# Patient Record
Sex: FEMALE | Race: WHITE | NOT HISPANIC OR LATINO | Employment: PART TIME | URBAN - NONMETROPOLITAN AREA
[De-identification: names, ages, dates, MRNs, and addresses within clinical notes are randomized per-mention and may not be internally consistent; named-entity substitution may affect disease eponyms.]

---

## 2016-08-25 LAB
EXTERNAL ABO GROUPING: NORMAL
EXTERNAL ANTIBODY SCREEN: NEGATIVE
EXTERNAL GONORRHEA SCREEN: NEGATIVE
EXTERNAL HEPATITIS B SURFACE ANTIGEN: NEGATIVE
EXTERNAL RH FACTOR: POSITIVE
EXTERNAL RUBELLA QUALITATIVE: NORMAL
HIV1 AB SPEC QL IA.RAPID: NEGATIVE

## 2017-02-07 ENCOUNTER — INITIAL PRENATAL (OUTPATIENT)
Dept: OBSTETRICS AND GYNECOLOGY | Facility: CLINIC | Age: 27
End: 2017-02-07

## 2017-02-07 ENCOUNTER — PROCEDURE VISIT (OUTPATIENT)
Dept: OBSTETRICS AND GYNECOLOGY | Facility: CLINIC | Age: 27
End: 2017-02-07

## 2017-02-07 VITALS
HEIGHT: 64 IN | BODY MASS INDEX: 25.27 KG/M2 | SYSTOLIC BLOOD PRESSURE: 110 MMHG | DIASTOLIC BLOOD PRESSURE: 65 MMHG | WEIGHT: 148 LBS

## 2017-02-07 DIAGNOSIS — Z34.83 ENCOUNTER FOR SUPERVISION OF OTHER NORMAL PREGNANCY IN THIRD TRIMESTER: Primary | ICD-10-CM

## 2017-02-07 DIAGNOSIS — O36.5930 SMALL FOR DATES AFFECTING MANAGEMENT OF MOTHER, THIRD TRIMESTER, NOT APPLICABLE OR UNSPECIFIED FETUS: Primary | ICD-10-CM

## 2017-02-07 PROBLEM — Z34.93 ENCOUNTER FOR SUPERVISION OF NORMAL PREGNANCY IN THIRD TRIMESTER: Status: ACTIVE | Noted: 2017-02-07

## 2017-02-07 PROCEDURE — 0501F PRENATAL FLOW SHEET: CPT | Performed by: OBSTETRICS & GYNECOLOGY

## 2017-02-07 PROCEDURE — 90715 TDAP VACCINE 7 YRS/> IM: CPT | Performed by: OBSTETRICS & GYNECOLOGY

## 2017-02-07 PROCEDURE — 90471 IMMUNIZATION ADMIN: CPT | Performed by: OBSTETRICS & GYNECOLOGY

## 2017-02-07 PROCEDURE — 76816 OB US FOLLOW-UP PER FETUS: CPT | Performed by: OBSTETRICS & GYNECOLOGY

## 2017-02-07 RX ORDER — PROMETHAZINE HYDROCHLORIDE 25 MG/1
25 TABLET ORAL EVERY 6 HOURS PRN
COMMUNITY
End: 2017-02-07 | Stop reason: SDUPTHER

## 2017-02-07 RX ORDER — PRENATAL VIT NO.126/IRON/FOLIC 28MG-0.8MG
1 TABLET ORAL DAILY
COMMUNITY
End: 2017-11-29

## 2017-02-07 RX ORDER — RANITIDINE 150 MG/1
150 TABLET ORAL 2 TIMES DAILY
COMMUNITY
End: 2017-04-20 | Stop reason: HOSPADM

## 2017-02-07 RX ORDER — FERROUS SULFATE 325(65) MG
325 TABLET ORAL
COMMUNITY
End: 2017-06-14

## 2017-02-07 RX ORDER — PROMETHAZINE HYDROCHLORIDE 25 MG/1
25 TABLET ORAL EVERY 6 HOURS PRN
Qty: 30 TABLET | Refills: 3 | Status: SHIPPED | OUTPATIENT
Start: 2017-02-07 | End: 2017-04-20 | Stop reason: HOSPADM

## 2017-02-07 NOTE — PROGRESS NOTES
Transfer OB, first pregnancy uncomplicated but delivery complicated by postpartum hemorrhage and transfusion. Second pregnancy complicated by severe hyperemesis but normal delivery.  Having a GIRL this time, feeling well. Has had flu vaccine.  Taking prenatal vitamin and iron supplement.  Labs and records reviewed, normal labs with blood type O positive, Glucola 127. Normal anatomy US and BHARGAVI by 7 week US.  US today shows 39% growth, AIDA 12.9  Tdap today in office.  Discussed contraception, patient considering surgical sterilization.

## 2017-02-21 ENCOUNTER — ROUTINE PRENATAL (OUTPATIENT)
Dept: OBSTETRICS AND GYNECOLOGY | Facility: CLINIC | Age: 27
End: 2017-02-21

## 2017-02-21 VITALS — SYSTOLIC BLOOD PRESSURE: 100 MMHG | DIASTOLIC BLOOD PRESSURE: 70 MMHG | BODY MASS INDEX: 26.26 KG/M2 | WEIGHT: 153 LBS

## 2017-02-21 DIAGNOSIS — Z34.93 PRENATAL CARE, THIRD TRIMESTER: ICD-10-CM

## 2017-02-21 DIAGNOSIS — Z78.9 NON-SMOKER: Primary | ICD-10-CM

## 2017-02-21 PROCEDURE — 0502F SUBSEQUENT PRENATAL CARE: CPT | Performed by: OBSTETRICS & GYNECOLOGY

## 2017-02-21 NOTE — PROGRESS NOTES
Kick count instructions were reviewed and encouraged.  Labor signs and symptoms were reviewed.  Patient was encouraged to come to hospital or call for bleeding, leakage of fluid or any other concerns.

## 2017-03-07 ENCOUNTER — ROUTINE PRENATAL (OUTPATIENT)
Dept: OBSTETRICS AND GYNECOLOGY | Facility: CLINIC | Age: 27
End: 2017-03-07

## 2017-03-07 VITALS — BODY MASS INDEX: 27.29 KG/M2 | WEIGHT: 159 LBS | DIASTOLIC BLOOD PRESSURE: 72 MMHG | SYSTOLIC BLOOD PRESSURE: 105 MMHG

## 2017-03-07 DIAGNOSIS — Z34.83 ENCOUNTER FOR SUPERVISION OF OTHER NORMAL PREGNANCY IN THIRD TRIMESTER: ICD-10-CM

## 2017-03-07 DIAGNOSIS — Z78.9 NON-SMOKER: Primary | ICD-10-CM

## 2017-03-07 PROCEDURE — 0502F SUBSEQUENT PRENATAL CARE: CPT | Performed by: OBSTETRICS & GYNECOLOGY

## 2017-03-07 NOTE — PROGRESS NOTES
Pt c/o frequent BH contractions and has passed a little bit of mucus.  Zantac not really helping with GERD, recommended Pepcid AC.  Good FM.  .  Patient reports that she has already had a TDaP.  No concerns.

## 2017-03-21 ENCOUNTER — ROUTINE PRENATAL (OUTPATIENT)
Dept: OBSTETRICS AND GYNECOLOGY | Facility: CLINIC | Age: 27
End: 2017-03-21

## 2017-03-21 VITALS — WEIGHT: 158 LBS | SYSTOLIC BLOOD PRESSURE: 118 MMHG | DIASTOLIC BLOOD PRESSURE: 72 MMHG | BODY MASS INDEX: 27.12 KG/M2

## 2017-03-21 DIAGNOSIS — Z34.83 PRENATAL CARE, SUBSEQUENT PREGNANCY, THIRD TRIMESTER: Primary | ICD-10-CM

## 2017-03-21 DIAGNOSIS — Z34.93 PRENATAL CARE, THIRD TRIMESTER: ICD-10-CM

## 2017-03-21 DIAGNOSIS — Z78.9 NON-SMOKER: ICD-10-CM

## 2017-03-21 LAB — EXTERNAL GROUP B STREP ANTIGEN: NEGATIVE

## 2017-03-21 PROCEDURE — 0502F SUBSEQUENT PRENATAL CARE: CPT | Performed by: OBSTETRICS & GYNECOLOGY

## 2017-03-24 LAB
C TRACH RRNA SPEC QL NAA+PROBE: NEGATIVE
GP B STREP DNA SPEC QL NAA+PROBE: NEGATIVE
N GONORRHOEA RRNA SPEC QL NAA+PROBE: NEGATIVE

## 2017-03-28 ENCOUNTER — ROUTINE PRENATAL (OUTPATIENT)
Dept: OBSTETRICS AND GYNECOLOGY | Facility: CLINIC | Age: 27
End: 2017-03-28

## 2017-03-28 ENCOUNTER — PROCEDURE VISIT (OUTPATIENT)
Dept: OBSTETRICS AND GYNECOLOGY | Facility: CLINIC | Age: 27
End: 2017-03-28

## 2017-03-28 VITALS — WEIGHT: 155 LBS | BODY MASS INDEX: 26.61 KG/M2 | DIASTOLIC BLOOD PRESSURE: 80 MMHG | SYSTOLIC BLOOD PRESSURE: 110 MMHG

## 2017-03-28 DIAGNOSIS — O36.5130: Primary | ICD-10-CM

## 2017-03-28 DIAGNOSIS — Z34.83 PRENATAL CARE, SUBSEQUENT PREGNANCY, THIRD TRIMESTER: ICD-10-CM

## 2017-03-28 DIAGNOSIS — Z78.9 NONSMOKER: Primary | ICD-10-CM

## 2017-03-28 PROCEDURE — 76816 OB US FOLLOW-UP PER FETUS: CPT | Performed by: OBSTETRICS & GYNECOLOGY

## 2017-03-28 PROCEDURE — 76819 FETAL BIOPHYS PROFIL W/O NST: CPT | Performed by: OBSTETRICS & GYNECOLOGY

## 2017-03-28 PROCEDURE — 59425 ANTEPARTUM CARE ONLY: CPT | Performed by: OBSTETRICS & GYNECOLOGY

## 2017-04-11 ENCOUNTER — ROUTINE PRENATAL (OUTPATIENT)
Dept: OBSTETRICS AND GYNECOLOGY | Facility: CLINIC | Age: 27
End: 2017-04-11

## 2017-04-11 VITALS — WEIGHT: 160 LBS | BODY MASS INDEX: 27.46 KG/M2 | SYSTOLIC BLOOD PRESSURE: 112 MMHG | DIASTOLIC BLOOD PRESSURE: 78 MMHG

## 2017-04-11 DIAGNOSIS — Z34.93 PRENATAL CARE IN THIRD TRIMESTER: Primary | ICD-10-CM

## 2017-04-11 PROCEDURE — 0502F SUBSEQUENT PRENATAL CARE: CPT | Performed by: NURSE PRACTITIONER

## 2017-04-11 NOTE — PROGRESS NOTES
+FHTs, +FM.  Doing well.  Occasional contractions.  Cvx 2/30/hi.  RTO Monday for postdate US and appt with MD.

## 2017-04-17 ENCOUNTER — PROCEDURE VISIT (OUTPATIENT)
Dept: OBSTETRICS AND GYNECOLOGY | Facility: CLINIC | Age: 27
End: 2017-04-17

## 2017-04-17 ENCOUNTER — ROUTINE PRENATAL (OUTPATIENT)
Dept: OBSTETRICS AND GYNECOLOGY | Facility: CLINIC | Age: 27
End: 2017-04-17

## 2017-04-17 VITALS — WEIGHT: 160 LBS | SYSTOLIC BLOOD PRESSURE: 128 MMHG | BODY MASS INDEX: 27.46 KG/M2 | DIASTOLIC BLOOD PRESSURE: 90 MMHG

## 2017-04-17 DIAGNOSIS — O48.0 POSTMATURITY PREGNANCY, 40-42 WEEKS GESTATION: Primary | ICD-10-CM

## 2017-04-17 DIAGNOSIS — Z34.83 ENCOUNTER FOR SUPERVISION OF OTHER NORMAL PREGNANCY IN THIRD TRIMESTER: Primary | ICD-10-CM

## 2017-04-17 DIAGNOSIS — O48.0 POST-TERM PREGNANCY, 40-42 WEEKS OF GESTATION: Primary | ICD-10-CM

## 2017-04-17 PROCEDURE — 76816 OB US FOLLOW-UP PER FETUS: CPT | Performed by: OBSTETRICS & GYNECOLOGY

## 2017-04-17 PROCEDURE — 0502F SUBSEQUENT PRENATAL CARE: CPT | Performed by: OBSTETRICS & GYNECOLOGY

## 2017-04-17 PROCEDURE — 76819 FETAL BIOPHYS PROFIL W/O NST: CPT | Performed by: OBSTETRICS & GYNECOLOGY

## 2017-04-17 RX ORDER — CALCIUM CARBONATE 200(500)MG
2 TABLET,CHEWABLE ORAL 3 TIMES DAILY PRN
Status: CANCELLED | OUTPATIENT
Start: 2017-04-17

## 2017-04-17 RX ORDER — PROMETHAZINE HYDROCHLORIDE 25 MG/1
12.5 SUPPOSITORY RECTAL EVERY 6 HOURS PRN
Status: CANCELLED | OUTPATIENT
Start: 2017-04-17

## 2017-04-17 RX ORDER — PROMETHAZINE HYDROCHLORIDE 25 MG/ML
12.5 INJECTION, SOLUTION INTRAMUSCULAR; INTRAVENOUS EVERY 6 HOURS PRN
Status: CANCELLED | OUTPATIENT
Start: 2017-04-17

## 2017-04-17 RX ORDER — FAMOTIDINE 10 MG
20 TABLET ORAL ONCE AS NEEDED
Status: CANCELLED | OUTPATIENT
Start: 2017-04-17

## 2017-04-17 RX ORDER — SODIUM CHLORIDE 0.9 % (FLUSH) 0.9 %
1-10 SYRINGE (ML) INJECTION AS NEEDED
Status: CANCELLED | OUTPATIENT
Start: 2017-04-17

## 2017-04-17 RX ORDER — LIDOCAINE HYDROCHLORIDE 10 MG/ML
5 INJECTION, SOLUTION INFILTRATION; PERINEURAL AS NEEDED
Status: CANCELLED | OUTPATIENT
Start: 2017-04-17

## 2017-04-17 RX ORDER — METHYLERGONOVINE MALEATE 0.2 MG/ML
200 INJECTION INTRAVENOUS AS NEEDED
Status: CANCELLED | OUTPATIENT
Start: 2017-04-17

## 2017-04-17 RX ORDER — BUTORPHANOL TARTRATE 1 MG/ML
1 INJECTION, SOLUTION INTRAMUSCULAR; INTRAVENOUS
Status: CANCELLED | OUTPATIENT
Start: 2017-04-17

## 2017-04-17 RX ORDER — PROMETHAZINE HYDROCHLORIDE 25 MG/1
12.5 TABLET ORAL EVERY 6 HOURS PRN
Status: CANCELLED | OUTPATIENT
Start: 2017-04-17

## 2017-04-17 RX ORDER — ACETAMINOPHEN 325 MG/1
650 TABLET ORAL EVERY 4 HOURS PRN
Status: CANCELLED | OUTPATIENT
Start: 2017-04-17

## 2017-04-17 RX ORDER — FAMOTIDINE 10 MG/ML
20 INJECTION, SOLUTION INTRAVENOUS ONCE AS NEEDED
Status: CANCELLED | OUTPATIENT
Start: 2017-04-17

## 2017-04-17 RX ORDER — CARBOPROST TROMETHAMINE 250 UG/ML
250 INJECTION, SOLUTION INTRAMUSCULAR AS NEEDED
Status: CANCELLED | OUTPATIENT
Start: 2017-04-17

## 2017-04-17 RX ORDER — MISOPROSTOL 100 UG/1
800 TABLET ORAL AS NEEDED
Status: CANCELLED | OUTPATIENT
Start: 2017-04-17

## 2017-04-17 RX ORDER — TERBUTALINE SULFATE 1 MG/ML
0.25 INJECTION, SOLUTION SUBCUTANEOUS AS NEEDED
Status: CANCELLED | OUTPATIENT
Start: 2017-04-17

## 2017-04-17 RX ORDER — OXYCODONE HYDROCHLORIDE AND ACETAMINOPHEN 5; 325 MG/1; MG/1
2 TABLET ORAL EVERY 4 HOURS PRN
Status: CANCELLED | OUTPATIENT
Start: 2017-04-17 | End: 2017-04-27

## 2017-04-17 RX ORDER — BUTORPHANOL TARTRATE 1 MG/ML
2 INJECTION, SOLUTION INTRAMUSCULAR; INTRAVENOUS
Status: CANCELLED | OUTPATIENT
Start: 2017-04-17

## 2017-04-17 NOTE — H&P
[facility]  Mirela Flores  : 1990  MRN: [id number]  CSN: [id number]    History and Physical    Subjective   Mirela Flores is a 27 y.o. year old  with an Estimated Date of Delivery: 17 scheduled for induction of labor on 17 due to post dates pregnancy with favorable cervix.  Prenatal care has been with Dr. Roosevelt Stevens.  It has been benign.    Obstetric History       T2      TAB0   SAB1   E0   M0   L2       # Outcome Date GA Lbr Parish/2nd Weight Sex Delivery Anes PTL Lv   4 Current            3 SAB 16 7w0d    SAB      2 Term 14 39w0d  7 lb 6 oz (3.345 kg) M Vag-Spont EPI N Y   1 Term 13 39w0d  7 lb 6 oz (3.345 kg) M Vag-Spont EPI Y Y      Complications: Postpartum hemorrhage        No past medical history on file.  Past Surgical History:   Procedure Laterality Date   • CHOLECYSTECTOMY     • D&C WITH SUCTION     • OVARIAN CYST SURGERY Right    • TYMPANOSTOMY TUBE PLACEMENT         Current Outpatient Prescriptions:   •  ferrous sulfate 325 (65 FE) MG tablet, Take 325 mg by mouth Daily With Breakfast., Disp: , Rfl:   •  Prenatal Vit-Fe Fumarate-FA (PRENATAL, CLASSIC, VITAMIN) 28-0.8 MG tablet tablet, Take  by mouth Daily., Disp: , Rfl:   •  promethazine (PHENERGAN) 25 MG tablet, Take 1 tablet by mouth Every 6 (Six) Hours As Needed for nausea or vomiting., Disp: 30 tablet, Rfl: 3  •  raNITIdine (ZANTAC) 150 MG tablet, Take 150 mg by mouth 2 (Two) Times a Day., Disp: , Rfl:     Allergies   Allergen Reactions   • Cephalosporins    • Penicillins      Smoking status: Never Smoker                                                              Smokeless status: Never Used                        Review of Systems      Objective   LMP 2016  General: well developed; well nourished  no acute distress   Heart: regular rate and rhythm   Lungs: breathing is unlabored   Abdomen:  Cervix: soft, non-tender; no masses  was checked: 4 cm / 50 % / -3  Estimated weight 8#        Prenatal Labs  Lab Results   Component Value Date    HGB 12.2 2016    ABORH O Rh Positive 2016       Recent Labs  Lab Results   Component Value Date    HGB 12.2 2016    HCT 34.9 (L) 2016    WBC 7.60 2016     2016           Assessment   1. IUP with an Estimated Date of Delivery: 17  2. Induction of labor scheduled on 17 for post dates pregnancy.         Plan   1. Risks and benefits of induction discussed.  Patient understands that IOL increases the risk of  delivery over spontaneous labor, especially if the patient does not have a favorable cervix.    Margarito Stevens MD  2017

## 2017-04-18 ENCOUNTER — HOSPITAL ENCOUNTER (INPATIENT)
Facility: HOSPITAL | Age: 27
LOS: 2 days | Discharge: HOME | End: 2017-04-20
Attending: OBSTETRICS & GYNECOLOGY | Admitting: OBSTETRICS & GYNECOLOGY

## 2017-04-18 DIAGNOSIS — O48.0 POST TERM PREGNANCY OVER 40 WEEKS: Primary | ICD-10-CM

## 2017-04-18 DIAGNOSIS — O48.0 POSTMATURITY PREGNANCY, 40-42 WEEKS GESTATION: ICD-10-CM

## 2017-04-18 LAB
ABO GROUP BLD: NORMAL
BLD GP AB SCN SERPL QL: NEGATIVE
DEPRECATED RDW RBC AUTO: 49.2 FL (ref 40–54)
ERYTHROCYTE [DISTWIDTH] IN BLOOD BY AUTOMATED COUNT: 14.6 % (ref 12–15)
HCT VFR BLD AUTO: 32.1 % (ref 37–47)
HGB BLD-MCNC: 11.1 G/DL (ref 12–16)
MCH RBC QN AUTO: 32.1 PG (ref 28–32)
MCHC RBC AUTO-ENTMCNC: 34.6 G/DL (ref 33–36)
MCV RBC AUTO: 92.8 FL (ref 82–98)
PLATELET # BLD AUTO: 123 10*3/MM3 (ref 130–400)
PMV BLD AUTO: 12.1 FL (ref 6–12)
RBC # BLD AUTO: 3.46 10*6/MM3 (ref 4.2–5.4)
RH BLD: POSITIVE
WBC NRBC COR # BLD: 9.77 10*3/MM3 (ref 4.8–10.8)

## 2017-04-18 PROCEDURE — 25010000002 BUTORPHANOL PER 1 MG: Performed by: OBSTETRICS & GYNECOLOGY

## 2017-04-18 PROCEDURE — 86900 BLOOD TYPING SEROLOGIC ABO: CPT | Performed by: OBSTETRICS & GYNECOLOGY

## 2017-04-18 PROCEDURE — 85027 COMPLETE CBC AUTOMATED: CPT | Performed by: OBSTETRICS & GYNECOLOGY

## 2017-04-18 PROCEDURE — 59409 OBSTETRICAL CARE: CPT | Performed by: OBSTETRICS & GYNECOLOGY

## 2017-04-18 PROCEDURE — 86901 BLOOD TYPING SEROLOGIC RH(D): CPT | Performed by: OBSTETRICS & GYNECOLOGY

## 2017-04-18 PROCEDURE — 4A1HX4Z MONITORING OF PRODUCTS OF CONCEPTION, CARDIAC ELECTRICAL ACTIVITY, EXTERNAL APPROACH: ICD-10-PCS | Performed by: OBSTETRICS & GYNECOLOGY

## 2017-04-18 PROCEDURE — 86850 RBC ANTIBODY SCREEN: CPT | Performed by: OBSTETRICS & GYNECOLOGY

## 2017-04-18 PROCEDURE — 0HQ9XZZ REPAIR PERINEUM SKIN, EXTERNAL APPROACH: ICD-10-PCS | Performed by: OBSTETRICS & GYNECOLOGY

## 2017-04-18 RX ORDER — MISOPROSTOL 200 UG/1
600 TABLET ORAL ONCE AS NEEDED
Status: DISCONTINUED | OUTPATIENT
Start: 2017-04-18 | End: 2017-04-20 | Stop reason: HOSPADM

## 2017-04-18 RX ORDER — SODIUM CHLORIDE, SODIUM LACTATE, POTASSIUM CHLORIDE, CALCIUM CHLORIDE 600; 310; 30; 20 MG/100ML; MG/100ML; MG/100ML; MG/100ML
125 INJECTION, SOLUTION INTRAVENOUS CONTINUOUS
Status: DISCONTINUED | OUTPATIENT
Start: 2017-04-18 | End: 2017-04-18

## 2017-04-18 RX ORDER — FAMOTIDINE 10 MG/ML
20 INJECTION, SOLUTION INTRAVENOUS ONCE AS NEEDED
Status: DISCONTINUED | OUTPATIENT
Start: 2017-04-18 | End: 2017-04-18 | Stop reason: HOSPADM

## 2017-04-18 RX ORDER — CALCIUM CARBONATE 200(500)MG
2 TABLET,CHEWABLE ORAL 3 TIMES DAILY PRN
Status: DISCONTINUED | OUTPATIENT
Start: 2017-04-18 | End: 2017-04-18 | Stop reason: HOSPADM

## 2017-04-18 RX ORDER — PROMETHAZINE HYDROCHLORIDE 25 MG/ML
12.5 INJECTION, SOLUTION INTRAMUSCULAR; INTRAVENOUS EVERY 6 HOURS PRN
Status: DISCONTINUED | OUTPATIENT
Start: 2017-04-18 | End: 2017-04-18 | Stop reason: HOSPADM

## 2017-04-18 RX ORDER — ACETAMINOPHEN 325 MG/1
650 TABLET ORAL EVERY 4 HOURS PRN
Status: DISCONTINUED | OUTPATIENT
Start: 2017-04-18 | End: 2017-04-18 | Stop reason: HOSPADM

## 2017-04-18 RX ORDER — BUTORPHANOL TARTRATE 1 MG/ML
1 INJECTION, SOLUTION INTRAMUSCULAR; INTRAVENOUS
Status: DISCONTINUED | OUTPATIENT
Start: 2017-04-18 | End: 2017-04-18 | Stop reason: HOSPADM

## 2017-04-18 RX ORDER — OXYTOCIN/RINGER'S LACTATE 20/1000 ML
999 PLASTIC BAG, INJECTION (ML) INTRAVENOUS CONTINUOUS PRN
Status: DISCONTINUED | OUTPATIENT
Start: 2017-04-18 | End: 2017-04-20 | Stop reason: HOSPADM

## 2017-04-18 RX ORDER — ONDANSETRON 2 MG/ML
4 INJECTION INTRAMUSCULAR; INTRAVENOUS EVERY 6 HOURS PRN
Status: DISCONTINUED | OUTPATIENT
Start: 2017-04-18 | End: 2017-04-20 | Stop reason: HOSPADM

## 2017-04-18 RX ORDER — METHYLERGONOVINE MALEATE 0.2 MG/ML
200 INJECTION INTRAVENOUS ONCE AS NEEDED
Status: DISCONTINUED | OUTPATIENT
Start: 2017-04-18 | End: 2017-04-20 | Stop reason: HOSPADM

## 2017-04-18 RX ORDER — POLYETHYLENE GLYCOL 3350 17 G/17G
17 POWDER, FOR SOLUTION ORAL DAILY
Status: DISCONTINUED | OUTPATIENT
Start: 2017-04-18 | End: 2017-04-20 | Stop reason: HOSPADM

## 2017-04-18 RX ORDER — PROMETHAZINE HYDROCHLORIDE 25 MG/ML
12.5 INJECTION, SOLUTION INTRAMUSCULAR; INTRAVENOUS EVERY 6 HOURS PRN
Status: DISCONTINUED | OUTPATIENT
Start: 2017-04-18 | End: 2017-04-20 | Stop reason: HOSPADM

## 2017-04-18 RX ORDER — PROMETHAZINE HYDROCHLORIDE 25 MG/1
25 TABLET ORAL EVERY 6 HOURS PRN
Status: DISCONTINUED | OUTPATIENT
Start: 2017-04-18 | End: 2017-04-20 | Stop reason: HOSPADM

## 2017-04-18 RX ORDER — FAMOTIDINE 20 MG/1
20 TABLET, FILM COATED ORAL ONCE AS NEEDED
Status: DISCONTINUED | OUTPATIENT
Start: 2017-04-18 | End: 2017-04-18 | Stop reason: HOSPADM

## 2017-04-18 RX ORDER — PROMETHAZINE HYDROCHLORIDE 12.5 MG/1
12.5 SUPPOSITORY RECTAL EVERY 6 HOURS PRN
Status: DISCONTINUED | OUTPATIENT
Start: 2017-04-18 | End: 2017-04-18 | Stop reason: HOSPADM

## 2017-04-18 RX ORDER — TERBUTALINE SULFATE 1 MG/ML
0.25 INJECTION, SOLUTION SUBCUTANEOUS AS NEEDED
Status: DISCONTINUED | OUTPATIENT
Start: 2017-04-18 | End: 2017-04-18 | Stop reason: HOSPADM

## 2017-04-18 RX ORDER — METHYLERGONOVINE MALEATE 0.2 MG/ML
200 INJECTION INTRAVENOUS AS NEEDED
Status: DISCONTINUED | OUTPATIENT
Start: 2017-04-18 | End: 2017-04-18 | Stop reason: HOSPADM

## 2017-04-18 RX ORDER — LIDOCAINE HYDROCHLORIDE 10 MG/ML
5 INJECTION, SOLUTION INFILTRATION; PERINEURAL AS NEEDED
Status: DISCONTINUED | OUTPATIENT
Start: 2017-04-18 | End: 2017-04-18 | Stop reason: HOSPADM

## 2017-04-18 RX ORDER — OXYTOCIN/0.9 % SODIUM CHLORIDE 30/500 ML
2-30 PLASTIC BAG, INJECTION (ML) INTRAVENOUS
Status: DISCONTINUED | OUTPATIENT
Start: 2017-04-18 | End: 2017-04-18

## 2017-04-18 RX ORDER — SODIUM CHLORIDE 0.9 % (FLUSH) 0.9 %
1-10 SYRINGE (ML) INJECTION AS NEEDED
Status: DISCONTINUED | OUTPATIENT
Start: 2017-04-18 | End: 2017-04-18 | Stop reason: HOSPADM

## 2017-04-18 RX ORDER — CALCIUM CARBONATE 200(500)MG
2 TABLET,CHEWABLE ORAL 3 TIMES DAILY PRN
Status: DISCONTINUED | OUTPATIENT
Start: 2017-04-18 | End: 2017-04-20 | Stop reason: HOSPADM

## 2017-04-18 RX ORDER — OXYTOCIN/RINGER'S LACTATE 20/1000 ML
125 PLASTIC BAG, INJECTION (ML) INTRAVENOUS CONTINUOUS PRN
Status: DISCONTINUED | OUTPATIENT
Start: 2017-04-18 | End: 2017-04-20 | Stop reason: HOSPADM

## 2017-04-18 RX ORDER — PROMETHAZINE HYDROCHLORIDE 25 MG/1
12.5 TABLET ORAL EVERY 6 HOURS PRN
Status: DISCONTINUED | OUTPATIENT
Start: 2017-04-18 | End: 2017-04-18 | Stop reason: HOSPADM

## 2017-04-18 RX ORDER — SODIUM CHLORIDE 0.9 % (FLUSH) 0.9 %
1-10 SYRINGE (ML) INJECTION AS NEEDED
Status: DISCONTINUED | OUTPATIENT
Start: 2017-04-18 | End: 2017-04-20 | Stop reason: HOSPADM

## 2017-04-18 RX ORDER — ONDANSETRON 4 MG/1
4 TABLET, FILM COATED ORAL EVERY 8 HOURS PRN
Status: DISCONTINUED | OUTPATIENT
Start: 2017-04-18 | End: 2017-04-20 | Stop reason: HOSPADM

## 2017-04-18 RX ORDER — PROMETHAZINE HYDROCHLORIDE 12.5 MG/1
12.5 SUPPOSITORY RECTAL EVERY 6 HOURS PRN
Status: DISCONTINUED | OUTPATIENT
Start: 2017-04-18 | End: 2017-04-20 | Stop reason: HOSPADM

## 2017-04-18 RX ORDER — OXYCODONE HYDROCHLORIDE AND ACETAMINOPHEN 5; 325 MG/1; MG/1
2 TABLET ORAL EVERY 4 HOURS PRN
Status: DISCONTINUED | OUTPATIENT
Start: 2017-04-18 | End: 2017-04-18 | Stop reason: HOSPADM

## 2017-04-18 RX ORDER — HYDROCODONE BITARTRATE AND ACETAMINOPHEN 5; 325 MG/1; MG/1
1 TABLET ORAL EVERY 4 HOURS PRN
Status: DISCONTINUED | OUTPATIENT
Start: 2017-04-18 | End: 2017-04-20 | Stop reason: HOSPADM

## 2017-04-18 RX ORDER — ACETAMINOPHEN 325 MG/1
650 TABLET ORAL EVERY 4 HOURS PRN
Status: DISCONTINUED | OUTPATIENT
Start: 2017-04-18 | End: 2017-04-20 | Stop reason: HOSPADM

## 2017-04-18 RX ORDER — CARBOPROST TROMETHAMINE 250 UG/ML
250 INJECTION, SOLUTION INTRAMUSCULAR AS NEEDED
Status: DISCONTINUED | OUTPATIENT
Start: 2017-04-18 | End: 2017-04-18 | Stop reason: HOSPADM

## 2017-04-18 RX ORDER — IBUPROFEN 200 MG
600 TABLET ORAL EVERY 8 HOURS PRN
Status: DISCONTINUED | OUTPATIENT
Start: 2017-04-18 | End: 2017-04-20 | Stop reason: HOSPADM

## 2017-04-18 RX ORDER — BISACODYL 10 MG
10 SUPPOSITORY, RECTAL RECTAL DAILY PRN
Status: DISCONTINUED | OUTPATIENT
Start: 2017-04-19 | End: 2017-04-20 | Stop reason: HOSPADM

## 2017-04-18 RX ORDER — MISOPROSTOL 200 UG/1
800 TABLET ORAL AS NEEDED
Status: DISCONTINUED | OUTPATIENT
Start: 2017-04-18 | End: 2017-04-18 | Stop reason: HOSPADM

## 2017-04-18 RX ORDER — DOCUSATE SODIUM 100 MG/1
100 CAPSULE, LIQUID FILLED ORAL 2 TIMES DAILY
Status: DISCONTINUED | OUTPATIENT
Start: 2017-04-18 | End: 2017-04-20 | Stop reason: HOSPADM

## 2017-04-18 RX ADMIN — HYDROCODONE BITARTRATE AND ACETAMINOPHEN 1 TABLET: 5; 325 TABLET ORAL at 17:13

## 2017-04-18 RX ADMIN — IBUPROFEN 600 MG: 600 TABLET ORAL at 14:12

## 2017-04-18 RX ADMIN — LIDOCAINE HYDROCHLORIDE 5 ML: 10 INJECTION, SOLUTION INFILTRATION; PERINEURAL at 12:47

## 2017-04-18 RX ADMIN — SODIUM CHLORIDE, POTASSIUM CHLORIDE, SODIUM LACTATE AND CALCIUM CHLORIDE 1000 ML: 600; 310; 30; 20 INJECTION, SOLUTION INTRAVENOUS at 06:32

## 2017-04-18 RX ADMIN — SODIUM CHLORIDE, POTASSIUM CHLORIDE, SODIUM LACTATE AND CALCIUM CHLORIDE 125 ML/HR: 600; 310; 30; 20 INJECTION, SOLUTION INTRAVENOUS at 09:46

## 2017-04-18 RX ADMIN — Medication 125 ML/HR: at 13:51

## 2017-04-18 RX ADMIN — IBUPROFEN 600 MG: 600 TABLET ORAL at 21:38

## 2017-04-18 RX ADMIN — Medication 999 ML/HR: at 12:40

## 2017-04-18 RX ADMIN — OXYTOCIN-SODIUM CHLORIDE 0.9% IV SOLN 30 UNIT/500ML 2 MILLI-UNITS/MIN: 30-0.9/5 SOLUTION at 07:32

## 2017-04-18 RX ADMIN — BUTORPHANOL TARTRATE 1 MG: 1 INJECTION, SOLUTION INTRAMUSCULAR; INTRAVENOUS at 11:05

## 2017-04-18 RX ADMIN — HYDROCODONE BITARTRATE AND ACETAMINOPHEN 1 TABLET: 5; 325 TABLET ORAL at 21:38

## 2017-04-18 NOTE — L&D DELIVERY NOTE
[facility]  Vaginal Delivery Note    Delivery     Delivery: Vaginal, Spontaneous Delivery     YOB: 2016    Time of Birth: 12:36 PM      Anesthesia: None     Delivering clinician: Margarito Stevens    Forceps?   No   Vacuum? No    Shoulder dystocia present: No        Delivery narrative:  Progressed to C/C/+2 and pushed well to deliver a LVIF. LAWRENCE to ROT vigorous to mom's abdomen. Placenta spontaneous and intact with 3VC after IV pitocin. Right labial laceration repaired with 3-0 Vicryl Rapide. Epidural anes. EBL 250cc. No complications. Sponge and needle count correct.       Infant    Findings: female  infant     Infant observations: Weight: No birth weight on file.   Length:    in  Observations/Comments:         Apgars: 8   @ 1 minute /    9   @ 5 minutes   Infant Name:      Placenta, Cord, and Fluid    Placenta delivered  Spontaneous  at   4/18 12:40 PM     Cord: 3 vessels  present.   Nuchal Cord?  no   Cord blood obtained: Yes    Cord gases obtained:  No    Cord gas results: Venous:  No results found for: PHCVEN    Arterial:  No results found for: PHCART     Repair    Episiotomy: None    Lacerations: Yes  Laceration Information  Laceration Repaired?   Perineal:         Periurethral:         Labial:         Sulcus:         Vaginal:         Cervical:            Estimated Blood Loss:       Suture used for repair: 3-0 Vicryl Rapide     Complications  none    Disposition  Mother to Mother Baby/Postpartum  in stable condition currently.  Baby to remains with mom  in stable condition currently.      Margairto Stevens MD  04/18/17  1:01 PM

## 2017-04-18 NOTE — PLAN OF CARE
Problem: Breastfeeding (Adult,NICU,[place],Obstetrics,Pediatric)  Goal: Signs and Symptoms of Listed Potential Problems Will be Absent or Manageable (Breastfeeding)  Outcome: Ongoing (interventions implemented as appropriate)

## 2017-04-18 NOTE — LACTATION NOTE
This note was copied from a baby's chart.  Assisted with latching and infant nursed well after 2 attempts. Skin to skin with mom. Deep jaw dropping sucks and consistent sucking noted. Mother states she feels tug but no pinching. Mother appears comfortable with positioning and infant positioned well.

## 2017-04-18 NOTE — PLAN OF CARE
Problem: Patient Care Overview (Adult)  Goal: Plan of Care Review  Outcome: Ongoing (interventions implemented as appropriate)    04/18/17 0708   Outcome Evaluation   Outcome Summary/Follow up Plan no problems with labor and delivery        Goal: Adult Individualization and Mutuality  Outcome: Ongoing (interventions implemented as appropriate)  Goal: Discharge Needs Assessment  Outcome: Ongoing (interventions implemented as appropriate)    Problem: Labor (Cervical Ripen, Induct, Augment) (Adult,Obstetrics,Pediatric)  Goal: Signs and Symptoms of Listed Potential Problems Will be Absent or Manageable (Labor)  Outcome: Ongoing (interventions implemented as appropriate)

## 2017-04-18 NOTE — PLAN OF CARE
Problem: Patient Care Overview (Adult)  Goal: Plan of Care Review  Outcome: Ongoing (interventions implemented as appropriate)    04/18/17 4028   Outcome Evaluation   Outcome Summary/Follow up Plan FF/ML/U1 with light lochia; voiding; ambulating; breastfeeding; norco x 1 dose for pain et rach well   Coping/Psychosocial Response Interventions   Plan Of Care Reviewed With patient   Patient Care Overview   Progress improving       Goal: Adult Individualization and Mutuality  Outcome: Ongoing (interventions implemented as appropriate)  Goal: Discharge Needs Assessment  Outcome: Ongoing (interventions implemented as appropriate)    Problem: Postpartum, Vaginal Delivery (Adult)  Goal: Signs and Symptoms of Listed Potential Problems Will be Absent or Manageable (Postpartum, Vaginal Delivery)  Outcome: Ongoing (interventions implemented as appropriate)

## 2017-04-18 NOTE — LACTATION NOTE
40/4 week  in labor with Pitocin planning to breastfeed. Patient states she  2 previous children but required supplementation with formula due to children not gaining weight. 2nd child  approximately 4 months while supplementing. New Medela Double Electric breast pump at home. Initial Breastfeeding Teaching Packet reviewed with patient. See Lactation Assessment.     Maternal Hx: D&C, Right ovarian cyst removed, cholecystectomy, inadequate milk supply  Maternal Meds: Zantac, Ferrous Sulfate, PNV, Phenergan  Pump: New Double Electric Medela Breast Pump

## 2017-04-18 NOTE — PLAN OF CARE
Problem: Labor (Cervical Ripen, Induct, Augment) (Adult,Obstetrics,Pediatric)  Goal: Signs and Symptoms of Listed Potential Problems Will be Absent or Manageable (Labor)  Outcome: Outcome(s) achieved Date Met:  04/18/17

## 2017-04-19 LAB
HCT VFR BLD AUTO: 29.2 % (ref 37–47)
HGB BLD-MCNC: 10 G/DL (ref 12–16)

## 2017-04-19 PROCEDURE — 85014 HEMATOCRIT: CPT | Performed by: OBSTETRICS & GYNECOLOGY

## 2017-04-19 PROCEDURE — 85018 HEMOGLOBIN: CPT | Performed by: OBSTETRICS & GYNECOLOGY

## 2017-04-19 RX ADMIN — HYDROCODONE BITARTRATE AND ACETAMINOPHEN 1 TABLET: 5; 325 TABLET ORAL at 02:49

## 2017-04-19 RX ADMIN — IBUPROFEN 600 MG: 600 TABLET ORAL at 08:40

## 2017-04-19 RX ADMIN — IBUPROFEN 600 MG: 600 TABLET ORAL at 21:46

## 2017-04-19 NOTE — LACTATION NOTE
This note was copied from a baby's chart.  Bilateral compression stripes noted per breast exam. No feeding at this time. Encouraged lanolin, milk expression, air drying nipples. Cool gels given. Described how to attain deep latch and emphasized need for same at each feed to avoid further nipple trauma. Pt states abrasions happened last night when she and baby both very sleepy. Pt also had been pulling breast from infant's mouth before breaking suction to disengage. Teaching completed on same.    Hx low supply discussed. Says does not have dx of PCOS, but had cysts removed in past. She also has thyroid problems, is being monitored, but is not on med for it. She had no problems with infertility. Denies:  HTN, breast surgeries, DM. Cautioned use of anything that could negatively impact milk supply:  OTC cold/allergy meds, peppermint, sandro, hormonal birth control, missing feeding sessions, lack of stimulation, lack of milk removal.       Many topics covered per pt's questions:  Galactogogues, lactogenesis II, infant abdomen size, (Dad voiced understanding at small amount of milk required on day 1 , 5 mls,), self-regulating feeds at breast by infant, hunger cues. Pt tracking feeds well.   Plan is to do everything to attain maximum milk supply- to formula feed only if necessary for adequate weight gain, but not until it is confirmed it is needed. She desires exclusive BFing.     Pump:  Has double electric at home    Education given:    BF cards    Breastfeeding A Great Start Book by Erika Stanton RN, LCCE, ICD and GKim Burrell MD, FACOG    [facility] by [facility]    Freshly Expressed Breastmilk Storage Guidelines for Healthy Term Babies References: [email or web address]

## 2017-04-19 NOTE — PLAN OF CARE
Problem: Patient Care Overview (Adult)  Goal: Plan of Care Review  Outcome: Ongoing (interventions implemented as appropriate)    17 [id number]   Outcome Evaluation   Outcome Summary/Follow up Plan fundus firm, midline, u2,lochia scant, voiding, nipples cracked with lanoling cream to nipples prn, po pain medication given prn, saline lock removed, breastfeeding is going good   Coping/Psychosocial Response Interventions   Plan Of Care Reviewed With patient;spouse   Patient Care Overview   Progress improving       Goal: Adult Individualization and Mutuality  Outcome: Ongoing (interventions implemented as appropriate)  Goal: Discharge Needs Assessment  Outcome: Ongoing (interventions implemented as appropriate)    Problem: Breastfeeding (Adult,NICU,,Obstetrics,Pediatric)  Goal: Signs and Symptoms of Listed Potential Problems Will be Absent or Manageable (Breastfeeding)  Outcome: Ongoing (interventions implemented as appropriate)    Problem: Postpartum, Vaginal Delivery (Adult)  Goal: Signs and Symptoms of Listed Potential Problems Will be Absent or Manageable (Postpartum, Vaginal Delivery)  Outcome: Ongoing (interventions implemented as appropriate)

## 2017-04-19 NOTE — PROGRESS NOTES
"[facility]  Vaginal Delivery Progress Note    Subjective   Subjective:   Diet: Adequate intake.    Activity level: Normal.    Pain control: Well controlled.      Postpartum Day 1: Vaginal Delivery    The patient feels well.  Her pain is well controlled with prescribed pain medications.   She is ambulating well.  Patient describes her bleeding as thin lochia.    Breastfeeding: infant latching.    Objective     Objective   Vital Signs Range for the last 24 hours  Temperature: Temp:  [97.2 °F (36.2 °C)-98.1 °F (36.7 °C)] 97.8 °F (36.6 °C)   Temp Source: Temp src: Temporal Artery    BP: BP: (115-136)/(69-89) 115/76   Pulse: Heart Rate:  [] 65   Respirations: Resp:  [16-20] 18   SPO2: SpO2:  [98 %-100 %] 98 %   O2 Amount (l/min): Flow (L/min):  [10] 10   O2 Devices O2 Device: room air   Weight:       Admit Height:  Height: 64\" (162.6 cm)    Physical Exam:  General:  no acute distresss.  Abdomen: abdomen is soft without significant tenderness, masses, organomegaly or guarding. Fundus: appropriate, firm, non tender  Extremities: normal, atraumatic, no cyanosis, and trace edema.       Lab results reviewed:  Yes   Rubella:  No results found for: RUBELLAIGGIN Nurse Transcribed from prenatal record --  No components found for: EXTRUBELQUAL  Rh Status:    RH type   Date Value Ref Range Status   04/18/2017 Positive  Final     Immunizations:   Immunization History   Administered Date(s) Administered   • Tdap 02/07/2017       Assessment/Plan   Assessment & Plan  Active Problems:    Post term pregnancy over 40 weeks      Mirela Flores is Day 1  post-partum  Vaginal, Spontaneous Delivery  local adm per MD for repair .      Plan:  Continue current care.      Roseanne Michele MD  4/19/2017  8:31 AM    "

## 2017-04-19 NOTE — PLAN OF CARE
Problem: Patient Care Overview (Adult)  Goal: Plan of Care Review  Outcome: Ongoing (interventions implemented as appropriate)    17 0404   Outcome Evaluation   Outcome Summary/Follow up Plan FF ML U1 Scant lochia. voiding. Breastfeeding well. Norco and Motrin given for pain this shift per pt request.   Coping/Psychosocial Response Interventions   Plan Of Care Reviewed With patient   Patient Care Overview   Progress progress toward functional goals as expected       Goal: Adult Individualization and Mutuality  Outcome: Ongoing (interventions implemented as appropriate)  Goal: Discharge Needs Assessment  Outcome: Ongoing (interventions implemented as appropriate)    Problem: Breastfeeding (Adult,NICU,,Obstetrics,Pediatric)  Goal: Signs and Symptoms of Listed Potential Problems Will be Absent or Manageable (Breastfeeding)  Outcome: Ongoing (interventions implemented as appropriate)    Problem: Postpartum, Vaginal Delivery (Adult)  Goal: Signs and Symptoms of Listed Potential Problems Will be Absent or Manageable (Postpartum, Vaginal Delivery)  Outcome: Ongoing (interventions implemented as appropriate)

## 2017-04-20 ENCOUNTER — TELEPHONE (OUTPATIENT)
Dept: OBSTETRICS AND GYNECOLOGY | Facility: CLINIC | Age: 27
End: 2017-04-20

## 2017-04-20 VITALS
WEIGHT: 163.5 LBS | HEART RATE: 64 BPM | SYSTOLIC BLOOD PRESSURE: 129 MMHG | OXYGEN SATURATION: 98 % | RESPIRATION RATE: 18 BRPM | TEMPERATURE: 98 F | HEIGHT: 64 IN | BODY MASS INDEX: 27.91 KG/M2 | DIASTOLIC BLOOD PRESSURE: 89 MMHG

## 2017-04-20 RX ORDER — HYDROCODONE BITARTRATE AND ACETAMINOPHEN 5; 325 MG/1; MG/1
1 TABLET ORAL EVERY 6 HOURS PRN
Qty: 10 TABLET | Refills: 0 | Status: SHIPPED | OUTPATIENT
Start: 2017-04-20 | End: 2017-04-28

## 2017-04-20 RX ADMIN — IBUPROFEN 600 MG: 600 TABLET ORAL at 08:51

## 2017-04-20 NOTE — TELEPHONE ENCOUNTER
Patient is being discharged from  per Cathie.  She has changed her mind and would like to go home with some pain medication.  Can you please review. Patient is still in house

## 2017-04-20 NOTE — PLAN OF CARE
Problem: Patient Care Overview (Adult)  Goal: Plan of Care Review  Outcome: Ongoing (interventions implemented as appropriate)    17 0541   Outcome Evaluation   Outcome Summary/Follow up Plan FF ML U2. Scant Lochia. VSS. Motrin given per pt request.    Coping/Psychosocial Response Interventions   Plan Of Care Reviewed With patient   Patient Care Overview   Progress progress toward functional goals as expected       Goal: Adult Individualization and Mutuality  Outcome: Ongoing (interventions implemented as appropriate)  Goal: Discharge Needs Assessment  Outcome: Ongoing (interventions implemented as appropriate)    Problem: Breastfeeding (Adult,NICU,[place],Obstetrics,Pediatric)  Goal: Signs and Symptoms of Listed Potential Problems Will be Absent or Manageable (Breastfeeding)  Outcome: Ongoing (interventions implemented as appropriate)    Problem: Postpartum, Vaginal Delivery (Adult)  Goal: Signs and Symptoms of Listed Potential Problems Will be Absent or Manageable (Postpartum, Vaginal Delivery)  Outcome: Ongoing (interventions implemented as appropriate)

## 2017-04-20 NOTE — LACTATION NOTE
This note was copied from a baby's chart.  40/4 week female infant born via vaginal delivery 17 @1236.  Birth weight 7 lb 15.7 oz (3620 g). Current weight 7 lb 9.4 oz (3442 g). Weight loss -4.93%.  12 breastfeeds, 2 voids and 2 stools in last 24 hours.  Mother states she feels like feedings are going very well.  States her breasts feel more full today than yesterday.  Encouraged importance of nursing at least every three hours and more often if infant desires related to mother's history of low milk supply.  Discussed management of engorgement and signs and symptoms of mastitis.  Encouraged mother to call lactation staff with any further questions following discharge.       Maternal hx: , hx low milk supply  Maternal meds: iron, PNV, phenergan, zantac  Pump: has pump at home

## 2017-04-20 NOTE — PROGRESS NOTES
"[facility]  Vaginal Delivery Progress Note    Subjective   Postpartum Day 2: Vaginal Delivery    The patient feels well.  Her pain is well controlled with nonsteroidal anti-inflammatory drugs.   She is ambulating well.  Patient describes her bleeding as thin lochia.    Breastfeeding: infant latching without difficulty.    Objective     Vital Signs Range for the last 24 hours  Temperature: Temp:  [97.2 °F (36.2 °C)-98 °F (36.7 °C)] 97.2 °F (36.2 °C)   Temp Source: Temp src: Temporal Artery    BP: BP: (118-130)/(67-78) 118/78   Pulse: Heart Rate:  [61-71] 67   Respirations: Resp:  [16-18] 18   SPO2: SpO2:  [98 %-100 %] 100 %   O2 Amount (l/min):     O2 Devices O2 Device: room air   Weight:       Admit Height:  Height: 64\" (162.6 cm)      Physical Exam:  General:  no acute distresss.  Abdomen: Fundus: appropriate, firm, non tender  Extremities: normal, atraumatic, no cyanosis, and trace edema.       Lab results reviewed:  Yes   Rubella:  No results found for: RUBELLAIGGIN Nurse Transcribed from prenatal record --  No components found for: EXTRUBELQUAL  Rh Status:    RH type   Date Value Ref Range Status   04/18/2017 Positive  Final     Immunizations:   Immunization History   Administered Date(s) Administered   • Tdap 02/07/2017     Lab Results (last 24 hours)     ** No results found for the last 24 hours. **          Assessment/Plan     Active Problems:    Post term pregnancy over 40 weeks      Mirela Flores is Day 2  post-partum  Vaginal, Spontaneous Delivery  local adm per MD for repair .      Plan:  Discharge home with standard precautions and return to clinic in 4-6 weeks.      Margarito Stevens MD  4/20/2017  6:40 AM      "

## 2017-04-20 NOTE — DISCHARGE SUMMARY
Discharge Summary    [facility]  Mirela Flores  : 1990  MRN: [id number]  CSN: [id number]    Date of Admission: 2017   Date of Discharge:  2017   Delivering Physician: Margarito Stevens        Admission Diagnosis: 1. Post term pregnancy over 40 weeks [O48.0]   Discharge Diagnosis: 1. Pregnancy at 40w4d - delivered       Procedures: 2017  - Vaginal, Spontaneous Delivery       Hospital Course  Patient is a 27 y.o.  who at 40w4d had a vaginal birth.  Her postpartum course was without complications.  On PPD #2 she was ready for discharge.  She had normal lochia and pain well controlled with oral medications.    Infant  female  fetus weighing 7 lb 15.7 oz (3.62 kg)   Apgars -  8  @ 1 minute /  9  @ 5 minutes.    Discharge labs  Lab Results   Component Value Date    WBC 9.77 2017    HGB 10.0 (L) 2017    HCT 29.2 (L) 2017     (L) 2017       Discharge Medications   Mirela Flores   Home Medication Instructions RASTA:[id number]    Printed on:17 0641   Medication Information                      ferrous sulfate 325 (65 FE) MG tablet  Take 325 mg by mouth Daily With Breakfast.             Prenatal Vit-Fe Fumarate-FA (PRENATAL, CLASSIC, VITAMIN) 28-0.8 MG tablet tablet  Take  by mouth Daily.                 Discharge Disposition Home or Self Care   Condition on Discharge: good   Follow-up: 6 weeks with Cesar Stevens MD  2017

## 2017-04-26 ENCOUNTER — TELEPHONE (OUTPATIENT)
Dept: LACTATION | Facility: HOSPITAL | Age: 27
End: 2017-04-26

## 2017-04-26 NOTE — TELEPHONE ENCOUNTER
Infant, Estrella, 1 week    Mirela called as Estrella has begun feeding for maximum of 10 mins each breast or sometimes 10-15 mins one side only. Mirela says she is full after this. She cannot elicit more feeding cues or get her to nurse longer. She hears gulping at breast. Infant has adequate wet/dirty diapers. Mirela reports weight was 8-0 in hospital and she weighed 7-15 at last MD visit. Assured her  these are indications that Estrella is feeding well and that shorter duration is likely due to abundant milk supply. Much praise / encouragement given. Invited to Andres Beckman.

## 2017-05-30 ENCOUNTER — POSTPARTUM VISIT (OUTPATIENT)
Dept: OBSTETRICS AND GYNECOLOGY | Facility: CLINIC | Age: 27
End: 2017-05-30

## 2017-05-30 VITALS
DIASTOLIC BLOOD PRESSURE: 70 MMHG | HEIGHT: 64 IN | WEIGHT: 127 LBS | BODY MASS INDEX: 21.68 KG/M2 | SYSTOLIC BLOOD PRESSURE: 115 MMHG

## 2017-05-30 DIAGNOSIS — R87.610 ASCUS WITH POSITIVE HIGH RISK HPV CERVICAL: Primary | ICD-10-CM

## 2017-05-30 DIAGNOSIS — R87.810 ASCUS WITH POSITIVE HIGH RISK HPV CERVICAL: Primary | ICD-10-CM

## 2017-05-30 DIAGNOSIS — Z78.9 NONSMOKER: ICD-10-CM

## 2017-05-30 PROCEDURE — 0503F POSTPARTUM CARE VISIT: CPT | Performed by: OBSTETRICS & GYNECOLOGY

## 2017-05-30 PROCEDURE — G0123 SCREEN CERV/VAG THIN LAYER: HCPCS | Performed by: OBSTETRICS & GYNECOLOGY

## 2017-06-01 LAB
GEN CATEG CVX/VAG CYTO-IMP: ABNORMAL
LAB AP CASE REPORT: ABNORMAL
LAB AP GYN ADDITIONAL INFORMATION: ABNORMAL
Lab: ABNORMAL
PATH INTERP SPEC-IMP: ABNORMAL
STAT OF ADQ CVX/VAG CYTO-IMP: ABNORMAL

## 2017-06-05 ENCOUNTER — TELEPHONE (OUTPATIENT)
Dept: OBSTETRICS AND GYNECOLOGY | Facility: CLINIC | Age: 27
End: 2017-06-05

## 2017-06-06 ENCOUNTER — TELEPHONE (OUTPATIENT)
Dept: OBSTETRICS AND GYNECOLOGY | Facility: CLINIC | Age: 27
End: 2017-06-06

## 2017-06-14 ENCOUNTER — OFFICE VISIT (OUTPATIENT)
Dept: OBSTETRICS AND GYNECOLOGY | Facility: CLINIC | Age: 27
End: 2017-06-14

## 2017-06-14 VITALS
BODY MASS INDEX: 21.34 KG/M2 | WEIGHT: 125 LBS | SYSTOLIC BLOOD PRESSURE: 120 MMHG | HEIGHT: 64 IN | DIASTOLIC BLOOD PRESSURE: 72 MMHG

## 2017-06-14 DIAGNOSIS — Z78.9 NONSMOKER: ICD-10-CM

## 2017-06-14 DIAGNOSIS — R87.613 HIGH GRADE SQUAMOUS INTRAEPITHELIAL LESION ON CYTOLOGIC SMEAR OF CERVIX (HGSIL): Primary | ICD-10-CM

## 2017-06-14 LAB
B-HCG UR QL: NEGATIVE
INTERNAL NEGATIVE CONTROL: NEGATIVE
INTERNAL POSITIVE CONTROL: NEGATIVE
Lab: NORMAL

## 2017-06-14 PROCEDURE — 81025 URINE PREGNANCY TEST: CPT | Performed by: OBSTETRICS & GYNECOLOGY

## 2017-06-14 PROCEDURE — 88305 TISSUE EXAM BY PATHOLOGIST: CPT | Performed by: OBSTETRICS & GYNECOLOGY

## 2017-06-14 PROCEDURE — 57454 BX/CURETT OF CERVIX W/SCOPE: CPT | Performed by: OBSTETRICS & GYNECOLOGY

## 2017-06-14 NOTE — PROGRESS NOTES
Colposcopy    Date of procedure:  6/14/2017    Risks and benefits discussed? yes           All questions answered? 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  The findings were as follows:  The transformation zone was not able to be seen adequately.    An ECC was performed.    Acetowhite noted surrounding the external os, extending farther our onto the cx from 3-9 o'clock    Ectocervical biopsies were taken from 6 o'clock.  Silver nitrate was applied to acheive hemostasis..    Colposcopic Impression: Adequate colposcopy  BRETT II  Colposcopic findings are consistent with PAP      Plan: Will base further treatment on pathology results.    This note was electronically signed.    Karli Guerrero MD  June 14, 2017  11:52 AM

## 2017-06-16 LAB
CYTO UR: NORMAL
LAB AP CASE REPORT: NORMAL
LAB AP CLINICAL INFORMATION: NORMAL
Lab: NORMAL
PATH REPORT.FINAL DX SPEC: NORMAL
PATH REPORT.GROSS SPEC: NORMAL

## 2017-06-19 ENCOUNTER — TELEPHONE (OUTPATIENT)
Dept: OBSTETRICS AND GYNECOLOGY | Facility: CLINIC | Age: 27
End: 2017-06-19

## 2017-06-20 ENCOUNTER — OFFICE VISIT (OUTPATIENT)
Dept: OBSTETRICS AND GYNECOLOGY | Facility: CLINIC | Age: 27
End: 2017-06-20

## 2017-06-20 VITALS
HEIGHT: 64 IN | BODY MASS INDEX: 21.17 KG/M2 | DIASTOLIC BLOOD PRESSURE: 82 MMHG | WEIGHT: 124 LBS | SYSTOLIC BLOOD PRESSURE: 120 MMHG

## 2017-06-20 DIAGNOSIS — Z78.9 NONSMOKER: ICD-10-CM

## 2017-06-20 DIAGNOSIS — D06.9 CIN III (CERVICAL INTRAEPITHELIAL NEOPLASIA GRADE III) WITH SEVERE DYSPLASIA: Primary | ICD-10-CM

## 2017-06-20 PROCEDURE — 99213 OFFICE O/P EST LOW 20 MIN: CPT | Performed by: OBSTETRICS & GYNECOLOGY

## 2017-06-20 RX ORDER — SODIUM CHLORIDE 0.9 % (FLUSH) 0.9 %
1-10 SYRINGE (ML) INJECTION AS NEEDED
Status: CANCELLED | OUTPATIENT
Start: 2017-06-20

## 2017-06-20 NOTE — H&P
"[facility]  Mirela Flores  : 1990  MRN: [id number]  CSN: [id number]    History and Physical    Subjective   Mirela Flores is a 27 y.o. year old  who presents for consultation about surgery due to BRETT III/carcinoma in-situ on recent colpo biopsies.  The patient recalls that the last PAP before she got pregnant was LGSIL (she is currently postpartum), and then her PP PAP showed HGSIL.  We have discussed the possibility of a LEEP at previous office visits.    No past medical history on file.  Past Surgical History:   Procedure Laterality Date   • CHOLECYSTECTOMY     • D&C WITH SUCTION     • OVARIAN CYST SURGERY Right    • TYMPANOSTOMY TUBE PLACEMENT       Smoking status: Never Smoker                                                              Smokeless status: Never Used                          Current Outpatient Prescriptions:   •  Prenatal Vit-Fe Fumarate-FA (PRENATAL, CLASSIC, VITAMIN) 28-0.8 MG tablet tablet, Take  by mouth Daily., Disp: , Rfl:     Allergies   Allergen Reactions   • Cephalosporins      Pt states was told can never take them again.    • Penicillins      Reaction as a child.  Patient was told can never take again.        Family History   Problem Relation Age of Onset   • Other Father    • No Known Problems Mother    • No Known Problems Paternal Grandfather    • No Known Problems Paternal Grandmother    • Thyroid disease Maternal Grandmother    • Hypertension Maternal Grandmother    • No Known Problems Maternal Grandfather    • Breast cancer Other 60   • Ovarian cancer Neg Hx      Review of Systems   Respiratory: Negative for shortness of breath.    Cardiovascular: Negative for chest pain.   Gastrointestinal: Positive for constipation (for past several weeks). Negative for abdominal pain and diarrhea.   Genitourinary: Positive for pelvic pain (\" a little \".  Patient feels like that started a couple of weeks ago.  She has not had a period yet since delivery.) and vaginal bleeding " "(scent spotting). Negative for difficulty urinating.   Musculoskeletal: Negative for arthralgias, back pain, neck pain and neck stiffness.   Skin: Negative for rash.   Neurological: Positive for headaches (every day recently, she took migraine medication before pregnancy, will resume when not breastfeeding). Negative for dizziness.   Psychiatric/Behavioral: Positive for sleep disturbance (new baby). The patient is not nervous/anxious.          Objective   /82  Ht 64\" (162.6 cm)  Wt 124 lb (56.2 kg)  BMI 21.28 kg/m2    Physical Exam   Physical Exam   Constitutional: She is oriented to person, place, and time. She appears well-developed and well-nourished. No distress.   HENT:   Head: Normocephalic and atraumatic.   Eyes: EOM are normal.   Neck: Normal range of motion. No thyromegaly present.   Cardiovascular: Normal rate and regular rhythm.    No murmur heard.  Pulmonary/Chest: Effort normal and breath sounds normal. She has no wheezes.   Abdominal: Soft. She exhibits no distension. There is no tenderness.   Musculoskeletal: Normal range of motion.   Neurological: She is alert and oriented to person, place, and time.   Skin: Skin is warm and dry.   Psychiatric: She has a normal mood and affect. Her behavior is normal. Judgment normal.   Nursing note and vitals reviewed.      Labs  Lab Results   Component Value Date     (L) 04/18/2017    HGB 10.0 (L) 04/19/2017    HCT 29.2 (L) 04/19/2017    WBC 9.77 04/18/2017     08/18/2016    K 3.7 08/18/2016     08/18/2016    CO2 23 (L) 08/18/2016    BUN 11 08/18/2016    CREATININE 0.51 08/18/2016    GLUCOSE 90 08/18/2016    ALBUMIN 4.4 08/18/2016    CALCIUM 9.0 08/18/2016    AST 25 08/18/2016    ALT 26 08/18/2016    BILITOT 0.4 08/18/2016        Assessment & Plan  Mirela was seen today for pre-op exam.    Diagnoses and all orders for this visit:    BRETT III (cervical intraepithelial neoplasia grade III) with severe dysplasia: patient being scheduled for a " MARCO ANTONIO.  Questions answered.  Procedure diagrammed for patient.  Post-op expectations and restrictions reviewed.    Nonsmoker      There are no diagnoses linked to this encounter.    Karli Guerrero MD  6/20/2017  2:02 PM

## 2017-06-20 NOTE — PROGRESS NOTES
"[facility]  Mirela Flores  : 1990  MRN: [id number]  CSN: [id number]    History and Physical    Subjective   Mirela Flores is a 27 y.o. year old  who presents for consultation about surgery due to BRETT III/carcinoma in-situ on recent colpo biopsies.  The patient recalls that the last PAP before she got pregnant was LGSIL (she is currently postpartum), and then her PP PAP showed HGSIL.  We have discussed the possibility of a LEEP at previous office visits.    No past medical history on file.  Past Surgical History:   Procedure Laterality Date   • CHOLECYSTECTOMY     • D&C WITH SUCTION     • OVARIAN CYST SURGERY Right    • TYMPANOSTOMY TUBE PLACEMENT       Smoking status: Never Smoker                                                              Smokeless status: Never Used                          Current Outpatient Prescriptions:   •  Prenatal Vit-Fe Fumarate-FA (PRENATAL, CLASSIC, VITAMIN) 28-0.8 MG tablet tablet, Take  by mouth Daily., Disp: , Rfl:     Allergies   Allergen Reactions   • Cephalosporins      Pt states was told can never take them again.    • Penicillins      Reaction as a child.  Patient was told can never take again.        Family History   Problem Relation Age of Onset   • Other Father    • No Known Problems Mother    • No Known Problems Paternal Grandfather    • No Known Problems Paternal Grandmother    • Thyroid disease Maternal Grandmother    • Hypertension Maternal Grandmother    • No Known Problems Maternal Grandfather    • Breast cancer Other 60   • Ovarian cancer Neg Hx      Review of Systems   Respiratory: Negative for shortness of breath.    Cardiovascular: Negative for chest pain.   Gastrointestinal: Positive for constipation (for past several weeks). Negative for abdominal pain and diarrhea.   Genitourinary: Positive for pelvic pain (\" a little \".  Patient feels like that started a couple of weeks ago.  She has not had a period yet since delivery.) and vaginal bleeding " "(scent spotting). Negative for difficulty urinating.   Musculoskeletal: Negative for arthralgias, back pain, neck pain and neck stiffness.   Skin: Negative for rash.   Neurological: Positive for headaches (every day recently, she took migraine medication before pregnancy, will resume when not breastfeeding). Negative for dizziness.   Psychiatric/Behavioral: Positive for sleep disturbance (new baby). The patient is not nervous/anxious.          Objective   /82  Ht 64\" (162.6 cm)  Wt 124 lb (56.2 kg)  BMI 21.28 kg/m2    Physical Exam   Physical Exam   Constitutional: She is oriented to person, place, and time. She appears well-developed and well-nourished. No distress.   HENT:   Head: Normocephalic and atraumatic.   Eyes: EOM are normal.   Neck: Normal range of motion. No thyromegaly present.   Cardiovascular: Normal rate and regular rhythm.    No murmur heard.  Pulmonary/Chest: Effort normal and breath sounds normal. She has no wheezes.   Abdominal: Soft. She exhibits no distension. There is no tenderness.   Musculoskeletal: Normal range of motion.   Neurological: She is alert and oriented to person, place, and time.   Skin: Skin is warm and dry.   Psychiatric: She has a normal mood and affect. Her behavior is normal. Judgment normal.   Nursing note and vitals reviewed.      Labs  Lab Results   Component Value Date     (L) 04/18/2017    HGB 10.0 (L) 04/19/2017    HCT 29.2 (L) 04/19/2017    WBC 9.77 04/18/2017     08/18/2016    K 3.7 08/18/2016     08/18/2016    CO2 23 (L) 08/18/2016    BUN 11 08/18/2016    CREATININE 0.51 08/18/2016    GLUCOSE 90 08/18/2016    ALBUMIN 4.4 08/18/2016    CALCIUM 9.0 08/18/2016    AST 25 08/18/2016    ALT 26 08/18/2016    BILITOT 0.4 08/18/2016        Assessment & Plan  Mirela was seen today for pre-op exam.    Diagnoses and all orders for this visit:    BRETT III (cervical intraepithelial neoplasia grade III) with severe dysplasia: patient being scheduled for a " MARCO ANTONIO.  Questions answered.  Procedure diagrammed for patient.  Post-op expectations and restrictions reviewed.    Nonsmoker      There are no diagnoses linked to this encounter.    Karli Guerrero MD  6/20/2017  2:02 PM

## 2017-06-25 ENCOUNTER — RESULTS ENCOUNTER (OUTPATIENT)
Dept: OBSTETRICS AND GYNECOLOGY | Facility: CLINIC | Age: 27
End: 2017-06-25

## 2017-06-25 DIAGNOSIS — D06.9 CIN III (CERVICAL INTRAEPITHELIAL NEOPLASIA GRADE III) WITH SEVERE DYSPLASIA: ICD-10-CM

## 2017-06-26 ENCOUNTER — APPOINTMENT (OUTPATIENT)
Dept: PREADMISSION TESTING | Facility: HOSPITAL | Age: 27
End: 2017-06-26

## 2017-06-30 ENCOUNTER — APPOINTMENT (OUTPATIENT)
Dept: PREADMISSION TESTING | Facility: HOSPITAL | Age: 27
End: 2017-06-30

## 2017-06-30 VITALS
BODY MASS INDEX: 21.41 KG/M2 | HEIGHT: 64 IN | HEART RATE: 88 BPM | OXYGEN SATURATION: 97 % | RESPIRATION RATE: 16 BRPM | TEMPERATURE: 97.6 F | SYSTOLIC BLOOD PRESSURE: 123 MMHG | DIASTOLIC BLOOD PRESSURE: 69 MMHG | WEIGHT: 125.4 LBS

## 2017-06-30 DIAGNOSIS — D06.9 CIN III (CERVICAL INTRAEPITHELIAL NEOPLASIA GRADE III) WITH SEVERE DYSPLASIA: ICD-10-CM

## 2017-06-30 LAB
ABO GROUP BLD: NORMAL
BASOPHILS # BLD AUTO: 0.05 10*3/MM3 (ref 0–0.2)
BASOPHILS NFR BLD AUTO: 0.8 % (ref 0–2)
BLD GP AB SCN SERPL QL: NEGATIVE
DEPRECATED RDW RBC AUTO: 47.6 FL (ref 40–54)
EOSINOPHIL # BLD AUTO: 0.05 10*3/MM3 (ref 0–0.7)
EOSINOPHIL NFR BLD AUTO: 0.8 % (ref 0–4)
ERYTHROCYTE [DISTWIDTH] IN BLOOD BY AUTOMATED COUNT: 13.9 % (ref 12–15)
HCT VFR BLD AUTO: 34.7 % (ref 37–47)
HGB BLD-MCNC: 11.6 G/DL (ref 12–16)
IMM GRANULOCYTES # BLD: 0.01 10*3/MM3 (ref 0–0.03)
IMM GRANULOCYTES NFR BLD: 0.2 % (ref 0–5)
LYMPHOCYTES # BLD AUTO: 2.94 10*3/MM3 (ref 0.72–4.86)
LYMPHOCYTES NFR BLD AUTO: 47.4 % (ref 15–45)
MCH RBC QN AUTO: 31 PG (ref 28–32)
MCHC RBC AUTO-ENTMCNC: 33.4 G/DL (ref 33–36)
MCV RBC AUTO: 92.8 FL (ref 82–98)
MONOCYTES # BLD AUTO: 0.44 10*3/MM3 (ref 0.19–1.3)
MONOCYTES NFR BLD AUTO: 7.1 % (ref 4–12)
NEUTROPHILS # BLD AUTO: 2.71 10*3/MM3 (ref 1.87–8.4)
NEUTROPHILS NFR BLD AUTO: 43.7 % (ref 39–78)
PLATELET # BLD AUTO: 204 10*3/MM3 (ref 130–400)
PMV BLD AUTO: 11.6 FL (ref 6–12)
RBC # BLD AUTO: 3.74 10*6/MM3 (ref 4.2–5.4)
RH BLD: POSITIVE
WBC NRBC COR # BLD: 6.2 10*3/MM3 (ref 4.8–10.8)

## 2017-06-30 PROCEDURE — 93010 ELECTROCARDIOGRAM REPORT: CPT | Performed by: INTERNAL MEDICINE

## 2017-07-03 ENCOUNTER — ANESTHESIA (OUTPATIENT)
Dept: PERIOP | Facility: HOSPITAL | Age: 27
End: 2017-07-03

## 2017-07-03 ENCOUNTER — HOSPITAL ENCOUNTER (OUTPATIENT)
Facility: HOSPITAL | Age: 27
Setting detail: HOSPITAL OUTPATIENT SURGERY
Discharge: HOME | End: 2017-07-03
Attending: OBSTETRICS & GYNECOLOGY | Admitting: OBSTETRICS & GYNECOLOGY

## 2017-07-03 ENCOUNTER — ANESTHESIA EVENT (OUTPATIENT)
Dept: PERIOP | Facility: HOSPITAL | Age: 27
End: 2017-07-03

## 2017-07-03 ENCOUNTER — TELEPHONE (OUTPATIENT)
Dept: OBSTETRICS AND GYNECOLOGY | Facility: CLINIC | Age: 27
End: 2017-07-03

## 2017-07-03 VITALS
SYSTOLIC BLOOD PRESSURE: 105 MMHG | RESPIRATION RATE: 16 BRPM | HEART RATE: 60 BPM | TEMPERATURE: 98 F | DIASTOLIC BLOOD PRESSURE: 66 MMHG | OXYGEN SATURATION: 100 %

## 2017-07-03 DIAGNOSIS — D06.9 CIN III (CERVICAL INTRAEPITHELIAL NEOPLASIA GRADE III) WITH SEVERE DYSPLASIA: ICD-10-CM

## 2017-07-03 DIAGNOSIS — R94.31 ABNORMAL EKG: Primary | ICD-10-CM

## 2017-07-03 PROBLEM — O48.0 POST TERM PREGNANCY OVER 40 WEEKS: Status: RESOLVED | Noted: 2017-04-18 | Resolved: 2017-07-03

## 2017-07-03 PROBLEM — Z34.93 ENCOUNTER FOR SUPERVISION OF NORMAL PREGNANCY IN THIRD TRIMESTER: Status: RESOLVED | Noted: 2017-02-07 | Resolved: 2017-07-03

## 2017-07-03 LAB — B-HCG UR QL: NEGATIVE

## 2017-07-03 PROCEDURE — 25010000002 FENTANYL CITRATE (PF) 100 MCG/2ML SOLUTION: Performed by: NURSE ANESTHETIST, CERTIFIED REGISTERED

## 2017-07-03 PROCEDURE — 57522 CONIZATION OF CERVIX: CPT | Performed by: OBSTETRICS & GYNECOLOGY

## 2017-07-03 PROCEDURE — 25010000002 KETOROLAC TROMETHAMINE PER 15 MG: Performed by: NURSE ANESTHETIST, CERTIFIED REGISTERED

## 2017-07-03 PROCEDURE — 25010000002 MIDAZOLAM PER 1 MG: Performed by: ANESTHESIOLOGY

## 2017-07-03 PROCEDURE — 25010000002 DEXAMETHASONE PER 1 MG: Performed by: ANESTHESIOLOGY

## 2017-07-03 PROCEDURE — 25010000002 PROPOFOL 10 MG/ML EMULSION: Performed by: NURSE ANESTHETIST, CERTIFIED REGISTERED

## 2017-07-03 PROCEDURE — 88307 TISSUE EXAM BY PATHOLOGIST: CPT | Performed by: OBSTETRICS & GYNECOLOGY

## 2017-07-03 PROCEDURE — 81025 URINE PREGNANCY TEST: CPT | Performed by: OBSTETRICS & GYNECOLOGY

## 2017-07-03 RX ORDER — MIDAZOLAM HYDROCHLORIDE 1 MG/ML
2 INJECTION INTRAMUSCULAR; INTRAVENOUS
Status: DISCONTINUED | OUTPATIENT
Start: 2017-07-03 | End: 2017-07-03 | Stop reason: HOSPADM

## 2017-07-03 RX ORDER — FENTANYL CITRATE 50 UG/ML
INJECTION, SOLUTION INTRAMUSCULAR; INTRAVENOUS AS NEEDED
Status: DISCONTINUED | OUTPATIENT
Start: 2017-07-03 | End: 2017-07-03 | Stop reason: SURG

## 2017-07-03 RX ORDER — ONDANSETRON 2 MG/ML
4 INJECTION INTRAMUSCULAR; INTRAVENOUS ONCE AS NEEDED
Status: DISCONTINUED | OUTPATIENT
Start: 2017-07-03 | End: 2017-07-03 | Stop reason: HOSPADM

## 2017-07-03 RX ORDER — MAGNESIUM HYDROXIDE 1200 MG/15ML
LIQUID ORAL AS NEEDED
Status: DISCONTINUED | OUTPATIENT
Start: 2017-07-03 | End: 2017-07-03 | Stop reason: HOSPADM

## 2017-07-03 RX ORDER — SODIUM CHLORIDE, SODIUM LACTATE, POTASSIUM CHLORIDE, CALCIUM CHLORIDE 600; 310; 30; 20 MG/100ML; MG/100ML; MG/100ML; MG/100ML
9 INJECTION, SOLUTION INTRAVENOUS CONTINUOUS
Status: DISCONTINUED | OUTPATIENT
Start: 2017-07-03 | End: 2017-07-03 | Stop reason: HOSPADM

## 2017-07-03 RX ORDER — OXYCODONE HYDROCHLORIDE AND ACETAMINOPHEN 5; 325 MG/1; MG/1
1 TABLET ORAL EVERY 4 HOURS PRN
Status: DISCONTINUED | OUTPATIENT
Start: 2017-07-03 | End: 2017-07-03 | Stop reason: HOSPADM

## 2017-07-03 RX ORDER — LABETALOL HYDROCHLORIDE 5 MG/ML
5 INJECTION, SOLUTION INTRAVENOUS
Status: DISCONTINUED | OUTPATIENT
Start: 2017-07-03 | End: 2017-07-03 | Stop reason: HOSPADM

## 2017-07-03 RX ORDER — LIDOCAINE HYDROCHLORIDE 20 MG/ML
INJECTION, SOLUTION INFILTRATION; PERINEURAL AS NEEDED
Status: DISCONTINUED | OUTPATIENT
Start: 2017-07-03 | End: 2017-07-03 | Stop reason: SURG

## 2017-07-03 RX ORDER — VASOPRESSIN 20 U/ML
INJECTION PARENTERAL AS NEEDED
Status: DISCONTINUED | OUTPATIENT
Start: 2017-07-03 | End: 2017-07-03 | Stop reason: HOSPADM

## 2017-07-03 RX ORDER — MORPHINE SULFATE 2 MG/ML
2 INJECTION, SOLUTION INTRAMUSCULAR; INTRAVENOUS
Status: DISCONTINUED | OUTPATIENT
Start: 2017-07-03 | End: 2017-07-03 | Stop reason: HOSPADM

## 2017-07-03 RX ORDER — IODINE SOLUTION STRONG 5% (LUGOL'S) 5 %
SOLUTION ORAL AS NEEDED
Status: DISCONTINUED | OUTPATIENT
Start: 2017-07-03 | End: 2017-07-03 | Stop reason: HOSPADM

## 2017-07-03 RX ORDER — PROPOFOL 10 MG/ML
VIAL (ML) INTRAVENOUS AS NEEDED
Status: DISCONTINUED | OUTPATIENT
Start: 2017-07-03 | End: 2017-07-03 | Stop reason: SURG

## 2017-07-03 RX ORDER — FENTANYL CITRATE 50 UG/ML
25 INJECTION, SOLUTION INTRAMUSCULAR; INTRAVENOUS
Status: DISCONTINUED | OUTPATIENT
Start: 2017-07-03 | End: 2017-07-03 | Stop reason: HOSPADM

## 2017-07-03 RX ORDER — NALOXONE HCL 0.4 MG/ML
0.4 VIAL (ML) INJECTION AS NEEDED
Status: DISCONTINUED | OUTPATIENT
Start: 2017-07-03 | End: 2017-07-03 | Stop reason: HOSPADM

## 2017-07-03 RX ORDER — DEXAMETHASONE SODIUM PHOSPHATE 4 MG/ML
4 INJECTION, SOLUTION INTRA-ARTICULAR; INTRALESIONAL; INTRAMUSCULAR; INTRAVENOUS; SOFT TISSUE ONCE AS NEEDED
Status: COMPLETED | OUTPATIENT
Start: 2017-07-03 | End: 2017-07-03

## 2017-07-03 RX ORDER — SODIUM CHLORIDE 0.9 % (FLUSH) 0.9 %
1-10 SYRINGE (ML) INJECTION AS NEEDED
Status: DISCONTINUED | OUTPATIENT
Start: 2017-07-03 | End: 2017-07-03 | Stop reason: HOSPADM

## 2017-07-03 RX ORDER — IPRATROPIUM BROMIDE AND ALBUTEROL SULFATE 2.5; .5 MG/3ML; MG/3ML
3 SOLUTION RESPIRATORY (INHALATION) ONCE AS NEEDED
Status: DISCONTINUED | OUTPATIENT
Start: 2017-07-03 | End: 2017-07-03 | Stop reason: HOSPADM

## 2017-07-03 RX ORDER — DIPHENHYDRAMINE HYDROCHLORIDE 50 MG/ML
12.5 INJECTION INTRAMUSCULAR; INTRAVENOUS
Status: DISCONTINUED | OUTPATIENT
Start: 2017-07-03 | End: 2017-07-03 | Stop reason: HOSPADM

## 2017-07-03 RX ORDER — KETOROLAC TROMETHAMINE 30 MG/ML
INJECTION, SOLUTION INTRAMUSCULAR; INTRAVENOUS AS NEEDED
Status: DISCONTINUED | OUTPATIENT
Start: 2017-07-03 | End: 2017-07-03 | Stop reason: SURG

## 2017-07-03 RX ORDER — HYDRALAZINE HYDROCHLORIDE 20 MG/ML
5 INJECTION INTRAMUSCULAR; INTRAVENOUS
Status: DISCONTINUED | OUTPATIENT
Start: 2017-07-03 | End: 2017-07-03 | Stop reason: HOSPADM

## 2017-07-03 RX ORDER — OXYCODONE HYDROCHLORIDE AND ACETAMINOPHEN 5; 325 MG/1; MG/1
1-2 TABLET ORAL EVERY 4 HOURS PRN
Qty: 20 TABLET | Refills: 0 | Status: SHIPPED | OUTPATIENT
Start: 2017-07-03 | End: 2017-07-14

## 2017-07-03 RX ORDER — MEPERIDINE HYDROCHLORIDE 25 MG/ML
12.5 INJECTION INTRAMUSCULAR; INTRAVENOUS; SUBCUTANEOUS
Status: DISCONTINUED | OUTPATIENT
Start: 2017-07-03 | End: 2017-07-03 | Stop reason: HOSPADM

## 2017-07-03 RX ORDER — MAGNESIUM HYDROXIDE 1200 MG/15ML
LIQUID ORAL
Status: DISCONTINUED | OUTPATIENT
Start: 1840-12-31 | End: 2017-07-03 | Stop reason: HOSPADM

## 2017-07-03 RX ORDER — DEXTROSE MONOHYDRATE 25 G/50ML
12.5 INJECTION, SOLUTION INTRAVENOUS AS NEEDED
Status: DISCONTINUED | OUTPATIENT
Start: 2017-07-03 | End: 2017-07-03 | Stop reason: HOSPADM

## 2017-07-03 RX ORDER — MIDAZOLAM HYDROCHLORIDE 1 MG/ML
1 INJECTION INTRAMUSCULAR; INTRAVENOUS
Status: DISCONTINUED | OUTPATIENT
Start: 2017-07-03 | End: 2017-07-03 | Stop reason: HOSPADM

## 2017-07-03 RX ADMIN — KETOROLAC TROMETHAMINE 30 MG: 30 INJECTION, SOLUTION INTRAMUSCULAR at 14:19

## 2017-07-03 RX ADMIN — LIDOCAINE HYDROCHLORIDE 0.5 ML: 10 INJECTION, SOLUTION EPIDURAL; INFILTRATION; INTRACAUDAL; PERINEURAL at 12:59

## 2017-07-03 RX ADMIN — FENTANYL CITRATE 100 MCG: 50 INJECTION, SOLUTION INTRAMUSCULAR; INTRAVENOUS at 14:18

## 2017-07-03 RX ADMIN — MIDAZOLAM HYDROCHLORIDE 2 MG: 1 INJECTION, SOLUTION INTRAMUSCULAR; INTRAVENOUS at 13:04

## 2017-07-03 RX ADMIN — SODIUM CHLORIDE, POTASSIUM CHLORIDE, SODIUM LACTATE AND CALCIUM CHLORIDE 9 ML/HR: 600; 310; 30; 20 INJECTION, SOLUTION INTRAVENOUS at 12:59

## 2017-07-03 RX ADMIN — PROPOFOL 200 MG: 10 INJECTION, EMULSION INTRAVENOUS at 14:18

## 2017-07-03 RX ADMIN — DEXAMETHASONE SODIUM PHOSPHATE 4 MG: 4 INJECTION, SOLUTION INTRAMUSCULAR; INTRAVENOUS at 13:04

## 2017-07-03 RX ADMIN — LIDOCAINE HYDROCHLORIDE 100 MG: 20 INJECTION, SOLUTION INFILTRATION; PERINEURAL at 14:18

## 2017-07-03 RX ADMIN — KETOROLAC TROMETHAMINE 30 MG: 30 INJECTION, SOLUTION INTRAMUSCULAR at 14:18

## 2017-07-03 NOTE — DISCHARGE INSTRUCTIONS

## 2017-07-03 NOTE — PLAN OF CARE
Problem: Patient Care Overview (Adult)  Goal: Plan of Care Review  Outcome: Ongoing (interventions implemented as appropriate)    07/03/17 1250   Coping/Psychosocial Response Interventions   Plan Of Care Reviewed With patient   Patient Care Overview   Progress no change         Problem: Perioperative Period (Adult)  Goal: Signs and Symptoms of Listed Potential Problems Will be Absent or Manageable (Perioperative Period)    07/03/17 1250   Perioperative Period   Problems Assessed (Perioperative Period) hypothermia;physiologic stress response;situational response   Problems Present (Perioperative Period) none

## 2017-07-03 NOTE — PLAN OF CARE
Problem: Patient Care Overview (Adult)  Goal: Plan of Care Review  Outcome: Outcome(s) achieved Date Met:  07/03/17 07/03/17 3375   Coping/Psychosocial Response Interventions   Plan Of Care Reviewed With patient;spouse   Patient Care Overview   Progress improving   Outcome Evaluation   Outcome Summary/Follow up Plan pt states that pain level is tolerable; spoke with dr. ang regarding continuation of pnv (okay to continue) and percocet while breastfeeding (okay to take while breastfeeding with no restrictions); pt meets discharge critiera, pt okay for discharge          Problem: Perioperative Period (Adult)  Goal: Signs and Symptoms of Listed Potential Problems Will be Absent or Manageable (Perioperative Period)  Outcome: Outcome(s) achieved Date Met:  07/03/17

## 2017-07-03 NOTE — ANESTHESIA POSTPROCEDURE EVALUATION
Patient: Mirela Flores    Procedure Summary     Date Anesthesia Start Anesthesia Stop Room / Location    07/03/17 1412 1451 [facility] OR 14 / [facility] OR       Procedure Diagnosis Surgeon Provider    CERVICAL CONIZATION, LOOP ELECTROCAUTERY EXCISION PROCEDURE (Bilateral Cervix) BRETT III (cervical intraepithelial neoplasia grade III) with severe dysplasia  (BRETT III (cervical intraepithelial neoplasia grade III) with severe dysplasia [D06.9]) MD Laurie James CRNA          Anesthesia Type: general  Last vitals  /70 (07/03/17 1533)    Temp 98 °F (36.7 °C) (07/03/17 1500)    Pulse 69 (07/03/17 1533)   Resp 18 (07/03/17 1533)    SpO2 100 % (07/03/17 1533)      Post Anesthesia Care and Evaluation    Patient location during evaluation: PACU  Patient participation: complete - patient participated  Level of consciousness: awake  Pain score: 0  Pain management: adequate  Airway patency: patent  Anesthetic complications: No anesthetic complications  PONV Status: none  Cardiovascular status: acceptable  Respiratory status: acceptable  Hydration status: acceptable  Post Neuraxial Block status: Motor and sensory function returned to baseline

## 2017-07-03 NOTE — PLAN OF CARE
Problem: Patient Care Overview (Adult)  Goal: Plan of Care Review  Outcome: Ongoing (interventions implemented as appropriate)    07/03/17 1534   Coping/Psychosocial Response Interventions   Plan Of Care Reviewed With patient   Patient Care Overview   Progress improving   Outcome Evaluation   Outcome Summary/Follow up Plan no pain or nausea meets pacu discharge criteria         Problem: Perioperative Period (Adult)  Goal: Signs and Symptoms of Listed Potential Problems Will be Absent or Manageable (Perioperative Period)  Outcome: Ongoing (interventions implemented as appropriate)

## 2017-07-03 NOTE — H&P (VIEW-ONLY)
"[facility]  Mirela Flores  : 1990  MRN: [id number]  CSN: [id number]    History and Physical    Subjective   Mirela Flores is a 27 y.o. year old  who presents for consultation about surgery due to BRETT III/carcinoma in-situ on recent colpo biopsies.  The patient recalls that the last PAP before she got pregnant was LGSIL (she is currently postpartum), and then her PP PAP showed HGSIL.  We have discussed the possibility of a LEEP at previous office visits.    No past medical history on file.  Past Surgical History:   Procedure Laterality Date   • CHOLECYSTECTOMY     • D&C WITH SUCTION     • OVARIAN CYST SURGERY Right    • TYMPANOSTOMY TUBE PLACEMENT       Smoking status: Never Smoker                                                              Smokeless status: Never Used                          Current Outpatient Prescriptions:   •  Prenatal Vit-Fe Fumarate-FA (PRENATAL, CLASSIC, VITAMIN) 28-0.8 MG tablet tablet, Take  by mouth Daily., Disp: , Rfl:     Allergies   Allergen Reactions   • Cephalosporins      Pt states was told can never take them again.    • Penicillins      Reaction as a child.  Patient was told can never take again.        Family History   Problem Relation Age of Onset   • Other Father    • No Known Problems Mother    • No Known Problems Paternal Grandfather    • No Known Problems Paternal Grandmother    • Thyroid disease Maternal Grandmother    • Hypertension Maternal Grandmother    • No Known Problems Maternal Grandfather    • Breast cancer Other 60   • Ovarian cancer Neg Hx      Review of Systems   Respiratory: Negative for shortness of breath.    Cardiovascular: Negative for chest pain.   Gastrointestinal: Positive for constipation (for past several weeks). Negative for abdominal pain and diarrhea.   Genitourinary: Positive for pelvic pain (\" a little \".  Patient feels like that started a couple of weeks ago.  She has not had a period yet since delivery.) and vaginal bleeding " "(scent spotting). Negative for difficulty urinating.   Musculoskeletal: Negative for arthralgias, back pain, neck pain and neck stiffness.   Skin: Negative for rash.   Neurological: Positive for headaches (every day recently, she took migraine medication before pregnancy, will resume when not breastfeeding). Negative for dizziness.   Psychiatric/Behavioral: Positive for sleep disturbance (new baby). The patient is not nervous/anxious.          Objective   /82  Ht 64\" (162.6 cm)  Wt 124 lb (56.2 kg)  BMI 21.28 kg/m2    Physical Exam   Physical Exam   Constitutional: She is oriented to person, place, and time. She appears well-developed and well-nourished. No distress.   HENT:   Head: Normocephalic and atraumatic.   Eyes: EOM are normal.   Neck: Normal range of motion. No thyromegaly present.   Cardiovascular: Normal rate and regular rhythm.    No murmur heard.  Pulmonary/Chest: Effort normal and breath sounds normal. She has no wheezes.   Abdominal: Soft. She exhibits no distension. There is no tenderness.   Musculoskeletal: Normal range of motion.   Neurological: She is alert and oriented to person, place, and time.   Skin: Skin is warm and dry.   Psychiatric: She has a normal mood and affect. Her behavior is normal. Judgment normal.   Nursing note and vitals reviewed.      Labs  Lab Results   Component Value Date     (L) 04/18/2017    HGB 10.0 (L) 04/19/2017    HCT 29.2 (L) 04/19/2017    WBC 9.77 04/18/2017     08/18/2016    K 3.7 08/18/2016     08/18/2016    CO2 23 (L) 08/18/2016    BUN 11 08/18/2016    CREATININE 0.51 08/18/2016    GLUCOSE 90 08/18/2016    ALBUMIN 4.4 08/18/2016    CALCIUM 9.0 08/18/2016    AST 25 08/18/2016    ALT 26 08/18/2016    BILITOT 0.4 08/18/2016        Assessment & Plan  Mirela was seen today for pre-op exam.    Diagnoses and all orders for this visit:    BRETT III (cervical intraepithelial neoplasia grade III) with severe dysplasia: patient being scheduled for a " MARCO ANTONIO.  Questions answered.  Procedure diagrammed for patient.  Post-op expectations and restrictions reviewed.    Nonsmoker      There are no diagnoses linked to this encounter.    Karli Guerrero MD  6/20/2017  2:02 PM

## 2017-07-03 NOTE — ANESTHESIA PREPROCEDURE EVALUATION
Anesthesia Evaluation     Patient summary reviewed   no history of anesthetic complications:  NPO Solid Status: > 8 hours       Airway   Mallampati: I  TM distance: >3 FB  Neck ROM: full  Dental      Pulmonary - negative pulmonary ROS   Cardiovascular - negative cardio ROS        Neuro/Psych- negative ROS  (-) seizures  GI/Hepatic/Renal/Endo - negative ROS     Musculoskeletal     Abdominal    Substance History      OB/GYN          Other                                      Anesthesia Plan    ASA 1     general     intravenous induction   Anesthetic plan and risks discussed with patient.

## 2017-07-03 NOTE — OP NOTE
[facility] Din[facility] Flores  : 1990  MRN: [id number]  CSN: [id number]  Date: 7/3/2017    Operative Note    CERVICAL CONIZATION      Pre-op Diagnosis:  BRETT III (cervical intraepithelial neoplasia grade III) with severe dysplasia [D06.9]   Post-op Diagnosis:  Post-Op Diagnosis Codes:     * BRETT III (cervical intraepithelial neoplasia grade III) with severe dysplasia [D06.9]   Procedure: Procedure(s):  CERVICAL CONIZATION, LOOP ELECTROCAUTERY EXCISION PROCEDURE   Surgeon: Surgeon(s):  Karli Guerrero MD       Anesthesia: General   Estimated Blood Loss: 50   mls   Fluids: See anesthesia record   UOP: 50   mls   ABx: None   Specimens:  LEEP cone   Findings: Small margin of Lugol's Iodine uptake defect surrounding the os    Complications: None     Description of Procedure:       The patient was taken to the operating room where she was prepped and draped in the usual sterile fashion in the dorsal lithotomy position using Juan J stirrups.  A powder coated speculum was placed in the patient's vagina.  The cervix was grasped with a tenaculum for traction, and pulled into a neutral position for best visualization.  The cervix was washed with Lugol's iodine solution.  The cervix was unremarkable with no visible lesionsThere were an area of Lugol's iodine defect noted to surround the external os in a narrow margin all the way around.  .  A standard size medium Caceres loop electrode was chosen.  With the Bovie set to 40/40, a 12 o'clock entry point was used and the Caceres loop electrode rotated 360°.  The bed of the LEEP site was then cauterized with the Bovie rollerball.  The area was completely hemostatic at the conclusion of the case.  The tenaculum was removed from the cervix and the tenaculum site noted to be hemostatic.  All instrumentation was removed from the patient's vagina.  Sponge, lap and needle counts were correct ×2.  The patient tolerated the procedure well.  The patient was taken to the recovery room in  stable condition with instructions for discharge home the same day and follow up in the office in 2 weeks.           Karli Guerrero MD   7/3/2017  2:44 PM

## 2017-07-03 NOTE — ANESTHESIA PROCEDURE NOTES
Airway  Urgency: elective    End Time:7/3/2017 2:20 PM  Airway not difficult    General Information and Staff    Patient location during procedure: OR  CRNA: KATHERINE PACHECO    Indications and Patient Condition  Indications for airway management: airway protection    Preoxygenated: yes  MILS maintained throughout  Mask difficulty assessment: 1 - vent by mask    Final Airway Details  Final airway type: supraglottic airway      Successful airway: I-gel  Size 4    Number of attempts at approach: 1

## 2017-07-05 ENCOUNTER — TELEPHONE (OUTPATIENT)
Dept: OBSTETRICS AND GYNECOLOGY | Facility: CLINIC | Age: 27
End: 2017-07-05

## 2017-07-06 LAB
CYTO UR: NORMAL
LAB AP CASE REPORT: NORMAL
Lab: NORMAL
PATH REPORT.FINAL DX SPEC: NORMAL
PATH REPORT.GROSS SPEC: NORMAL

## 2017-07-14 ENCOUNTER — OFFICE VISIT (OUTPATIENT)
Dept: OBSTETRICS AND GYNECOLOGY | Facility: CLINIC | Age: 27
End: 2017-07-14

## 2017-07-14 VITALS
BODY MASS INDEX: 20.49 KG/M2 | WEIGHT: 120 LBS | DIASTOLIC BLOOD PRESSURE: 64 MMHG | HEIGHT: 64 IN | SYSTOLIC BLOOD PRESSURE: 112 MMHG

## 2017-07-14 DIAGNOSIS — Z78.9 NONSMOKER: ICD-10-CM

## 2017-07-14 DIAGNOSIS — Z09 S/P GYNECOLOGICAL SURGERY, FOLLOW-UP EXAM: Primary | ICD-10-CM

## 2017-07-14 PROCEDURE — 99024 POSTOP FOLLOW-UP VISIT: CPT | Performed by: OBSTETRICS & GYNECOLOGY

## 2017-07-14 RX ORDER — CIPROFLOXACIN 250 MG/1
250 TABLET, FILM COATED ORAL 2 TIMES DAILY
Qty: 14 TABLET | Refills: 0 | Status: SHIPPED | OUTPATIENT
Start: 2017-07-14 | End: 2017-07-24

## 2017-07-14 RX ORDER — METRONIDAZOLE 500 MG/1
500 TABLET ORAL 2 TIMES DAILY
Qty: 14 TABLET | Refills: 0 | Status: SHIPPED | OUTPATIENT
Start: 2017-07-14 | End: 2017-07-21

## 2017-07-14 NOTE — PROGRESS NOTES
"Subjective   Chief Complaint   Patient presents with   • Post-op     pt here today for 2 week post op LEEP procedure. pt says that she is still bleeding and says that it about like a period. pt says that she still have that black flecks as well. pt voices no other concerns.      Mirela Flores is a 27 y.o. year old  presenting to be seen for her post-operative visit.  She had a LEEP 2 weeks ago.  Currently she reports mild cramping and bleeding like a period.  She is not sure whether this is a period or not since she has irregular cycles.    No Additional Complaints Reported    The following portions of the patient's history were reviewed and updated as appropriate:current medications and allergies    Review of Systems   Constitutional: Negative for fatigue and fever.   Respiratory: Negative for shortness of breath.    Cardiovascular: Negative for chest pain.   Gastrointestinal: Negative for constipation and diarrhea.   Genitourinary: Positive for vaginal bleeding. Negative for pelvic pain.         Objective   /64  Ht 64.25\" (163.2 cm)  Wt 120 lb (54.4 kg)  BMI 20.44 kg/m2    General:  well developed; well nourished  no acute distress   Abdomen: soft, non-tender; no masses   Pelvis: Clinical staff was present for exam  Cervix:  LEEP bed \"oozing\" with a discharge that is mostly watery, but contains some scant blood.  Eschar over leep bed with a yellow-liza appearance that could represent cellulitis.          Assessment   1. Pt is 2 weeks s/p LEEP  2. Possible cellulitis of LEEP bed  3. BRETT III on pathology, with negative margins     Plan   1. Allergy to cephalosporins, so will do cipro and flagyl.  2. RTO in 2 weeks  3. Will surveillance with PAP's q 6 months for 2 years.    No orders of the defined types were placed in this encounter.         This note was electronically signed.    Karli Guerrero MD  2017  "

## 2017-07-19 ENCOUNTER — HOSPITAL ENCOUNTER (EMERGENCY)
Facility: HOSPITAL | Age: 27
Discharge: HOME | End: 2017-07-20
Attending: EMERGENCY MEDICINE | Admitting: EMERGENCY MEDICINE

## 2017-07-19 DIAGNOSIS — N99.89 OTHER POSTOPERATIVE COMPLICATION INVOLVING GENITOURINARY SYSTEM: ICD-10-CM

## 2017-07-19 DIAGNOSIS — N93.9 VAGINAL BLEEDING: Primary | ICD-10-CM

## 2017-07-19 LAB
B-HCG UR QL: NEGATIVE
BACTERIA UR QL AUTO: ABNORMAL /HPF
BILIRUB UR QL STRIP: NEGATIVE
CLARITY UR: ABNORMAL
COLOR UR: ABNORMAL
GLUCOSE UR STRIP-MCNC: NEGATIVE MG/DL
HCT VFR BLD AUTO: 34.1 % (ref 37–47)
HGB BLD-MCNC: 11.4 G/DL (ref 12–16)
HGB UR QL STRIP.AUTO: ABNORMAL
HYALINE CASTS UR QL AUTO: ABNORMAL /LPF
KETONES UR QL STRIP: ABNORMAL
LEUKOCYTE ESTERASE UR QL STRIP.AUTO: ABNORMAL
NITRITE UR QL STRIP: POSITIVE
PH UR STRIP.AUTO: 6.5 [PH] (ref 5–8)
PROT UR QL STRIP: ABNORMAL
RBC # UR: ABNORMAL /HPF
REF LAB TEST METHOD: ABNORMAL
SP GR UR STRIP: 1.02 (ref 1–1.03)
SQUAMOUS #/AREA URNS HPF: ABNORMAL /HPF
UROBILINOGEN UR QL STRIP: ABNORMAL
WBC UR QL AUTO: ABNORMAL /HPF

## 2017-07-19 PROCEDURE — 99284 EMERGENCY DEPT VISIT MOD MDM: CPT

## 2017-07-19 PROCEDURE — 85018 HEMOGLOBIN: CPT | Performed by: EMERGENCY MEDICINE

## 2017-07-19 PROCEDURE — 85014 HEMATOCRIT: CPT | Performed by: EMERGENCY MEDICINE

## 2017-07-19 PROCEDURE — 87086 URINE CULTURE/COLONY COUNT: CPT | Performed by: EMERGENCY MEDICINE

## 2017-07-19 PROCEDURE — 81001 URINALYSIS AUTO W/SCOPE: CPT | Performed by: EMERGENCY MEDICINE

## 2017-07-19 PROCEDURE — 81025 URINE PREGNANCY TEST: CPT | Performed by: EMERGENCY MEDICINE

## 2017-07-20 ENCOUNTER — OFFICE VISIT (OUTPATIENT)
Dept: OBSTETRICS AND GYNECOLOGY | Facility: CLINIC | Age: 27
End: 2017-07-20

## 2017-07-20 ENCOUNTER — LAB REQUISITION (OUTPATIENT)
Dept: LAB | Facility: HOSPITAL | Age: 27
End: 2017-07-20

## 2017-07-20 ENCOUNTER — APPOINTMENT (OUTPATIENT)
Dept: LAB | Facility: HOSPITAL | Age: 27
End: 2017-07-20
Attending: OBSTETRICS & GYNECOLOGY

## 2017-07-20 VITALS
DIASTOLIC BLOOD PRESSURE: 72 MMHG | SYSTOLIC BLOOD PRESSURE: 102 MMHG | HEIGHT: 64 IN | BODY MASS INDEX: 19.97 KG/M2 | WEIGHT: 117 LBS

## 2017-07-20 VITALS
HEIGHT: 64 IN | DIASTOLIC BLOOD PRESSURE: 64 MMHG | BODY MASS INDEX: 20.49 KG/M2 | TEMPERATURE: 98.1 F | OXYGEN SATURATION: 100 % | SYSTOLIC BLOOD PRESSURE: 117 MMHG | RESPIRATION RATE: 16 BRPM | WEIGHT: 120 LBS | HEART RATE: 88 BPM

## 2017-07-20 DIAGNOSIS — N93.9 VAGINAL BLEEDING, ABNORMAL: ICD-10-CM

## 2017-07-20 DIAGNOSIS — R55 SYNCOPE AND COLLAPSE: ICD-10-CM

## 2017-07-20 DIAGNOSIS — Z00.00 ROUTINE GENERAL MEDICAL EXAMINATION AT A HEALTH CARE FACILITY: ICD-10-CM

## 2017-07-20 DIAGNOSIS — Z78.9 NONSMOKER: ICD-10-CM

## 2017-07-20 DIAGNOSIS — Z09 S/P GYNECOLOGICAL SURGERY, FOLLOW-UP EXAM: Primary | ICD-10-CM

## 2017-07-20 LAB
BILIRUB UR QL STRIP: NEGATIVE
CLARITY UR: CLEAR
COLOR UR: YELLOW
GLUCOSE UR STRIP-MCNC: NEGATIVE MG/DL
HGB BLD-MCNC: 11.7 G/DL (ref 12–16)
HGB UR QL STRIP.AUTO: NEGATIVE
KETONES UR QL STRIP: NEGATIVE
LEUKOCYTE ESTERASE UR QL STRIP.AUTO: NEGATIVE
NITRITE UR QL STRIP: NEGATIVE
PH UR STRIP.AUTO: 6.5 [PH] (ref 5–8)
PROT UR QL STRIP: NEGATIVE
SP GR UR STRIP: 1.02 (ref 1–1.03)
UROBILINOGEN UR QL STRIP: NORMAL

## 2017-07-20 PROCEDURE — 81003 URINALYSIS AUTO W/O SCOPE: CPT | Performed by: EMERGENCY MEDICINE

## 2017-07-20 PROCEDURE — 36415 COLL VENOUS BLD VENIPUNCTURE: CPT | Performed by: OBSTETRICS & GYNECOLOGY

## 2017-07-20 PROCEDURE — 99024 POSTOP FOLLOW-UP VISIT: CPT | Performed by: OBSTETRICS & GYNECOLOGY

## 2017-07-20 PROCEDURE — 85018 HEMOGLOBIN: CPT | Performed by: OBSTETRICS & GYNECOLOGY

## 2017-07-20 NOTE — PROGRESS NOTES
"Subjective   Chief Complaint   Patient presents with   • Vaginal Bleeding     pt here today with c/o vaginal bleeding. pt had LEEP a little over 2 weeks ago. pt had clotting yesterday that sent her to the er yesterday. pt says that this morning she has passed several clots this morning that have been between the size of a golf ball and tennis ball. pt says that the bleeding is getting worse. pt voices that her cramping is getting worse as the clots are getting bigger. pt says that she has been dizzy this morning. pt voices no other concerns.      Mirela Flores is a 27 y.o. year old .  No LMP recorded.  She presents to be seen because of heavy bleeding 2+ weeks s/p LEEP.  The patient went to the ER last night passing large clots and was instructed to present to the office this morning.     The following portions of the patient's history were reviewed and updated as appropriate:current medications and allergies    Smoking status: Never Smoker                                                              Smokeless status: Never Used                        Review of Systems   Respiratory: Negative for shortness of breath.    Cardiovascular: Negative for chest pain.   Gastrointestinal: Negative for abdominal pain.   Genitourinary: Positive for vaginal bleeding (heavy with clots since last evening, had previously been light).   Neurological: Positive for dizziness.         Objective   /72  Ht 64\" (162.6 cm)  Wt 117 lb (53.1 kg)  BMI 20.08 kg/m2    Physical Exam   Constitutional: She is oriented to person, place, and time. She appears well-developed and well-nourished. No distress.   HENT:   Head: Normocephalic and atraumatic.   Neck: Normal range of motion.   Pulmonary/Chest: Effort normal.   Abdominal: Soft. There is no tenderness.   Genitourinary: Vagina normal. Pelvic exam was performed with patient supine. There is no tenderness or lesion on the right labia. There is no tenderness or lesion on the " left labia. Uterus is not enlarged and not tender. Right adnexum displays no tenderness and no fullness. Left adnexum displays no tenderness and no fullness. No tenderness or bleeding in the vagina. No signs of injury around the vagina.   Genitourinary Comments: LEEP bed healing well with one distinct area of bleeding at the 9 o'clock position right at the edge of the LEEP site.  Silver nitrate and Monsel's used to control bleeding.  Patient's bleeding resolved by the end of exam.  Patient tolerated well.   Musculoskeletal: Normal range of motion.   Neurological: She is alert and oriented to person, place, and time.   Skin: Skin is warm and dry.   Psychiatric: She has a normal mood and affect. Her behavior is normal. Judgment and thought content normal.   Nursing note and vitals reviewed.      Lab Review   Hgb from ER was 11.2    Imaging   No data reviewed     Assessment & Plan    Mirela was seen today for vaginal bleeding.    Diagnoses and all orders for this visit:    S/P gynecological surgery: Pt had a LEEP just over 2 weeks ago    Vaginal bleeding, abnormal: Bleeding controlled with silver nitrate and Monsel's.  Patient will call office if bleeding returns and be taken to the OR for cautery or suture    Nonsmoker      This note was electronically signed.    Karli Guerrero MD  July 20, 2017  9:05 AM    Total time spent today with Mirela  was 20 minutes (level 3).  Greater than 50% of the time was spent coordinating care, answering her questions and counseling regarding pathophysiology of her presenting problem along with plans for any diagnositc work-up and treatment.    Addendum:  The patient passed out and fell to floor at check-out desk.  She was placed in a room and experienced N/V.  Patient had a STAT Hgb sent to hospital lab and was given cool rags.  Approx 20 minutes later, after having something to drink, the patient was feeling better and wanted to leave.  She was escorted out by .  Patient will be  called with results of Hgb when it returns.  She was encouraged to push fluids all day.

## 2017-07-20 NOTE — ED PROVIDER NOTES
Subjective   HPI Comments: Pt reports that she has been having vaginal bleeding for the past 2-2.5 wks.  Pt had a LEEP and cone performed.  She reports that she began passing large clots this afternoon and using more than a pad per hour.  She developed some dizziness and felt faint this evening to the point that she fell onto the couch.  She has not had any dyspnea with the bleeding.        History provided by:  Patient      Review of Systems   Constitutional: Negative for activity change, fatigue and fever.   HENT: Negative for congestion, ear pain, facial swelling, postnasal drip, rhinorrhea, sinus pressure, sore throat and trouble swallowing.    Eyes: Negative for photophobia and redness.   Respiratory: Negative for chest tightness, shortness of breath and wheezing.    Cardiovascular: Negative for chest pain and palpitations.   Gastrointestinal: Negative for abdominal distention, abdominal pain, diarrhea, nausea and vomiting.   Genitourinary: Positive for vaginal bleeding. Negative for difficulty urinating, dysuria and flank pain.   Musculoskeletal: Negative for back pain and neck pain.   Skin: Negative for color change and wound.   Neurological: Positive for dizziness, light-headedness and headaches. Negative for tremors, seizures, syncope, speech difficulty, weakness and numbness.   Psychiatric/Behavioral: Negative for agitation, confusion, self-injury and suicidal ideas.       Past Medical History:   Diagnosis Date   • Anemia affecting pregnancy    • Asthma     as a child   • History of transfusion    • Migraines    • Mono exposure    • S/P thyroid biopsy        Allergies   Allergen Reactions   • Cephalosporins      Pt states was told can never take them again.    • Penicillins      Reaction as a child.  Patient was told can never take again.    • Tape Rash     TEGADERM       Past Surgical History:   Procedure Laterality Date   • CERVICAL CONIZATION Bilateral 7/3/2017    Procedure: CERVICAL CONIZATION, LOOP  ELECTROCAUTERY EXCISION PROCEDURE;  Surgeon: Karli Guerrero MD;  Location: [facility];  Service:    • CHOLECYSTECTOMY     • D&C WITH SUCTION     • OVARIAN CYST SURGERY Right    • TYMPANOSTOMY TUBE PLACEMENT         Family History   Problem Relation Age of Onset   • Other Father    • Heart murmur Mother    • No Known Problems Paternal Grandfather    • No Known Problems Paternal Grandmother    • Thyroid disease Maternal Grandmother    • Hypertension Maternal Grandmother    • No Known Problems Maternal Grandfather    • Breast cancer Other 60   • Ovarian cancer Neg Hx        Social History     Social History   • Marital status:      Spouse name: N/A   • Number of children: N/A   • Years of education: N/A     Social History Main Topics   • Smoking status: Never Smoker   • Smokeless tobacco: Never Used   • Alcohol use No   • Drug use: No   • Sexual activity: Yes     Partners: Male     Birth control/ protection: None     Other Topics Concern   • None     Social History Narrative           Objective   Physical Exam   Constitutional: She is oriented to person, place, and time. She appears well-developed and well-nourished. No distress.   HENT:   Head: Normocephalic and atraumatic.   Mouth/Throat: Oropharynx is clear and moist. No oropharyngeal exudate.   Eyes: EOM are normal. Pupils are equal, round, and reactive to light.   Neck: Normal range of motion. Neck supple. No JVD present.   Cardiovascular: Normal rate, regular rhythm and normal heart sounds.  Exam reveals no friction rub.    No murmur heard.  Pulmonary/Chest: Effort normal and breath sounds normal. No respiratory distress. She has no wheezes. She has no rales.   Abdominal: Soft. Bowel sounds are normal. She exhibits no distension. There is no tenderness. There is no rebound and no guarding.   Genitourinary: Pelvic exam was performed with patient supine. There is no tenderness on the right labia. There is no tenderness on the left labia. Cervix exhibits  friability. Cervix exhibits no motion tenderness and no discharge. There is bleeding in the vagina.   Genitourinary Comments: There is postsurgical change from the LEEP and cone procedure.  The cervix does not appear to be healing.  It is friable and continues to be bleeding although not significantly heavy   Neurological: She is alert and oriented to person, place, and time. No cranial nerve deficit.   Skin: Skin is warm and dry. No rash noted.   Psychiatric: She has a normal mood and affect. Her behavior is normal. Judgment and thought content normal.   Nursing note and vitals reviewed.      Procedures         ED Course  ED Course                  MDM  Number of Diagnoses or Management Options  Other postoperative complication involving genitourinary system: new and requires workup  Vaginal bleeding: new and requires workup  Diagnosis management comments: Discussed with patient about the findings of the pelvic exam.  Her H&H is stable when compared to lab work from 2 weeks ago.  Instructed her to follow-up with her OB/GYN in the next few days to see about the complications of this LEEP and cone.  She was told to return to the ED if she has any further issues or new complaints.       Amount and/or Complexity of Data Reviewed  Clinical lab tests: ordered and reviewed  Decide to obtain previous medical records or to obtain history from someone other than the patient: yes  Independent visualization of images, tracings, or specimens: yes    Risk of Complications, Morbidity, and/or Mortality  Presenting problems: moderate  Diagnostic procedures: moderate  Management options: moderate    Patient Progress  Patient progress: stable      Final diagnoses:   Vaginal bleeding   Other postoperative complication involving genitourinary system            David Pryor MD  07/20/17 0147

## 2017-07-21 LAB — BACTERIA SPEC AEROBE CULT: ABNORMAL

## 2017-07-31 ENCOUNTER — OFFICE VISIT (OUTPATIENT)
Dept: OBSTETRICS AND GYNECOLOGY | Facility: CLINIC | Age: 27
End: 2017-07-31

## 2017-07-31 VITALS
WEIGHT: 119 LBS | BODY MASS INDEX: 20.32 KG/M2 | HEIGHT: 64 IN | SYSTOLIC BLOOD PRESSURE: 118 MMHG | DIASTOLIC BLOOD PRESSURE: 76 MMHG

## 2017-07-31 DIAGNOSIS — N93.9 VAGINAL BLEEDING: Primary | ICD-10-CM

## 2017-07-31 PROCEDURE — 99213 OFFICE O/P EST LOW 20 MIN: CPT | Performed by: OBSTETRICS & GYNECOLOGY

## 2017-08-01 NOTE — PROGRESS NOTES
"Mirela Flores is a 27 y.o. female here today for evaluation of vaginal bleeding.  About 1 month ago she underwent a LEEP, which has been complicated by couple episodes of bleeding from the conization bed.  She was seen about a week ago and had Monsel solution applied in the office.  Over the last 2 days she has had light to moderate bright red vaginal bleeding.  This is been associated with small clots but no cramping.  She denies fevers or vaginal discharge.  She denies dizziness or weakness.    Visit Vitals   • /76   • Ht 64\" (162.6 cm)   • Wt 119 lb (54 kg)   • BMI 20.43 kg/m2     Gen. pleasant female no acute distress  Breathing unlabored  On pelvic examination there is a small amount of blood around the vulva.  With the speculum there is a small amount of dark red blood in the vagina.  Upon visualizing the cervix, a pinpoint area of active bleeding at the anterior surface of the conization bed was noted.  This was made hemostatic with a silver nitrate stick.  Is difficult to tell if this was related to cervical trauma from inserting the speculum, or was pre-existing.  Nonetheless, the conization bed is almost completely healed and there is no active bleeding.    Assessment: Vaginal bleeding    The bleeding appears to be control this time, and she will follow up as scheduled with Dr. Guerrero in 1-2 weeks.  In the meantime if she has any recurrence of heavy vaginal bleeding she will contact the office.    "

## 2017-08-11 ENCOUNTER — OFFICE VISIT (OUTPATIENT)
Dept: OBSTETRICS AND GYNECOLOGY | Facility: CLINIC | Age: 27
End: 2017-08-11

## 2017-08-11 VITALS
SYSTOLIC BLOOD PRESSURE: 102 MMHG | DIASTOLIC BLOOD PRESSURE: 62 MMHG | BODY MASS INDEX: 20.32 KG/M2 | HEIGHT: 64 IN | WEIGHT: 119 LBS

## 2017-08-11 DIAGNOSIS — Z78.9 NONSMOKER: ICD-10-CM

## 2017-08-11 DIAGNOSIS — Z30.2 STERILIZATION: Primary | ICD-10-CM

## 2017-08-11 DIAGNOSIS — D06.9 CIN III (CERVICAL INTRAEPITHELIAL NEOPLASIA GRADE III) WITH SEVERE DYSPLASIA: ICD-10-CM

## 2017-08-11 PROCEDURE — 99024 POSTOP FOLLOW-UP VISIT: CPT | Performed by: OBSTETRICS & GYNECOLOGY

## 2017-08-11 RX ORDER — SODIUM CHLORIDE 0.9 % (FLUSH) 0.9 %
1-10 SYRINGE (ML) INJECTION AS NEEDED
Status: CANCELLED | OUTPATIENT
Start: 2017-08-11

## 2017-08-11 NOTE — PROGRESS NOTES
"Subjective   Chief Complaint   Patient presents with   • Postpartum Care     pt here today for 6 weeks postpartum visit. pt says that she did finally stop bleeding from her LEEP procedure. pt voices no other concerns.      Mirela Flores is a 27 y.o. year old  presenting to be seen for her postpartum visit.  She had a vaginal delivery.   Prenatal course was been benign.    Since delivery she has been sexually active, but it was not painful.  She does not have concerns about post-partum blues/depression.   She is breastfeeing.  Going well.    The following portions of the patient's history were reviewed and updated as appropriate:current medications and allergies    Smoking status: Never Smoker                                                              Smokeless status: Never Used                        Review of Systems   Respiratory: Negative for shortness of breath.    Cardiovascular: Negative for chest pain.   Gastrointestinal: Negative for abdominal pain.   Genitourinary: Negative for difficulty urinating, enuresis, pelvic pain, vaginal bleeding and vaginal discharge.         Objective   /62  Ht 64\" (162.6 cm)  Wt 119 lb (54 kg)  LMP 2017  BMI 20.43 kg/m2    General:  well developed; well nourished  no acute distress   Abdomen: soft, non-tender; no masses   Pelvis: Not performed.          Assessment   1. Normal 6 week postpartum exam  2. No depression or blues  3. BRETT III, s/p LEEP 5 weeks ago     Plan   1. BC options reviewed and compared today.  She did not do well with pills in the past and she previously had a Mirena and did not like it.  Desires a tubal ligation.  Discussed salpingectomy vs Filsche clips        Risks of laparoscopic surgery were reviewed to include, but are not limited to, the possibility of bowel perforation requiring possible bowel resection and/or colostomy, possible bladder injury or possible and possible vascular injury which could require the need for a blood " transfusion.  Possible need for conversion to a laparotomy was also discussed.  The patient's questions were answered to her satisfaction.  Post-op restrictions and typical post-op course were also reviewed.     2. Repeat PAP in 5 months.    No orders of the defined types were placed in this encounter.         This note was electronically signed.    Karli Guerrero MD  August 11, 2017

## 2017-08-14 ENCOUNTER — APPOINTMENT (OUTPATIENT)
Dept: PREADMISSION TESTING | Facility: HOSPITAL | Age: 27
End: 2017-08-14

## 2017-08-14 VITALS
OXYGEN SATURATION: 98 % | HEIGHT: 64 IN | HEART RATE: 72 BPM | WEIGHT: 121 LBS | BODY MASS INDEX: 20.66 KG/M2 | RESPIRATION RATE: 18 BRPM | DIASTOLIC BLOOD PRESSURE: 53 MMHG | SYSTOLIC BLOOD PRESSURE: 114 MMHG

## 2017-08-14 LAB
BASOPHILS # BLD AUTO: 0.04 10*3/MM3 (ref 0–0.2)
BASOPHILS NFR BLD AUTO: 0.7 % (ref 0–2)
DEPRECATED RDW RBC AUTO: 46.4 FL (ref 40–54)
EOSINOPHIL # BLD AUTO: 0.08 10*3/MM3 (ref 0–0.7)
EOSINOPHIL NFR BLD AUTO: 1.4 % (ref 0–4)
ERYTHROCYTE [DISTWIDTH] IN BLOOD BY AUTOMATED COUNT: 13.3 % (ref 12–15)
HCT VFR BLD AUTO: 33.9 % (ref 37–47)
HGB BLD-MCNC: 11.2 G/DL (ref 12–16)
IMM GRANULOCYTES # BLD: 0.01 10*3/MM3 (ref 0–0.03)
IMM GRANULOCYTES NFR BLD: 0.2 % (ref 0–5)
LYMPHOCYTES # BLD AUTO: 2.65 10*3/MM3 (ref 0.72–4.86)
LYMPHOCYTES NFR BLD AUTO: 45.6 % (ref 15–45)
MCH RBC QN AUTO: 32.1 PG (ref 28–32)
MCHC RBC AUTO-ENTMCNC: 33 G/DL (ref 33–36)
MCV RBC AUTO: 97.1 FL (ref 82–98)
MONOCYTES # BLD AUTO: 0.4 10*3/MM3 (ref 0.19–1.3)
MONOCYTES NFR BLD AUTO: 6.9 % (ref 4–12)
NEUTROPHILS # BLD AUTO: 2.63 10*3/MM3 (ref 1.87–8.4)
NEUTROPHILS NFR BLD AUTO: 45.2 % (ref 39–78)
PLATELET # BLD AUTO: 169 10*3/MM3 (ref 130–400)
PMV BLD AUTO: 11.7 FL (ref 6–12)
RBC # BLD AUTO: 3.49 10*6/MM3 (ref 4.2–5.4)
WBC NRBC COR # BLD: 5.81 10*3/MM3 (ref 4.8–10.8)

## 2017-08-14 PROCEDURE — 36415 COLL VENOUS BLD VENIPUNCTURE: CPT | Performed by: OBSTETRICS & GYNECOLOGY

## 2017-08-14 PROCEDURE — 85025 COMPLETE CBC W/AUTO DIFF WBC: CPT | Performed by: OBSTETRICS & GYNECOLOGY

## 2017-08-14 NOTE — DISCHARGE INSTRUCTIONS
DAY OF SURGERY INSTRUCTIONS        YOUR SURGEON: dr ang    PROCEDURE: ***laparoscopic salpingectomy    DATE OF SURGERY: ***8/28/2017    ARRIVAL TIME: AS DIRECTED BY OFFICE    DAY OF SURGERY TAKE ONLY THESE MEDICATIONS: ***none      BEFORE YOU COME TO THE HOSPITAL  (Pre-op instructions)  • Do not eat, drink, smoke or chew gum after midnight the night before surgery.  This also includes no mints.  • Morning of surgery take only the medicines you have been instructed with a sip of water unless otherwise instructed  by your physician.  • Do not shave, wear makeup or dark nail polish.  • Remove all jewelry including rings.  • Leave anything you consider valuable at home.  • Leave your suitcase in the car until after your surgery.  • Bring the following with you if applicable:  o Picture ID and insurance, Medicare or Medicaid cards  o Co-pay/deductible required by insurance (cash, check, credit card)  o Copy of advance directive, living will or power-of- documents if not brought to PAT  o CPAP or BIPAP mask and tubing  o Relaxation aids (MP3 player, book, magazine)  • On the day of surgery check in at registration located at the main entrance of the hospital.       Outpatient Surgery Guidelines, Adult  Outpatient procedures are those for which the person having the procedure is allowed to go home the same day as the procedure. Various procedures are done on an outpatient basis. You should follow some general guidelines if you will be having an outpatient procedure.  LET YOUR HEALTH CARE PROVIDER KNOW ABOUT:  · Any allergies you have.  · All medicines you are taking, including vitamins, herbs, eye drops, creams, and over-the-counter medicines.  · Previous problems you or members of your family have had with the use of anesthetics.  · Any blood disorders you have.  · Previous surgeries you have had.  · Medical conditions you have.  RISKS AND COMPLICATIONS  Your health care provider will discuss possible risks  and complications with you before surgery. Common risks and complications include:    · Problems due to the use of anesthetics.  · Blood loss and replacement (does not apply to minor surgical procedures).  · Temporary increase in pain due to surgery.  · Uncorrected pain or problems that the surgery was meant to correct.  · Infection.  · New damage.  BEFORE THE PROCEDURE  · Ask your health care provider about changing or stopping your regular medicines. You may need to stop taking certain medicines in the days or weeks before the procedure.  · Stop smoking at least 2 weeks before surgery. This lowers your risk for complications during and after surgery. Ask your health care provider for help with this if needed.  · Eat your usual meals and a light supper the day before surgery. Continue fluid intake. Do not drink alcohol.  · Do not eat or drink after midnight the night before your surgery.   · Arrange for someone to take you home and to stay with you for 24 hours after the procedure. Medicine given for your procedure may affect your ability to drive or to care for yourself.  · Call your health care provider's office if you develop an illness or problem that may prevent you from safely having your procedure.  AFTER THE PROCEDURE  After surgery, you will be taken to a recovery area, where your progress will be monitored. If there are no complications, you will be allowed to go home when you are awake, stable, and taking fluids well. You may have numbness around the surgical site. Healing will take some time. You will have tenderness at the surgical site and may have some swelling and bruising. You may also have some nausea.  HOME CARE INSTRUCTIONS  · Do not drive for 24 hours, or as directed by your health care provider. Do not drive while taking prescription pain medicines.  · Do not drink alcohol for 24 hours.  · Do not make important decisions or sign legal documents for 24 hours.  · You may resume a normal diet and  activities as directed.  · Do not lift anything heavier than 10 pounds (4.5 kg) or play contact sports until your health care provider says it is okay.  · Change your bandages (dressings) as directed.  · Only take over-the-counter or prescription medicines as directed by your health care provider.  · Follow up with your health care provider as directed.  SEEK MEDICAL CARE IF:  · You have increased bleeding (more than a small spot) from the surgical site.  · You have redness, swelling, or increasing pain in the wound.  · You see pus coming from the wound.  · You have a fever.  · You notice a bad smell coming from the wound or dressing.  · You feel lightheaded or faint.  · You develop a rash.  · You have trouble breathing.  · You develop allergies.  MAKE SURE YOU:  · Understand these instructions.  · Will watch your condition.  · Will get help right away if you are not doing well or get worse.     This information is not intended to replace advice given to you by your health care provider. Make sure you discuss any questions you have with your health care provider.     Document Released: 09/12/2002 Document Revised: 05/03/2016 Document Reviewed: 05/22/2014  [employer] Interactive Patient Education ©2016 [employer]       Fall Prevention in Hospitals, Adult  As a hospital patient, your condition and the treatments you receive can increase your risk for falls. Some additional risk factors for falls in a hospital include:  · Being in an unfamiliar environment.  · Being on bed rest.  · Your surgery.  · Taking certain medicines.  · Your tubing requirements, such as intravenous (IV) therapy or catheters.  It is important that you learn how to decrease fall risks while at the hospital. Below are important tips that can help prevent falls.  SAFETY TIPS FOR PREVENTING FALLS  Talk about your risk of falling.  · Ask your health care provider why you are at risk for falling. Is it your medicine, illness, tubing placement, or  something else?  · Make a plan with your health care provider to keep you safe from falls.  · Ask your health care provider or pharmacist about side effects of your medicines. Some medicines can make you dizzy or affect your coordination.  Ask for help.  · Ask for help before getting out of bed. You may need to press your call button.  · Ask for assistance in getting safely to the toilet.  · Ask for a walker or cane to be put at your bedside. Ask that most of the side rails on your bed be placed up before your health care provider leaves the room.  · Ask family or friends to sit with you.  · Ask for things that are out of your reach, such as your glasses, hearing aids, telephone, bedside table, or call button.  Follow these tips to avoid falling:  · Stay lying or seated, rather than standing, while waiting for help.  · Wear rubber-soled slippers or shoes whenever you walk in the hospital.  · Avoid quick, sudden movements.  ¨ Change positions slowly.  ¨ Sit on the side of your bed before standing.  ¨ Stand up slowly and wait before you start to walk.  · Let your health care provider know if there is a spill on the floor.  · Pay careful attention to the medical equipment, electrical cords, and tubes around you.  · When you need help, use your call button by your bed or in the bathroom. Wait for one of your health care providers to help you.  · If you feel dizzy or unsure of your footing, return to bed and wait for assistance.  · Avoid being distracted by the TV, telephone, or another person in your room.  · Do not lean or support yourself on rolling objects, such as IV poles or bedside tables.     This information is not intended to replace advice given to you by your health care provider. Make sure you discuss any questions you have with your health care provider.     Document Released: 12/15/2001 Document Revised: 01/08/2016 Document Reviewed: 08/25/2013  Elsevier Interactive Patient Education ©2016 [employer]       Surgical Site Infections FAQs  What is a Surgical Site Infection (SSI)?  A surgical site infection is an infection that occurs after surgery in the part of the body where the surgery took place. Most patients who have surgery do not develop an infection. However, infections develop in about 1 to 3 out of every 100 patients who have surgery.  Some of the common symptoms of a surgical site infection are:  · Redness and pain around the area where you had surgery  · Drainage of cloudy fluid from your surgical wound  · Fever  Can SSIs be treated?  Yes. Most surgical site infections can be treated with antibiotics. The antibiotic given to you depends on the bacteria (germs) causing the infection. Sometimes patients with SSIs also need another surgery to treat the infection.  What are some of the things that hospitals are doing to prevent SSIs?  To prevent SSIs, doctors, nurses, and other healthcare providers:  · Clean their hands and arms up to their elbows with an antiseptic agent just before the surgery.  · Clean their hands with soap and water or an alcohol-based hand rub before and after caring for each patient.  · May remove some of your hair immediately before your surgery using electric clippers if the hair is in the same area where the procedure will occur. They should not shave you with a razor.  · Wear special hair covers, masks, gowns, and gloves during surgery to keep the surgery area clean.  · Give you antibiotics before your surgery starts. In most cases, you should get antibiotics within 60 minutes before the surgery starts and the antibiotics should be stopped within 24 hours after surgery.  · Clean the skin at the site of your surgery with a special soap that kills germs.  What can I do to help prevent SSIs?  Before your surgery:  · Tell your doctor about other medical problems you may have. Health problems such as allergies, diabetes, and obesity could affect your surgery and your  treatment.  · Quit smoking. Patients who smoke get more infections. Talk to your doctor about how you can quit before your surgery.  · Do not shave near where you will have surgery. Shaving with a razor can irritate your skin and make it easier to develop an infection.  At the time of your surgery:  · Speak up if someone tries to shave you with a razor before surgery. Ask why you need to be shaved and talk with your surgeon if you have any concerns.  · Ask if you will get antibiotics before surgery.  After your surgery:  · Make sure that your healthcare providers clean their hands before examining you, either with soap and water or an alcohol-based hand rub.  · If you do not see your providers clean their hands, please ask them to do so.  · Family and friends who visit you should not touch the surgical wound or dressings.  · Family and friends should clean their hands with soap and water or an alcohol-based hand rub before and after visiting you. If you do not see them clean their hands, ask them to clean their hands.  What do I need to do when I go home from the hospital?  · Before you go home, your doctor or nurse should explain everything you need to know about taking care of your wound. Make sure you understand how to care for your wound before you leave the hospital.  · Always clean your hands before and after caring for your wound.  · Before you go home, make sure you know who to contact if you have questions or problems after you get home.  · If you have any symptoms of an infection, such as redness and pain at the surgery site, drainage, or fever, call your doctor immediately.  If you have additional questions, please ask your doctor or nurse.  Developed and co-sponsored by The Society for Healthcare Epidemiology of [place] (SHEA); Infectious Diseases Society of [place] (IDSA); American Hospital Association; Association for Professionals in Infection Control and Epidemiology (APIC); Centers for Disease  Control and Prevention (CDC); and The Joint Commission.     This information is not intended to replace advice given to you by your health care provider. Make sure you discuss any questions you have with your health care provider.     Document Released: 12/23/2014 Document Revised: 01/08/2016 Document Reviewed: 03/02/2016  [employer] Interactive Patient Education ©2016 [employer].       [facility]  CHG 4% Patient Instruction Sheet    Preparing the Skin Before Surgery  Preparing or “prepping” skin before surgery can reduce the risk of infection at the surgical site. To make the process easier,[facility] has chosen 4% Chlorhexidine Gluconate (CHG) antiseptic solution.   The steps below outline the prepping process and should be carefully followed.                                                                                                                                                      Prep the skin at the following time(s):                                                      We recommend you shower the night before surgery, and again the morning of surgery with the 4% CHG antiseptic solution using half of the bottle and a cloth each time.  Dress in clean clothes/sleepwear after showering.  See instructions below for application.          Do not apply any lotions or moisturizers.       Do not shave the area to be prepped for at least 2 days prior to surgery.    Clipping the hair may be done immediately prior to your surgery at the hospital    if needed.    Directions:  Thoroughly rinse your body with water.  Apply 4% CHG to a cloth and wash skin gently, paying special attention to the operative site.  Rinse again thoroughly.  Once you have begun using this product do not apply anything else to your skin. If itching or redness persists, rinse affected areas and discontinue use.    When using this product:  • Keep out of eyes, ears, and mouth.  • If solution should contact these areas, rinse out promptly  and thoroughly with water.  • For external use only.  • Do not use in genital area, on your face or head.      PATIENT/FAMILY/RESPONSIBLE PARTY VERBALIZES UNDERSTANDING OF ABOVE EDUCATION.  COPY OF PAIN SCALE GIVEN AND REVIEWED WITH VERBALIZED UNDERSTANDING.

## 2017-08-16 ENCOUNTER — RESULTS ENCOUNTER (OUTPATIENT)
Dept: OBSTETRICS AND GYNECOLOGY | Facility: CLINIC | Age: 27
End: 2017-08-16

## 2017-08-16 DIAGNOSIS — Z30.2 STERILIZATION: ICD-10-CM

## 2017-08-28 ENCOUNTER — ANESTHESIA EVENT (OUTPATIENT)
Dept: PERIOP | Facility: HOSPITAL | Age: 27
End: 2017-08-28

## 2017-08-28 ENCOUNTER — ANESTHESIA (OUTPATIENT)
Dept: PERIOP | Facility: HOSPITAL | Age: 27
End: 2017-08-28

## 2017-08-28 ENCOUNTER — HOSPITAL ENCOUNTER (OUTPATIENT)
Facility: HOSPITAL | Age: 27
Setting detail: HOSPITAL OUTPATIENT SURGERY
Discharge: HOME | End: 2017-08-28
Attending: OBSTETRICS & GYNECOLOGY | Admitting: OBSTETRICS & GYNECOLOGY

## 2017-08-28 VITALS
SYSTOLIC BLOOD PRESSURE: 113 MMHG | OXYGEN SATURATION: 98 % | DIASTOLIC BLOOD PRESSURE: 68 MMHG | RESPIRATION RATE: 16 BRPM | TEMPERATURE: 97.5 F | HEART RATE: 73 BPM

## 2017-08-28 DIAGNOSIS — Z30.2 STERILIZATION: ICD-10-CM

## 2017-08-28 LAB
ABO GROUP BLD: NORMAL
B-HCG UR QL: NEGATIVE
BLD GP AB SCN SERPL QL: NEGATIVE
RH BLD: POSITIVE

## 2017-08-28 PROCEDURE — 58661 LAPAROSCOPY REMOVE ADNEXA: CPT | Performed by: OBSTETRICS & GYNECOLOGY

## 2017-08-28 PROCEDURE — 88302 TISSUE EXAM BY PATHOLOGIST: CPT | Performed by: OBSTETRICS & GYNECOLOGY

## 2017-08-28 PROCEDURE — 86900 BLOOD TYPING SEROLOGIC ABO: CPT | Performed by: OBSTETRICS & GYNECOLOGY

## 2017-08-28 PROCEDURE — 25010000002 ONDANSETRON PER 1 MG: Performed by: ANESTHESIOLOGY

## 2017-08-28 PROCEDURE — 25010000002 FENTANYL CITRATE (PF) 100 MCG/2ML SOLUTION: Performed by: NURSE ANESTHETIST, CERTIFIED REGISTERED

## 2017-08-28 PROCEDURE — 81025 URINE PREGNANCY TEST: CPT | Performed by: OBSTETRICS & GYNECOLOGY

## 2017-08-28 PROCEDURE — 25010000002 GENTAMICIN PER 80 MG: Performed by: OBSTETRICS & GYNECOLOGY

## 2017-08-28 PROCEDURE — 86901 BLOOD TYPING SEROLOGIC RH(D): CPT | Performed by: OBSTETRICS & GYNECOLOGY

## 2017-08-28 PROCEDURE — 86850 RBC ANTIBODY SCREEN: CPT | Performed by: OBSTETRICS & GYNECOLOGY

## 2017-08-28 PROCEDURE — 25010000002 KETOROLAC TROMETHAMINE PER 15 MG: Performed by: NURSE ANESTHETIST, CERTIFIED REGISTERED

## 2017-08-28 PROCEDURE — 25010000002 PROPOFOL 10 MG/ML EMULSION: Performed by: NURSE ANESTHETIST, CERTIFIED REGISTERED

## 2017-08-28 PROCEDURE — 25010000002 HYDROMORPHONE PER 4 MG: Performed by: ANESTHESIOLOGY

## 2017-08-28 PROCEDURE — 25010000002 NEOSTIGMINE PER 0.5 MG: Performed by: NURSE ANESTHETIST, CERTIFIED REGISTERED

## 2017-08-28 PROCEDURE — 25010000002 MIDAZOLAM PER 1 MG: Performed by: ANESTHESIOLOGY

## 2017-08-28 RX ORDER — GLYCOPYRROLATE 0.2 MG/ML
INJECTION INTRAMUSCULAR; INTRAVENOUS AS NEEDED
Status: DISCONTINUED | OUTPATIENT
Start: 2017-08-28 | End: 2017-08-28 | Stop reason: SURG

## 2017-08-28 RX ORDER — CLINDAMYCIN PHOSPHATE 900 MG/50ML
900 INJECTION INTRAVENOUS
Status: DISCONTINUED | OUTPATIENT
Start: 2017-08-28 | End: 2017-08-28 | Stop reason: HOSPADM

## 2017-08-28 RX ORDER — NALOXONE HCL 0.4 MG/ML
0.04 VIAL (ML) INJECTION AS NEEDED
Status: DISCONTINUED | OUTPATIENT
Start: 2017-08-28 | End: 2017-08-28 | Stop reason: HOSPADM

## 2017-08-28 RX ORDER — PROPOFOL 10 MG/ML
VIAL (ML) INTRAVENOUS AS NEEDED
Status: DISCONTINUED | OUTPATIENT
Start: 2017-08-28 | End: 2017-08-28 | Stop reason: SURG

## 2017-08-28 RX ORDER — MIDAZOLAM HYDROCHLORIDE 1 MG/ML
2 INJECTION INTRAMUSCULAR; INTRAVENOUS
Status: DISCONTINUED | OUTPATIENT
Start: 2017-08-28 | End: 2017-08-28 | Stop reason: HOSPADM

## 2017-08-28 RX ORDER — OXYCODONE HYDROCHLORIDE AND ACETAMINOPHEN 5; 325 MG/1; MG/1
1 TABLET ORAL EVERY 4 HOURS PRN
Status: DISCONTINUED | OUTPATIENT
Start: 2017-08-28 | End: 2017-08-28 | Stop reason: HOSPADM

## 2017-08-28 RX ORDER — OXYCODONE HYDROCHLORIDE AND ACETAMINOPHEN 5; 325 MG/1; MG/1
1-2 TABLET ORAL EVERY 4 HOURS PRN
Qty: 40 TABLET | Refills: 0 | Status: SHIPPED | OUTPATIENT
Start: 2017-08-28 | End: 2017-09-12

## 2017-08-28 RX ORDER — ONDANSETRON 2 MG/ML
4 INJECTION INTRAMUSCULAR; INTRAVENOUS AS NEEDED
Status: DISCONTINUED | OUTPATIENT
Start: 2017-08-28 | End: 2017-08-28 | Stop reason: HOSPADM

## 2017-08-28 RX ORDER — SODIUM CHLORIDE 0.9 % (FLUSH) 0.9 %
3 SYRINGE (ML) INJECTION AS NEEDED
Status: DISCONTINUED | OUTPATIENT
Start: 2017-08-28 | End: 2017-08-28 | Stop reason: HOSPADM

## 2017-08-28 RX ORDER — MIDAZOLAM HYDROCHLORIDE 1 MG/ML
1 INJECTION INTRAMUSCULAR; INTRAVENOUS
Status: DISCONTINUED | OUTPATIENT
Start: 2017-08-28 | End: 2017-08-28 | Stop reason: HOSPADM

## 2017-08-28 RX ORDER — MORPHINE SULFATE 2 MG/ML
2 INJECTION, SOLUTION INTRAMUSCULAR; INTRAVENOUS AS NEEDED
Status: DISCONTINUED | OUTPATIENT
Start: 2017-08-28 | End: 2017-08-28 | Stop reason: HOSPADM

## 2017-08-28 RX ORDER — KETOROLAC TROMETHAMINE 30 MG/ML
INJECTION, SOLUTION INTRAMUSCULAR; INTRAVENOUS AS NEEDED
Status: DISCONTINUED | OUTPATIENT
Start: 2017-08-28 | End: 2017-08-28 | Stop reason: SURG

## 2017-08-28 RX ORDER — LIDOCAINE HYDROCHLORIDE 10 MG/ML
0.5 INJECTION, SOLUTION INFILTRATION; PERINEURAL ONCE AS NEEDED
Status: DISCONTINUED | OUTPATIENT
Start: 2017-08-28 | End: 2017-08-28 | Stop reason: HOSPADM

## 2017-08-28 RX ORDER — SODIUM CHLORIDE 0.9 % (FLUSH) 0.9 %
1-10 SYRINGE (ML) INJECTION AS NEEDED
Status: DISCONTINUED | OUTPATIENT
Start: 2017-08-28 | End: 2017-08-28 | Stop reason: HOSPADM

## 2017-08-28 RX ORDER — HYDRALAZINE HYDROCHLORIDE 20 MG/ML
5 INJECTION INTRAMUSCULAR; INTRAVENOUS
Status: DISCONTINUED | OUTPATIENT
Start: 2017-08-28 | End: 2017-08-28 | Stop reason: HOSPADM

## 2017-08-28 RX ORDER — METOCLOPRAMIDE HYDROCHLORIDE 5 MG/ML
5 INJECTION INTRAMUSCULAR; INTRAVENOUS
Status: DISCONTINUED | OUTPATIENT
Start: 2017-08-28 | End: 2017-08-28 | Stop reason: HOSPADM

## 2017-08-28 RX ORDER — LABETALOL HYDROCHLORIDE 5 MG/ML
5 INJECTION, SOLUTION INTRAVENOUS
Status: DISCONTINUED | OUTPATIENT
Start: 2017-08-28 | End: 2017-08-28 | Stop reason: HOSPADM

## 2017-08-28 RX ORDER — IPRATROPIUM BROMIDE AND ALBUTEROL SULFATE 2.5; .5 MG/3ML; MG/3ML
3 SOLUTION RESPIRATORY (INHALATION) ONCE AS NEEDED
Status: DISCONTINUED | OUTPATIENT
Start: 2017-08-28 | End: 2017-08-28 | Stop reason: HOSPADM

## 2017-08-28 RX ORDER — FENTANYL CITRATE 50 UG/ML
INJECTION, SOLUTION INTRAMUSCULAR; INTRAVENOUS AS NEEDED
Status: DISCONTINUED | OUTPATIENT
Start: 2017-08-28 | End: 2017-08-28 | Stop reason: SURG

## 2017-08-28 RX ORDER — MAGNESIUM HYDROXIDE 1200 MG/15ML
LIQUID ORAL AS NEEDED
Status: DISCONTINUED | OUTPATIENT
Start: 2017-08-28 | End: 2017-08-28 | Stop reason: HOSPADM

## 2017-08-28 RX ORDER — SODIUM CHLORIDE, SODIUM LACTATE, POTASSIUM CHLORIDE, CALCIUM CHLORIDE 600; 310; 30; 20 MG/100ML; MG/100ML; MG/100ML; MG/100ML
1000 INJECTION, SOLUTION INTRAVENOUS CONTINUOUS PRN
Status: DISCONTINUED | OUTPATIENT
Start: 2017-08-28 | End: 2017-08-28 | Stop reason: HOSPADM

## 2017-08-28 RX ORDER — SCOLOPAMINE TRANSDERMAL SYSTEM 1 MG/1
1 PATCH, EXTENDED RELEASE TRANSDERMAL ONCE
Status: DISCONTINUED | OUTPATIENT
Start: 2017-08-28 | End: 2017-08-28 | Stop reason: HOSPADM

## 2017-08-28 RX ORDER — MEPERIDINE HYDROCHLORIDE 25 MG/ML
12.5 INJECTION INTRAMUSCULAR; INTRAVENOUS; SUBCUTANEOUS
Status: DISCONTINUED | OUTPATIENT
Start: 2017-08-28 | End: 2017-08-28 | Stop reason: HOSPADM

## 2017-08-28 RX ORDER — FLUMAZENIL 0.1 MG/ML
0.2 INJECTION INTRAVENOUS AS NEEDED
Status: DISCONTINUED | OUTPATIENT
Start: 2017-08-28 | End: 2017-08-28 | Stop reason: HOSPADM

## 2017-08-28 RX ORDER — LIDOCAINE HYDROCHLORIDE 20 MG/ML
INJECTION, SOLUTION INFILTRATION; PERINEURAL AS NEEDED
Status: DISCONTINUED | OUTPATIENT
Start: 2017-08-28 | End: 2017-08-28 | Stop reason: SURG

## 2017-08-28 RX ORDER — SODIUM CHLORIDE, SODIUM LACTATE, POTASSIUM CHLORIDE, CALCIUM CHLORIDE 600; 310; 30; 20 MG/100ML; MG/100ML; MG/100ML; MG/100ML
100 INJECTION, SOLUTION INTRAVENOUS CONTINUOUS
Status: DISCONTINUED | OUTPATIENT
Start: 2017-08-28 | End: 2017-08-28 | Stop reason: HOSPADM

## 2017-08-28 RX ADMIN — HYDROMORPHONE HYDROCHLORIDE 1 MG: 1 INJECTION, SOLUTION INTRAMUSCULAR; INTRAVENOUS; SUBCUTANEOUS at 13:25

## 2017-08-28 RX ADMIN — SODIUM CHLORIDE, POTASSIUM CHLORIDE, SODIUM LACTATE AND CALCIUM CHLORIDE: 600; 310; 30; 20 INJECTION, SOLUTION INTRAVENOUS at 12:45

## 2017-08-28 RX ADMIN — MIDAZOLAM HYDROCHLORIDE 2 MG: 1 INJECTION, SOLUTION INTRAMUSCULAR; INTRAVENOUS at 12:01

## 2017-08-28 RX ADMIN — Medication 3 MG: at 12:50

## 2017-08-28 RX ADMIN — FENTANYL CITRATE 100 MCG: 50 INJECTION, SOLUTION INTRAMUSCULAR; INTRAVENOUS at 12:18

## 2017-08-28 RX ADMIN — PROPOFOL 150 MG: 10 INJECTION, EMULSION INTRAVENOUS at 12:18

## 2017-08-28 RX ADMIN — CLINDAMYCIN PHOSPHATE 900 MG: 18 INJECTION, SOLUTION INTRAVENOUS at 12:18

## 2017-08-28 RX ADMIN — ONDANSETRON 4 MG: 2 INJECTION, SOLUTION INTRAMUSCULAR; INTRAVENOUS at 14:28

## 2017-08-28 RX ADMIN — LIDOCAINE HYDROCHLORIDE 50 MG: 20 INJECTION, SOLUTION INFILTRATION; PERINEURAL at 12:18

## 2017-08-28 RX ADMIN — GLYCOPYRROLATE 0.4 MG: 0.2 INJECTION, SOLUTION INTRAMUSCULAR; INTRAVENOUS at 12:50

## 2017-08-28 RX ADMIN — OXYCODONE HYDROCHLORIDE AND ACETAMINOPHEN 1 TABLET: 5; 325 TABLET ORAL at 15:01

## 2017-08-28 RX ADMIN — SODIUM CHLORIDE, POTASSIUM CHLORIDE, SODIUM LACTATE AND CALCIUM CHLORIDE 1000 ML: 600; 310; 30; 20 INJECTION, SOLUTION INTRAVENOUS at 09:41

## 2017-08-28 RX ADMIN — SCOLOPAMINE TRANSDERMAL SYSTEM 1 PATCH: 1 PATCH, EXTENDED RELEASE TRANSDERMAL at 12:00

## 2017-08-28 RX ADMIN — KETOROLAC TROMETHAMINE 30 MG: 30 INJECTION, SOLUTION INTRAMUSCULAR at 12:50

## 2017-08-28 RX ADMIN — HYDROMORPHONE HYDROCHLORIDE 1 MG: 1 INJECTION, SOLUTION INTRAMUSCULAR; INTRAVENOUS; SUBCUTANEOUS at 13:40

## 2017-08-28 RX ADMIN — GENTAMICIN SULFATE 270 MG: 40 INJECTION, SOLUTION INTRAMUSCULAR; INTRAVENOUS at 12:05

## 2017-08-28 NOTE — PLAN OF CARE
Problem: Patient Care Overview (Adult)  Goal: Plan of Care Review  Outcome: Ongoing (interventions implemented as appropriate)    08/28/17 4848   Coping/Psychosocial Response Interventions   Plan Of Care Reviewed With patient   Patient Care Overview   Progress improving   Outcome Evaluation   Outcome Summary/Follow up Plan SD-4, Drowsy, arouses. PACU dc criteria met.         Problem: Perioperative Period (Adult)  Goal: Signs and Symptoms of Listed Potential Problems Will be Absent or Manageable (Perioperative Period)  Outcome: Ongoing (interventions implemented as appropriate)

## 2017-08-28 NOTE — ANESTHESIA PROCEDURE NOTES
Airway  Urgency: elective    Airway not difficult    General Information and Staff    Patient location during procedure: OR    Indications and Patient Condition  Indications for airway management: airway protection    Preoxygenated: yes  MILS maintained throughout  Mask difficulty assessment: 1 - vent by mask    Final Airway Details  Final airway type: endotracheal airway      Successful airway: ETT    Successful intubation technique: direct laryngoscopy  Endotracheal tube insertion site: oral  Blade: Delcid  Blade size: #2  ETT size: 7.0 mm  Cormack-Lehane Classification: grade I - full view of glottis  Placement verified by: chest auscultation   Measured from: lips  ETT to lips (cm): 19  Number of attempts at approach: 1

## 2017-08-28 NOTE — PLAN OF CARE
Problem: Perioperative Period (Adult)  Goal: Signs and Symptoms of Listed Potential Problems Will be Absent or Manageable (Perioperative Period)  Outcome: Ongoing (interventions implemented as appropriate)    08/28/17 1128   Perioperative Period   Problems Assessed (Perioperative Period) pain;hypothermia;hypoxia/hypoxemia;perioperative injury;situational response   Problems Present (Perioperative Period) situational response

## 2017-08-28 NOTE — PLAN OF CARE
Problem: Patient Care Overview (Adult)  Goal: Plan of Care Review  Outcome: Outcome(s) achieved Date Met:  08/28/17 08/28/17 8126   Coping/Psychosocial Response Interventions   Plan Of Care Reviewed With patient;spouse   Patient Care Overview   Progress improving   Outcome Evaluation   Outcome Summary/Follow up Plan PATIENT MEETS DISCHARGE CRITERIA         Problem: Perioperative Period (Adult)  Goal: Signs and Symptoms of Listed Potential Problems Will be Absent or Manageable (Perioperative Period)  Outcome: Outcome(s) achieved Date Met:  08/28/17

## 2017-08-28 NOTE — PLAN OF CARE
Problem: Patient Care Overview (Adult)  Goal: Plan of Care Review  Outcome: Ongoing (interventions implemented as appropriate)    08/28/17 1211   Coping/Psychosocial Response Interventions   Plan Of Care Reviewed With patient   Patient Care Overview   Progress no change   Outcome Evaluation   Outcome Summary/Follow up Plan no changes in the preop holding phase

## 2017-08-28 NOTE — ANESTHESIA POSTPROCEDURE EVALUATION
Patient: Mirela LEAHY Flores    Procedure Summary     Date Anesthesia Start Anesthesia Stop Room / Location    08/28/17 1217 1316 [facility] OR 09 / [facility] OR       Procedure Diagnosis Surgeon Provider    laparoscopic salpingectomy (Bilateral Abdomen) Sterilization  (Sterilization [Z30.2]) MD Meli James CRNA          Anesthesia Type: general  Last vitals  BP   113/68 (08/28/17 1540)    Temp        Pulse   73 (08/28/17 1540)   Resp   16 (08/28/17 1540)    SpO2   98 % (08/28/17 1540)      Post Anesthesia Care and Evaluation      Comments: Patient d/c from PACU prior to anes eval based on Tony score.  Please see RN notes for details of d/c criteria.

## 2017-08-28 NOTE — OP NOTE
Dinah Flores  : 1990  MRN: [id number]  [facility]: [id number]  Date: 2017    Operative Note    SALPINGECTOMY LAPAROSCOPIC      Pre-op Diagnosis:  Sterilization [Z30.2]   Post-op Diagnosis:  Post-Op Diagnosis Codes:     * Sterilization [Z30.2]   Procedure: Procedure(s):  laparoscopic salpingectomy   Surgeon: Surgeon(s):  Karli Guerrero MD       Anesthesia: General   Estimated Blood Loss: scant   mls   Fluids: 1000   mls   UOP: 15   mls   ABx: ancef   Specimens:  Bilateral fallopian tubes   Findings: Normal pelvis.  U/T/O unremarkable   Complications: none     Description of Procedure:         After informed consent was obtained, the patient was taken to the operating room, where general anesthesia was administered. She was placed in the lithotomy position in [place] stirrups, and her abdomen, perineum and vagina were prepped and draped in normal sterile fashion. A speculum was placed in the vagina and a NONO uterine manipulator placed through the cervical os and clamped to the anterior lip of the cervix.  Her bladder drained with a latex free catheter.   The patient's abdomen was insufflated using a varies needle at the umbilicus.  Following appropriate insufflation, a 5 mm Optiview trocar was used at the same location.  The abdomen was surveyed and found to be free of any adhesive disease or scar tissue to the anterior abdominal wall.  An additional 12 mm trocar was placed in the suprapubic region, in the midline, above the pubic symphysis, and another 5 mm port placed in the LLQ. The patient was then placed in Trendelenburg position and the bowel swept out of the pelvis with a blunt probe and/or Pacheco Geck grasper.  The left fallopian tube was grasp with the Pacheco Geck grasper and placed under general tension as [place] Bipolar device was used to dissect it away from the adjacent ovary and transect across the diameter of the tube near the cornua of the uterus.  A before-and-after  picture was taken on each side.  All surgical pedicles were checked for hemostasis.  The identical procedure was then performed on the right fallopian tube.  The patient's uterus, both ovaries, and both fallopian tubes all appeared completely normal.  There was no free fluid or blood in the patient's pelvis.  The laparoscope was removed and the gas allowed to escape the abdomen through the umbilical port.  Trochars were removed.   The fascia at the 12 mm incision was reapproximated with 0-vicryl on a UR6 needle and subcutaneous tissue at all incisions were reapproximated with 3-0 Vicryl in a continuous fashion.  The instruments were then removed from the vagina and the [place] site noted to be hemostatic.  Skin Affix was placed over the incisions as a bandage.   The patient was then awakened and taken to the recovery room in stable condition.  She tolerated the procedure well and without complications.  All sponge needle and instrument counts were correct times 2 per the OR staff.      Karli Guerrero MD   8/28/2017  1:00 PM

## 2017-08-28 NOTE — ANESTHESIA PREPROCEDURE EVALUATION
Anesthesia Evaluation     Patient summary reviewed and Nursing notes reviewed   no history of anesthetic complications:  NPO Solid Status: > 8 hours  NPO Liquid Status: > 8 hours     Airway   Mallampati: I  TM distance: >3 FB  Neck ROM: full  no difficulty expected  Dental - normal exam     Pulmonary - normal exam    breath sounds clear to auscultation  (-) asthma, recent URI, sleep apnea, not a smoker  Cardiovascular   Exercise tolerance: excellent (>7 METS)    Rhythm: regular  Rate: normal    (-) pacemaker, hypertension, past MI, angina, murmur, cardiac stents      Neuro/Psych  (-) seizures, TIA, CVA, weakness, numbness  GI/Hepatic/Renal/Endo    (-) GERD, liver disease, no renal disease, diabetes, hypothyroidism, hyperthyroidism    ROS Comment: Thyroid nodule, being followed    Musculoskeletal     (-) neck pain, neck stiffness  Abdominal    Substance History      OB/GYN    (-)  Pregnant        Other                                        Anesthesia Plan    ASA 1     general     intravenous induction   Anesthetic plan and risks discussed with patient.  Use of blood products discussed with patient  Consented to blood products.

## 2017-08-28 NOTE — DISCHARGE INSTRUCTIONS

## 2017-08-31 ENCOUNTER — OFFICE VISIT (OUTPATIENT)
Dept: OBSTETRICS AND GYNECOLOGY | Facility: CLINIC | Age: 27
End: 2017-08-31

## 2017-08-31 VITALS
DIASTOLIC BLOOD PRESSURE: 80 MMHG | WEIGHT: 121 LBS | BODY MASS INDEX: 20.66 KG/M2 | HEIGHT: 64 IN | SYSTOLIC BLOOD PRESSURE: 102 MMHG

## 2017-08-31 DIAGNOSIS — Z09 S/P GYNECOLOGICAL SURGERY, FOLLOW-UP EXAM: Primary | ICD-10-CM

## 2017-08-31 DIAGNOSIS — L23.9 ALLERGIC DERMATITIS: ICD-10-CM

## 2017-08-31 PROCEDURE — 99024 POSTOP FOLLOW-UP VISIT: CPT | Performed by: OBSTETRICS & GYNECOLOGY

## 2017-08-31 RX ORDER — METHYLPREDNISOLONE 4 MG/1
TABLET ORAL
Qty: 21 TABLET | Refills: 0 | Status: SHIPPED | OUTPATIENT
Start: 2017-08-31 | End: 2017-09-12

## 2017-09-12 ENCOUNTER — OFFICE VISIT (OUTPATIENT)
Dept: OBSTETRICS AND GYNECOLOGY | Facility: CLINIC | Age: 27
End: 2017-09-12

## 2017-09-12 VITALS
WEIGHT: 119 LBS | BODY MASS INDEX: 20.32 KG/M2 | HEIGHT: 64 IN | DIASTOLIC BLOOD PRESSURE: 70 MMHG | SYSTOLIC BLOOD PRESSURE: 104 MMHG

## 2017-09-12 DIAGNOSIS — Z78.9 NONSMOKER: ICD-10-CM

## 2017-09-12 DIAGNOSIS — Z09 S/P GYNECOLOGICAL SURGERY, FOLLOW-UP EXAM: Primary | ICD-10-CM

## 2017-09-12 DIAGNOSIS — F41.9 ANXIETY: ICD-10-CM

## 2017-09-12 PROCEDURE — 99024 POSTOP FOLLOW-UP VISIT: CPT | Performed by: OBSTETRICS & GYNECOLOGY

## 2017-09-12 RX ORDER — BUSPIRONE HYDROCHLORIDE 15 MG/1
7.5 TABLET ORAL 2 TIMES DAILY PRN
Qty: 60 TABLET | Refills: 2 | Status: SHIPPED | OUTPATIENT
Start: 2017-09-12 | End: 2017-11-20

## 2017-09-12 NOTE — PROGRESS NOTES
"Subjective   Chief Complaint   Patient presents with   • Post-op     pt here today for 2 week post op laparoscopic bilateral salpingectomy. pt voices that she still has a little bit of the rash and says that it will flare up if she gets stressed out. pt voices no other concerns.      Mirela Flores is a 27 y.o. year old  presenting to be seen for her post-operative visit.  She had a bilateral salpingectomy 2 weeks ago.  Currently she reports No pain or discomfort.  Incisions healed.  She still has a \"flare-up\" of rash if she she really stressed, but rash from Chloroprep has basically resolved.    No Additional Complaints Reported    The following portions of the patient's history were reviewed and updated as appropriate:current medications and allergies    Review of Systems      Objective   /70  Ht 64\" (162.6 cm)  Wt 119 lb (54 kg)  BMI 20.43 kg/m2    General:  well developed; well nourished  no acute distress   Abdomen: soft, non-tender; no masses  incision is healed.  No rash visible on exam.   Pelvis: Not performed.          Assessment   1. Pt is 2 weeks s/p bilateral salpingectomy  2. Pt with a long h/o anxiety and has been on medication in the past.  She stopped taking it when she became pregnant, but feels it would be beneficial to resume.     Plan   1. Zoloft with Buspar prn started today.  2. Patient has no restrictions now from sterilization.  3. Annual exam to be scheduled as she is leaving  4. RTO prn    New Medications Ordered This Visit   Medications   • sertraline (ZOLOFT) 50 MG tablet     Sig: Take 1 tablet by mouth Daily.     Dispense:  30 tablet     Refill:  2   • busPIRone (BUSPAR) 15 MG tablet     Sig: Take 0.5 tablets by mouth 2 (Two) Times a Day As Needed (anxiety).     Dispense:  60 tablet     Refill:  2          This note was electronically signed.    Karli Guerrero MD  2017  "

## 2017-10-03 ENCOUNTER — OFFICE VISIT (OUTPATIENT)
Dept: FAMILY MEDICINE CLINIC | Age: 27
End: 2017-10-03
Payer: COMMERCIAL

## 2017-10-03 VITALS
RESPIRATION RATE: 16 BRPM | HEART RATE: 98 BPM | BODY MASS INDEX: 19.05 KG/M2 | TEMPERATURE: 99.1 F | OXYGEN SATURATION: 98 % | DIASTOLIC BLOOD PRESSURE: 62 MMHG | HEIGHT: 64 IN | WEIGHT: 111.6 LBS | SYSTOLIC BLOOD PRESSURE: 112 MMHG

## 2017-10-03 DIAGNOSIS — F90.0 ATTENTION DEFICIT HYPERACTIVITY DISORDER (ADHD), PREDOMINANTLY INATTENTIVE TYPE: ICD-10-CM

## 2017-10-03 DIAGNOSIS — G43.009 MIGRAINE WITHOUT AURA AND WITHOUT STATUS MIGRAINOSUS, NOT INTRACTABLE: ICD-10-CM

## 2017-10-03 PROCEDURE — 99213 OFFICE O/P EST LOW 20 MIN: CPT | Performed by: NURSE PRACTITIONER

## 2017-10-03 RX ORDER — BUSPIRONE HYDROCHLORIDE 15 MG/1
7.5 TABLET ORAL
COMMUNITY
Start: 2017-09-12 | End: 2017-11-30

## 2017-10-03 RX ORDER — RIZATRIPTAN BENZOATE 10 MG/1
TABLET ORAL
Qty: 10 TABLET | Refills: 2 | Status: SHIPPED | OUTPATIENT
Start: 2017-10-03

## 2017-10-03 RX ORDER — BUTALBITAL, ACETAMINOPHEN AND CAFFEINE 50; 325; 40 MG/1; MG/1; MG/1
1 TABLET ORAL EVERY 6 HOURS PRN
Qty: 40 TABLET | Refills: 2 | Status: SHIPPED | OUTPATIENT
Start: 2017-10-03 | End: 2017-11-09 | Stop reason: SDUPTHER

## 2017-10-03 ASSESSMENT — PATIENT HEALTH QUESTIONNAIRE - PHQ9
SUM OF ALL RESPONSES TO PHQ9 QUESTIONS 1 & 2: 0
1. LITTLE INTEREST OR PLEASURE IN DOING THINGS: 0
2. FEELING DOWN, DEPRESSED OR HOPELESS: 0
SUM OF ALL RESPONSES TO PHQ QUESTIONS 1-9: 0

## 2017-10-03 ASSESSMENT — ENCOUNTER SYMPTOMS
CHEST TIGHTNESS: 0
COUGH: 0
SHORTNESS OF BREATH: 0
SORE THROAT: 0
SINUS PRESSURE: 0
ABDOMINAL PAIN: 0
NAUSEA: 1
WHEEZING: 0
DIARRHEA: 0

## 2017-10-03 NOTE — MR AVS SNAPSHOT
After Visit Summary             Candace Crabtree   10/3/2017 10:45 AM   Office Visit    Description:  Female : 1990   Provider:  AHSAN Dennis   Department:  [facility]              Your Follow-Up and Future Appointments         Below is a list of your follow-up and future appointments. This may not be a complete list as you may have made appointments directly with providers that we are not aware of or your providers may have made some for you. Please call your providers to confirm appointments. It is important to keep your appointments. Please bring your current insurance card, photo ID, co-pay, and all medication bottles to your appointment. If self-pay, payment is expected at the time of service. Your To-Do List     Future Appointments Provider Department Dept Phone    2018 11:15 AM Sandy Villalta MD [address] [phone]    Please arrive 15 minutes prior to appointment time, bring insurance card and photo ID. Follow-Up    Return in about 3 months (around 1/3/2018). Information from Your Visit        Department     Name Address Phone Fax    [address] [address] [address] [phone]      You Were Seen for:         Comments    Migraine without aura and without status migrainosus, not intractable   [[id number]]         Vital Signs     Blood Pressure Pulse Temperature Respirations Height Weight    112/62 98 99.1 °F (37.3 °C) (Temporal) 16 5' 4\" (1.626 m) 111 lb 9.6 oz (50.6 kg)    Oxygen Saturation Body Mass Index Smoking Status             98% 19.16 kg/m2 Never Smoker            Today's Medication Changes          These changes are accurate as of: 10/3/17 12:25 PM.  If you have any questions, ask your nurse or doctor.                START taking these medications           butalbital-acetaminophen-caffeine -40 MG per tablet   Commonly known as:  Wei Rivera Instructions: Take 1 tablet by mouth every 6 hours as needed for Headaches   Quantity:  40 tablet   Refills:  2   Started by:  AHSAN Miller       lisdexamfetamine 30 MG capsule   Commonly known as:  VYVANSE   Instructions: Take 1 capsule by mouth every morning . Earliest Fill Date: 10/3/17   Quantity:  30 capsule   Refills:  0   Started by:  Sim Promise, APRN       rizatriptan 10 MG tablet   Commonly known as:  MAXALT   Instructions: May repeat in 2 hours if needed. Do not exceed 2 tablets in 24 hours. Quantity:  10 tablet   Refills:  2   Started by:  AHSAN Miller            Where to Get Your Medications      These medications were sent to [address] [place] [address]., [address] [facility] [address]     Phone:  [phone]     butalbital-acetaminophen-caffeine -40 MG per tablet    rizatriptan 10 MG tablet         You can get these medications from any pharmacy     Bring a paper prescription for each of these medications     lisdexamfetamine 30 MG capsule               Your Current Medications Are              busPIRone (BUSPAR) 15 MG tablet Take 7.5 mg by mouth    sertraline (ZOLOFT) 50 MG tablet Take 50 mg by mouth    rizatriptan (MAXALT) 10 MG tablet May repeat in 2 hours if needed. Do not exceed 2 tablets in 24 hours. butalbital-acetaminophen-caffeine (FIORICET, ESGIC) -40 MG per tablet Take 1 tablet by mouth every 6 hours as needed for Headaches    lisdexamfetamine (VYVANSE) 30 MG capsule Take 1 capsule by mouth every morning . Earliest Fill Date: 10/3/17      Allergies              Cephalosporins     Pt states was told can never take them again. Penicillins     Reaction as a child. Patient was told can never take again.      Adhesive Tape Rash    TEGADERM AND STERI STRIPS         Additional Information        Basic Information Date Of Birth Sex Race Ethnicity Preferred Language    1990 Female White Non-/Non  English      Problem List as of 10/3/2017                 Migraine without aura and without status migrainosus, not intractable    Attention deficit hyperactivity disorder (ADHD), predominantly inattentive type      Preventive Care        Date Due    HIV screening is recommended for all people regardless of risk factors  aged 15-65 years at least once (lifetime) who have never been HIV tested. 4/9/2006    Tetanus Combination Vaccine (1 - Tdap) 4/9/2010    Pap Smear 4/9/2012    Yearly Flu Vaccine (1) 9/1/2017            MyChart Signup           [employer] allows you to send messages to your doctor, view your test results, renew your prescriptions, schedule appointments, view visit notes, and more. How Do I Sign Up? 1. In your Internet browser, go to [email or web address]  2. Click on the Sign Up Now link in the Sign In box. You will see the New Member Sign Up page. 3. Enter your [employer] Access Code exactly as it appears below. You will not need to use this code after youve completed the sign-up process. If you do not sign up before the expiration date, you must request a new code. [employer] Access Code: [email or web address]  Expires: 12/2/2017 12:25 PM    4. Enter your Social Security Number ([id number]) and Date of Birth (mm/dd/yyyy) as indicated and click Submit. You will be taken to the next sign-up page. 5. Create a [employer] ID. This will be your [employer] login ID and cannot be changed, so think of one that is secure and easy to remember. 6. Create a [employer] password. You can change your password at any time. 7. Enter your Password Reset Question and Answer. This can be used at a later time if you forget your password. 8. Enter your e-mail address. You will receive e-mail notification when new information is available in [address] 9. Click Sign Up. You can now view your medical record.

## 2017-10-03 NOTE — PROGRESS NOTES
Leandro Tavarez is a 33 y.o. female who presents today for   Chief Complaint   Patient presents with    Discuss Medications     headache med and has not taken ADD med in a long time. What pt had before it did not work and is requesting starting something new        HPI:  She has a h/o migraine headaches which have been worse recently. Symptoms include photophobia and nausea. She sometimes has trouble focusing when headache is bad. No diplopia or visual disturbance otherwise. She is having a migraine once a week now. She has tried sumitriptan in the past with some relief. She could not tolerate topamax due to facial swelling. She has a h/o ADHD, inattentive predominant. Symptoms include inability to concentrate and focus well on the task at hand. She was controlling symptoms fairly well without medication, but now has 2 children and has had more trouble focusing and keeping schedule straight. More forgetful. She has tried ritalin and concerta in the past but could not tolerate, made her feel poorly and nauseated. Her  takes vyvanse and tolerates it well. Review of Systems   HENT: Negative for congestion, ear pain, sinus pressure and sore throat. Respiratory: Negative for cough, chest tightness, shortness of breath and wheezing. Cardiovascular: Negative for chest pain. Gastrointestinal: Positive for nausea (with migraines). Negative for abdominal pain and diarrhea. Musculoskeletal: Negative for arthralgias and myalgias. Skin: Negative for rash. Neurological: Positive for headaches. Psychiatric/Behavioral: Positive for decreased concentration.        Past Medical History:   Diagnosis Date    Attention deficit hyperactivity disorder (ADHD), predominantly inattentive type 10/3/2017    Migraine without aura and without status migrainosus, not intractable 10/3/2017       Current Outpatient Prescriptions   Medication Sig Dispense Refill    busPIRone (BUSPAR) 15 MG tablet Take 7.5 mg by mouth      sertraline (ZOLOFT) 50 MG tablet Take 50 mg by mouth      lisdexamfetamine (VYVANSE) 30 MG capsule Take 1 capsule by mouth every morning . 30 capsule 0    rizatriptan (MAXALT) 10 MG tablet May repeat in 2 hours if needed. Do not exceed 2 tablets in 24 hours. 10 tablet 2    butalbital-acetaminophen-caffeine (FIORICET, ESGIC) -40 MG per tablet Take 1 tablet by mouth every 6 hours as needed for Headaches 40 tablet 2     No current facility-administered medications for this visit. Allergies   Allergen Reactions    Cephalosporins      Pt states was told can never take them again.  Penicillins      Reaction as a child. Patient was told can never take again.  Adhesive Tape Rash     TEGADERM AND STERI STRIPS       No past surgical history on file. Social History   Substance Use Topics    Smoking status: Never Smoker    Smokeless tobacco: Never Used    Alcohol use No       No family history on file. /62  Pulse 98  Temp 99.1 °F (37.3 °C) (Temporal)   Resp 16  Ht 5' 4\" (1.626 m)  Wt 111 lb 9.6 oz (50.6 kg)  SpO2 98%  BMI 19.16 kg/m2    Physical Exam   Constitutional: She is oriented to person, place, and time. She appears well-developed and well-nourished. Eyes: Conjunctivae and EOM are normal. Pupils are equal, round, and reactive to light. Neck: Normal range of motion. Neck supple. No JVD present. No tracheal deviation present. No thyromegaly present. Cardiovascular: Normal rate, regular rhythm, normal heart sounds and intact distal pulses. No murmur heard. Pulmonary/Chest: Effort normal and breath sounds normal. No respiratory distress. Musculoskeletal: Normal range of motion. She exhibits no edema. Lymphadenopathy:     She has no cervical adenopathy. Neurological: She is alert and oriented to person, place, and time. No cranial nerve deficit. She exhibits normal muscle tone. Coordination normal.   Skin: Skin is warm and dry.  No rash noted.   Psychiatric: She has a normal mood and affect. Her behavior is normal. Thought content normal.   Vitals reviewed. Assessment:    ICD-10-CM ICD-9-CM    1. Migraine without aura and without status migrainosus, not intractable G43.009 346.10    2. Attention deficit hyperactivity disorder (ADHD), predominantly inattentive type F90.0 314.00        Plan:  -Advised aleve 1-2 tabs at onset of HA. May take twice daily prn.  -If pain is not controlled with aleve, she will try fioricet q6h prn but was advised to avoid daily use due to potential of rebound HA.  -If not improved with fiorecet, she may try maxalt.  -Unfortunately, she could not tolerate topamax in the past for prevention. Could consider propranolol in the future. -Vyvanse rx given for adhd, inattentive predominant. -F/U with Dr. Gideon Gale in 3 months. Sanjuana Lopez was seen today for discuss medications. Diagnoses and all orders for this visit:    Migraine without aura and without status migrainosus, not intractable    Attention deficit hyperactivity disorder (ADHD), predominantly inattentive type    Other orders  -     lisdexamfetamine (VYVANSE) 30 MG capsule; Take 1 capsule by mouth every morning .  -     rizatriptan (MAXALT) 10 MG tablet; May repeat in 2 hours if needed. Do not exceed 2 tablets in 24 hours. -     butalbital-acetaminophen-caffeine (FIORICET, ESGIC) -40 MG per tablet; Take 1 tablet by mouth every 6 hours as needed for Headaches      There are no discontinued medications. There are no Patient Instructions on file for this visit. Patient voices understanding and agrees to plans along with risks and benefits of plan. Counseling:  Sweta Jimenez's case, medications and options were discussed in detail. Patient was instructed to call the office if she questions regarding her treatment. Should her conditions worsen, she should return to office to be reassessed by AHSAN Valera.  she Should to go the closest Emergency Department for any emergency. They verbalized understanding the above instructions. Return in about 3 months (around 1/3/2018).

## 2017-10-17 ENCOUNTER — TELEPHONE (OUTPATIENT)
Dept: FAMILY MEDICINE CLINIC | Age: 27
End: 2017-10-17

## 2017-10-17 NOTE — TELEPHONE ENCOUNTER
Pt stated that she likes the vyvanse and how it works, however, she states it runs out fairly quickly and she feels like it needs to be a stronger dose. Whatever is decided on this medication, she will need that sent in to her pharmacy.  She says she is about out of it

## 2017-11-02 ENCOUNTER — TELEPHONE (OUTPATIENT)
Dept: FAMILY MEDICINE CLINIC | Age: 27
End: 2017-11-02

## 2017-11-07 ENCOUNTER — HOSPITAL ENCOUNTER (EMERGENCY)
Facility: HOSPITAL | Age: 27
Discharge: HOME | End: 2017-11-07
Attending: EMERGENCY MEDICINE | Admitting: EMERGENCY MEDICINE

## 2017-11-07 VITALS
DIASTOLIC BLOOD PRESSURE: 93 MMHG | RESPIRATION RATE: 16 BRPM | OXYGEN SATURATION: 100 % | HEART RATE: 76 BPM | SYSTOLIC BLOOD PRESSURE: 129 MMHG | WEIGHT: 110 LBS | TEMPERATURE: 98.5 F | BODY MASS INDEX: 18.33 KG/M2 | HEIGHT: 65 IN

## 2017-11-07 DIAGNOSIS — G43.001 MIGRAINE WITHOUT AURA AND WITH STATUS MIGRAINOSUS, NOT INTRACTABLE: Primary | ICD-10-CM

## 2017-11-07 PROCEDURE — 96372 THER/PROPH/DIAG INJ SC/IM: CPT

## 2017-11-07 PROCEDURE — 25010000002 DIPHENHYDRAMINE PER 50 MG: Performed by: EMERGENCY MEDICINE

## 2017-11-07 PROCEDURE — 99283 EMERGENCY DEPT VISIT LOW MDM: CPT

## 2017-11-07 RX ORDER — MEPERIDINE HYDROCHLORIDE 50 MG/ML
50 INJECTION INTRAMUSCULAR; INTRAVENOUS; SUBCUTANEOUS ONCE
Status: COMPLETED | OUTPATIENT
Start: 2017-11-07 | End: 2017-11-07

## 2017-11-07 RX ORDER — DIPHENHYDRAMINE HYDROCHLORIDE 50 MG/ML
25 INJECTION INTRAMUSCULAR; INTRAVENOUS ONCE
Status: COMPLETED | OUTPATIENT
Start: 2017-11-07 | End: 2017-11-07

## 2017-11-07 RX ADMIN — MEPERIDINE HYDROCHLORIDE 50 MG: 50 INJECTION, SOLUTION INTRAMUSCULAR; INTRAVENOUS; SUBCUTANEOUS at 07:39

## 2017-11-07 RX ADMIN — DIPHENHYDRAMINE HYDROCHLORIDE 25 MG: 50 INJECTION, SOLUTION INTRAMUSCULAR; INTRAVENOUS at 07:38

## 2017-11-07 NOTE — ED PROVIDER NOTES
Subjective   HPI Comments: Patient complains of a migraine headache is been going on for about 2 weeks now but has waxed and waned in severity.  It is much more severe this morning.  She has migraine medicine normally takes care of this but cannot take the right now because she is breast-feeding.  She says she has been managing to control the headache with Excedrin migraine for the past couple weeks but this one is much worse this morning she cannot get any relief from it.  It is similar to her migraines in the past.    Patient is a 27 y.o. female presenting with migraines.   History provided by:  Patient   used: No    Migraine   Pain location:  Frontal  Quality:  Dull  Radiates to:  Does not radiate  Severity currently:  9/10  Severity at highest:  9/10  Onset quality:  Gradual  Duration:  2 weeks  Timing:  Constant  Progression:  Worsening  Chronicity:  Recurrent  Similar to prior headaches: yes    Context: bright light and loud noise    Context: not activity, not caffeine, not coughing, not defecating, not eating, not stress, not exposure to cold air, not intercourse and not straining    Relieved by:  Nothing  Worsened by:  Nothing  Ineffective treatments:  None tried  Associated symptoms: eye pain, nausea and photophobia    Associated symptoms: no abdominal pain, no back pain, no blurred vision, no congestion, no cough, no diarrhea, no dizziness, no drainage, no ear pain, no facial pain, no fatigue, no fever, no focal weakness, no hearing loss, no loss of balance, no myalgias, no near-syncope, no neck pain, no neck stiffness, no numbness, no paresthesias, no seizures, no sinus pressure, no sore throat, no swollen glands, no syncope, no tingling, no URI, no visual change and no weakness    Risk factors: no anger, no family hx of SAH, does not have insomnia and lifestyle not sedentary        Review of Systems   Constitutional: Negative.  Negative for fatigue and fever.   HENT: Negative.   Negative for congestion, ear pain, hearing loss, postnasal drip, sinus pressure and sore throat.    Eyes: Positive for photophobia and pain. Negative for blurred vision.   Respiratory: Negative.  Negative for cough.    Cardiovascular: Negative for syncope and near-syncope.   Gastrointestinal: Positive for nausea. Negative for abdominal pain and diarrhea.   Genitourinary: Negative.    Musculoskeletal: Negative.  Negative for back pain, myalgias, neck pain and neck stiffness.   Neurological: Negative.  Negative for dizziness, focal weakness, seizures, weakness, numbness, paresthesias and loss of balance.   Psychiatric/Behavioral: Negative.    All other systems reviewed and are negative.      Past Medical History:   Diagnosis Date   • Anemia affecting pregnancy    • Asthma     as a child   • History of transfusion    • Migraines    • Mono exposure    • S/P thyroid biopsy        Allergies   Allergen Reactions   • Cephalosporins      Pt states was told can never take them again.    • Penicillins      Reaction as a child.  Patient was told can never take again.    • Tape Rash     TEGADERM AND STERI STRIPS       Past Surgical History:   Procedure Laterality Date   • CERVICAL CONIZATION Bilateral 7/3/2017    Procedure: CERVICAL CONIZATION, LOOP ELECTROCAUTERY EXCISION PROCEDURE;  Surgeon: Karli Guerrero MD;  Location: [facility] OR;  Service:    • CHOLECYSTECTOMY     • D&C WITH SUCTION     • OVARIAN CYST SURGERY Right    • SALPINGECTOMY Bilateral 8/28/2017    Procedure: laparoscopic salpingectomy;  Surgeon: Karli Guerrero MD;  Location: [facility] OR;  Service:    • TYMPANOSTOMY TUBE PLACEMENT         Family History   Problem Relation Age of Onset   • Other Father    • Heart murmur Mother    • No Known Problems Paternal Grandfather    • No Known Problems Paternal Grandmother    • Thyroid disease Maternal Grandmother    • Hypertension Maternal Grandmother    • No Known Problems Maternal Grandfather    • Breast cancer Other 60   •  Ovarian cancer Neg Hx        Social History     Social History   • Marital status:      Spouse name: N/A   • Number of children: N/A   • Years of education: N/A     Social History Main Topics   • Smoking status: Never Smoker   • Smokeless tobacco: Never Used   • Alcohol use No   • Drug use: No   • Sexual activity: Yes     Partners: Male     Birth control/ protection: None     Other Topics Concern   • None     Social History Narrative       Prior to Admission medications    Medication Sig Start Date End Date Taking? Authorizing Provider   busPIRone (BUSPAR) 15 MG tablet Take 0.5 tablets by mouth 2 (Two) Times a Day As Needed (anxiety). 9/12/17 9/12/18 Yes Karli Guerrero MD   lisdexamfetamine (VYVANSE) 40 MG capsule Take 40 mg by mouth Every Morning.   Yes Historical Provider, MD   Prenatal Vit-Fe Fumarate-FA (PRENATAL, CLASSIC, VITAMIN) 28-0.8 MG tablet tablet Take  by mouth Daily.   Yes Historical Provider, MD   sertraline (ZOLOFT) 50 MG tablet Take 1 tablet by mouth Daily. 9/12/17 9/12/18 Yes Karli Guerrero MD       Medications   meperidine (DEMEROL) injection 50 mg (50 mg Intramuscular Given 11/7/17 0739)   diphenhydrAMINE (BENADRYL) injection 25 mg (25 mg Intramuscular Given 11/7/17 0738)       Vitals:    11/07/17 0754   BP: 129/93   Pulse: 76   Resp: 16   Temp: 98.5 °F (36.9 °C)   SpO2: 100%         Objective   Physical Exam   Constitutional: She is oriented to person, place, and time. She appears well-developed and well-nourished.   HENT:   Head: Normocephalic and atraumatic.   Eyes: EOM are normal. Pupils are equal, round, and reactive to light.   Neck: Normal range of motion. Neck supple.   Neurological: She is alert and oriented to person, place, and time. She displays normal reflexes. No cranial nerve deficit. She exhibits normal muscle tone. Coordination normal.   Psychiatric: She has a normal mood and affect. Her behavior is normal.   Nursing note and vitals reviewed.      Procedures         Lab  Results (last 24 hours)     ** No results found for the last 24 hours. **          No orders to display       ED Course  ED Course   Comment By Time   I told the patient that we would get her shot today to help with her headache as that is what has helped her in the past.  I did tell her however she did go home and get an a clot dark place to rest with this past and that she cannot be try to take care of things at home while he was medicine [place].  I also turnover told her not to breast-feed with this medicine for the next several hours.  She says she will female before the injection and then supplemented later with other stuff.  She is discharged in stable condition. Ad Sparks Jr., MD 11/07 0821          MDM  Number of Diagnoses or Management Options  Migraine without aura and with status migrainosus, not intractable: established and worsening  Risk of Complications, Morbidity, and/or Mortality  Presenting problems: moderate  Diagnostic procedures: low  Management options: low    Patient Progress  Patient progress: stable      Final diagnoses:   Migraine without aura and with status migrainosus, not intractable          Ad Sparks Jr., MD  11/07/17 0878

## 2017-11-08 ENCOUNTER — HOSPITAL ENCOUNTER (EMERGENCY)
Facility: HOSPITAL | Age: 27
Discharge: HOME | End: 2017-11-08
Admitting: EMERGENCY MEDICINE

## 2017-11-08 VITALS
HEART RATE: 73 BPM | RESPIRATION RATE: 16 BRPM | BODY MASS INDEX: 19.12 KG/M2 | WEIGHT: 112 LBS | HEIGHT: 64 IN | DIASTOLIC BLOOD PRESSURE: 83 MMHG | TEMPERATURE: 98 F | SYSTOLIC BLOOD PRESSURE: 134 MMHG | OXYGEN SATURATION: 96 %

## 2017-11-08 DIAGNOSIS — G43.809 OTHER MIGRAINE WITHOUT STATUS MIGRAINOSUS, NOT INTRACTABLE: Primary | ICD-10-CM

## 2017-11-08 PROCEDURE — 96372 THER/PROPH/DIAG INJ SC/IM: CPT

## 2017-11-08 PROCEDURE — 99283 EMERGENCY DEPT VISIT LOW MDM: CPT

## 2017-11-08 PROCEDURE — 25010000002 HYDROMORPHONE PER 4 MG: Performed by: NURSE PRACTITIONER

## 2017-11-08 RX ORDER — ONDANSETRON 4 MG/1
4 TABLET, ORALLY DISINTEGRATING ORAL ONCE
Status: COMPLETED | OUTPATIENT
Start: 2017-11-08 | End: 2017-11-08

## 2017-11-08 RX ADMIN — HYDROMORPHONE HYDROCHLORIDE 1 MG: 1 INJECTION, SOLUTION INTRAMUSCULAR; INTRAVENOUS; SUBCUTANEOUS at 10:12

## 2017-11-08 RX ADMIN — ONDANSETRON 4 MG: 4 TABLET, ORALLY DISINTEGRATING ORAL at 10:09

## 2017-11-08 NOTE — ED PROVIDER NOTES
Subjective   HPI Comments: Patient is a 27-year-old  female presents ER today with complaint of migraine headache.  The patient reports that she has a history of migraine headaches.  She states that she normally takes daily medications to treat this however has not had this medication in over 14 months.  This was due to the patient being pregnant and that she is now breast-feeding.  The patient reports that she had a migraine yesterday and was seen here at this ER.  Patient received a shot of Benadryl as well as Demerol.  She states that this helped for a while however when she got home last night she began to vomit and have more pain.  The patient reports that she decided to come in this morning to see about getting another pain shot.  Patient denies any fever she denies any neck pain.  She denies any visual changes or any thunderclap headache.  The patient reports that her symptoms are consistent with her normal migraines.  She presents ER today for further evaluation.    Patient is a 27 y.o. female presenting with migraines.   History provided by:  Patient   used: No    Migraine   Pain location:  Generalized  Quality:  Dull  Radiates to:  Does not radiate  Severity currently:  8/10  Onset quality:  Sudden  Duration:  1 day  Timing:  Constant  Progression:  Unchanged  Chronicity:  New  Similar to prior headaches: yes    Context: bright light and loud noise    Context: not activity, not caffeine, not coughing, not defecating, not eating, not stress, not exposure to cold air, not intercourse and not straining    Relieved by: Demerol, Benadryl.  Worsened by:  Light  Associated symptoms: nausea and vomiting    Associated symptoms: no abdominal pain, no back pain, no blurred vision, no congestion, no cough, no diarrhea, no dizziness, no drainage, no ear pain, no eye pain, no facial pain, no fatigue, no fever, no focal weakness, no hearing loss, no loss of balance, no myalgias, no  near-syncope, no neck pain, no neck stiffness, no numbness, no paresthesias, no photophobia, no seizures, no sinus pressure, no sore throat, no swollen glands, no syncope, no tingling, no URI, no visual change and no weakness    Risk factors: no anger, no family hx of SAH, does not have insomnia and lifestyle not sedentary        Review of Systems   Constitutional: Negative for fatigue and fever.   HENT: Negative for congestion, ear pain, hearing loss, postnasal drip, sinus pressure and sore throat.    Eyes: Negative for blurred vision, photophobia and pain.   Respiratory: Negative for cough.    Cardiovascular: Negative for syncope and near-syncope.   Gastrointestinal: Positive for nausea and vomiting. Negative for abdominal pain and diarrhea.   Musculoskeletal: Negative for back pain, myalgias, neck pain and neck stiffness.   Neurological: Negative for dizziness, focal weakness, seizures, weakness, numbness, paresthesias and loss of balance.   All other systems reviewed and are negative.      Past Medical History:   Diagnosis Date   • Anemia affecting pregnancy    • Asthma     as a child   • History of transfusion    • Migraines    • Mono exposure    • S/P thyroid biopsy        Allergies   Allergen Reactions   • Cephalosporins      Pt states was told can never take them again.    • Penicillins      Reaction as a child.  Patient was told can never take again.    • Tape Rash     TEGADERM AND STERI STRIPS       Past Surgical History:   Procedure Laterality Date   • CERVICAL CONIZATION Bilateral 7/3/2017    Procedure: CERVICAL CONIZATION, LOOP ELECTROCAUTERY EXCISION PROCEDURE;  Surgeon: Karli Guerrero MD;  Location: [facility] OR;  Service:    • CHOLECYSTECTOMY     • D&C WITH SUCTION     • OVARIAN CYST SURGERY Right    • SALPINGECTOMY Bilateral 8/28/2017    Procedure: laparoscopic salpingectomy;  Surgeon: Karli Guerrero MD;  Location: [facility] OR;  Service:    • TYMPANOSTOMY TUBE PLACEMENT         Family History   Problem  Relation Age of Onset   • Other Father    • Heart murmur Mother    • No Known Problems Paternal Grandfather    • No Known Problems Paternal Grandmother    • Thyroid disease Maternal Grandmother    • Hypertension Maternal Grandmother    • No Known Problems Maternal Grandfather    • Breast cancer Other 60   • Ovarian cancer Neg Hx        Social History     Social History   • Marital status:      Spouse name: N/A   • Number of children: N/A   • Years of education: N/A     Social History Main Topics   • Smoking status: Never Smoker   • Smokeless tobacco: Never Used   • Alcohol use No   • Drug use: No   • Sexual activity: Yes     Partners: Male     Birth control/ protection: None     Other Topics Concern   • None     Social History Narrative           Objective   Physical Exam   Constitutional: She is oriented to person, place, and time. She appears well-developed and well-nourished.   HENT:   Head: Normocephalic and atraumatic.   Eyes: Conjunctivae are normal. Pupils are equal, round, and reactive to light.   Neck: Normal range of motion. Neck supple.   Cardiovascular: Normal rate, regular rhythm and normal heart sounds.    Pulmonary/Chest: Effort normal and breath sounds normal.   Musculoskeletal: Normal range of motion.   Neurological: She is alert and oriented to person, place, and time.   Skin: Skin is warm and dry.   Psychiatric: She has a normal mood and affect.   Nursing note and vitals reviewed.      Procedures         ED Course  ED Course   Comment By Time   Pt is currently breastfeeding; I reviewed the literature regarding recommendations for medications to use while lactating; promethazine can decrease milk production as well as benadryl. The pt received Demerol yesterday but it is recommended as a one time dose only during lactation. I discussed this with pt; zoffran can be used with caution while breastfeeding. Dilaudid can be used as well with caution advised and has a low risk of CNS depression in  infants due to limited drug excretion in milk. Mary Rodríguez, APRN 11/08 1014   I did discuss this with the pt and she understands the risks of taking these medications while breastfeeding. She would like to proceed with Zofran and Dilaudid at this time. I have adivised the pt to avoid breastfeeding for at least 4 hours and to dispose of any milk that is produced during that time. She is cautioned to look for any s/s of resp or CNS depression in her child for the next day. She is advised to r/t immediately to the ER if she notices this. Mary M Marcos, APRN 11/08 1015   Pt feeling better; will be DC home at this time in stable cond; advised to f/u with her PCP in 1-2 days for a recheck, she may return to the ER as needed. She will be DC home in stable cond with her spouse at this time. Mary Rodríguez, APRN 11/08 1046        No orders to display       Lab Results (last 24 hours)     ** No results found for the last 24 hours. **                [facility]    Final diagnoses:   Other migraine without status migrainosus, not intractable            Mary Rodríguez, APRN  11/08/17 1106

## 2017-11-09 ENCOUNTER — OFFICE VISIT (OUTPATIENT)
Dept: FAMILY MEDICINE CLINIC | Age: 27
End: 2017-11-09
Payer: COMMERCIAL

## 2017-11-09 VITALS
WEIGHT: 114.6 LBS | OXYGEN SATURATION: 100 % | SYSTOLIC BLOOD PRESSURE: 112 MMHG | DIASTOLIC BLOOD PRESSURE: 74 MMHG | HEART RATE: 74 BPM | BODY MASS INDEX: 19.67 KG/M2

## 2017-11-09 DIAGNOSIS — G43.119 INTRACTABLE MIGRAINE WITH AURA WITHOUT STATUS MIGRAINOSUS: Primary | ICD-10-CM

## 2017-11-09 DIAGNOSIS — F90.0 ATTENTION DEFICIT HYPERACTIVITY DISORDER (ADHD), PREDOMINANTLY INATTENTIVE TYPE: ICD-10-CM

## 2017-11-09 PROCEDURE — 96372 THER/PROPH/DIAG INJ SC/IM: CPT | Performed by: CLINICAL NURSE SPECIALIST

## 2017-11-09 PROCEDURE — 99213 OFFICE O/P EST LOW 20 MIN: CPT | Performed by: CLINICAL NURSE SPECIALIST

## 2017-11-09 RX ORDER — BUTALBITAL, ACETAMINOPHEN AND CAFFEINE 50; 325; 40 MG/1; MG/1; MG/1
1 TABLET ORAL EVERY 6 HOURS PRN
Qty: 40 TABLET | Refills: 0 | Status: SHIPPED | OUTPATIENT
Start: 2017-11-09 | End: 2017-11-30 | Stop reason: ALTCHOICE

## 2017-11-09 RX ORDER — PROPRANOLOL HYDROCHLORIDE 40 MG/1
40 TABLET ORAL 2 TIMES DAILY
Qty: 60 TABLET | Refills: 3 | Status: SHIPPED | OUTPATIENT
Start: 2017-11-09 | End: 2017-11-30 | Stop reason: ALTCHOICE

## 2017-11-09 RX ORDER — RIZATRIPTAN BENZOATE 10 MG/1
TABLET ORAL
Qty: 10 TABLET | Refills: 2 | Status: CANCELLED | OUTPATIENT
Start: 2017-11-09

## 2017-11-09 RX ORDER — KETOROLAC TROMETHAMINE 30 MG/ML
60 INJECTION, SOLUTION INTRAMUSCULAR; INTRAVENOUS ONCE
Status: COMPLETED | OUTPATIENT
Start: 2017-11-09 | End: 2017-11-09

## 2017-11-09 RX ADMIN — KETOROLAC TROMETHAMINE 60 MG: 30 INJECTION, SOLUTION INTRAMUSCULAR; INTRAVENOUS at 14:17

## 2017-11-09 ASSESSMENT — ENCOUNTER SYMPTOMS
SHORTNESS OF BREATH: 0
TROUBLE SWALLOWING: 0
COLOR CHANGE: 0
WHEEZING: 0
NAUSEA: 1
FACIAL SWELLING: 0
SINUS PRESSURE: 0
SORE THROAT: 0
CHEST TIGHTNESS: 0
BACK PAIN: 0
COUGH: 0
VOMITING: 1

## 2017-11-09 NOTE — PROGRESS NOTES
SUBJECTIVE:  Sergio Scott is a 33 y.o. who presents today for Follow-Up from Hospital (Patient is here for ER follow up for migraines. Patient states she is breastfeeding and has not been able to take her migraine medications.)      JOSELITO Mcgregor presents today with c/o migraine headaches. She has chronic migraines. She has been on topamax in the past for prevention, but had adverse effects. She was then off medications during pregnancy and she continues to occasionally breastfeed her smallest child. She was in the office recently, was given rx for fioricet and maxalt to use prn. She has not used either due to worry about breastfeeding. She developed a migraine Monday that was worse than usual.  She did go to ER and was given dilaudid and zofran. She felt like it masked the pain, but did not help anything. She has not had any relief. She has had slight vision changes, tingling to right side of her face with this. She remains in pain today. She has not had vision exam in some time. ADHD, inattentive type, worse with 2 small children. She is back on vyvanse at 40mg once daily. She is doing better, tolerating this dose without any adverse effects. She will see Dr Monie Humphries for routine follow up on this in January. She is due for next rx on 11/16, but needs to get 11/15 as will be going out of town for one week. Past Medical History:   Diagnosis Date    Attention deficit hyperactivity disorder (ADHD), predominantly inattentive type 10/3/2017    Migraine without aura and without status migrainosus, not intractable 10/3/2017     No past surgical history on file. No family history on file.   Social History   Substance Use Topics    Smoking status: Never Smoker    Smokeless tobacco: Never Used    Alcohol use No     Current Outpatient Prescriptions   Medication Sig Dispense Refill    butalbital-acetaminophen-caffeine (FIORICET, ESGIC) -40 MG per tablet Take 1 tablet by mouth every 6 hours as needed for Headaches 40 tablet 0    propranolol (INDERAL) 40 MG tablet Take 1 tablet by mouth 2 times daily 60 tablet 3    Lisdexamfetamine Dimesylate (VYVANSE) 40 MG CAPS Take 40 mg by mouth daily . 30 capsule 0    busPIRone (BUSPAR) 15 MG tablet Take 7.5 mg by mouth      sertraline (ZOLOFT) 50 MG tablet Take 50 mg by mouth      rizatriptan (MAXALT) 10 MG tablet May repeat in 2 hours if needed. Do not exceed 2 tablets in 24 hours. 10 tablet 2     No current facility-administered medications for this visit. Allergies   Allergen Reactions    Cephalosporins      Pt states was told can never take them again.  Penicillins      Reaction as a child. Patient was told can never take again.  Adhesive Tape Rash     TEGADERM AND STERI STRIPS       Review of Systems   Constitutional: Negative for appetite change, chills and fever. HENT: Negative for congestion, ear pain, facial swelling, hearing loss, postnasal drip, sinus pressure, sore throat and trouble swallowing. Eyes: Positive for visual disturbance. Respiratory: Negative for cough, chest tightness, shortness of breath and wheezing. Cardiovascular: Negative for chest pain and palpitations. Gastrointestinal: Positive for nausea and vomiting. Musculoskeletal: Negative for arthralgias, back pain and neck pain. Skin: Negative for color change and rash. Neurological: Positive for numbness and headaches. Negative for dizziness, syncope, weakness and light-headedness. Hematological: Negative for adenopathy. Does not bruise/bleed easily. Psychiatric/Behavioral: Positive for decreased concentration. Negative for confusion and suicidal ideas. The patient is not nervous/anxious. OBJECTIVE:  /74   Pulse 74   Wt 114 lb 9.6 oz (52 kg)   SpO2 100%   BMI 19.67 kg/m²    Physical Exam   Constitutional: She is oriented to person, place, and time. She appears well-developed and well-nourished.    HENT:   Head: Normocephalic and atraumatic. Mouth/Throat: Oropharynx is clear and moist.   Eyes: Conjunctivae are normal. Pupils are equal, round, and reactive to light. Right eye exhibits no discharge. Left eye exhibits no discharge. Cardiovascular: Normal rate and regular rhythm. No murmur heard. Pulmonary/Chest: Effort normal and breath sounds normal. No respiratory distress. She has no wheezes. She has no rales. Musculoskeletal: Normal range of motion. She exhibits no deformity. Neurological: She is alert and oriented to person, place, and time. No cranial nerve deficit. Skin: Skin is warm and dry. No rash noted. No erythema. Psychiatric: She has a normal mood and affect. Thought content normal.   Vitals reviewed. ASSESSMENT/PLAN:  1. Intractable migraine with aura without status migrainosus  To start propanolol 40mg twice daily for prophylaxis   Warned about possibly causing hypotension, symptoms to watch for  May use fioricet prn, would not use maxalt while breastfeeding  toradol IM today  Recommend vision exam with eye MD  - butalbital-acetaminophen-caffeine (FIORICET, ESGIC) -40 MG per tablet; Take 1 tablet by mouth every 6 hours as needed for Headaches  Dispense: 40 tablet; Refill: 0  - propranolol (INDERAL) 40 MG tablet; Take 1 tablet by mouth 2 times daily  Dispense: 60 tablet; Refill: 3  - ketorolac (TORADOL) injection 60 mg; Inject 2 mLs into the muscle once    2. Attention deficit hyperactivity disorder (ADHD), predominantly inattentive type  Same dose  Refill per Dr Bone Seats  Return every 3 months as scheduled          Return for already scheduled.

## 2017-11-12 ENCOUNTER — APPOINTMENT (OUTPATIENT)
Dept: CT IMAGING | Facility: HOSPITAL | Age: 27
End: 2017-11-12

## 2017-11-12 ENCOUNTER — HOSPITAL ENCOUNTER (EMERGENCY)
Facility: HOSPITAL | Age: 27
Discharge: HOME | End: 2017-11-13
Attending: FAMILY MEDICINE | Admitting: FAMILY MEDICINE

## 2017-11-12 DIAGNOSIS — R51.9 INTRACTABLE HEADACHE, UNSPECIFIED CHRONICITY PATTERN, UNSPECIFIED HEADACHE TYPE: Primary | ICD-10-CM

## 2017-11-12 LAB
APPEARANCE CSF: CLEAR
BASOPHILS # BLD AUTO: 0.02 10*3/MM3 (ref 0–0.2)
BASOPHILS NFR BLD AUTO: 0.2 % (ref 0–2)
COLOR CSF: COLORLESS
COLOR SPUN CSF: COLORLESS
CRP SERPL-MCNC: 32.96 MG/DL (ref 0–0.99)
DEPRECATED RDW RBC AUTO: 46.3 FL (ref 40–54)
EOSINOPHIL # BLD AUTO: 0.01 10*3/MM3 (ref 0–0.7)
EOSINOPHIL NFR BLD AUTO: 0.1 % (ref 0–4)
ERYTHROCYTE [DISTWIDTH] IN BLOOD BY AUTOMATED COUNT: 13.5 % (ref 12–15)
FLUAV AG NPH QL: NEGATIVE
FLUBV AG NPH QL IA: NEGATIVE
GLUCOSE CSF-MCNC: 67 MG/DL (ref 40–70)
HCT VFR BLD AUTO: 36.4 % (ref 37–47)
HGB BLD-MCNC: 12 G/DL (ref 12–16)
IMM GRANULOCYTES # BLD: 0.05 10*3/MM3 (ref 0–0.03)
IMM GRANULOCYTES NFR BLD: 0.4 % (ref 0–5)
INDIA INK PREP CSF: NORMAL
LYMPHOCYTES # BLD AUTO: 1.27 10*3/MM3 (ref 0.72–4.86)
LYMPHOCYTES NFR BLD AUTO: 10.1 % (ref 15–45)
MCH RBC QN AUTO: 31.1 PG (ref 28–32)
MCHC RBC AUTO-ENTMCNC: 33 G/DL (ref 33–36)
MCV RBC AUTO: 94.3 FL (ref 82–98)
METHOD: ABNORMAL
MONOCYTES # BLD AUTO: 1.38 10*3/MM3 (ref 0.19–1.3)
MONOCYTES NFR BLD AUTO: 11 % (ref 4–12)
NEUTROPHILS # BLD AUTO: 9.81 10*3/MM3 (ref 1.87–8.4)
NEUTROPHILS NFR BLD AUTO: 78.2 % (ref 39–78)
NUC CELL # CSF MANUAL: 0 /MM3 (ref 0–8)
PLATELET # BLD AUTO: 190 10*3/MM3 (ref 130–400)
PMV BLD AUTO: 11.2 FL (ref 6–12)
PROT CSF-MCNC: 22 MG/DL (ref 12–60)
RBC # BLD AUTO: 3.86 10*6/MM3 (ref 4.2–5.4)
RBC # CSF MANUAL: 1 /MM3 (ref 0–0)
SPECIMEN VOL CSF: 6 ML
TUBE # CSF: 1
WBC NRBC COR # BLD: 12.54 10*3/MM3 (ref 4.8–10.8)

## 2017-11-12 PROCEDURE — 86618 LYME DISEASE ANTIBODY: CPT | Performed by: FAMILY MEDICINE

## 2017-11-12 PROCEDURE — 96361 HYDRATE IV INFUSION ADD-ON: CPT

## 2017-11-12 PROCEDURE — 96374 THER/PROPH/DIAG INJ IV PUSH: CPT

## 2017-11-12 PROCEDURE — 82945 GLUCOSE OTHER FLUID: CPT | Performed by: PHYSICIAN ASSISTANT

## 2017-11-12 PROCEDURE — 87205 SMEAR GRAM STAIN: CPT | Performed by: PHYSICIAN ASSISTANT

## 2017-11-12 PROCEDURE — 87070 CULTURE OTHR SPECIMN AEROBIC: CPT | Performed by: PHYSICIAN ASSISTANT

## 2017-11-12 PROCEDURE — 87804 INFLUENZA ASSAY W/OPTIC: CPT | Performed by: PHYSICIAN ASSISTANT

## 2017-11-12 PROCEDURE — 87206 SMEAR FLUORESCENT/ACID STAI: CPT | Performed by: FAMILY MEDICINE

## 2017-11-12 PROCEDURE — 84157 ASSAY OF PROTEIN OTHER: CPT | Performed by: PHYSICIAN ASSISTANT

## 2017-11-12 PROCEDURE — 86666 EHRLICHIA ANTIBODY: CPT | Performed by: FAMILY MEDICINE

## 2017-11-12 PROCEDURE — 25010000002 HYDROMORPHONE PER 4 MG: Performed by: FAMILY MEDICINE

## 2017-11-12 PROCEDURE — 87116 MYCOBACTERIA CULTURE: CPT | Performed by: FAMILY MEDICINE

## 2017-11-12 PROCEDURE — 86140 C-REACTIVE PROTEIN: CPT | Performed by: PHYSICIAN ASSISTANT

## 2017-11-12 PROCEDURE — 86617 LYME DISEASE ANTIBODY: CPT | Performed by: FAMILY MEDICINE

## 2017-11-12 PROCEDURE — 87529 HSV DNA AMP PROBE: CPT | Performed by: FAMILY MEDICINE

## 2017-11-12 PROCEDURE — 96375 TX/PRO/DX INJ NEW DRUG ADDON: CPT

## 2017-11-12 PROCEDURE — 25010000002 DEXAMETHASONE PER 1 MG: Performed by: PHYSICIAN ASSISTANT

## 2017-11-12 PROCEDURE — 87015 SPECIMEN INFECT AGNT CONCNTJ: CPT | Performed by: PHYSICIAN ASSISTANT

## 2017-11-12 PROCEDURE — 87798 DETECT AGENT NOS DNA AMP: CPT | Performed by: FAMILY MEDICINE

## 2017-11-12 PROCEDURE — 70450 CT HEAD/BRAIN W/O DYE: CPT

## 2017-11-12 PROCEDURE — 25010000002 ONDANSETRON PER 1 MG: Performed by: PHYSICIAN ASSISTANT

## 2017-11-12 PROCEDURE — 85025 COMPLETE CBC W/AUTO DIFF WBC: CPT | Performed by: PHYSICIAN ASSISTANT

## 2017-11-12 PROCEDURE — 99284 EMERGENCY DEPT VISIT MOD MDM: CPT

## 2017-11-12 PROCEDURE — 87498 ENTEROVIRUS PROBE&REVRS TRNS: CPT | Performed by: FAMILY MEDICINE

## 2017-11-12 PROCEDURE — 89050 BODY FLUID CELL COUNT: CPT | Performed by: PHYSICIAN ASSISTANT

## 2017-11-12 RX ORDER — DIPHENHYDRAMINE HYDROCHLORIDE 50 MG/ML
25 INJECTION INTRAMUSCULAR; INTRAVENOUS ONCE
Status: COMPLETED | OUTPATIENT
Start: 2017-11-12 | End: 2017-11-13

## 2017-11-12 RX ORDER — SODIUM CHLORIDE 0.9 % (FLUSH) 0.9 %
10 SYRINGE (ML) INJECTION AS NEEDED
Status: DISCONTINUED | OUTPATIENT
Start: 2017-11-12 | End: 2017-11-13 | Stop reason: HOSPADM

## 2017-11-12 RX ORDER — BUTALBITAL, ACETAMINOPHEN AND CAFFEINE 50; 325; 40 MG/1; MG/1; MG/1
1 TABLET ORAL EVERY 4 HOURS PRN
COMMUNITY
End: 2017-11-23 | Stop reason: HOSPADM

## 2017-11-12 RX ORDER — DOXYCYCLINE 100 MG/1
100 CAPSULE ORAL 2 TIMES DAILY
Qty: 20 CAPSULE | Refills: 0 | Status: SHIPPED | OUTPATIENT
Start: 2017-11-12 | End: 2017-11-20

## 2017-11-12 RX ORDER — ONDANSETRON 2 MG/ML
4 INJECTION INTRAMUSCULAR; INTRAVENOUS ONCE
Status: COMPLETED | OUTPATIENT
Start: 2017-11-12 | End: 2017-11-12

## 2017-11-12 RX ORDER — DEXAMETHASONE SODIUM PHOSPHATE 10 MG/ML
6 INJECTION INTRAMUSCULAR; INTRAVENOUS ONCE
Status: COMPLETED | OUTPATIENT
Start: 2017-11-12 | End: 2017-11-12

## 2017-11-12 RX ADMIN — DEXAMETHASONE SODIUM PHOSPHATE 6 MG: 10 INJECTION, SOLUTION INTRAMUSCULAR; INTRAVENOUS at 20:50

## 2017-11-12 RX ADMIN — SODIUM CHLORIDE 1000 ML: 9 INJECTION, SOLUTION INTRAVENOUS at 19:40

## 2017-11-12 RX ADMIN — HYDROMORPHONE HYDROCHLORIDE 0.5 MG: 1 INJECTION, SOLUTION INTRAMUSCULAR; INTRAVENOUS; SUBCUTANEOUS at 20:51

## 2017-11-12 RX ADMIN — ONDANSETRON HYDROCHLORIDE 4 MG: 2 SOLUTION INTRAMUSCULAR; INTRAVENOUS at 19:42

## 2017-11-13 VITALS
SYSTOLIC BLOOD PRESSURE: 113 MMHG | RESPIRATION RATE: 18 BRPM | BODY MASS INDEX: 18.78 KG/M2 | OXYGEN SATURATION: 100 % | HEIGHT: 64 IN | WEIGHT: 110 LBS | DIASTOLIC BLOOD PRESSURE: 74 MMHG | TEMPERATURE: 97.7 F | HEART RATE: 69 BPM

## 2017-11-13 PROCEDURE — 25010000002 DIPHENHYDRAMINE PER 50 MG: Performed by: FAMILY MEDICINE

## 2017-11-13 PROCEDURE — 25010000002 HYDROMORPHONE PER 4 MG: Performed by: FAMILY MEDICINE

## 2017-11-13 PROCEDURE — 96375 TX/PRO/DX INJ NEW DRUG ADDON: CPT

## 2017-11-13 PROCEDURE — 96376 TX/PRO/DX INJ SAME DRUG ADON: CPT

## 2017-11-13 RX ADMIN — DIPHENHYDRAMINE HYDROCHLORIDE 25 MG: 50 INJECTION, SOLUTION INTRAMUSCULAR; INTRAVENOUS at 00:49

## 2017-11-13 RX ADMIN — HYDROMORPHONE HYDROCHLORIDE 1 MG: 1 INJECTION, SOLUTION INTRAMUSCULAR; INTRAVENOUS; SUBCUTANEOUS at 00:48

## 2017-11-13 NOTE — ED PROVIDER NOTES
Subjective   HPI Comments: Patient is here with acutely worsening headache.  She has a history of migraine headaches.  She was treated in the ER on November 7 and November 8 with IM medication for a typical migraine.  She then followed up with her primary care provider provider on Friday and was started on Fioricet as needed and given a prescription for propanolol to take daily.  She is limited on her medications at this time because she is breast-feeding.  The propanolol was not tolerated well but has been taking Fioricet and it has been helping control her migraines until today.  She reports this headache is slowly worsening and is now feeling worse than her normal migraines.  She reports never having a migraine of this severity.  She has visual disturbances which is common with her migraines but seems to be worse now.  She has associated neck back pain, chills and generalized muscle aches.  She has mild rhinorrhea and congestion but reports this is similar to her chronic issues with rhinitis.  It is noted on her vitals that she is tachycardic, she states this is abnormal for her.    Patient is a 27 y.o. female presenting with headaches.   History provided by:  Patient  Headache   Pain location:  Generalized  Quality:  Sharp  Radiates to:  L neck, R neck and upper back  Severity currently:  10/10  Onset quality:  Gradual  Duration:  1 day  Timing:  Constant  Progression:  Worsening  Chronicity: Has history of migraine headaches but this is a different and more severe type of headache.  Context: bright light and loud noise    Relieved by:  Nothing  Worsened by:  Light, sound and activity  Ineffective treatments:  Prescription medications, resting in a darkened room and cold packs  Associated symptoms: back pain, blurred vision, congestion, fatigue, nausea, neck pain, photophobia and visual change    Associated symptoms: no abdominal pain, no cough, no diarrhea, no dizziness, no drainage, no ear pain, no eye pain,  no facial pain, no fever (None measured at home), no focal weakness, no hearing loss, no loss of balance, no myalgias, no near-syncope, no neck stiffness, no numbness, no paresthesias, no seizures, no sore throat, no swollen glands, no syncope, no vomiting and no weakness    Visual Change:     Location:  Both eyes    Quality: blurred vision      Onset quality:  Gradual    Severity:  Moderate    Timing:  Intermittent    Chronicity:  Recurrent (But worse than before)  Risk factors: no anger, no family hx of SAH, does not have insomnia and lifestyle not sedentary        Review of Systems   Constitutional: Positive for chills and fatigue. Negative for fever (None measured at home) and unexpected weight change.   HENT: Positive for congestion and rhinorrhea. Negative for ear pain, facial swelling, hearing loss, postnasal drip, sneezing, sore throat, tinnitus, trouble swallowing and voice change.    Eyes: Positive for blurred vision, photophobia and visual disturbance. Negative for pain, discharge and redness.   Respiratory: Negative for cough, chest tightness, shortness of breath and wheezing.    Cardiovascular: Negative for chest pain, palpitations, leg swelling, syncope and near-syncope.   Gastrointestinal: Positive for constipation (After medication given at previous ER visits) and nausea. Negative for abdominal pain, diarrhea and vomiting.   Genitourinary: Negative for decreased urine volume, difficulty urinating and dysuria.   Musculoskeletal: Positive for back pain and neck pain. Negative for joint swelling, myalgias and neck stiffness.   Skin: Negative for color change, pallor and rash.   Neurological: Positive for headaches. Negative for dizziness, focal weakness, seizures, syncope, weakness, numbness, paresthesias and loss of balance.   Hematological: Negative for adenopathy.   Psychiatric/Behavioral: Negative.        Past Medical History:   Diagnosis Date   • Anemia affecting pregnancy    • Asthma     as a  child   • History of transfusion    • Migraines    • Mono exposure    • S/P thyroid biopsy        Allergies   Allergen Reactions   • Cephalosporins      Pt states was told can never take them again.    • Penicillins      Reaction as a child.  Patient was told can never take again.    • Tape Rash     TEGADERM AND STERI STRIPS       Past Surgical History:   Procedure Laterality Date   • CERVICAL CONIZATION Bilateral 7/3/2017    Procedure: CERVICAL CONIZATION, LOOP ELECTROCAUTERY EXCISION PROCEDURE;  Surgeon: Karli Guerrero MD;  Location: [facility] OR;  Service:    • CHOLECYSTECTOMY     • D&C WITH SUCTION     • OVARIAN CYST SURGERY Right    • SALPINGECTOMY Bilateral 8/28/2017    Procedure: laparoscopic salpingectomy;  Surgeon: Karli Guerrero MD;  Location: [facility] OR;  Service:    • TYMPANOSTOMY TUBE PLACEMENT         Family History   Problem Relation Age of Onset   • Other Father    • Heart murmur Mother    • No Known Problems Paternal Grandfather    • No Known Problems Paternal Grandmother    • Thyroid disease Maternal Grandmother    • Hypertension Maternal Grandmother    • No Known Problems Maternal Grandfather    • Breast cancer Other 60   • Ovarian cancer Neg Hx        Social History     Social History   • Marital status:      Spouse name: N/A   • Number of children: N/A   • Years of education: N/A     Social History Main Topics   • Smoking status: Never Smoker   • Smokeless tobacco: Never Used   • Alcohol use No   • Drug use: No   • Sexual activity: Yes     Partners: Male     Birth control/ protection: None     Other Topics Concern   • None     Social History Narrative           Objective   Physical Exam   Constitutional: She is oriented to person, place, and time. She appears well-developed. No distress.   Appears tired and uncomfortable   HENT:   Head: Normocephalic and atraumatic.   Right Ear: External ear normal.   Left Ear: External ear normal.   Nose: Rhinorrhea present.   Mouth/Throat: Oropharynx is  clear and moist.   Eyes: Conjunctivae and EOM are normal. Pupils are equal, round, and reactive to light. No scleral icterus.   Mild photophobia bilaterally   Neck: Normal range of motion. Muscular tenderness present. No spinous process tenderness present. No rigidity. Normal range of motion present.   Cardiovascular: Regular rhythm, normal heart sounds and intact distal pulses.  Tachycardia present.    No murmur heard.  Pulmonary/Chest: Effort normal and breath sounds normal. No respiratory distress. She has no wheezes. She has no rales.   Abdominal: Soft. Bowel sounds are normal. There is no tenderness.   Musculoskeletal: Normal range of motion. She exhibits no edema or tenderness.   Lymphadenopathy:     She has no cervical adenopathy.   Neurological: She is alert and oriented to person, place, and time. No cranial nerve deficit. She exhibits normal muscle tone.   Skin: Skin is warm and dry. No rash noted. She is not diaphoretic. There is pallor.   Psychiatric: She has a normal mood and affect. Judgment and thought content normal.   Nursing note and vitals reviewed.      Bedside Lumbar Puncture  Date/Time: 11/13/2017 1:30 AM  Performed by: ARNAUD BALTAZAR  Authorized by: ARNAUD BALTAZAR     Consent:     Consent obtained:  Verbal and written    Consent given by:  Patient    Risks discussed:  Bleeding, headache, nerve damage, infection, pain and repeat procedure    Alternatives discussed:  No treatment, delayed treatment, alternative treatment, observation and referral  Pre-procedure details:     Procedure purpose:  Diagnostic    Preparation: Patient was prepped and draped in usual sterile fashion    Anesthesia (see MAR for exact dosages):     Anesthesia method:  None  Procedure details:     Lumbar space:  L3-L4 interspace    Patient position:  Sitting    Needle gauge:  20    Ultrasound guidance: no      Number of attempts:  1    Fluid appearance:  Clear    Tubes of fluid:  4    Total volume (ml):   10  Post-procedure:     Puncture site:  Direct pressure applied    Patient tolerance of procedure:  Tolerated well, no immediate complications             ED Course  ED Course   Comment By Time   Patient in CT at this time. CITLALLI Goldberg 11/12 2003   CT of the head reviewed with no significant abnormality.  Lab findings concerning due to elevated CRP and elevated white blood cell count with more intense headache than ever experienced.  She is also having additional complaint of chills and malaise along with neck and back pain which is not normal compared to her previous migraine headaches.  Vital signs also slightly abnormal compared to previous ER visits.  Discuss further IV medication for pain control, she has decided to go forward with Decadron and Dilaudid.  She understands that this can interfere with breast-feeding but she has ultimately decided to stop breast-feeding at this time as she would prefer to have more lasting relief from her headaches.  Discussed case with Dr. Bautista.  She agrees that patient should undergo a lumbar puncture if she is agreeable.  Discuss lumbar puncture with patient including indications, risks (including but not limited to pain, paralysis, infection and worsening headache) and benefits.  She expressed complete understanding.  She would like to go forward with this. CITLALLI Goldberg 11/12 2046   Dr. Bautista to be managing case from this point forward. CITLALLI Goldberg 11/12 2112   She received markedly improvement with Dilaudid.  Discussed the case with Dr. Forrest who will see the patient in his office.  I have discharged the patient home with instructions to follow with Dr. Forrest closely.  In addition we are prophylactically treating her with doxycycline until the rest of the titers come back. Johanne Bautista DO 11/13 0129                  [facility]    Final diagnoses:   Intractable headache, unspecified chronicity pattern, unspecified headache type            Johanne WREN  Lily,   11/13/17 0129       Johanne Bautista, DO  11/13/17 0131

## 2017-11-13 NOTE — ED NOTES
Kaye from chemistry called and stated that there is a delay in the CSF machine to expect a delay. She could not give me a time frame, but stated that she would keep us informed.       Agustina Toro RN  11/12/17 2957

## 2017-11-13 NOTE — DISCHARGE INSTRUCTIONS

## 2017-11-14 ENCOUNTER — HOSPITAL ENCOUNTER (OUTPATIENT)
Facility: HOSPITAL | Age: 27
Setting detail: OBSERVATION
Discharge: HOME | End: 2017-11-17
Attending: PSYCHIATRY & NEUROLOGY | Admitting: PSYCHIATRY & NEUROLOGY

## 2017-11-14 DIAGNOSIS — G43.111 INTRACTABLE MIGRAINE WITH AURA WITH STATUS MIGRAINOSUS: Primary | ICD-10-CM

## 2017-11-14 DIAGNOSIS — G43.111 INTRACTABLE MIGRAINE WITH AURA WITH STATUS MIGRAINOSUS: ICD-10-CM

## 2017-11-14 DIAGNOSIS — D64.9 ANEMIA, UNSPECIFIED TYPE: Primary | ICD-10-CM

## 2017-11-14 DIAGNOSIS — M54.2 NECK PAIN: ICD-10-CM

## 2017-11-14 PROBLEM — G43.101 MIGRAINE WITH AURA AND WITH STATUS MIGRAINOSUS: Status: ACTIVE | Noted: 2017-11-14

## 2017-11-14 LAB
ANION GAP SERPL CALCULATED.3IONS-SCNC: 12 MMOL/L (ref 4–13)
BASOPHILS # BLD AUTO: 0.02 10*3/MM3 (ref 0–0.2)
BASOPHILS NFR BLD AUTO: 0.3 % (ref 0–2)
BUN BLD-MCNC: 11 MG/DL (ref 5–21)
BUN/CREAT SERPL: 19.6 (ref 7–25)
CALCIUM SPEC-SCNC: 8.9 MG/DL (ref 8.4–10.4)
CHLORIDE SERPL-SCNC: 104 MMOL/L (ref 98–110)
CO2 SERPL-SCNC: 29 MMOL/L (ref 24–31)
CREAT BLD-MCNC: 0.56 MG/DL (ref 0.5–1.4)
DEPRECATED RDW RBC AUTO: 46 FL (ref 40–54)
EOSINOPHIL # BLD AUTO: 0.03 10*3/MM3 (ref 0–0.7)
EOSINOPHIL NFR BLD AUTO: 0.4 % (ref 0–4)
ERYTHROCYTE [DISTWIDTH] IN BLOOD BY AUTOMATED COUNT: 13.4 % (ref 12–15)
GFR SERPL CREATININE-BSD FRML MDRD: 131 ML/MIN/1.73
GLUCOSE BLD-MCNC: 86 MG/DL (ref 70–100)
HCT VFR BLD AUTO: 30.8 % (ref 37–47)
HGB BLD-MCNC: 10 G/DL (ref 12–16)
IMM GRANULOCYTES # BLD: 0.02 10*3/MM3 (ref 0–0.03)
IMM GRANULOCYTES NFR BLD: 0.3 % (ref 0–5)
LYMPHOCYTES # BLD AUTO: 1.08 10*3/MM3 (ref 0.72–4.86)
LYMPHOCYTES NFR BLD AUTO: 15.5 % (ref 15–45)
MCH RBC QN AUTO: 30.2 PG (ref 28–32)
MCHC RBC AUTO-ENTMCNC: 32.5 G/DL (ref 33–36)
MCV RBC AUTO: 93.1 FL (ref 82–98)
MONOCYTES # BLD AUTO: 0.62 10*3/MM3 (ref 0.19–1.3)
MONOCYTES NFR BLD AUTO: 8.9 % (ref 4–12)
NEUTROPHILS # BLD AUTO: 5.21 10*3/MM3 (ref 1.87–8.4)
NEUTROPHILS NFR BLD AUTO: 74.6 % (ref 39–78)
PLATELET # BLD AUTO: 183 10*3/MM3 (ref 130–400)
PMV BLD AUTO: 11.1 FL (ref 6–12)
POTASSIUM BLD-SCNC: 3.2 MMOL/L (ref 3.5–5.3)
R RICKETTSI IGG SER QL IA: NEGATIVE
R RICKETTSI IGM TITR SER: 0.25 INDEX (ref 0–0.89)
RBC # BLD AUTO: 3.31 10*6/MM3 (ref 4.2–5.4)
SODIUM BLD-SCNC: 145 MMOL/L (ref 135–145)
WBC NRBC COR # BLD: 6.98 10*3/MM3 (ref 4.8–10.8)

## 2017-11-14 PROCEDURE — 96374 THER/PROPH/DIAG INJ IV PUSH: CPT

## 2017-11-14 PROCEDURE — 25010000002 ONDANSETRON PER 1 MG: Performed by: PSYCHIATRY & NEUROLOGY

## 2017-11-14 PROCEDURE — 96372 THER/PROPH/DIAG INJ SC/IM: CPT

## 2017-11-14 PROCEDURE — 25010000002 DIPHENHYDRAMINE PER 50 MG: Performed by: PSYCHIATRY & NEUROLOGY

## 2017-11-14 PROCEDURE — 80048 BASIC METABOLIC PNL TOTAL CA: CPT | Performed by: PSYCHIATRY & NEUROLOGY

## 2017-11-14 PROCEDURE — 25010000002 PROCHLORPERAZINE EDISYLATE PER 10 MG: Performed by: PSYCHIATRY & NEUROLOGY

## 2017-11-14 PROCEDURE — G0378 HOSPITAL OBSERVATION PER HR: HCPCS

## 2017-11-14 PROCEDURE — 85025 COMPLETE CBC W/AUTO DIFF WBC: CPT | Performed by: PSYCHIATRY & NEUROLOGY

## 2017-11-14 PROCEDURE — 25010000002 DIHYDROERGOTAMINE MESYLATE PER 1 MG: Performed by: PSYCHIATRY & NEUROLOGY

## 2017-11-14 PROCEDURE — 96375 TX/PRO/DX INJ NEW DRUG ADDON: CPT

## 2017-11-14 PROCEDURE — 25010000002 ENOXAPARIN PER 10 MG: Performed by: PSYCHIATRY & NEUROLOGY

## 2017-11-14 PROCEDURE — 25010000002 LORAZEPAM PER 2 MG: Performed by: PSYCHIATRY & NEUROLOGY

## 2017-11-14 PROCEDURE — G0379 DIRECT REFER HOSPITAL OBSERV: HCPCS

## 2017-11-14 PROCEDURE — 99220 PR INITIAL OBSERVATION CARE/DAY 70 MINUTES: CPT | Performed by: PSYCHIATRY & NEUROLOGY

## 2017-11-14 PROCEDURE — 25010000002 DEXAMETHASONE PER 1 MG: Performed by: PSYCHIATRY & NEUROLOGY

## 2017-11-14 RX ORDER — ONDANSETRON 2 MG/ML
4 INJECTION INTRAMUSCULAR; INTRAVENOUS EVERY 6 HOURS PRN
Status: CANCELLED | OUTPATIENT
Start: 2017-11-14

## 2017-11-14 RX ORDER — DIHYDROERGOTAMINE MESYLATE 1 MG/ML
1 INJECTION, SOLUTION INTRAMUSCULAR; INTRAVENOUS; SUBCUTANEOUS EVERY 8 HOURS PRN
Status: DISCONTINUED | OUTPATIENT
Start: 2017-11-14 | End: 2017-11-16

## 2017-11-14 RX ORDER — DIPHENHYDRAMINE HYDROCHLORIDE 50 MG/ML
25 INJECTION INTRAMUSCULAR; INTRAVENOUS ONCE
Status: COMPLETED | OUTPATIENT
Start: 2017-11-14 | End: 2017-11-14

## 2017-11-14 RX ORDER — DIPHENHYDRAMINE HYDROCHLORIDE 50 MG/ML
25 INJECTION INTRAMUSCULAR; INTRAVENOUS ONCE
Status: CANCELLED | OUTPATIENT
Start: 2017-11-14 | End: 2017-11-14

## 2017-11-14 RX ORDER — DEXAMETHASONE SODIUM PHOSPHATE 10 MG/ML
10 INJECTION INTRAMUSCULAR; INTRAVENOUS ONCE
Status: CANCELLED | OUTPATIENT
Start: 2017-11-14 | End: 2017-11-14

## 2017-11-14 RX ORDER — ACETAMINOPHEN 325 MG/1
650 TABLET ORAL EVERY 4 HOURS PRN
Status: CANCELLED | OUTPATIENT
Start: 2017-11-14

## 2017-11-14 RX ORDER — ACETAMINOPHEN 325 MG/1
650 TABLET ORAL EVERY 4 HOURS PRN
Status: DISCONTINUED | OUTPATIENT
Start: 2017-11-14 | End: 2017-11-17 | Stop reason: HOSPADM

## 2017-11-14 RX ORDER — ONDANSETRON 2 MG/ML
4 INJECTION INTRAMUSCULAR; INTRAVENOUS EVERY 6 HOURS PRN
Status: DISCONTINUED | OUTPATIENT
Start: 2017-11-14 | End: 2017-11-17 | Stop reason: HOSPADM

## 2017-11-14 RX ORDER — DEXAMETHASONE SODIUM PHOSPHATE 10 MG/ML
10 INJECTION INTRAMUSCULAR; INTRAVENOUS ONCE
Status: COMPLETED | OUTPATIENT
Start: 2017-11-14 | End: 2017-11-14

## 2017-11-14 RX ORDER — SODIUM CHLORIDE 9 MG/ML
100 INJECTION, SOLUTION INTRAVENOUS CONTINUOUS
Status: DISCONTINUED | OUTPATIENT
Start: 2017-11-14 | End: 2017-11-16

## 2017-11-14 RX ORDER — LORAZEPAM 2 MG/ML
0.5 INJECTION INTRAMUSCULAR EVERY 6 HOURS PRN
Status: DISCONTINUED | OUTPATIENT
Start: 2017-11-14 | End: 2017-11-17 | Stop reason: HOSPADM

## 2017-11-14 RX ORDER — DIHYDROERGOTAMINE MESYLATE 1 MG/ML
0.5 INJECTION, SOLUTION INTRAMUSCULAR; INTRAVENOUS; SUBCUTANEOUS EVERY 8 HOURS PRN
Status: DISCONTINUED | OUTPATIENT
Start: 2017-11-14 | End: 2017-11-14

## 2017-11-14 RX ORDER — SODIUM CHLORIDE 9 MG/ML
100 INJECTION, SOLUTION INTRAVENOUS CONTINUOUS
Status: CANCELLED | OUTPATIENT
Start: 2017-11-14

## 2017-11-14 RX ORDER — LORAZEPAM 2 MG/ML
0.5 INJECTION INTRAMUSCULAR EVERY 6 HOURS PRN
Status: CANCELLED | OUTPATIENT
Start: 2017-11-14 | End: 2017-11-24

## 2017-11-14 RX ADMIN — LORAZEPAM 0.5 MG: 2 INJECTION INTRAMUSCULAR at 20:42

## 2017-11-14 RX ADMIN — SODIUM CHLORIDE 100 ML/HR: 9 INJECTION, SOLUTION INTRAVENOUS at 11:39

## 2017-11-14 RX ADMIN — PROCHLORPERAZINE EDISYLATE 5 MG: 5 INJECTION INTRAMUSCULAR; INTRAVENOUS at 12:12

## 2017-11-14 RX ADMIN — DIPHENHYDRAMINE HYDROCHLORIDE 25 MG: 50 INJECTION, SOLUTION INTRAMUSCULAR; INTRAVENOUS at 12:11

## 2017-11-14 RX ADMIN — DEXAMETHASONE SODIUM PHOSPHATE 10 MG: 10 INJECTION, SOLUTION INTRAMUSCULAR; INTRAVENOUS at 12:12

## 2017-11-14 RX ADMIN — DIHYDROERGOTAMINE MESYLATE 0.5 MG: 1 INJECTION, SOLUTION INTRAMUSCULAR; INTRAVENOUS; SUBCUTANEOUS at 15:56

## 2017-11-14 RX ADMIN — ENOXAPARIN SODIUM 40 MG: 40 INJECTION SUBCUTANEOUS at 12:13

## 2017-11-14 RX ADMIN — ONDANSETRON HYDROCHLORIDE 4 MG: 2 SOLUTION INTRAMUSCULAR; INTRAVENOUS at 20:42

## 2017-11-14 RX ADMIN — SODIUM CHLORIDE 100 ML/HR: 9 INJECTION, SOLUTION INTRAVENOUS at 22:39

## 2017-11-14 NOTE — TELEPHONE ENCOUNTER
Brennan Tellez called requesting a refill of the below medication which has been pended for you:     Requested Prescriptions     Pending Prescriptions Disp Refills    Lisdexamfetamine Dimesylate (VYVANSE) 40 MG CAPS 30 capsule 0     Sig: Take 40 mg by mouth daily . Last Appointment Date: Visit date not found  Next Appointment Date: 1/4/2018    Allergies   Allergen Reactions    Cephalosporins      Pt states was told can never take them again.  Penicillins      Reaction as a child. Patient was told can never take again.      Adhesive Tape Rash     TEGADERM AND STERI STRIPS

## 2017-11-15 LAB
A PHAGOCYTOPH IGM TITR SER IF: NEGATIVE {TITER}
ANION GAP SERPL CALCULATED.3IONS-SCNC: 10 MMOL/L (ref 4–13)
B BURGDOR AB CSF IA-ACNC: 0.06 LIV
BASOPHILS # BLD AUTO: 0.03 10*3/MM3 (ref 0–0.2)
BASOPHILS NFR BLD AUTO: 0.4 % (ref 0–2)
BORRELIA BURGDORFERI IGM AB [PRESENCE] IN CEREBRAL SPINAL FLUID: NEGATIVE
BUN BLD-MCNC: 10 MG/DL (ref 5–21)
BUN/CREAT SERPL: 19.2 (ref 7–25)
CALCIUM SPEC-SCNC: 8.8 MG/DL (ref 8.4–10.4)
CHLORIDE SERPL-SCNC: 106 MMOL/L (ref 98–110)
CO2 SERPL-SCNC: 26 MMOL/L (ref 24–31)
CONV HGE IGG TITER: NEGATIVE
CREAT BLD-MCNC: 0.52 MG/DL (ref 0.5–1.4)
DEPRECATED RDW RBC AUTO: 46.4 FL (ref 40–54)
E CHAFFEENSIS DNA BLD QL NAA+PROBE: NEGATIVE
E CHAFFEENSIS IGG TITR SER IF: NEGATIVE {TITER}
E. CHAFFEENSIS (HME) IGM TITER: NEGATIVE
EOSINOPHIL # BLD AUTO: 0.05 10*3/MM3 (ref 0–0.7)
EOSINOPHIL NFR BLD AUTO: 0.7 % (ref 0–4)
ERYTHROCYTE [DISTWIDTH] IN BLOOD BY AUTOMATED COUNT: 13.5 % (ref 12–15)
GFR SERPL CREATININE-BSD FRML MDRD: 143 ML/MIN/1.73
GLUCOSE BLD-MCNC: 89 MG/DL (ref 70–100)
HCT VFR BLD AUTO: 30.9 % (ref 37–47)
HGB BLD-MCNC: 10.3 G/DL (ref 12–16)
HSV1 DNA SPEC QL NAA+PROBE: NEGATIVE
HSV2 DNA SPEC QL NAA+PROBE: NEGATIVE
IMM GRANULOCYTES # BLD: 0.02 10*3/MM3 (ref 0–0.03)
IMM GRANULOCYTES NFR BLD: 0.3 % (ref 0–5)
LYMPHOCYTES # BLD AUTO: 3.11 10*3/MM3 (ref 0.72–4.86)
LYMPHOCYTES NFR BLD AUTO: 40.9 % (ref 15–45)
MCH RBC QN AUTO: 31.1 PG (ref 28–32)
MCHC RBC AUTO-ENTMCNC: 33.3 G/DL (ref 33–36)
MCV RBC AUTO: 93.4 FL (ref 82–98)
MONOCYTES # BLD AUTO: 0.64 10*3/MM3 (ref 0.19–1.3)
MONOCYTES NFR BLD AUTO: 8.4 % (ref 4–12)
NEUTROPHILS # BLD AUTO: 3.75 10*3/MM3 (ref 1.87–8.4)
NEUTROPHILS NFR BLD AUTO: 49.3 % (ref 39–78)
NRBC BLD MANUAL-RTO: 0 /100 WBC (ref 0–0)
PLATELET # BLD AUTO: 191 10*3/MM3 (ref 130–400)
PMV BLD AUTO: 11.3 FL (ref 6–12)
POTASSIUM BLD-SCNC: 3.4 MMOL/L (ref 3.5–5.3)
RBC # BLD AUTO: 3.31 10*6/MM3 (ref 4.2–5.4)
SODIUM BLD-SCNC: 142 MMOL/L (ref 135–145)
WBC NRBC COR # BLD: 7.6 10*3/MM3 (ref 4.8–10.8)

## 2017-11-15 PROCEDURE — 25010000002 DIHYDROERGOTAMINE MESYLATE PER 1 MG: Performed by: PSYCHIATRY & NEUROLOGY

## 2017-11-15 PROCEDURE — G0378 HOSPITAL OBSERVATION PER HR: HCPCS

## 2017-11-15 PROCEDURE — 25010000002 ONDANSETRON PER 1 MG

## 2017-11-15 PROCEDURE — 96361 HYDRATE IV INFUSION ADD-ON: CPT

## 2017-11-15 PROCEDURE — 96372 THER/PROPH/DIAG INJ SC/IM: CPT

## 2017-11-15 PROCEDURE — 96376 TX/PRO/DX INJ SAME DRUG ADON: CPT

## 2017-11-15 PROCEDURE — 25010000002 LORAZEPAM PER 2 MG

## 2017-11-15 PROCEDURE — 80048 BASIC METABOLIC PNL TOTAL CA: CPT | Performed by: PSYCHIATRY & NEUROLOGY

## 2017-11-15 PROCEDURE — 85025 COMPLETE CBC W/AUTO DIFF WBC: CPT | Performed by: PSYCHIATRY & NEUROLOGY

## 2017-11-15 PROCEDURE — 25010000002 PROCHLORPERAZINE EDISYLATE PER 10 MG: Performed by: PSYCHIATRY & NEUROLOGY

## 2017-11-15 PROCEDURE — 99225 PR SBSQ OBSERVATION CARE/DAY 25 MINUTES: CPT | Performed by: PSYCHIATRY & NEUROLOGY

## 2017-11-15 PROCEDURE — 25010000002 ONDANSETRON PER 1 MG: Performed by: PSYCHIATRY & NEUROLOGY

## 2017-11-15 PROCEDURE — 25010000002 LORAZEPAM PER 2 MG: Performed by: PSYCHIATRY & NEUROLOGY

## 2017-11-15 PROCEDURE — 25010000002 ENOXAPARIN PER 10 MG: Performed by: PSYCHIATRY & NEUROLOGY

## 2017-11-15 RX ORDER — SUMATRIPTAN 50 MG/1
50 TABLET, FILM COATED ORAL ONCE AS NEEDED
Qty: 9 TABLET | Refills: 2 | Status: SHIPPED | OUTPATIENT
Start: 2017-11-15 | End: 2017-11-23 | Stop reason: HOSPADM

## 2017-11-15 RX ORDER — LORAZEPAM 2 MG/ML
INJECTION INTRAMUSCULAR
Status: COMPLETED
Start: 2017-11-15 | End: 2017-11-15

## 2017-11-15 RX ORDER — ONDANSETRON 2 MG/ML
INJECTION INTRAMUSCULAR; INTRAVENOUS
Status: COMPLETED
Start: 2017-11-15 | End: 2017-11-15

## 2017-11-15 RX ORDER — AMITRIPTYLINE HYDROCHLORIDE 25 MG/1
25 TABLET, FILM COATED ORAL NIGHTLY
Qty: 30 TABLET | Refills: 1 | Status: SHIPPED | OUTPATIENT
Start: 2017-11-15 | End: 2017-12-06 | Stop reason: SDUPTHER

## 2017-11-15 RX ADMIN — LORAZEPAM 0.5 MG: 2 INJECTION INTRAMUSCULAR at 11:30

## 2017-11-15 RX ADMIN — ONDANSETRON HYDROCHLORIDE 4 MG: 2 SOLUTION INTRAMUSCULAR; INTRAVENOUS at 17:13

## 2017-11-15 RX ADMIN — SODIUM CHLORIDE 100 ML/HR: 9 INJECTION, SOLUTION INTRAVENOUS at 08:51

## 2017-11-15 RX ADMIN — ENOXAPARIN SODIUM 40 MG: 40 INJECTION SUBCUTANEOUS at 08:51

## 2017-11-15 RX ADMIN — LORAZEPAM 0.5 MG: 2 INJECTION INTRAMUSCULAR at 17:48

## 2017-11-15 RX ADMIN — DIHYDROERGOTAMINE MESYLATE 1 MG: 1 INJECTION, SOLUTION INTRAMUSCULAR; INTRAVENOUS; SUBCUTANEOUS at 16:48

## 2017-11-15 RX ADMIN — SODIUM CHLORIDE 100 ML/HR: 9 INJECTION, SOLUTION INTRAVENOUS at 17:49

## 2017-11-15 RX ADMIN — ONDANSETRON HYDROCHLORIDE 4 MG: 2 SOLUTION INTRAMUSCULAR; INTRAVENOUS at 22:51

## 2017-11-15 RX ADMIN — LORAZEPAM: 2 INJECTION INTRAMUSCULAR; INTRAVENOUS at 03:54

## 2017-11-15 RX ADMIN — LORAZEPAM 0.5 MG: 2 INJECTION INTRAMUSCULAR at 23:21

## 2017-11-15 RX ADMIN — ONDANSETRON HYDROCHLORIDE: 2 SOLUTION INTRAMUSCULAR; INTRAVENOUS at 03:54

## 2017-11-15 RX ADMIN — PROCHLORPERAZINE EDISYLATE 5 MG: 5 INJECTION INTRAMUSCULAR; INTRAVENOUS at 12:45

## 2017-11-15 RX ADMIN — ONDANSETRON HYDROCHLORIDE 4 MG: 2 SOLUTION INTRAMUSCULAR; INTRAVENOUS at 03:54

## 2017-11-15 RX ADMIN — DIHYDROERGOTAMINE MESYLATE 1 MG: 1 INJECTION, SOLUTION INTRAMUSCULAR; INTRAVENOUS; SUBCUTANEOUS at 08:51

## 2017-11-15 RX ADMIN — DIHYDROERGOTAMINE MESYLATE 1 MG: 1 INJECTION, SOLUTION INTRAMUSCULAR; INTRAVENOUS; SUBCUTANEOUS at 00:04

## 2017-11-15 RX ADMIN — LORAZEPAM 0.5 MG: 2 INJECTION INTRAMUSCULAR at 03:54

## 2017-11-16 ENCOUNTER — APPOINTMENT (OUTPATIENT)
Dept: PREOP | Facility: HOSPITAL | Age: 27
End: 2017-11-16

## 2017-11-16 ENCOUNTER — ANESTHESIA (OUTPATIENT)
Dept: PREOP | Facility: HOSPITAL | Age: 27
End: 2017-11-16

## 2017-11-16 ENCOUNTER — ANESTHESIA EVENT (OUTPATIENT)
Dept: PREOP | Facility: HOSPITAL | Age: 27
End: 2017-11-16

## 2017-11-16 LAB
25(OH)D3 SERPL-MCNC: 30.9 NG/ML (ref 30–100)
ANION GAP SERPL CALCULATED.3IONS-SCNC: 12 MMOL/L (ref 4–13)
BASOPHILS # BLD AUTO: 0.03 10*3/MM3 (ref 0–0.2)
BASOPHILS NFR BLD AUTO: 0.4 % (ref 0–2)
BUN BLD-MCNC: 9 MG/DL (ref 5–21)
BUN/CREAT SERPL: 16.4 (ref 7–25)
CALCIUM SPEC-SCNC: 8.6 MG/DL (ref 8.4–10.4)
CHLORIDE SERPL-SCNC: 102 MMOL/L (ref 98–110)
CO2 SERPL-SCNC: 29 MMOL/L (ref 24–31)
CREAT BLD-MCNC: 0.55 MG/DL (ref 0.5–1.4)
DEPRECATED RDW RBC AUTO: 45.6 FL (ref 40–54)
EOSINOPHIL # BLD AUTO: 0.06 10*3/MM3 (ref 0–0.7)
EOSINOPHIL NFR BLD AUTO: 0.8 % (ref 0–4)
ERYTHROCYTE [DISTWIDTH] IN BLOOD BY AUTOMATED COUNT: 13.2 % (ref 12–15)
ERYTHROCYTE [SEDIMENTATION RATE] IN BLOOD: 28 MM/HR (ref 0–20)
FERRITIN SERPL-MCNC: 127 NG/ML (ref 6.24–137)
FOLATE SERPL-MCNC: 7.96 NG/ML
GFR SERPL CREATININE-BSD FRML MDRD: 134 ML/MIN/1.73
GLUCOSE BLD-MCNC: 84 MG/DL (ref 70–100)
HCT VFR BLD AUTO: 33 % (ref 37–47)
HGB BLD-MCNC: 10.8 G/DL (ref 12–16)
IMM GRANULOCYTES # BLD: 0.03 10*3/MM3 (ref 0–0.03)
IMM GRANULOCYTES NFR BLD: 0.4 % (ref 0–5)
IRON 24H UR-MRATE: 50 MCG/DL (ref 42–180)
IRON SATN MFR SERPL: 20 % (ref 20–45)
LYMPHOCYTES # BLD AUTO: 2.74 10*3/MM3 (ref 0.72–4.86)
LYMPHOCYTES NFR BLD AUTO: 38.2 % (ref 15–45)
MAGNESIUM SERPL-MCNC: 1.9 MG/DL (ref 1.4–2.2)
MCH RBC QN AUTO: 30.5 PG (ref 28–32)
MCHC RBC AUTO-ENTMCNC: 32.7 G/DL (ref 33–36)
MCV RBC AUTO: 93.2 FL (ref 82–98)
MONOCYTES # BLD AUTO: 0.62 10*3/MM3 (ref 0.19–1.3)
MONOCYTES NFR BLD AUTO: 8.6 % (ref 4–12)
NEUTROPHILS # BLD AUTO: 3.7 10*3/MM3 (ref 1.87–8.4)
NEUTROPHILS NFR BLD AUTO: 51.6 % (ref 39–78)
PLATELET # BLD AUTO: 252 10*3/MM3 (ref 130–400)
PMV BLD AUTO: 11.6 FL (ref 6–12)
POTASSIUM BLD-SCNC: 3.4 MMOL/L (ref 3.5–5.3)
RBC # BLD AUTO: 3.54 10*6/MM3 (ref 4.2–5.4)
SODIUM BLD-SCNC: 143 MMOL/L (ref 135–145)
TIBC SERPL-MCNC: 249 MCG/DL (ref 225–420)
VIT B12 BLD-MCNC: 531 PG/ML (ref 239–931)
WBC NRBC COR # BLD: 7.18 10*3/MM3 (ref 4.8–10.8)

## 2017-11-16 PROCEDURE — 25010000002 DIHYDROERGOTAMINE MESYLATE PER 1 MG: Performed by: PSYCHIATRY & NEUROLOGY

## 2017-11-16 PROCEDURE — 85651 RBC SED RATE NONAUTOMATED: CPT | Performed by: PSYCHIATRY & NEUROLOGY

## 2017-11-16 PROCEDURE — 96361 HYDRATE IV INFUSION ADD-ON: CPT

## 2017-11-16 PROCEDURE — 25010000002 METOCLOPRAMIDE PER 10 MG: Performed by: PSYCHIATRY & NEUROLOGY

## 2017-11-16 PROCEDURE — 83550 IRON BINDING TEST: CPT | Performed by: PSYCHIATRY & NEUROLOGY

## 2017-11-16 PROCEDURE — 25010000002 ONDANSETRON PER 1 MG: Performed by: PSYCHIATRY & NEUROLOGY

## 2017-11-16 PROCEDURE — 25010000002 LORAZEPAM PER 2 MG: Performed by: PSYCHIATRY & NEUROLOGY

## 2017-11-16 PROCEDURE — 25010000002 DIPHENHYDRAMINE PER 50 MG: Performed by: PSYCHIATRY & NEUROLOGY

## 2017-11-16 PROCEDURE — G0378 HOSPITAL OBSERVATION PER HR: HCPCS

## 2017-11-16 PROCEDURE — 82728 ASSAY OF FERRITIN: CPT | Performed by: PSYCHIATRY & NEUROLOGY

## 2017-11-16 PROCEDURE — 96375 TX/PRO/DX INJ NEW DRUG ADDON: CPT

## 2017-11-16 PROCEDURE — 83540 ASSAY OF IRON: CPT | Performed by: PSYCHIATRY & NEUROLOGY

## 2017-11-16 PROCEDURE — 82306 VITAMIN D 25 HYDROXY: CPT | Performed by: PSYCHIATRY & NEUROLOGY

## 2017-11-16 PROCEDURE — 82607 VITAMIN B-12: CPT | Performed by: PSYCHIATRY & NEUROLOGY

## 2017-11-16 PROCEDURE — 80048 BASIC METABOLIC PNL TOTAL CA: CPT | Performed by: PSYCHIATRY & NEUROLOGY

## 2017-11-16 PROCEDURE — 99225 PR SBSQ OBSERVATION CARE/DAY 25 MINUTES: CPT | Performed by: PSYCHIATRY & NEUROLOGY

## 2017-11-16 PROCEDURE — 25810000003 POTASSIUM CHLORIDE PER 2 MEQ: Performed by: PSYCHIATRY & NEUROLOGY

## 2017-11-16 PROCEDURE — 85025 COMPLETE CBC W/AUTO DIFF WBC: CPT | Performed by: PSYCHIATRY & NEUROLOGY

## 2017-11-16 PROCEDURE — 82746 ASSAY OF FOLIC ACID SERUM: CPT | Performed by: PSYCHIATRY & NEUROLOGY

## 2017-11-16 PROCEDURE — 25010000002 KETOROLAC TROMETHAMINE PER 15 MG: Performed by: PSYCHIATRY & NEUROLOGY

## 2017-11-16 PROCEDURE — 83735 ASSAY OF MAGNESIUM: CPT | Performed by: PSYCHIATRY & NEUROLOGY

## 2017-11-16 PROCEDURE — 96376 TX/PRO/DX INJ SAME DRUG ADON: CPT

## 2017-11-16 RX ORDER — SODIUM CHLORIDE AND POTASSIUM CHLORIDE 150; 450 MG/100ML; MG/100ML
100 INJECTION, SOLUTION INTRAVENOUS CONTINUOUS
Status: DISCONTINUED | OUTPATIENT
Start: 2017-11-16 | End: 2017-11-17 | Stop reason: HOSPADM

## 2017-11-16 RX ORDER — METOCLOPRAMIDE HYDROCHLORIDE 5 MG/ML
10 INJECTION INTRAMUSCULAR; INTRAVENOUS EVERY 8 HOURS
Status: COMPLETED | OUTPATIENT
Start: 2017-11-16 | End: 2017-11-17

## 2017-11-16 RX ORDER — DIHYDROERGOTAMINE MESYLATE 1 MG/ML
0.75 INJECTION, SOLUTION INTRAMUSCULAR; INTRAVENOUS; SUBCUTANEOUS EVERY 8 HOURS
Status: COMPLETED | OUTPATIENT
Start: 2017-11-16 | End: 2017-11-17

## 2017-11-16 RX ORDER — DOXYCYCLINE 100 MG/1
100 TABLET ORAL EVERY 12 HOURS SCHEDULED
Status: DISCONTINUED | OUTPATIENT
Start: 2017-11-16 | End: 2017-11-16 | Stop reason: SDUPTHER

## 2017-11-16 RX ORDER — DIPHENHYDRAMINE HYDROCHLORIDE 50 MG/ML
50 INJECTION INTRAMUSCULAR; INTRAVENOUS EVERY 6 HOURS PRN
Status: DISCONTINUED | OUTPATIENT
Start: 2017-11-16 | End: 2017-11-17 | Stop reason: HOSPADM

## 2017-11-16 RX ORDER — POTASSIUM CHLORIDE 1.5 G/1.77G
20 POWDER, FOR SOLUTION ORAL ONCE
Status: DISCONTINUED | OUTPATIENT
Start: 2017-11-16 | End: 2017-11-16 | Stop reason: SDUPTHER

## 2017-11-16 RX ORDER — POTASSIUM CHLORIDE 750 MG/1
20 CAPSULE, EXTENDED RELEASE ORAL ONCE
Status: COMPLETED | OUTPATIENT
Start: 2017-11-16 | End: 2017-11-16

## 2017-11-16 RX ORDER — METOCLOPRAMIDE HYDROCHLORIDE 5 MG/ML
10 INJECTION INTRAMUSCULAR; INTRAVENOUS EVERY 8 HOURS
Status: DISCONTINUED | OUTPATIENT
Start: 2017-11-16 | End: 2017-11-16

## 2017-11-16 RX ORDER — KETOROLAC TROMETHAMINE 30 MG/ML
30 INJECTION, SOLUTION INTRAMUSCULAR; INTRAVENOUS EVERY 8 HOURS PRN
Status: DISCONTINUED | OUTPATIENT
Start: 2017-11-16 | End: 2017-11-17 | Stop reason: HOSPADM

## 2017-11-16 RX ORDER — DIHYDROERGOTAMINE MESYLATE 1 MG/ML
0.75 INJECTION, SOLUTION INTRAMUSCULAR; INTRAVENOUS; SUBCUTANEOUS EVERY 8 HOURS
Status: DISCONTINUED | OUTPATIENT
Start: 2017-11-17 | End: 2017-11-16

## 2017-11-16 RX ORDER — DOXYCYCLINE 50 MG/1
100 CAPSULE ORAL EVERY 12 HOURS SCHEDULED
Status: DISCONTINUED | OUTPATIENT
Start: 2017-11-16 | End: 2017-11-17 | Stop reason: HOSPADM

## 2017-11-16 RX ADMIN — LORAZEPAM 0.5 MG: 2 INJECTION INTRAMUSCULAR at 19:51

## 2017-11-16 RX ADMIN — SODIUM CHLORIDE 100 ML/HR: 9 INJECTION, SOLUTION INTRAVENOUS at 04:17

## 2017-11-16 RX ADMIN — POTASSIUM CHLORIDE AND SODIUM CHLORIDE 100 ML/HR: 450; 150 INJECTION, SOLUTION INTRAVENOUS at 12:25

## 2017-11-16 RX ADMIN — DIHYDROERGOTAMINE MESYLATE 1 MG: 1 INJECTION, SOLUTION INTRAMUSCULAR; INTRAVENOUS; SUBCUTANEOUS at 09:42

## 2017-11-16 RX ADMIN — DIHYDROERGOTAMINE MESYLATE 0.75 MG: 1 INJECTION, SOLUTION INTRAMUSCULAR; INTRAVENOUS; SUBCUTANEOUS at 23:45

## 2017-11-16 RX ADMIN — LORAZEPAM 0.5 MG: 2 INJECTION INTRAMUSCULAR at 13:58

## 2017-11-16 RX ADMIN — POTASSIUM CHLORIDE 20 MEQ: 750 CAPSULE, EXTENDED RELEASE ORAL at 12:25

## 2017-11-16 RX ADMIN — METOCLOPRAMIDE 10 MG: 5 INJECTION, SOLUTION INTRAMUSCULAR; INTRAVENOUS at 23:25

## 2017-11-16 RX ADMIN — DIPHENHYDRAMINE HYDROCHLORIDE 50 MG: 50 INJECTION, SOLUTION INTRAMUSCULAR; INTRAVENOUS at 23:44

## 2017-11-16 RX ADMIN — KETOROLAC TROMETHAMINE 30 MG: 30 INJECTION, SOLUTION INTRAMUSCULAR at 17:43

## 2017-11-16 RX ADMIN — METOCLOPRAMIDE 10 MG: 5 INJECTION, SOLUTION INTRAMUSCULAR; INTRAVENOUS at 16:06

## 2017-11-16 RX ADMIN — LORAZEPAM 0.5 MG: 2 INJECTION INTRAMUSCULAR at 04:51

## 2017-11-16 RX ADMIN — CHOLECALCIFEROL CAP 125 MCG (5000 UNIT) 5000 UNITS: 125 CAP at 16:06

## 2017-11-16 RX ADMIN — ONDANSETRON HYDROCHLORIDE 4 MG: 2 SOLUTION INTRAMUSCULAR; INTRAVENOUS at 10:00

## 2017-11-16 RX ADMIN — DIHYDROERGOTAMINE MESYLATE 0.75 MG: 1 INJECTION, SOLUTION INTRAMUSCULAR; INTRAVENOUS; SUBCUTANEOUS at 16:43

## 2017-11-16 RX ADMIN — DOXYCYCLINE 100 MG: 50 CAPSULE ORAL at 20:09

## 2017-11-16 RX ADMIN — DIPHENHYDRAMINE HYDROCHLORIDE 50 MG: 50 INJECTION, SOLUTION INTRAMUSCULAR; INTRAVENOUS at 17:43

## 2017-11-16 NOTE — ANESTHESIA PROCEDURE NOTES
Blood Patch    Patient location during procedure: pre-op  Blood patch placed: 11/16/2017 2:42 PM  Stop Time:11/16/2017 2:51 PM    Indications for Blood Patch: headache  Staff  Anesthesiologist: CESIA CURRY  Preanesthetic Checklist  Completed: patient identified, site marked, surgical consent, pre-op evaluation, timeout performed, IV checked, risks and benefits discussed and monitors and equipment checked  Blood Patch Prep  Patient position: sitting  Sterile Tech: gloves, cap, mask and sterile barrier.  Prep: Betadine  Patient monitoring: continuous pulse ox  Location: L3-L4  Blood patch source: left antecubital IV.  Blood Patch Procedure  Sedation:no    Approach: midline   Guidance: landmark technique  Needle Gauge 17 G  Needle Type: Tuohy  Solution used: autologous blood  Loss of Resistance: 4 cm  Loss of Resistance Medium: saline  Blood Patch Source:venous    Amount injected: 20 mL  Assessment  Patient tolerance:patient tolerated the procedure well with no apparent complications  Complications:no

## 2017-11-16 NOTE — ANESTHESIA PREPROCEDURE EVALUATION
Anesthesia Evaluation     Patient summary reviewed and Nursing notes reviewed   no history of anesthetic complications:  NPO Solid Status: > 8 hours  NPO Liquid Status: > 8 hours     Airway   Mallampati: I  TM distance: >3 FB  Neck ROM: full  Dental - normal exam     Pulmonary - normal exam    breath sounds clear to auscultation  (-) asthma, recent URI, sleep apnea, not a smoker  Cardiovascular   Exercise tolerance: excellent (>7 METS)    ECG reviewed  Rhythm: regular  Rate: normal    (-) pacemaker, hypertension, past MI, angina, murmur, cardiac stents      Neuro/Psych  (+) headaches,    (-) seizures, TIA, CVA, weakness, numbness  GI/Hepatic/Renal/Endo    (-) GERD, liver disease, no renal disease, diabetes, hypothyroidism, hyperthyroidism    ROS Comment: Thyroid nodule, being followed    Musculoskeletal     (-) neck pain, neck stiffness  Abdominal    Substance History      OB/GYN    (-)  Pregnant        Other        ROS/Med Hx Other: Migraine HA, but has worsened since lumbar puncture.  Since that time, has become positional.                                  Anesthesia Plan    ASA 1     other   (Blood Patch)  Anesthetic plan and risks discussed with patient.

## 2017-11-17 VITALS
OXYGEN SATURATION: 99 % | HEIGHT: 64 IN | SYSTOLIC BLOOD PRESSURE: 139 MMHG | DIASTOLIC BLOOD PRESSURE: 93 MMHG | TEMPERATURE: 98 F | RESPIRATION RATE: 18 BRPM | WEIGHT: 111 LBS | HEART RATE: 65 BPM | BODY MASS INDEX: 18.95 KG/M2

## 2017-11-17 LAB
ANION GAP SERPL CALCULATED.3IONS-SCNC: 8 MMOL/L (ref 4–13)
BACTERIA SPEC AEROBE CULT: NORMAL
BASOPHILS # BLD AUTO: 0.04 10*3/MM3 (ref 0–0.2)
BASOPHILS NFR BLD AUTO: 0.5 % (ref 0–2)
BUN BLD-MCNC: 8 MG/DL (ref 5–21)
BUN/CREAT SERPL: 14.8 (ref 7–25)
CALCIUM SPEC-SCNC: 8.5 MG/DL (ref 8.4–10.4)
CHLORIDE SERPL-SCNC: 104 MMOL/L (ref 98–110)
CO2 SERPL-SCNC: 30 MMOL/L (ref 24–31)
CREAT BLD-MCNC: 0.54 MG/DL (ref 0.5–1.4)
DEPRECATED RDW RBC AUTO: 43.6 FL (ref 40–54)
EOSINOPHIL # BLD AUTO: 0.21 10*3/MM3 (ref 0–0.7)
EOSINOPHIL NFR BLD AUTO: 2.7 % (ref 0–4)
ERYTHROCYTE [DISTWIDTH] IN BLOOD BY AUTOMATED COUNT: 12.9 % (ref 12–15)
EV RNA SPEC QL NAA+PROBE: NEGATIVE
GFR SERPL CREATININE-BSD FRML MDRD: 136 ML/MIN/1.73
GLUCOSE BLD-MCNC: 93 MG/DL (ref 70–100)
GRAM STN SPEC: NORMAL
HCT VFR BLD AUTO: 33 % (ref 37–47)
HGB BLD-MCNC: 10.8 G/DL (ref 12–16)
IMM GRANULOCYTES # BLD: 0.07 10*3/MM3 (ref 0–0.03)
IMM GRANULOCYTES NFR BLD: 0.9 % (ref 0–5)
LYMPHOCYTES # BLD AUTO: 2.85 10*3/MM3 (ref 0.72–4.86)
LYMPHOCYTES NFR BLD AUTO: 36.8 % (ref 15–45)
MCH RBC QN AUTO: 30.2 PG (ref 28–32)
MCHC RBC AUTO-ENTMCNC: 32.7 G/DL (ref 33–36)
MCV RBC AUTO: 92.2 FL (ref 82–98)
MONOCYTES # BLD AUTO: 0.59 10*3/MM3 (ref 0.19–1.3)
MONOCYTES NFR BLD AUTO: 7.6 % (ref 4–12)
NEUTROPHILS # BLD AUTO: 3.98 10*3/MM3 (ref 1.87–8.4)
NEUTROPHILS NFR BLD AUTO: 51.5 % (ref 39–78)
PLATELET # BLD AUTO: 278 10*3/MM3 (ref 130–400)
PMV BLD AUTO: 10.9 FL (ref 6–12)
POTASSIUM BLD-SCNC: 3.5 MMOL/L (ref 3.5–5.3)
RBC # BLD AUTO: 3.58 10*6/MM3 (ref 4.2–5.4)
SODIUM BLD-SCNC: 142 MMOL/L (ref 135–145)
WBC NRBC COR # BLD: 7.74 10*3/MM3 (ref 4.8–10.8)

## 2017-11-17 PROCEDURE — G0378 HOSPITAL OBSERVATION PER HR: HCPCS

## 2017-11-17 PROCEDURE — 25010000002 KETOROLAC TROMETHAMINE PER 15 MG: Performed by: PSYCHIATRY & NEUROLOGY

## 2017-11-17 PROCEDURE — 96376 TX/PRO/DX INJ SAME DRUG ADON: CPT

## 2017-11-17 PROCEDURE — 80048 BASIC METABOLIC PNL TOTAL CA: CPT | Performed by: PSYCHIATRY & NEUROLOGY

## 2017-11-17 PROCEDURE — 25010000002 DIPHENHYDRAMINE PER 50 MG: Performed by: PSYCHIATRY & NEUROLOGY

## 2017-11-17 PROCEDURE — 25010000002 DIHYDROERGOTAMINE MESYLATE PER 1 MG: Performed by: PSYCHIATRY & NEUROLOGY

## 2017-11-17 PROCEDURE — 25010000002 MAGNESIUM SULFATE IN D5W 1G/100ML (PREMIX) 1-5 GM/100ML-% SOLUTION: Performed by: PSYCHIATRY & NEUROLOGY

## 2017-11-17 PROCEDURE — 25810000003 POTASSIUM CHLORIDE PER 2 MEQ: Performed by: PSYCHIATRY & NEUROLOGY

## 2017-11-17 PROCEDURE — 85025 COMPLETE CBC W/AUTO DIFF WBC: CPT | Performed by: PSYCHIATRY & NEUROLOGY

## 2017-11-17 PROCEDURE — 99217 PR OBSERVATION CARE DISCHARGE MANAGEMENT: CPT | Performed by: PSYCHIATRY & NEUROLOGY

## 2017-11-17 PROCEDURE — 25010000002 METOCLOPRAMIDE PER 10 MG: Performed by: PSYCHIATRY & NEUROLOGY

## 2017-11-17 RX ORDER — POTASSIUM CHLORIDE 1.5 G/1.77G
20 POWDER, FOR SOLUTION ORAL ONCE
Status: COMPLETED | OUTPATIENT
Start: 2017-11-17 | End: 2017-11-17

## 2017-11-17 RX ORDER — MAGNESIUM SULFATE 1 G/100ML
1 INJECTION INTRAVENOUS ONCE
Status: COMPLETED | OUTPATIENT
Start: 2017-11-17 | End: 2017-11-17

## 2017-11-17 RX ADMIN — METOCLOPRAMIDE 10 MG: 5 INJECTION, SOLUTION INTRAMUSCULAR; INTRAVENOUS at 07:27

## 2017-11-17 RX ADMIN — DIPHENHYDRAMINE HYDROCHLORIDE 50 MG: 50 INJECTION, SOLUTION INTRAMUSCULAR; INTRAVENOUS at 05:47

## 2017-11-17 RX ADMIN — KETOROLAC TROMETHAMINE 30 MG: 30 INJECTION, SOLUTION INTRAMUSCULAR at 05:47

## 2017-11-17 RX ADMIN — MAGNESIUM SULFATE HEPTAHYDRATE 1 G: 1 INJECTION, SOLUTION INTRAVENOUS at 12:07

## 2017-11-17 RX ADMIN — DOXYCYCLINE 100 MG: 50 CAPSULE ORAL at 08:07

## 2017-11-17 RX ADMIN — POTASSIUM CHLORIDE AND SODIUM CHLORIDE 100 ML/HR: 450; 150 INJECTION, SOLUTION INTRAVENOUS at 01:50

## 2017-11-17 RX ADMIN — DIHYDROERGOTAMINE MESYLATE 0.75 MG: 1 INJECTION, SOLUTION INTRAMUSCULAR; INTRAVENOUS; SUBCUTANEOUS at 08:07

## 2017-11-17 RX ADMIN — CHOLECALCIFEROL CAP 125 MCG (5000 UNIT) 5000 UNITS: 125 CAP at 08:07

## 2017-11-17 RX ADMIN — POTASSIUM CHLORIDE 20 MEQ: 1.5 POWDER, FOR SOLUTION ORAL at 12:07

## 2017-11-17 NOTE — ANESTHESIA POSTPROCEDURE EVALUATION
"Patient: Mirela Flores    Procedure Summary     Date Anesthesia Start Anesthesia Stop Room / Location    11/16/17   [facility] PREOP PHASE II       Procedure Diagnosis Scheduled Providers Provider    BLOOD PATCH No diagnosis on file.  No responsible provider of record.          Anesthesia Type: other  Last vitals  BP   130/76 (11/16/17 1940)   Temp   98 °F (36.7 °C) (11/16/17 1940)   Pulse   53 (11/16/17 1940)   Resp   16 (11/16/17 1940)     SpO2   99 % (11/16/17 1940)     Post Anesthesia Care and Evaluation      Comments: Patient discharged from PACU prior to anesthesia evaluation based on Tony Score.  For details, see RN note.     /76 (BP Location: Left arm, Patient Position: Lying)  Pulse 53  Temp 98 °F (36.7 °C) (Oral)   Resp 16  Ht 64\" (162.6 cm)  Wt 111 lb (50.3 kg)  SpO2 99%  BMI 19.05 kg/m2      "

## 2017-11-18 LAB
R RICKETTSI IGG SER QL IA: NORMAL
R RICKETTSI IGM TITR SER: NORMAL {TITER}

## 2017-11-20 ENCOUNTER — HOSPITAL ENCOUNTER (INPATIENT)
Facility: HOSPITAL | Age: 27
LOS: 3 days | Discharge: HOME | End: 2017-11-23
Attending: FAMILY MEDICINE | Admitting: PSYCHIATRY & NEUROLOGY

## 2017-11-20 DIAGNOSIS — G43.819 OTHER MIGRAINE WITHOUT STATUS MIGRAINOSUS, INTRACTABLE: Primary | ICD-10-CM

## 2017-11-20 PROBLEM — G43.919 MIGRAINE, INTRACTABLE: Status: ACTIVE | Noted: 2017-11-20

## 2017-11-20 LAB
B-HCG UR QL: NEGATIVE
INTERNAL NEGATIVE CONTROL: NEGATIVE
INTERNAL POSITIVE CONTROL: POSITIVE
Lab: NORMAL

## 2017-11-20 PROCEDURE — 25010000002 DIHYDROERGOTAMINE MESYLATE PER 1 MG: Performed by: FAMILY MEDICINE

## 2017-11-20 PROCEDURE — 96376 TX/PRO/DX INJ SAME DRUG ADON: CPT

## 2017-11-20 PROCEDURE — 25010000002 DIPHENHYDRAMINE PER 50 MG: Performed by: FAMILY MEDICINE

## 2017-11-20 PROCEDURE — 25010000002 METOCLOPRAMIDE PER 10 MG: Performed by: FAMILY MEDICINE

## 2017-11-20 PROCEDURE — 25010000002 DIPHENHYDRAMINE PER 50 MG: Performed by: PSYCHIATRY & NEUROLOGY

## 2017-11-20 PROCEDURE — 96365 THER/PROPH/DIAG IV INF INIT: CPT

## 2017-11-20 PROCEDURE — 25010000002 KETOROLAC TROMETHAMINE PER 15 MG: Performed by: PSYCHIATRY & NEUROLOGY

## 2017-11-20 PROCEDURE — 96375 TX/PRO/DX INJ NEW DRUG ADDON: CPT

## 2017-11-20 PROCEDURE — 25010000002 HYDROMORPHONE PER 4 MG: Performed by: FAMILY MEDICINE

## 2017-11-20 PROCEDURE — 25010000002 KETOROLAC TROMETHAMINE PER 15 MG: Performed by: FAMILY MEDICINE

## 2017-11-20 PROCEDURE — G0378 HOSPITAL OBSERVATION PER HR: HCPCS

## 2017-11-20 PROCEDURE — 96361 HYDRATE IV INFUSION ADD-ON: CPT

## 2017-11-20 PROCEDURE — 99220 PR INITIAL OBSERVATION CARE/DAY 70 MINUTES: CPT | Performed by: PSYCHIATRY & NEUROLOGY

## 2017-11-20 PROCEDURE — 25010000002 PROMETHAZINE PER 50 MG: Performed by: FAMILY MEDICINE

## 2017-11-20 PROCEDURE — 25010000002 DEXAMETHASONE PER 1 MG: Performed by: FAMILY MEDICINE

## 2017-11-20 PROCEDURE — 99284 EMERGENCY DEPT VISIT MOD MDM: CPT

## 2017-11-20 PROCEDURE — 96366 THER/PROPH/DIAG IV INF ADDON: CPT

## 2017-11-20 PROCEDURE — 25810000003 SODIUM CHLORIDE 0.9 % WITH KCL 20 MEQ 20-0.9 MEQ/L-% SOLUTION: Performed by: PSYCHIATRY & NEUROLOGY

## 2017-11-20 RX ORDER — KETOROLAC TROMETHAMINE 30 MG/ML
30 INJECTION, SOLUTION INTRAMUSCULAR; INTRAVENOUS ONCE
Status: COMPLETED | OUTPATIENT
Start: 2017-11-20 | End: 2017-11-20

## 2017-11-20 RX ORDER — METOCLOPRAMIDE 10 MG/1
10 TABLET ORAL EVERY 6 HOURS
Status: DISCONTINUED | OUTPATIENT
Start: 2017-11-20 | End: 2017-11-20

## 2017-11-20 RX ORDER — METOCLOPRAMIDE HYDROCHLORIDE 5 MG/ML
10 INJECTION INTRAMUSCULAR; INTRAVENOUS ONCE
Status: COMPLETED | OUTPATIENT
Start: 2017-11-20 | End: 2017-11-20

## 2017-11-20 RX ORDER — DIPHENHYDRAMINE HYDROCHLORIDE 50 MG/ML
25 INJECTION INTRAMUSCULAR; INTRAVENOUS ONCE
Status: COMPLETED | OUTPATIENT
Start: 2017-11-20 | End: 2017-11-20

## 2017-11-20 RX ORDER — DIHYDROERGOTAMINE MESYLATE 1 MG/ML
1 INJECTION, SOLUTION INTRAMUSCULAR; INTRAVENOUS; SUBCUTANEOUS ONCE
Status: COMPLETED | OUTPATIENT
Start: 2017-11-20 | End: 2017-11-20

## 2017-11-20 RX ORDER — BUSPIRONE HYDROCHLORIDE 15 MG/1
7.5 TABLET ORAL 2 TIMES DAILY PRN
Status: ON HOLD | COMMUNITY
End: 2018-02-02 | Stop reason: ALTCHOICE

## 2017-11-20 RX ORDER — DEXAMETHASONE SODIUM PHOSPHATE 10 MG/ML
10 INJECTION INTRAMUSCULAR; INTRAVENOUS ONCE
Status: COMPLETED | OUTPATIENT
Start: 2017-11-20 | End: 2017-11-20

## 2017-11-20 RX ORDER — METOCLOPRAMIDE HYDROCHLORIDE 5 MG/ML
10 INJECTION INTRAMUSCULAR; INTRAVENOUS
Status: DISCONTINUED | OUTPATIENT
Start: 2017-11-20 | End: 2017-11-20

## 2017-11-20 RX ORDER — DOXYCYCLINE HYCLATE 100 MG/1
100 CAPSULE ORAL 2 TIMES DAILY
COMMUNITY
Start: 2017-11-12 | End: 2017-11-23 | Stop reason: HOSPADM

## 2017-11-20 RX ORDER — METOCLOPRAMIDE 10 MG/1
10 TABLET ORAL EVERY 8 HOURS
Status: COMPLETED | OUTPATIENT
Start: 2017-11-20 | End: 2017-11-21

## 2017-11-20 RX ORDER — SUMATRIPTAN 6 MG/.5ML
6 INJECTION, SOLUTION SUBCUTANEOUS ONCE
Status: COMPLETED | OUTPATIENT
Start: 2017-11-20 | End: 2017-11-20

## 2017-11-20 RX ORDER — PROMETHAZINE HYDROCHLORIDE 25 MG/ML
25 INJECTION, SOLUTION INTRAMUSCULAR; INTRAVENOUS ONCE
Status: COMPLETED | OUTPATIENT
Start: 2017-11-20 | End: 2017-11-20

## 2017-11-20 RX ORDER — SODIUM CHLORIDE AND POTASSIUM CHLORIDE 150; 900 MG/100ML; MG/100ML
75 INJECTION, SOLUTION INTRAVENOUS CONTINUOUS
Status: DISCONTINUED | OUTPATIENT
Start: 2017-11-20 | End: 2017-11-23 | Stop reason: HOSPADM

## 2017-11-20 RX ORDER — SODIUM CHLORIDE 0.9 % (FLUSH) 0.9 %
10 SYRINGE (ML) INJECTION AS NEEDED
Status: DISCONTINUED | OUTPATIENT
Start: 2017-11-20 | End: 2017-11-23 | Stop reason: HOSPADM

## 2017-11-20 RX ORDER — PRENATAL VIT/IRON FUM/FOLIC AC 27MG-0.8MG
1 TABLET ORAL DAILY
Status: DISCONTINUED | OUTPATIENT
Start: 2017-11-21 | End: 2017-11-21

## 2017-11-20 RX ORDER — KETOROLAC TROMETHAMINE 30 MG/ML
30 INJECTION, SOLUTION INTRAMUSCULAR; INTRAVENOUS EVERY 6 HOURS PRN
Status: DISCONTINUED | OUTPATIENT
Start: 2017-11-20 | End: 2017-11-21

## 2017-11-20 RX ADMIN — VALPROATE SODIUM 500 MG: 100 INJECTION, SOLUTION INTRAVENOUS at 16:22

## 2017-11-20 RX ADMIN — METOCLOPRAMIDE 10 MG: 10 TABLET ORAL at 23:31

## 2017-11-20 RX ADMIN — PROMETHAZINE HYDROCHLORIDE 25 MG: 25 INJECTION INTRAMUSCULAR; INTRAVENOUS at 17:11

## 2017-11-20 RX ADMIN — SODIUM CHLORIDE 500 ML: 9 INJECTION, SOLUTION INTRAVENOUS at 14:24

## 2017-11-20 RX ADMIN — DIHYDROERGOTAMINE MESYLATE 1 MG: 1 INJECTION, SOLUTION INTRAMUSCULAR; INTRAVENOUS; SUBCUTANEOUS at 16:18

## 2017-11-20 RX ADMIN — KETOROLAC TROMETHAMINE 30 MG: 30 INJECTION, SOLUTION INTRAMUSCULAR at 22:08

## 2017-11-20 RX ADMIN — SUMATRIPTAN 6 MG: 6 INJECTION, SOLUTION SUBCUTANEOUS at 14:29

## 2017-11-20 RX ADMIN — VALPROATE SODIUM 500 MG: 100 INJECTION, SOLUTION INTRAVENOUS at 22:00

## 2017-11-20 RX ADMIN — POTASSIUM CHLORIDE AND SODIUM CHLORIDE 75 ML/HR: 900; 150 INJECTION, SOLUTION INTRAVENOUS at 22:00

## 2017-11-20 RX ADMIN — DIPHENHYDRAMINE HYDROCHLORIDE 25 MG: 50 INJECTION, SOLUTION INTRAMUSCULAR; INTRAVENOUS at 14:27

## 2017-11-20 RX ADMIN — DEXAMETHASONE SODIUM PHOSPHATE 10 MG: 10 INJECTION, SOLUTION INTRAMUSCULAR; INTRAVENOUS at 14:34

## 2017-11-20 RX ADMIN — DIPHENHYDRAMINE HYDROCHLORIDE 25 MG: 50 INJECTION, SOLUTION INTRAMUSCULAR; INTRAVENOUS at 16:15

## 2017-11-20 RX ADMIN — HYDROMORPHONE HYDROCHLORIDE 1 MG: 1 INJECTION, SOLUTION INTRAMUSCULAR; INTRAVENOUS; SUBCUTANEOUS at 17:11

## 2017-11-20 RX ADMIN — HYDROMORPHONE HYDROCHLORIDE 1 MG: 1 INJECTION, SOLUTION INTRAMUSCULAR; INTRAVENOUS; SUBCUTANEOUS at 14:26

## 2017-11-20 RX ADMIN — METOCLOPRAMIDE 10 MG: 5 INJECTION, SOLUTION INTRAMUSCULAR; INTRAVENOUS at 16:10

## 2017-11-20 RX ADMIN — DIPHENHYDRAMINE HYDROCHLORIDE 50 MG: 50 INJECTION, SOLUTION INTRAMUSCULAR; INTRAVENOUS at 23:36

## 2017-11-20 RX ADMIN — KETOROLAC TROMETHAMINE 30 MG: 30 INJECTION, SOLUTION INTRAMUSCULAR at 14:32

## 2017-11-21 ENCOUNTER — TELEPHONE (OUTPATIENT)
Dept: FAMILY MEDICINE CLINIC | Age: 27
End: 2017-11-21

## 2017-11-21 ENCOUNTER — APPOINTMENT (OUTPATIENT)
Dept: CT IMAGING | Facility: HOSPITAL | Age: 27
End: 2017-11-21

## 2017-11-21 ENCOUNTER — APPOINTMENT (OUTPATIENT)
Dept: MRI IMAGING | Facility: HOSPITAL | Age: 27
End: 2017-11-21

## 2017-11-21 LAB
ALBUMIN SERPL-MCNC: 3.5 G/DL (ref 3.5–5)
ALBUMIN/GLOB SERPL: 1.3 G/DL (ref 1.1–2.5)
ALP SERPL-CCNC: 65 U/L (ref 24–120)
ALT SERPL W P-5'-P-CCNC: 31 U/L (ref 0–54)
ANION GAP SERPL CALCULATED.3IONS-SCNC: 11 MMOL/L (ref 4–13)
AST SERPL-CCNC: 11 U/L (ref 7–45)
BILIRUB SERPL-MCNC: 0.2 MG/DL (ref 0.1–1)
BUN BLD-MCNC: 13 MG/DL (ref 5–21)
BUN/CREAT SERPL: 21 (ref 7–25)
CALCIUM SPEC-SCNC: 8.6 MG/DL (ref 8.4–10.4)
CHLORIDE SERPL-SCNC: 104 MMOL/L (ref 98–110)
CO2 SERPL-SCNC: 27 MMOL/L (ref 24–31)
CREAT BLD-MCNC: 0.62 MG/DL (ref 0.5–1.4)
DEPRECATED RDW RBC AUTO: 46.9 FL (ref 40–54)
ERYTHROCYTE [DISTWIDTH] IN BLOOD BY AUTOMATED COUNT: 13.7 % (ref 12–15)
ERYTHROCYTE [SEDIMENTATION RATE] IN BLOOD: 7 MM/HR (ref 0–20)
GFR SERPL CREATININE-BSD FRML MDRD: 116 ML/MIN/1.73
GLOBULIN UR ELPH-MCNC: 2.6 GM/DL
GLUCOSE BLD-MCNC: 119 MG/DL (ref 70–100)
HCT VFR BLD AUTO: 35.3 % (ref 37–47)
HGB BLD-MCNC: 11.4 G/DL (ref 12–16)
MCH RBC QN AUTO: 30.3 PG (ref 28–32)
MCHC RBC AUTO-ENTMCNC: 32.3 G/DL (ref 33–36)
MCV RBC AUTO: 93.9 FL (ref 82–98)
PLATELET # BLD AUTO: 432 10*3/MM3 (ref 130–400)
PMV BLD AUTO: 10.6 FL (ref 6–12)
POTASSIUM BLD-SCNC: 3.8 MMOL/L (ref 3.5–5.3)
PROT SERPL-MCNC: 6.1 G/DL (ref 6.3–8.7)
RBC # BLD AUTO: 3.76 10*6/MM3 (ref 4.2–5.4)
SODIUM BLD-SCNC: 142 MMOL/L (ref 135–145)
VALPROATE SERPL-MCNC: 38.7 MCG/ML (ref 50–100)
WBC NRBC COR # BLD: 11.64 10*3/MM3 (ref 4.8–10.8)

## 2017-11-21 PROCEDURE — 25010000002 KETOROLAC TROMETHAMINE PER 15 MG: Performed by: PSYCHIATRY & NEUROLOGY

## 2017-11-21 PROCEDURE — 25010000002 ENOXAPARIN PER 10 MG: Performed by: PSYCHIATRY & NEUROLOGY

## 2017-11-21 PROCEDURE — 70496 CT ANGIOGRAPHY HEAD: CPT

## 2017-11-21 PROCEDURE — 25010000002 METOCLOPRAMIDE PER 10 MG: Performed by: PSYCHIATRY & NEUROLOGY

## 2017-11-21 PROCEDURE — A9577 INJ MULTIHANCE: HCPCS | Performed by: PSYCHIATRY & NEUROLOGY

## 2017-11-21 PROCEDURE — 85027 COMPLETE CBC AUTOMATED: CPT | Performed by: PSYCHIATRY & NEUROLOGY

## 2017-11-21 PROCEDURE — 0 GADOBENATE DIMEGLUMINE 529 MG/ML SOLUTION: Performed by: PSYCHIATRY & NEUROLOGY

## 2017-11-21 PROCEDURE — 70553 MRI BRAIN STEM W/O & W/DYE: CPT

## 2017-11-21 PROCEDURE — 80053 COMPREHEN METABOLIC PANEL: CPT | Performed by: PSYCHIATRY & NEUROLOGY

## 2017-11-21 PROCEDURE — 25010000002 DIPHENHYDRAMINE PER 50 MG: Performed by: PSYCHIATRY & NEUROLOGY

## 2017-11-21 PROCEDURE — 85651 RBC SED RATE NONAUTOMATED: CPT | Performed by: PSYCHIATRY & NEUROLOGY

## 2017-11-21 PROCEDURE — G0378 HOSPITAL OBSERVATION PER HR: HCPCS

## 2017-11-21 PROCEDURE — 25010000002 DIHYDROERGOTAMINE MESYLATE PER 1 MG: Performed by: PSYCHIATRY & NEUROLOGY

## 2017-11-21 PROCEDURE — 0 IOPAMIDOL PER 1 ML: Performed by: PSYCHIATRY & NEUROLOGY

## 2017-11-21 PROCEDURE — 80164 ASSAY DIPROPYLACETIC ACD TOT: CPT | Performed by: PSYCHIATRY & NEUROLOGY

## 2017-11-21 PROCEDURE — 70450 CT HEAD/BRAIN W/O DYE: CPT

## 2017-11-21 RX ORDER — DIPHENHYDRAMINE HYDROCHLORIDE 50 MG/ML
50 INJECTION INTRAMUSCULAR; INTRAVENOUS EVERY 8 HOURS PRN
Status: DISCONTINUED | OUTPATIENT
Start: 2017-11-21 | End: 2017-11-21 | Stop reason: SDUPTHER

## 2017-11-21 RX ORDER — AMITRIPTYLINE HYDROCHLORIDE 25 MG/1
25 TABLET, FILM COATED ORAL NIGHTLY
Status: DISCONTINUED | OUTPATIENT
Start: 2017-11-21 | End: 2017-11-23 | Stop reason: HOSPADM

## 2017-11-21 RX ORDER — GABAPENTIN 100 MG/1
100 CAPSULE ORAL EVERY 8 HOURS SCHEDULED
Status: DISCONTINUED | OUTPATIENT
Start: 2017-11-21 | End: 2017-11-21

## 2017-11-21 RX ORDER — METOCLOPRAMIDE HYDROCHLORIDE 5 MG/ML
10 INJECTION INTRAMUSCULAR; INTRAVENOUS EVERY 6 HOURS
Status: DISCONTINUED | OUTPATIENT
Start: 2017-11-21 | End: 2017-11-21

## 2017-11-21 RX ORDER — PRENATAL VIT/IRON FUM/FOLIC AC 27MG-0.8MG
1 TABLET ORAL DAILY
Status: DISCONTINUED | OUTPATIENT
Start: 2017-11-21 | End: 2017-11-23 | Stop reason: HOSPADM

## 2017-11-21 RX ORDER — MELATONIN
1000 DAILY
Status: DISCONTINUED | OUTPATIENT
Start: 2017-11-21 | End: 2017-11-23 | Stop reason: HOSPADM

## 2017-11-21 RX ORDER — KETOROLAC TROMETHAMINE 30 MG/ML
30 INJECTION, SOLUTION INTRAMUSCULAR; INTRAVENOUS EVERY 6 HOURS PRN
Status: DISCONTINUED | OUTPATIENT
Start: 2017-11-21 | End: 2017-11-23 | Stop reason: HOSPADM

## 2017-11-21 RX ORDER — METOCLOPRAMIDE HYDROCHLORIDE 5 MG/ML
10 INJECTION INTRAMUSCULAR; INTRAVENOUS EVERY 8 HOURS
Status: DISCONTINUED | OUTPATIENT
Start: 2017-11-22 | End: 2017-11-21

## 2017-11-21 RX ORDER — CYCLOBENZAPRINE HCL 10 MG
10 TABLET ORAL 3 TIMES DAILY PRN
Status: DISCONTINUED | OUTPATIENT
Start: 2017-11-21 | End: 2017-11-21

## 2017-11-21 RX ORDER — DIHYDROERGOTAMINE MESYLATE 1 MG/ML
0.75 INJECTION, SOLUTION INTRAMUSCULAR; INTRAVENOUS; SUBCUTANEOUS EVERY 8 HOURS
Status: DISCONTINUED | OUTPATIENT
Start: 2017-11-22 | End: 2017-11-21

## 2017-11-21 RX ORDER — DIHYDROERGOTAMINE MESYLATE 1 MG/ML
0.75 INJECTION, SOLUTION INTRAMUSCULAR; INTRAVENOUS; SUBCUTANEOUS EVERY 8 HOURS
Status: DISCONTINUED | OUTPATIENT
Start: 2017-11-21 | End: 2017-11-21

## 2017-11-21 RX ORDER — ACETAMINOPHEN 325 MG/1
650 TABLET ORAL EVERY 4 HOURS PRN
Status: DISCONTINUED | OUTPATIENT
Start: 2017-11-21 | End: 2017-11-23 | Stop reason: HOSPADM

## 2017-11-21 RX ORDER — DIHYDROERGOTAMINE MESYLATE 1 MG/ML
0.75 INJECTION, SOLUTION INTRAMUSCULAR; INTRAVENOUS; SUBCUTANEOUS EVERY 8 HOURS
Status: COMPLETED | OUTPATIENT
Start: 2017-11-22 | End: 2017-11-22

## 2017-11-21 RX ORDER — CYCLOBENZAPRINE HCL 10 MG
10 TABLET ORAL 3 TIMES DAILY PRN
Status: DISCONTINUED | OUTPATIENT
Start: 2017-11-21 | End: 2017-11-23 | Stop reason: HOSPADM

## 2017-11-21 RX ORDER — METOCLOPRAMIDE HYDROCHLORIDE 5 MG/ML
10 INJECTION INTRAMUSCULAR; INTRAVENOUS EVERY 8 HOURS
Status: COMPLETED | OUTPATIENT
Start: 2017-11-22 | End: 2017-11-22

## 2017-11-21 RX ORDER — FAMOTIDINE 20 MG/1
40 TABLET, FILM COATED ORAL DAILY
Status: DISCONTINUED | OUTPATIENT
Start: 2017-11-21 | End: 2017-11-23 | Stop reason: HOSPADM

## 2017-11-21 RX ORDER — BUSPIRONE HYDROCHLORIDE 5 MG/1
7.5 TABLET ORAL EVERY 12 HOURS SCHEDULED
Status: DISCONTINUED | OUTPATIENT
Start: 2017-11-21 | End: 2017-11-23 | Stop reason: HOSPADM

## 2017-11-21 RX ORDER — GABAPENTIN 100 MG/1
200 CAPSULE ORAL EVERY 8 HOURS SCHEDULED
Status: DISCONTINUED | OUTPATIENT
Start: 2017-11-21 | End: 2017-11-23 | Stop reason: HOSPADM

## 2017-11-21 RX ORDER — SENNA AND DOCUSATE SODIUM 50; 8.6 MG/1; MG/1
2 TABLET, FILM COATED ORAL 2 TIMES DAILY PRN
Status: DISCONTINUED | OUTPATIENT
Start: 2017-11-21 | End: 2017-11-23 | Stop reason: HOSPADM

## 2017-11-21 RX ORDER — METOCLOPRAMIDE HYDROCHLORIDE 5 MG/ML
10 INJECTION INTRAMUSCULAR; INTRAVENOUS EVERY 8 HOURS SCHEDULED
Status: DISCONTINUED | OUTPATIENT
Start: 2017-11-21 | End: 2017-11-21

## 2017-11-21 RX ADMIN — FAMOTIDINE 40 MG: 20 TABLET, FILM COATED ORAL at 12:09

## 2017-11-21 RX ADMIN — AMITRIPTYLINE HYDROCHLORIDE 25 MG: 25 TABLET, FILM COATED ORAL at 20:23

## 2017-11-21 RX ADMIN — ACETAMINOPHEN 650 MG: 325 TABLET ORAL at 14:06

## 2017-11-21 RX ADMIN — CHOLECALCIFEROL (VITAMIN D3) 25 MCG (1,000 UNIT) TABLET 1000 UNITS: TABLET at 12:09

## 2017-11-21 RX ADMIN — GADOBENATE DIMEGLUMINE 10 ML: 529 INJECTION, SOLUTION INTRAVENOUS at 14:00

## 2017-11-21 RX ADMIN — DIPHENHYDRAMINE HYDROCHLORIDE 50 MG: 50 INJECTION, SOLUTION INTRAMUSCULAR; INTRAVENOUS at 07:55

## 2017-11-21 RX ADMIN — METOCLOPRAMIDE 10 MG: 10 TABLET ORAL at 07:52

## 2017-11-21 RX ADMIN — METOCLOPRAMIDE 10 MG: 5 INJECTION, SOLUTION INTRAMUSCULAR; INTRAVENOUS at 16:02

## 2017-11-21 RX ADMIN — IOPAMIDOL 150 ML: 755 INJECTION, SOLUTION INTRAVENOUS at 14:15

## 2017-11-21 RX ADMIN — BUSPIRONE HYDROCHLORIDE 7.5 MG: 5 TABLET ORAL at 12:09

## 2017-11-21 RX ADMIN — DIPHENHYDRAMINE HYDROCHLORIDE 50 MG: 50 INJECTION, SOLUTION INTRAMUSCULAR; INTRAVENOUS at 17:19

## 2017-11-21 RX ADMIN — DIHYDROERGOTAMINE MESYLATE 1 MG: 1 INJECTION, SOLUTION INTRAMUSCULAR; INTRAVENOUS; SUBCUTANEOUS at 00:17

## 2017-11-21 RX ADMIN — KETOROLAC TROMETHAMINE 30 MG: 30 INJECTION, SOLUTION INTRAMUSCULAR at 05:50

## 2017-11-21 RX ADMIN — GABAPENTIN 200 MG: 100 CAPSULE ORAL at 22:31

## 2017-11-21 RX ADMIN — CYCLOBENZAPRINE HYDROCHLORIDE 10 MG: 10 TABLET, FILM COATED ORAL at 20:23

## 2017-11-21 RX ADMIN — DOCUSATE SODIUM -SENNOSIDES 2 TABLET: 50; 8.6 TABLET, COATED ORAL at 16:00

## 2017-11-21 RX ADMIN — GABAPENTIN 100 MG: 100 CAPSULE ORAL at 14:06

## 2017-11-21 RX ADMIN — ENOXAPARIN SODIUM 40 MG: 40 INJECTION SUBCUTANEOUS at 12:09

## 2017-11-21 RX ADMIN — DIHYDROERGOTAMINE MESYLATE 1 MG: 1 INJECTION, SOLUTION INTRAMUSCULAR; INTRAVENOUS; SUBCUTANEOUS at 08:20

## 2017-11-21 RX ADMIN — VALPROATE SODIUM 500 MG: 100 INJECTION, SOLUTION INTRAVENOUS at 05:50

## 2017-11-21 RX ADMIN — KETOROLAC TROMETHAMINE 30 MG: 30 INJECTION, SOLUTION INTRAMUSCULAR at 12:09

## 2017-11-21 RX ADMIN — DIHYDROERGOTAMINE MESYLATE 0.75 MG: 1 INJECTION, SOLUTION INTRAMUSCULAR; INTRAVENOUS; SUBCUTANEOUS at 16:17

## 2017-11-21 RX ADMIN — KETOROLAC TROMETHAMINE 30 MG: 30 INJECTION, SOLUTION INTRAMUSCULAR at 19:55

## 2017-11-21 RX ADMIN — BUSPIRONE HYDROCHLORIDE 7.5 MG: 5 TABLET ORAL at 20:23

## 2017-11-21 RX ADMIN — PRENATAL VIT W/ FE FUMARATE-FA TAB 27-0.8 MG 1 TABLET: 27-0.8 TAB at 08:01

## 2017-11-21 NOTE — TELEPHONE ENCOUNTER
Pt has been notified. She has a hfu tomorrow but she is back in hospital again.  She will call us if she needs to reschedule

## 2017-11-22 ENCOUNTER — APPOINTMENT (OUTPATIENT)
Dept: MRI IMAGING | Facility: HOSPITAL | Age: 27
End: 2017-11-22

## 2017-11-22 PROCEDURE — 25010000002 METOCLOPRAMIDE PER 10 MG: Performed by: PSYCHIATRY & NEUROLOGY

## 2017-11-22 PROCEDURE — 25010000002 KETOROLAC TROMETHAMINE PER 15 MG: Performed by: PSYCHIATRY & NEUROLOGY

## 2017-11-22 PROCEDURE — 25010000002 DIPHENHYDRAMINE PER 50 MG: Performed by: PSYCHIATRY & NEUROLOGY

## 2017-11-22 PROCEDURE — 72141 MRI NECK SPINE W/O DYE: CPT

## 2017-11-22 PROCEDURE — 99213 OFFICE O/P EST LOW 20 MIN: CPT | Performed by: PSYCHIATRY & NEUROLOGY

## 2017-11-22 PROCEDURE — G0378 HOSPITAL OBSERVATION PER HR: HCPCS

## 2017-11-22 PROCEDURE — 25010000002 ENOXAPARIN PER 10 MG: Performed by: PSYCHIATRY & NEUROLOGY

## 2017-11-22 PROCEDURE — 25010000002 DIHYDROERGOTAMINE MESYLATE PER 1 MG: Performed by: PSYCHIATRY & NEUROLOGY

## 2017-11-22 PROCEDURE — 25810000003 SODIUM CHLORIDE 0.9 % WITH KCL 20 MEQ 20-0.9 MEQ/L-% SOLUTION: Performed by: PSYCHIATRY & NEUROLOGY

## 2017-11-22 RX ORDER — DIVALPROEX SODIUM 250 MG/1
250 TABLET, DELAYED RELEASE ORAL EVERY 8 HOURS SCHEDULED
Status: DISCONTINUED | OUTPATIENT
Start: 2017-11-22 | End: 2017-11-23 | Stop reason: HOSPADM

## 2017-11-22 RX ORDER — METOCLOPRAMIDE HYDROCHLORIDE 5 MG/ML
10 INJECTION INTRAMUSCULAR; INTRAVENOUS EVERY 8 HOURS
Status: COMPLETED | OUTPATIENT
Start: 2017-11-22 | End: 2017-11-23

## 2017-11-22 RX ORDER — DIVALPROEX SODIUM 500 MG/1
500 TABLET, DELAYED RELEASE ORAL ONCE
Status: COMPLETED | OUTPATIENT
Start: 2017-11-22 | End: 2017-11-22

## 2017-11-22 RX ORDER — DIHYDROERGOTAMINE MESYLATE 1 MG/ML
0.5 INJECTION, SOLUTION INTRAMUSCULAR; INTRAVENOUS; SUBCUTANEOUS EVERY 8 HOURS
Status: COMPLETED | OUTPATIENT
Start: 2017-11-22 | End: 2017-11-23

## 2017-11-22 RX ADMIN — POTASSIUM CHLORIDE AND SODIUM CHLORIDE 75 ML/HR: 900; 150 INJECTION, SOLUTION INTRAVENOUS at 06:22

## 2017-11-22 RX ADMIN — DIHYDROERGOTAMINE MESYLATE 0.5 MG: 1 INJECTION, SOLUTION INTRAMUSCULAR; INTRAVENOUS; SUBCUTANEOUS at 18:01

## 2017-11-22 RX ADMIN — BUSPIRONE HYDROCHLORIDE 7.5 MG: 5 TABLET ORAL at 09:57

## 2017-11-22 RX ADMIN — DIHYDROERGOTAMINE MESYLATE 0.75 MG: 1 INJECTION, SOLUTION INTRAMUSCULAR; INTRAVENOUS; SUBCUTANEOUS at 10:16

## 2017-11-22 RX ADMIN — DIHYDROERGOTAMINE MESYLATE 0.75 MG: 1 INJECTION, SOLUTION INTRAMUSCULAR; INTRAVENOUS; SUBCUTANEOUS at 00:24

## 2017-11-22 RX ADMIN — GABAPENTIN 200 MG: 100 CAPSULE ORAL at 06:22

## 2017-11-22 RX ADMIN — CYCLOBENZAPRINE HYDROCHLORIDE 10 MG: 10 TABLET, FILM COATED ORAL at 06:22

## 2017-11-22 RX ADMIN — DIVALPROEX SODIUM 500 MG: 250 TABLET, DELAYED RELEASE ORAL at 15:33

## 2017-11-22 RX ADMIN — GABAPENTIN 200 MG: 100 CAPSULE ORAL at 13:02

## 2017-11-22 RX ADMIN — KETOROLAC TROMETHAMINE 30 MG: 30 INJECTION, SOLUTION INTRAMUSCULAR at 12:17

## 2017-11-22 RX ADMIN — FAMOTIDINE 40 MG: 20 TABLET, FILM COATED ORAL at 09:57

## 2017-11-22 RX ADMIN — CHOLECALCIFEROL (VITAMIN D3) 25 MCG (1,000 UNIT) TABLET 1000 UNITS: TABLET at 09:57

## 2017-11-22 RX ADMIN — METOCLOPRAMIDE 10 MG: 5 INJECTION, SOLUTION INTRAMUSCULAR; INTRAVENOUS at 09:50

## 2017-11-22 RX ADMIN — DIVALPROEX SODIUM 250 MG: 500 TABLET, DELAYED RELEASE ORAL at 22:24

## 2017-11-22 RX ADMIN — ENOXAPARIN SODIUM 40 MG: 40 INJECTION SUBCUTANEOUS at 13:01

## 2017-11-22 RX ADMIN — BUSPIRONE HYDROCHLORIDE 7.5 MG: 5 TABLET ORAL at 22:23

## 2017-11-22 RX ADMIN — DIPHENHYDRAMINE HYDROCHLORIDE 50 MG: 50 INJECTION, SOLUTION INTRAMUSCULAR; INTRAVENOUS at 12:17

## 2017-11-22 RX ADMIN — KETOROLAC TROMETHAMINE 30 MG: 30 INJECTION, SOLUTION INTRAMUSCULAR at 22:24

## 2017-11-22 RX ADMIN — METOCLOPRAMIDE 10 MG: 5 INJECTION, SOLUTION INTRAMUSCULAR; INTRAVENOUS at 17:35

## 2017-11-22 RX ADMIN — CYCLOBENZAPRINE HYDROCHLORIDE 10 MG: 10 TABLET, FILM COATED ORAL at 17:34

## 2017-11-22 RX ADMIN — PRENATAL VIT W/ FE FUMARATE-FA TAB 27-0.8 MG 1 TABLET: 27-0.8 TAB at 09:57

## 2017-11-22 RX ADMIN — KETOROLAC TROMETHAMINE 30 MG: 30 INJECTION, SOLUTION INTRAMUSCULAR at 02:18

## 2017-11-22 RX ADMIN — METOCLOPRAMIDE 10 MG: 5 INJECTION, SOLUTION INTRAMUSCULAR; INTRAVENOUS at 00:23

## 2017-11-22 RX ADMIN — AMITRIPTYLINE HYDROCHLORIDE 25 MG: 25 TABLET, FILM COATED ORAL at 22:23

## 2017-11-22 RX ADMIN — DOCUSATE SODIUM -SENNOSIDES 2 TABLET: 50; 8.6 TABLET, COATED ORAL at 09:57

## 2017-11-22 RX ADMIN — DIPHENHYDRAMINE HYDROCHLORIDE 50 MG: 50 INJECTION, SOLUTION INTRAMUSCULAR; INTRAVENOUS at 02:18

## 2017-11-22 RX ADMIN — GABAPENTIN 200 MG: 100 CAPSULE ORAL at 22:27

## 2017-11-23 VITALS
DIASTOLIC BLOOD PRESSURE: 70 MMHG | OXYGEN SATURATION: 98 % | TEMPERATURE: 98.4 F | BODY MASS INDEX: 18.95 KG/M2 | HEART RATE: 74 BPM | RESPIRATION RATE: 16 BRPM | HEIGHT: 64 IN | SYSTOLIC BLOOD PRESSURE: 117 MMHG | WEIGHT: 111 LBS

## 2017-11-23 PROBLEM — G43.001 MIGRAINE WITHOUT AURA WITH STATUS MIGRAINOSUS: Status: ACTIVE | Noted: 2017-11-14

## 2017-11-23 PROBLEM — D64.9 ANEMIA: Status: ACTIVE | Noted: 2017-11-23

## 2017-11-23 PROBLEM — M54.2 NECK PAIN: Status: ACTIVE | Noted: 2017-11-23

## 2017-11-23 PROBLEM — G43.011 INTRACTABLE MIGRAINE WITHOUT AURA AND WITH STATUS MIGRAINOSUS: Status: ACTIVE | Noted: 2017-11-20

## 2017-11-23 PROBLEM — E04.1 THYROID CYST: Status: ACTIVE | Noted: 2017-11-23

## 2017-11-23 LAB
TSH SERPL DL<=0.05 MIU/L-ACNC: 1.1 MIU/ML (ref 0.47–4.68)
VALPROATE SERPL-MCNC: 52.6 MCG/ML (ref 50–100)

## 2017-11-23 PROCEDURE — 97140 MANUAL THERAPY 1/> REGIONS: CPT | Performed by: PHYSICAL THERAPIST

## 2017-11-23 PROCEDURE — 84443 ASSAY THYROID STIM HORMONE: CPT | Performed by: PSYCHIATRY & NEUROLOGY

## 2017-11-23 PROCEDURE — 80164 ASSAY DIPROPYLACETIC ACD TOT: CPT | Performed by: PSYCHIATRY & NEUROLOGY

## 2017-11-23 PROCEDURE — G8978 MOBILITY CURRENT STATUS: HCPCS | Performed by: PHYSICAL THERAPIST

## 2017-11-23 PROCEDURE — 25010000002 KETOROLAC TROMETHAMINE PER 15 MG: Performed by: PSYCHIATRY & NEUROLOGY

## 2017-11-23 PROCEDURE — G0378 HOSPITAL OBSERVATION PER HR: HCPCS

## 2017-11-23 PROCEDURE — 25010000002 METOCLOPRAMIDE PER 10 MG: Performed by: PSYCHIATRY & NEUROLOGY

## 2017-11-23 PROCEDURE — G8979 MOBILITY GOAL STATUS: HCPCS | Performed by: PHYSICAL THERAPIST

## 2017-11-23 PROCEDURE — 97161 PT EVAL LOW COMPLEX 20 MIN: CPT | Performed by: PHYSICAL THERAPIST

## 2017-11-23 PROCEDURE — 25810000003 SODIUM CHLORIDE 0.9 % WITH KCL 20 MEQ 20-0.9 MEQ/L-% SOLUTION: Performed by: PSYCHIATRY & NEUROLOGY

## 2017-11-23 PROCEDURE — 99217 PR OBSERVATION CARE DISCHARGE MANAGEMENT: CPT | Performed by: PSYCHIATRY & NEUROLOGY

## 2017-11-23 PROCEDURE — 25010000002 DIPHENHYDRAMINE PER 50 MG: Performed by: PSYCHIATRY & NEUROLOGY

## 2017-11-23 PROCEDURE — 25010000002 HYDROMORPHONE PER 4 MG: Performed by: PSYCHIATRY & NEUROLOGY

## 2017-11-23 PROCEDURE — 25010000002 DIHYDROERGOTAMINE MESYLATE PER 1 MG: Performed by: PSYCHIATRY & NEUROLOGY

## 2017-11-23 RX ORDER — CYCLOBENZAPRINE HCL 10 MG
TABLET ORAL
Qty: 9 TABLET | Refills: 0 | Status: SHIPPED | OUTPATIENT
Start: 2017-11-23 | End: 2018-01-10 | Stop reason: SDUPTHER

## 2017-11-23 RX ORDER — DIVALPROEX SODIUM 250 MG/1
750 TABLET, EXTENDED RELEASE ORAL DAILY
Qty: 30 TABLET | Refills: 2 | Status: ON HOLD | OUTPATIENT
Start: 2017-11-23 | End: 2018-02-02 | Stop reason: ALTCHOICE

## 2017-11-23 RX ORDER — GABAPENTIN 300 MG/1
300 CAPSULE ORAL EVERY 8 HOURS SCHEDULED
Qty: 90 CAPSULE | Refills: 1 | Status: SHIPPED | OUTPATIENT
Start: 2017-11-23 | End: 2017-12-20

## 2017-11-23 RX ORDER — SUMATRIPTAN 100 MG/1
TABLET, FILM COATED ORAL
Qty: 9 TABLET | Refills: 1 | Status: SHIPPED | OUTPATIENT
Start: 2017-11-23 | End: 2018-04-16 | Stop reason: SDUPTHER

## 2017-11-23 RX ORDER — CYCLOBENZAPRINE HCL 10 MG
10 TABLET ORAL EVERY 8 HOURS
Qty: 6 TABLET | Refills: 1 | Status: CANCELLED | OUTPATIENT
Start: 2017-11-23 | End: 2017-11-25

## 2017-11-23 RX ORDER — SUMATRIPTAN 100 MG/1
TABLET, FILM COATED ORAL
Qty: 9 TABLET | Refills: 1 | Status: CANCELLED | OUTPATIENT
Start: 2017-11-23

## 2017-11-23 RX ADMIN — POTASSIUM CHLORIDE AND SODIUM CHLORIDE 75 ML/HR: 900; 150 INJECTION, SOLUTION INTRAVENOUS at 03:53

## 2017-11-23 RX ADMIN — KETOROLAC TROMETHAMINE 30 MG: 30 INJECTION, SOLUTION INTRAMUSCULAR at 11:37

## 2017-11-23 RX ADMIN — HYDROMORPHONE HYDROCHLORIDE 1 MG: 1 INJECTION, SOLUTION INTRAMUSCULAR; INTRAVENOUS; SUBCUTANEOUS at 12:26

## 2017-11-23 RX ADMIN — DIVALPROEX SODIUM 250 MG: 500 TABLET, DELAYED RELEASE ORAL at 09:38

## 2017-11-23 RX ADMIN — METOCLOPRAMIDE 10 MG: 5 INJECTION, SOLUTION INTRAMUSCULAR; INTRAVENOUS at 03:22

## 2017-11-23 RX ADMIN — CHOLECALCIFEROL (VITAMIN D3) 25 MCG (1,000 UNIT) TABLET 1000 UNITS: TABLET at 09:38

## 2017-11-23 RX ADMIN — GABAPENTIN 200 MG: 100 CAPSULE ORAL at 07:08

## 2017-11-23 RX ADMIN — FAMOTIDINE 40 MG: 20 TABLET, FILM COATED ORAL at 09:38

## 2017-11-23 RX ADMIN — METOCLOPRAMIDE 10 MG: 5 INJECTION, SOLUTION INTRAMUSCULAR; INTRAVENOUS at 09:32

## 2017-11-23 RX ADMIN — PRENATAL VIT W/ FE FUMARATE-FA TAB 27-0.8 MG 1 TABLET: 27-0.8 TAB at 09:38

## 2017-11-23 RX ADMIN — DIHYDROERGOTAMINE MESYLATE 0.5 MG: 1 INJECTION, SOLUTION INTRAMUSCULAR; INTRAVENOUS; SUBCUTANEOUS at 03:45

## 2017-11-23 RX ADMIN — CYCLOBENZAPRINE HYDROCHLORIDE 10 MG: 10 TABLET, FILM COATED ORAL at 07:08

## 2017-11-23 RX ADMIN — BUSPIRONE HYDROCHLORIDE 7.5 MG: 5 TABLET ORAL at 09:38

## 2017-11-23 RX ADMIN — DIHYDROERGOTAMINE MESYLATE 0.5 MG: 1 INJECTION, SOLUTION INTRAMUSCULAR; INTRAVENOUS; SUBCUTANEOUS at 10:02

## 2017-11-23 RX ADMIN — DIPHENHYDRAMINE HYDROCHLORIDE 50 MG: 50 INJECTION, SOLUTION INTRAMUSCULAR; INTRAVENOUS at 00:41

## 2017-11-23 RX ADMIN — DIPHENHYDRAMINE HYDROCHLORIDE 50 MG: 50 INJECTION, SOLUTION INTRAMUSCULAR; INTRAVENOUS at 11:37

## 2017-11-27 ENCOUNTER — LAB (OUTPATIENT)
Dept: LAB | Facility: HOSPITAL | Age: 27
End: 2017-11-27

## 2017-11-27 ENCOUNTER — APPOINTMENT (OUTPATIENT)
Dept: LAB | Facility: HOSPITAL | Age: 27
End: 2017-11-27

## 2017-11-27 ENCOUNTER — CONSULT (OUTPATIENT)
Dept: ONCOLOGY | Facility: CLINIC | Age: 27
End: 2017-11-27

## 2017-11-27 VITALS
RESPIRATION RATE: 16 BRPM | TEMPERATURE: 97.8 F | SYSTOLIC BLOOD PRESSURE: 118 MMHG | BODY MASS INDEX: 18.98 KG/M2 | WEIGHT: 111.2 LBS | HEART RATE: 118 BPM | OXYGEN SATURATION: 98 % | HEIGHT: 64 IN | DIASTOLIC BLOOD PRESSURE: 70 MMHG

## 2017-11-27 DIAGNOSIS — D72.829 LEUKOCYTOSIS, UNSPECIFIED TYPE: ICD-10-CM

## 2017-11-27 DIAGNOSIS — G43.819 OTHER MIGRAINE WITHOUT STATUS MIGRAINOSUS, INTRACTABLE: ICD-10-CM

## 2017-11-27 DIAGNOSIS — D06.9 CARCINOMA IN SITU OF CERVIX, UNSPECIFIED LOCATION: Primary | ICD-10-CM

## 2017-11-27 DIAGNOSIS — D64.9 ANEMIA, UNSPECIFIED TYPE: Primary | ICD-10-CM

## 2017-11-27 DIAGNOSIS — D64.9 ANEMIA, UNSPECIFIED TYPE: ICD-10-CM

## 2017-11-27 DIAGNOSIS — D75.839 THROMBOCYTOSIS: ICD-10-CM

## 2017-11-27 PROBLEM — G43.909 MIGRAINE: Status: ACTIVE | Noted: 2017-11-27

## 2017-11-27 LAB
ALBUMIN SERPL-MCNC: 4.4 G/DL (ref 3.5–5)
ALBUMIN/GLOB SERPL: 1.4 G/DL (ref 1.1–2.5)
ALP SERPL-CCNC: 76 U/L (ref 24–120)
ALT SERPL W P-5'-P-CCNC: 24 U/L (ref 0–54)
ANION GAP SERPL CALCULATED.3IONS-SCNC: 12 MMOL/L (ref 4–13)
AST SERPL-CCNC: 19 U/L (ref 7–45)
AUTO MIXED CELLS #: 0.8 10*3/MM3 (ref 0.1–2.6)
AUTO MIXED CELLS %: 6.7 % (ref 0.1–24)
BILIRUB SERPL-MCNC: 0.2 MG/DL (ref 0.1–1)
BUN BLD-MCNC: 18 MG/DL (ref 5–21)
BUN/CREAT SERPL: 25.4
CALCIUM SPEC-SCNC: 9.6 MG/DL (ref 8.4–10.4)
CHLORIDE SERPL-SCNC: 97 MMOL/L (ref 98–110)
CO2 SERPL-SCNC: 32 MMOL/L (ref 24–31)
CREAT BLD-MCNC: 0.71 MG/DL (ref 0.5–1.4)
ERYTHROCYTE [DISTWIDTH] IN BLOOD BY AUTOMATED COUNT: 13.2 % (ref 12–15)
FERRITIN SERPL-MCNC: 75.9 NG/ML (ref 6.24–137)
GFR SERPL CREATININE-BSD FRML MDRD: 100 ML/MIN/1.73
GLOBULIN UR ELPH-MCNC: 3.2 GM/DL
GLUCOSE BLD-MCNC: 96 MG/DL (ref 70–100)
HCT VFR BLD AUTO: 37 % (ref 37–47)
HGB BLD-MCNC: 12.3 G/DL (ref 12–16)
IRON 24H UR-MRATE: 49 MCG/DL (ref 42–180)
IRON SATN MFR SERPL: 16 % (ref 20–45)
LYMPHOCYTES # BLD AUTO: 2.5 10*3/MM3 (ref 0.8–7)
LYMPHOCYTES NFR BLD AUTO: 21 % (ref 15–45)
MCH RBC QN AUTO: 31.2 PG (ref 28–32)
MCHC RBC AUTO-ENTMCNC: 33.2 G/DL (ref 33–36)
MCV RBC AUTO: 93.9 FL (ref 82–98)
NEUTROPHILS # BLD AUTO: 8.8 10*3/MM3 (ref 1.5–8.3)
NEUTROPHILS NFR BLD AUTO: 72.3 % (ref 39–78)
PLATELET # BLD AUTO: 365 10*3/MM3 (ref 130–400)
PMV BLD AUTO: 9.6 FL (ref 6–12)
POTASSIUM BLD-SCNC: 4.6 MMOL/L (ref 3.5–5.3)
PROT SERPL-MCNC: 7.6 G/DL (ref 6.3–8.7)
RBC # BLD AUTO: 3.94 10*6/MM3 (ref 4.2–5.4)
SODIUM BLD-SCNC: 141 MMOL/L (ref 135–145)
TIBC SERPL-MCNC: 315 MCG/DL (ref 225–420)
VALPROATE SERPL-MCNC: 70.5 MCG/ML (ref 50–100)
WBC NRBC COR # BLD: 12.1 10*3/MM3 (ref 4.8–10.8)

## 2017-11-27 PROCEDURE — 85025 COMPLETE CBC W/AUTO DIFF WBC: CPT | Performed by: INTERNAL MEDICINE

## 2017-11-27 PROCEDURE — 80164 ASSAY DIPROPYLACETIC ACD TOT: CPT | Performed by: INTERNAL MEDICINE

## 2017-11-27 PROCEDURE — 99205 OFFICE O/P NEW HI 60 MIN: CPT | Performed by: INTERNAL MEDICINE

## 2017-11-27 PROCEDURE — 83550 IRON BINDING TEST: CPT | Performed by: INTERNAL MEDICINE

## 2017-11-27 PROCEDURE — 82728 ASSAY OF FERRITIN: CPT | Performed by: INTERNAL MEDICINE

## 2017-11-27 PROCEDURE — 80053 COMPREHEN METABOLIC PANEL: CPT | Performed by: INTERNAL MEDICINE

## 2017-11-27 PROCEDURE — 36415 COLL VENOUS BLD VENIPUNCTURE: CPT

## 2017-11-27 PROCEDURE — 83540 ASSAY OF IRON: CPT | Performed by: INTERNAL MEDICINE

## 2017-11-27 RX ORDER — FERROUS SULFATE 325(65) MG
325 TABLET ORAL 2 TIMES DAILY
Qty: 60 TABLET | Refills: 2 | Status: SHIPPED | OUTPATIENT
Start: 2017-11-27 | End: 2018-02-25

## 2017-11-27 NOTE — PROGRESS NOTES
[facility]  HEMATOLOGY & ONCOLOGY        Subjective     VISIT DIAGNOSIS:   Encounter Diagnoses   Name Primary?   • Anemia, unspecified type Yes   • Thrombocytosis    • Leukocytosis, unspecified type        REASON FOR VISIT:     Chief Complaint   Patient presents with   • Anemia     consult        HEMATOLOGY / ONCOLOGY HISTORY:    No history exists.     [No treatment plan]  Cancer Staging Information:  No matching staging information was found for the patient.      INTERVAL HISTORY  Patient ID: Mirela Flores is a 27 y.o. year old female due to iron deficiency anemia.  Patient has known since 2013 that she is anemic. During pregnancy she was advised to take prenatal vitamin of low hemoglobin.  Recently admitted her migraine, iron panel was done which showed iron of 50, ferritin 127, percent saturation 20, TIBC 249, folate 7.9, B12 531, that is while being on supplement.  She has not had her period since June 2016 when she got pregnant.  She has adequate diet, she is not a big eater, but eats healthy with lots of protein.  She denies nausea or vomiting.  Denies diarrhea or constipation.  She does have occasional bright red blood per rectum.  Denies family history of leukemia or lymphoma.  She endorses tiredness, denies shortness of breath, denies chest pain, denies fever or chills, denies unintentional weight loss or lymphadenopathy.        Review of Systems   Constitutional: Positive for fatigue. Negative for activity change, appetite change, chills, fever and unexpected weight change.   HENT: Negative.    Eyes: Negative.    Respiratory: Negative.    Cardiovascular: Negative.    Gastrointestinal: Negative.    Endocrine: Negative.    Genitourinary: Negative.    Musculoskeletal: Negative.    Skin: Negative.    Allergic/Immunologic: Negative.    Neurological: Negative.    Psychiatric/Behavioral: The patient is nervous/anxious.         She has a history of anxiety denies controlled on Zoloft.             Medications:    Current Outpatient Prescriptions   Medication Sig Dispense Refill   • amitriptyline (ELAVIL) 25 MG tablet Take 1 tablet by mouth Every Night. 30 tablet 1   • busPIRone (BUSPAR) 15 MG tablet Take 7.5 mg by mouth 2 (Two) Times a Day As Needed.     • Cholecalciferol 1000 units capsule Take 1,000 Units by mouth Daily. 90 capsule 0   • cyclobenzaprine (FLEXERIL) 10 MG tablet 1 tablet 3 times a day for up to 2 days.  Then 1 tablet 3 times a day when necessary for neck pain 9 tablet 0   • divalproex (DEPAKOTE ER) 250 MG 24 hr tablet Take 3 tablets by mouth Daily. 30 tablet 2   • gabapentin (NEURONTIN) 300 MG capsule Take 1 capsule by mouth Every 8 (Eight) Hours. 90 capsule 1   • lisdexamfetamine (VYVANSE) 40 MG capsule Take 40 mg by mouth Every Morning.     • Prenatal Vit-Fe Fumarate-FA (PRENATAL, CLASSIC, VITAMIN) 28-0.8 MG tablet tablet Take 1 tablet by mouth Daily.     • sertraline (ZOLOFT) 50 MG tablet Take 1 tablet by mouth Daily. 30 tablet 2   • SUMAtriptan (IMITREX) 100 MG tablet 1 tab at onset of headache.  May repeat after 2 hours to a maximum of 2 tablets in 24 hours 9 tablet 1   • ferrous sulfate 325 (65 FE) MG tablet Take 1 tablet by mouth 2 (Two) Times a Day for 90 days. 60 tablet 2     No current facility-administered medications for this visit.        ALLERGIES:    Allergies   Allergen Reactions   • Cephalosporins      Pt states was told can never take them again.    • Penicillins      Reaction as a child.  Patient was told can never take again.    • Topamax [Topiramate] Angioedema   • Adhesive Tape Rash     TEGADERM AND STERI STRIPS   • Tape Rash     TEGADERM AND STERI STRIPS       Objective      Vitals:    11/27/17 0908   BP: 118/70   Pulse: 118   Resp: 16   Temp: 97.8 °F (36.6 °C)   SpO2: 98%       Current Status 11/27/2017   ECOG score 0       General Appearance:Pale and Patient is awake, alert, oriented and in no acute distress. Patient is welldeveloped, wellnourished, and  appears stated age.  HEENT: Normocephalic. Sclerae clear, conjunctiva pale, extraocular movements intact, pupils, round, reactive to light and  accommodation. Mouth and throat are clear with moist oral mucosa.  NECK: Supple, no jugular venous distention, thyroid not enlarged.  LYMPH: No cervical, supraclavicular, axillary, or inguinal lymphadenopathy.  CHEST: Equal bilateral expansion, AP  diameter normal, resonant percussion note  LUNGS: Good air movement, no rales, rhonchi, rubs or wheezes with auscultation  CARDIO: Regular sinus rhythm, no murmurs, gallops or rubs.  ABDOMEN: Nondistended, soft, No tenderness, no guarding, no rebound, No hepatosplenomegaly. No abdominal masses. Bowel sounds positive. No hernia  GENITALIA: Not examined.  BREASTS: Not examined.  MUSKEL: No joint swelling, decreased motion, or inflammation  EXTREMS: No edema, clubbing, cyanosis, No varicose veins.  NEURO: Grossly nonfocal, Gait is coordinated and smooth, Cognition is preserved.  SKIN: No rashes, no ecchymoses, no petechia.  PSYCH: Oriented to time, place and person. Memory is preserved. Mood and affect appear normal      RECENT LABS:  Orders Only on 11/27/2017   Component Date Value Ref Range Status   • Glucose 11/27/2017 96  70 - 100 mg/dL Final   • BUN 11/27/2017 18  5 - 21 mg/dL Final   • Creatinine 11/27/2017 0.71  0.50 - 1.40 mg/dL Final   • Sodium 11/27/2017 141  135 - 145 mmol/L Final   • Potassium 11/27/2017 4.6  3.5 - 5.3 mmol/L Final   • Chloride 11/27/2017 97* 98 - 110 mmol/L Final   • CO2 11/27/2017 32.0* 24.0 - 31.0 mmol/L Final   • Calcium 11/27/2017 9.6  8.4 - 10.4 mg/dL Final   • Total Protein 11/27/2017 7.6  6.3 - 8.7 g/dL Final   • Albumin 11/27/2017 4.40  3.50 - 5.00 g/dL Final   • ALT (SGPT) 11/27/2017 24  0 - 54 U/L Final   • AST (SGOT) 11/27/2017 19  7 - 45 U/L Final   • Alkaline Phosphatase 11/27/2017 76  24 - 120 U/L Final   • Total Bilirubin 11/27/2017 0.2  0.1 - 1.0 mg/dL Final   • eGFR Non African Amer  11/27/2017 100  >60 mL/min/1.73 Final   • Globulin 11/27/2017 3.2  gm/dL Final   • A/G Ratio 11/27/2017 1.4  1.1 - 2.5 g/dL Final   • BUN/Creatinine Ratio 11/27/2017 25.4*   Final   • Anion Gap 11/27/2017 12.0  4.0 - 13.0 mmol/L Final   • WBC 11/27/2017 12.10* 4.80 - 10.80 10*3/mm3 Final   • RBC 11/27/2017 3.94* 4.20 - 5.40 10*6/mm3 Final   • Hemoglobin 11/27/2017 12.3  12.0 - 16.0 g/dL Final   • Hematocrit 11/27/2017 37.0  37.0 - 47.0 % Final   • MCV 11/27/2017 93.9  82.0 - 98.0 fL Final   • MCH 11/27/2017 31.2  28.0 - 32.0 pg Final   • MCHC 11/27/2017 33.2  33.0 - 36.0 g/dL Final   • RDW 11/27/2017 13.2  12.0 - 15.0 % Final   • MPV 11/27/2017 9.6  6.0 - 12.0 fL Final   • Platelets 11/27/2017 365  130 - 400 10*3/mm3 Final   • Neutrophil % 11/27/2017 72.3  39.0 - 78.0 % Final   • Lymphocyte % 11/27/2017 21.0  15.0 - 45.0 % Final   • Auto Mixed Cells % 11/27/2017 6.7  0.1 - 24.0 % Final   • Neutrophils, Absolute 11/27/2017 8.80* 1.50 - 8.30 10*3/mm3 Final   • Lymphocytes, Absolute 11/27/2017 2.50  0.80 - 7.00 10*3/mm3 Final   • Auto Mixed Cells # 11/27/2017 0.80  0.10 - 2.60 10*3/mm3 Final   Admission on 11/20/2017, Discharged on 11/23/2017   Component Date Value Ref Range Status   • HCG, Urine, QL 11/20/2017 Negative  Negative Final   • Lot Number 11/20/2017 [id number]   Final   • Internal Positive Control 11/20/2017 Positive   Final   • Internal Negative Control 11/20/2017 Negative   Final   • WBC 11/21/2017 11.64* 4.80 - 10.80 10*3/mm3 Final   • RBC 11/21/2017 3.76* 4.20 - 5.40 10*6/mm3 Final   • Hemoglobin 11/21/2017 11.4* 12.0 - 16.0 g/dL Final   • Hematocrit 11/21/2017 35.3* 37.0 - 47.0 % Final   • MCV 11/21/2017 93.9  82.0 - 98.0 fL Final   • MCH 11/21/2017 30.3  28.0 - 32.0 pg Final   • MCHC 11/21/2017 32.3* 33.0 - 36.0 g/dL Final   • RDW 11/21/2017 13.7  12.0 - 15.0 % Final   • RDW-SD 11/21/2017 46.9  40.0 - 54.0 fl Final   • MPV 11/21/2017 10.6  6.0 - 12.0 fL Final   • Platelets 11/21/2017 432* 130 - 400  10*3/mm3 Final   • Valproic Acid 11/21/2017 38.7* 50.0 - 100.0 mcg/mL Final   • Sed Rate 11/21/2017 7  0 - 20 mm/hr Final   • Glucose 11/21/2017 119* 70 - 100 mg/dL Final   • BUN 11/21/2017 13  5 - 21 mg/dL Final   • Creatinine 11/21/2017 0.62  0.50 - 1.40 mg/dL Final   • Sodium 11/21/2017 142  135 - 145 mmol/L Final   • Potassium 11/21/2017 3.8  3.5 - 5.3 mmol/L Final   • Chloride 11/21/2017 104  98 - 110 mmol/L Final   • CO2 11/21/2017 27.0  24.0 - 31.0 mmol/L Final   • Calcium 11/21/2017 8.6  8.4 - 10.4 mg/dL Final   • Total Protein 11/21/2017 6.1* 6.3 - 8.7 g/dL Final   • Albumin 11/21/2017 3.50  3.50 - 5.00 g/dL Final   • ALT (SGPT) 11/21/2017 31  0 - 54 U/L Final   • AST (SGOT) 11/21/2017 11  7 - 45 U/L Final   • Alkaline Phosphatase 11/21/2017 65  24 - 120 U/L Final   • Total Bilirubin 11/21/2017 0.2  0.1 - 1.0 mg/dL Final   • eGFR Non  Amer 11/21/2017 116  >60 mL/min/1.73 Final   • Globulin 11/21/2017 2.6  gm/dL Final   • A/G Ratio 11/21/2017 1.3  1.1 - 2.5 g/dL Final   • BUN/Creatinine Ratio 11/21/2017 21.0  7.0 - 25.0 Final   • Anion Gap 11/21/2017 11.0  4.0 - 13.0 mmol/L Final   • Valproic Acid 11/23/2017 52.6  50.0 - 100.0 mcg/mL Final   • TSH 11/23/2017 1.100  0.470 - 4.680 mIU/mL Final       RADIOLOGY:  Ct Angiogram Head With & Without Contrast    Result Date: 11/21/2017  Narrative: EXAMINATION: CT ANGIOGRAM HEAD W WO CONTRAST-   11/21/2017 1:38 PM CST  HISTORY: pulsatile tinnitus/ headache; G43.819-Other migraine, intractable, without status migrainosus  In order to have a CT radiation dose as low as reasonably achievable Automated Exposure Control was utilized for adjustment of the mA and/or KV according to patient size.  DLP in mGycm= 384.  CT angiography protocol. CT imaging with bolus IV contrast injection. Under concurrent supervision axial, sagittal, coronal, and three-dimensional data sets were constructed.  Normal intracranial arterial and vascular opacification.  There is no sign of  aneurysm.  The dural venous sinuses are patent.  No sign of vasculitis.  Intact [employer] [place].  Summary: 1. No vascular abnormality is seen.            This report was finalized on 11/21/2017 14:21 by Dr. Huey Flores MD.    Ct Head Without Contrast    Result Date: 11/21/2017  Narrative: EXAMINATION: CT HEAD WO CONTRAST-   11/21/2017 7:15 AM CST  HISTORY: Migraine; G43.819-Other migraine, intractable, without status migrainosus  In order to have a CT radiation dose as low as reasonably achievable Automated Exposure Control was utilized for adjustment of the mA and/or KV according to patient size.  DLP in mGycm= 591.  Noncontrast head CT compared with 11/12/2017.  Axial, sagittal, and coronal noncontrast CT imaging of the head.  The visualized paranasal sinuses are clear.  The brain and ventricles have an age appropriate appearance. There is no hemorrhage or mass-effect. No acute infarction is seen.  No calvarial abnormality.      Impression: 1. No acute intracranial abnormality is seen.              This report was finalized on 11/21/2017 07:32 by Dr. Huey Flores MD.    Ct Head Without Contrast    Result Date: 11/12/2017  Narrative: History: 27-year-old evaluated for headache.  Reference: None.  Technique: Unenhanced CT imaging of the head/brain performed.  For this CT exam, one or more of the following dose reduction techniques was employed: -automated exposure control -mA and/or kVp adjustment for patient size -iterative reconstruction   mGy-cm  Findings: Acute infarction - negative Hemorrhage - negative Mass - negative  No acute infarction, hemorrhage, or extra-axial collection is identified. No mass or abnormal mass effect. The ventricles are normal in size and configuration. Developmental low lying cerebellar tonsils suggested, doubtful clinical relevance.  There is no acute osseous abnormality and the visualized paranasal sinuses and mastoid air cells are clear.       Impression: Impression: No  acute intracranial abnormality. This report was finalized on 11/12/2017 20:30 by  Chirag Gardner, .    Mri Brain With & Without Contrast    Result Date: 11/21/2017  Narrative: EXAMINATION: MRI BRAIN W WO CONTRAST-  11/21/2017 1:04 PM CST  HISTORY: Intractable headache, neck pain and pulsatile tinnitus; G43.819-Other migraine, intractable, without status migrainosus.  Pre and postcontrast MR imaging of the brain.  No parenchymal ischemia.  Normal gray-white differentiation.  Normal midline structures.  No atrophy. Normal ventricle size. Clear paranasal sinuses.  No FLAIR signal abnormality.  No brain hemorrhage or abnormal calcification.  There is no abnormal brain enhancement. No mass lesion and no sign of vasculitis.  The dural venous sinuses are patent.  Pituitary gland height of 1 cm is acceptable given the patient's age.  Summary: 1. No acute intracranial abnormality. This report was finalized on 11/21/2017 13:39 by Dr. Huey Flores MD.    Mri Cervical Spine Without Contrast    Result Date: 11/22/2017  Narrative: EXAMINATION: MRI CERVICAL SPINE WO CONTRAST-  11/22/2017 2:20 PM EST  HISTORY: Neck pain and muscle spasms and paresthesias of arm; G43.819-Other migraine, intractable, without status migrainosus.  Routine protocol MR imaging of the cervical spine.  Normal curvature and alignment. Normal prevertebral soft tissues.  Incidental note is made of a right thyroid lower pole 1.2 cm nodule or cyst. Follow-up thyroid ultrasound can be performed for further evaluation.  There is no disc herniation or central or foraminal stenosis.  No neural compression.  Summary: 1. No cervical spinal cord abnormality and no disc herniation. No stenosis. 2. Incidental 1.2 cm right thyroid lesion for which a cyst is favored. Follow-up ultrasound can be obtained. This report was finalized on 11/22/2017 14:02 by Dr. Huey Flores MD.           Assessment/Plan [age]-year-old female history significant for iron deficiency anemia  since 2013, migraine headaches, ADHD, and anxiety referred to me for consult of her anemia.    1. Anemia: Primary bone marrow issues vs nutritional vs GI blood loss. Iron panel on the low side. She has been on prenatal vitamin and also since the 22nd started taking ferous sulfate with improvement in her blood counts. Continue with ferrous sulfate. Rx called to [facility]. She also experiences BRBPR occasionally, has not has a period since June 2016. WIll refer to GI to r/o GI blood loss.    2. Migrane: On depokoete. Amytriptyline, buspar neurontin, sumitriptan    3. Leukocytosis: reactive vs primary bone marrow issues. Management per #1. Also check flow cytometry    4.ADHA: on Vyvanse    5. Anxiety: on zoloft       Time Spent: 60 minutes; greater than  50% of time was spent in patient counseling and care coordination.     Palmira Perales MD    11/27/2017    9:40 AM

## 2017-11-28 ENCOUNTER — APPOINTMENT (OUTPATIENT)
Dept: PHYSICAL THERAPY | Facility: HOSPITAL | Age: 27
End: 2017-11-28
Attending: PSYCHIATRY & NEUROLOGY

## 2017-11-29 ENCOUNTER — OFFICE VISIT (OUTPATIENT)
Dept: NEUROLOGY | Facility: CLINIC | Age: 27
End: 2017-11-29

## 2017-11-29 VITALS
DIASTOLIC BLOOD PRESSURE: 60 MMHG | SYSTOLIC BLOOD PRESSURE: 100 MMHG | HEART RATE: 78 BPM | HEIGHT: 64 IN | WEIGHT: 111 LBS | BODY MASS INDEX: 18.95 KG/M2

## 2017-11-29 DIAGNOSIS — G43.011 INTRACTABLE MIGRAINE WITHOUT AURA AND WITH STATUS MIGRAINOSUS: Primary | ICD-10-CM

## 2017-11-29 PROCEDURE — 99214 OFFICE O/P EST MOD 30 MIN: CPT | Performed by: PHYSICIAN ASSISTANT

## 2017-11-29 RX ORDER — PROCHLORPERAZINE MALEATE 10 MG
10 TABLET ORAL EVERY 6 HOURS PRN
Qty: 30 TABLET | Refills: 2 | Status: SHIPPED | OUTPATIENT
Start: 2017-11-29 | End: 2018-09-24 | Stop reason: SDUPTHER

## 2017-11-29 RX ORDER — INDOMETHACIN 25 MG/1
50 CAPSULE ORAL 3 TIMES DAILY PRN
Qty: 60 CAPSULE | Refills: 2 | Status: ON HOLD | OUTPATIENT
Start: 2017-11-29 | End: 2018-06-20

## 2017-11-29 RX ORDER — PREDNISONE 10 MG/1
TABLET ORAL
Qty: 45 TABLET | Refills: 0 | Status: ON HOLD | OUTPATIENT
Start: 2017-11-29 | End: 2018-02-02

## 2017-11-30 ENCOUNTER — TELEPHONE (OUTPATIENT)
Dept: FAMILY MEDICINE CLINIC | Age: 27
End: 2017-11-30

## 2017-11-30 ENCOUNTER — HOSPITAL ENCOUNTER (OUTPATIENT)
Dept: ULTRASOUND IMAGING | Facility: HOSPITAL | Age: 27
Discharge: HOME | End: 2017-11-30
Attending: PSYCHIATRY & NEUROLOGY | Admitting: PSYCHIATRY & NEUROLOGY

## 2017-11-30 ENCOUNTER — OFFICE VISIT (OUTPATIENT)
Dept: FAMILY MEDICINE CLINIC | Age: 27
End: 2017-11-30
Payer: COMMERCIAL

## 2017-11-30 VITALS — SYSTOLIC BLOOD PRESSURE: 98 MMHG | OXYGEN SATURATION: 98 % | DIASTOLIC BLOOD PRESSURE: 70 MMHG | HEART RATE: 92 BPM

## 2017-11-30 DIAGNOSIS — G89.29 CHRONIC NECK PAIN: ICD-10-CM

## 2017-11-30 DIAGNOSIS — M54.2 CHRONIC NECK PAIN: ICD-10-CM

## 2017-11-30 DIAGNOSIS — G43.111 INTRACTABLE MIGRAINE WITH AURA WITH STATUS MIGRAINOSUS: Primary | ICD-10-CM

## 2017-11-30 DIAGNOSIS — G43.819 OTHER MIGRAINE WITHOUT STATUS MIGRAINOSUS, INTRACTABLE: ICD-10-CM

## 2017-11-30 DIAGNOSIS — F41.1 GENERALIZED ANXIETY DISORDER: ICD-10-CM

## 2017-11-30 PROCEDURE — 76536 US EXAM OF HEAD AND NECK: CPT

## 2017-11-30 PROCEDURE — 99214 OFFICE O/P EST MOD 30 MIN: CPT | Performed by: CLINICAL NURSE SPECIALIST

## 2017-11-30 RX ORDER — CHOLECALCIFEROL (VITAMIN D3) 25 MCG
CAPSULE ORAL
Refills: 0 | COMMUNITY
Start: 2017-11-17

## 2017-11-30 RX ORDER — AMITRIPTYLINE HYDROCHLORIDE 25 MG/1
25 TABLET, FILM COATED ORAL
COMMUNITY
Start: 2017-11-15

## 2017-11-30 RX ORDER — CYCLOBENZAPRINE HCL 10 MG
10 TABLET ORAL 3 TIMES DAILY PRN
Qty: 30 TABLET | Refills: 1 | Status: SHIPPED | OUTPATIENT
Start: 2017-11-30 | End: 2017-12-10

## 2017-11-30 RX ORDER — SUMATRIPTAN 50 MG/1
TABLET, FILM COATED ORAL
Refills: 2 | COMMUNITY
Start: 2017-11-15 | End: 2017-11-30 | Stop reason: SDUPTHER

## 2017-11-30 RX ORDER — PROMETHAZINE HYDROCHLORIDE 25 MG/1
TABLET ORAL
Refills: 3 | COMMUNITY
Start: 2017-08-28

## 2017-11-30 RX ORDER — SERTRALINE HYDROCHLORIDE 100 MG/1
100 TABLET, FILM COATED ORAL DAILY
Qty: 30 TABLET | Refills: 5 | Status: SHIPPED | OUTPATIENT
Start: 2017-11-30 | End: 2018-01-31 | Stop reason: SDUPTHER

## 2017-11-30 RX ORDER — FERROUS SULFATE 325(65) MG
325 TABLET ORAL
COMMUNITY
Start: 2017-11-27 | End: 2018-02-25

## 2017-11-30 RX ORDER — INDOMETHACIN 25 MG/1
50 CAPSULE ORAL
COMMUNITY
Start: 2017-11-29

## 2017-11-30 RX ORDER — DIVALPROEX SODIUM 250 MG/1
750 TABLET, EXTENDED RELEASE ORAL
COMMUNITY
Start: 2017-11-23 | End: 2018-01-31 | Stop reason: ALTCHOICE

## 2017-11-30 RX ORDER — SUMATRIPTAN 100 MG/1
TABLET, FILM COATED ORAL
Refills: 1 | COMMUNITY
Start: 2017-11-23

## 2017-11-30 RX ORDER — PROCHLORPERAZINE MALEATE 10 MG
10 TABLET ORAL
COMMUNITY
Start: 2017-11-29

## 2017-11-30 ASSESSMENT — ENCOUNTER SYMPTOMS
CONSTIPATION: 0
SORE THROAT: 0
NAUSEA: 1
COLOR CHANGE: 0
DIARRHEA: 0
SHORTNESS OF BREATH: 0
EYE PAIN: 0
CHEST TIGHTNESS: 0
SINUS PRESSURE: 0
ABDOMINAL PAIN: 0
TROUBLE SWALLOWING: 0
EYE REDNESS: 0
EYE DISCHARGE: 0
WHEEZING: 0
COUGH: 0
BACK PAIN: 0

## 2017-11-30 NOTE — PROGRESS NOTES
Subjective   Mirela Flores is a 27 y.o. female is here today for follow-up.    HPI Comments: The patient is had a complex course involving migraine.  She had presented to the emergency department with a severe headache and had a lumbar puncture and treatment for her headache.  Her headache subsequently became refractory and she was admitted to the hospital migraine as well as post LP headache features.  Patient is improved over this acute phase but continues to have daily headaches and has been relegated to very limited activity.  She has been using daily doses of sumatriptan.  She continues to have a mild postural component to her headache.  She has not had any focal neurologic deficits develop.    Headache    This is a recurrent problem. The current episode started more than 1 month ago. The problem occurs daily. The problem has been waxing and waning. The pain is located in the right unilateral, frontal, retro-orbital and occipital region. The pain radiates to the right neck. The pain quality is similar to prior headaches. The quality of the pain is described as aching, pulsating, throbbing and shooting. The pain is severe. Associated symptoms include dizziness, nausea, neck pain, photophobia and vomiting. Pertinent negatives include no fever or weakness. The symptoms are aggravated by activity, bright light, fatigue, emotional stress, noise and Valsalva. She has tried acetaminophen, antidepressants, Excedrin, ergotamines, darkened room, NSAIDs, triptans, oral narcotics and injectable narcotics for the symptoms. The treatment provided moderate relief. Her past medical history is significant for migraine headaches.       The following portions of the patient's history were reviewed and updated as appropriate: allergies, current medications, past family history, past medical history, past social history, past surgical history and problem list.    Review of Systems   Constitutional: Positive for fatigue. Negative  for fever.   Eyes: Positive for photophobia and visual disturbance.   Respiratory: Negative.    Cardiovascular: Negative.    Gastrointestinal: Positive for nausea and vomiting.   Endocrine: Negative.    Genitourinary: Negative.    Musculoskeletal: Positive for myalgias, neck pain and neck stiffness.   Skin: Negative.    Allergic/Immunologic: Negative.    Neurological: Positive for dizziness and headaches. Negative for speech difficulty and weakness.   Hematological: Negative.    Psychiatric/Behavioral: The patient is nervous/anxious.        Objective   Physical Exam   Constitutional: She is oriented to person, place, and time. Vital signs are normal. She appears well-developed and well-nourished. No distress.   HENT:   Head: Normocephalic and atraumatic.   Right Ear: Hearing and external ear normal.   Left Ear: Hearing and external ear normal.   Nose: Nose normal.   Mouth/Throat: Uvula is midline, oropharynx is clear and moist and mucous membranes are normal.   Eyes: Conjunctivae, EOM and lids are normal. Pupils are equal, round, and reactive to light. No scleral icterus.   Neck: Phonation normal. Muscular tenderness present. No spinous process tenderness present. Carotid bruit is not present. Decreased range of motion present. No thyroid mass and no thyromegaly present.   Cardiovascular: Normal rate, regular rhythm, S1 normal, S2 normal and normal heart sounds.    No murmur heard.  Pulmonary/Chest: Effort normal and breath sounds normal. No stridor. No respiratory distress. She has no wheezes. She has no rales.   Musculoskeletal: She exhibits no edema or tenderness.        Lumbar back: Normal.   Lymphadenopathy:     She has no cervical adenopathy.   Neurological: She is alert and oriented to person, place, and time. She has normal strength. She displays no atrophy and no tremor. No cranial nerve deficit or sensory deficit. She exhibits normal muscle tone. Coordination and gait normal. GCS eye subscore is 4. GCS  verbal subscore is 5. GCS motor subscore is 6.   Reflex Scores:       Tricep reflexes are 2+ on the right side and 2+ on the left side.       Bicep reflexes are 2+ on the right side and 2+ on the left side.       Brachioradialis reflexes are 2+ on the right side and 2+ on the left side.       Patellar reflexes are 2+ on the right side and 2+ on the left side.       Achilles reflexes are 2+ on the right side and 2+ on the left side.  Skin: Skin is warm and dry. No ecchymosis, no lesion and no rash noted. No cyanosis. Nails show no clubbing.   Psychiatric: She has a normal mood and affect. Her speech is normal and behavior is normal. Judgment and thought content normal. She is not actively hallucinating. Cognition and memory are normal. She is attentive.   Nursing note and vitals reviewed.        Assessment/Plan   Mirela was seen today for headache and migraine.    Diagnoses and all orders for this visit:    Intractable migraine without aura and with status migrainosus  -     predniSONE (DELTASONE) 10 MG tablet; 50 mg for 3 days, 40 mg for 3 days, 30 mg for 3 days, 20 mg for 3 days, 10 mg for 3 days, then D/C  -     prochlorperazine (COMPAZINE) 10 MG tablet; Take 1 tablet by mouth Every 6 (Six) Hours As Needed for Nausea or Vomiting.  -     indomethacin (INDOCIN) 25 MG capsule; Take 2 capsules by mouth 3 (Three) Times a Day As Needed for Moderate Pain .    Medication recommendations as above.  She will continue on her other medications unchanged.    20 minutes of a 25 minute outpatient visit was spent in counseling and coordination of care, she will follow-up with us in 1 week.        EMR Dragon transcription disclaimer:  Much of this encounter note is an electronic transcription/translation of spoken language to printed text.  The electronic translation of spoken language may permit erroneous, or at times, nonsensical words or phrases to be inadvertently transcribed.  The author has reviewed the note for such errors,  however some may still exist.

## 2017-12-01 ENCOUNTER — TELEPHONE (OUTPATIENT)
Dept: FAMILY MEDICINE CLINIC | Age: 27
End: 2017-12-01

## 2017-12-01 ENCOUNTER — HOSPITAL ENCOUNTER (OUTPATIENT)
Dept: PHYSICAL THERAPY | Facility: HOSPITAL | Age: 27
Setting detail: THERAPIES SERIES
Discharge: HOME | End: 2017-12-01

## 2017-12-01 ENCOUNTER — TELEPHONE (OUTPATIENT)
Dept: NEUROLOGY | Facility: CLINIC | Age: 27
End: 2017-12-01

## 2017-12-01 DIAGNOSIS — G43.011 INTRACTABLE MIGRAINE WITHOUT AURA AND WITH STATUS MIGRAINOSUS: Primary | ICD-10-CM

## 2017-12-01 DIAGNOSIS — M54.2 NECK PAIN: ICD-10-CM

## 2017-12-01 DIAGNOSIS — E04.1 THYROID NODULE: Primary | ICD-10-CM

## 2017-12-01 PROCEDURE — 97161 PT EVAL LOW COMPLEX 20 MIN: CPT

## 2017-12-01 NOTE — TELEPHONE ENCOUNTER
When pt calls back for results-no need to see Dr. Steffen Singh per Dr. Shasta Robertson. He will not see them unless nodule is greater that 2cm.

## 2017-12-01 NOTE — TELEPHONE ENCOUNTER
Called pt to inform her of US results and to follow up with Dr. Dariana cotto. Pt stated it had been around 2 years since last visit with him. New Referral sent.

## 2017-12-01 NOTE — THERAPY EVALUATION
Outpatient Physical Therapy Ortho Initial Evaluation   [facility]     Patient Name: Mirela Flores  : 1990  MRN: [id number]  Today's Date: 2017      Visit Date: 2017    Patient Active Problem List   Diagnosis   • BRETT III (cervical intraepithelial neoplasia grade III) with severe dysplasia   • Migraine without aura with status migrainosus   • Intractable migraine without aura and with status migrainosus   • Neck pain   • Anemia   • Thyroid cyst   • Migraine        Past Medical History:   Diagnosis Date   • Anemia affecting pregnancy    • Asthma     as a child   • History of transfusion    • Migraines    • Mono exposure    • S/P thyroid biopsy         Past Surgical History:   Procedure Laterality Date   • CERVICAL CONIZATION Bilateral 7/3/2017    Procedure: CERVICAL CONIZATION, LOOP ELECTROCAUTERY EXCISION PROCEDURE;  Surgeon: Karli Guerrero MD;  Location: [facility];  Service:    • CHOLECYSTECTOMY     • COLONOSCOPY  2011    normal   • D&C WITH SUCTION     • OVARIAN CYST SURGERY Right    • SALPINGECTOMY Bilateral 2017    Procedure: laparoscopic salpingectomy;  Surgeon: Karli Guerrero MD;  Location: [facility];  Service:    • TYMPANOSTOMY TUBE PLACEMENT         Visit Dx:     ICD-10-CM ICD-9-CM   1. Intractable migraine without aura and with status migrainosus G43.011 346.13   2. Neck pain M54.2 723.1             Patient History       17 0800          History    Chief Complaint Headache  -RS      Date Current Problem(s) Began 17  -RS      Brief Description of Current Complaint Patient was admitted to the hospital x 2 for intractable migraine.  She has had history of migraines in the past but no migraine has been as bad as this recent flare.  Patient reports she is in day 26 in a row of having a headache.  Patient denies any falls or balance disturbances or any significant neural signs.  No changes in vision are reported.  No loss of consciousness.  The headaches start from the  base of the skull and move around to the forehead.  Patient is under the care and screening of multiple physicians at this time.    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 MRI;Blood Work  -RS      Results of Clinical Tests No significant findings present  -RS      Surgery/Hospitalization Hospitalized at [facility] due to migraine pain  -RS      Are you or can you be pregnant No  -RS      Pain     Pain Location Neck  -RS      Pain at Present 4  -RS      Pain at Best 4  -RS      Pain at Worst 10  -RS      Pain Frequency Constant/continuous  -RS      Pain Description Headache;Pressure;Radiating;Throbbing  -RS      Pain Comments 26 headache days in a row  -RS      Fall Risk Assessment    Any falls in the past year: No  -RS      Services    Prior Rehab/Home Health Experiences No  -RS      Do you plan to receive Home Health services in the near future No  -RS      Daily Activities    Primary Language English  -RS      Are you able to read Yes  -RS      Are you able to write Yes  -RS      Pt Participated in POC and Goals Yes  -RS      Safety    Are you being hurt, hit, or frightened by anyone at home or in your life? No  -RS      Are you being neglected by a caregiver No  -RS        User Key  (r) = Recorded By, (t) = Taken By, (c) = Cosigned By    Initials Name Provider Type    RS Eliceo Mello, PT DPT Physical Therapist                PT Ortho       12/01/17 0800    Posture/Observations    Alignment Options Forward head;Thoracic kyphosis;Rounded shoulders  -RS    Forward Head Moderate;Increased;Sitting posture  -RS    Thoracic Kyphosis Moderate;Increased;Sitting  posture   upper portions  -RS    Rounded Shoulders Bilateral:;Mild;Increased;Sitting posture  -RS    Posture/Observations Comments Upper cervical remains extended relative to the lower cervical; kyphosis more prominent in the upper portions  -RS    Quarter Clearing    Quarter Clearing Upper Quarter Clearing  -RS    DTR- Upper Quarter Clearing    Biceps (C5/6) Bilateral:;3- Slightly hyperactive response  -RS    Brachioradialis (C6) Bilateral:;3- Slightly hyperactive response  -RS    Sensory Screen for Light Touch- Upper Quarter Clearing    C4 (posterior shoulder) Bilateral:;Intact  -RS    C5 (lateral upper arm) Bilateral:;Intact  -RS    C6 (tip of thumb) Bilateral:;Intact  -RS    C7 (tip of 3rd finger) Bilateral:;Intact  -RS    C8 (tip of 5th finger) Bilateral:;Intact  -RS    T1 (medial lower arm) Bilateral:;Intact  -RS    Myotomal Screen- Upper Quarter Clearing    Shoulder flexion (C5) Bilateral:;WNL  -RS    Elbow flexion/wrist extension (C6) Bilateral:;WNL  -RS    Elbow extension/wrist flexion (C7) Bilateral:;WNL  -RS    Finger flexion/ (C8) Bilateral:;WNL  -RS    Finger abduction (T1) Bilateral:;WNL  -RS     Bilateral:;WNL  -RS    Cervical/Shoulder ROM Screen    Cervical flexion Normal  -RS    Cervical extension Normal  -RS    Cervical rotation Impaired  -RS    Special Tests/Palpation    Special Tests/Palpation Cervical/Thoracic  -RS    Cervical Palpation    Cervical Palpation- Location? Suboccipital;SCM;Upper traps;Levator scapula;Scalenes  -RS    Suboccipital Bilateral:;Tender;Guarded/taut;Trigger point  -RS    SCM Right:;Guarded/taut  -RS    Scalenes Right:;Guarded/taut  -RS    Levator Scapula Bilateral:;Tender  -RS    Upper Traps Bilateral:;Tender;Trigger point   headache pain reproduced  -RS    Cervical Accessory Motions    Cervical Accessory Motions Tested? Yes   C1 rests into increased left lateral deviation  -RS    OA Flexion Hypomobile   gume L > R  -RS    AA Rotation Hypomobile   left  -RS     Thoracic Accessory Motions    Thoracic Accessory Motions Tested? --   did not assess due to intensity of headache  -RS    Cervical/Thoracic Special Tests    Cervical Distraction (Foraminal Compression vs. Facet Pain) Bilateral:;Negative  -RS    Vertebral Artery Test (VBI Sign) Negative  -RS    ROM (Range of Motion)    General ROM head/neck/trunk range of motion deficits identified  -RS    General Head/Neck/Trunk Assessment    ROM neck ROM deficit  -RS    Neck    Left Rotation AROM other (see comments)   51  -RS    Right Rotation AROM other (see comments)   52 deg  -RS    MMT (Manual Muscle Testing)    General MMT Assessment Detail Unable to grade scapular muscles due to pain with positioning.  Deep neck flexors tested <3/5 with SCM dominance  -RS    Pathomechanics    Spine Pathomechanics Limited upper thoracic motion with cervical ROM;Limited opposite AA rotation with cervical sidebending;Cervical protrusion/OA hyperextension with cervical extension  -RS    Pathomechanics Comments Bilateral scapular resting downward rotation as preferred alignment  -RS      User Key  (r) = Recorded By, (t) = Taken By, (c) = Cosigned By    Initials Name Provider Type    RS Eliceo Mello, PT DPT Physical Therapist                            Therapy Education       12/01/17 0800          Therapy Education    Education Details sitting cervical unweighing multiple times per day  -RS      Given HEP;Symptoms/condition management  -RS      Program New  -RS      How Provided Verbal;Demonstration  -RS      Provided to Patient  -RS      Level of Understanding Demonstrated;Verbalized  -RS        User Key  (r) = Recorded By, (t) = Taken By, (c) = Cosigned By    Initials Name Provider Type    RS Eliceo Mello, PT DPT Physical Therapist                PT OP Goals       12/01/17 0800       PT Short Term Goals    STG Date to Achieve 12/22/17  -RS     STG 1 Patient will report headache symptoms <4/10 for 50% of the day  -RS     STG 1  Progress New  -RS     STG 2 Patient will demonstrate a more neutral posture avoiding the resting upper cervical extension  -RS     STG 2 Progress New  -RS     Long Term Goals    LTG Date to Achieve 01/12/18  -RS     LTG 1 Patient will be independent with a comprehensive HEP by discharge  -RS     LTG 1 Progress New  -RS     LTG 2 Patient will improve deep neck flexor strength to at least 4/5 on MMT by discharge  -RS     LTG 2 Progress New  -RS     LTG 3 Patient will demonstrate cervical AROM to at least 60 degrees into rotation (left/right) without compensatory neck extension/sidebending by discharge  -RS     LTG 3 Progress New  -RS     LTG 4 Patient will report headache symptoms <3/10  for >50% of the day by discharge  -RS     LTG 4 Progress New  -RS     LTG 5 Patient will demonstrate symmetrical and neutral scapular alignment to alleviate excessive strain on the neck with daily activities  -RS     LTG 5 Progress New  -RS     Time Calculation    PT Goal Re-Cert Due Date 12/31/17  -RS       User Key  (r) = Recorded By, (t) = Taken By, (c) = Cosigned By    Initials Name Provider Type    RS Eliceo Mello, PT DPT Physical Therapist                PT Assessment/Plan       12/01/17 0800       PT Assessment    Functional Limitations Performance in self-care ADL;Limitation in home management;Performance in leisure activities  -RS     Impairments Joint mobility;Muscle strength;Pain;Posture;Range of motion  -RS     Assessment Comments Patient presents today with neck pain with headaches with medical diagnosis of intractable migraine.  The patient reports a 26 day headache at this time that is essentially unrelenting.  Patient denies any loss of balance or gait disturbances at this time.  No red flag symptoms are present during the examination or with subjective history.  I was able to reproduce some of the headache symptoms with upper cervical examination and soft tissue assessment of the upper trapezius, sub occiput,  etc.  The patient prefers to rest the upper cervical in left rotation and sidebending which alters loading patterns.  Postural strain is present resulting in pronounced capital extension.  I was unable to produce any symptom increase with VBI screening which is a positive finding.  Patient's imaging did not suggest any abnormalities at this time.  There was a noted inferior extension of the cerebellum on the MRI but no formal diagnosis of a Chiari malformation.  Close communication with the MD will be established regarding this finding to see if this is just incidental.  Patient is motivated at this time.  Thank you for allowing us to work with this patient.    -RS     Please refer to paper survey for additional self-reported information Yes  -RS     Rehab Potential Good  -RS     Patient/caregiver participated in establishment of treatment plan and goals Yes  -RS     Patient would benefit from skilled therapy intervention Yes  -RS     PT Plan    PT Frequency 2x/week  -RS     Predicted Duration of Therapy Intervention (days/wks) 6 weeks  -RS     Planned CPT's? PT EVAL LOW COMPLEXITY: 29968;PT THER PROC EA 15 MIN: 31911;PT MANUAL THERAPY EA 15 MIN: 49283;PT NEUROMUSC RE-EDUCATION EA 15 MIN: 72964;PT HOT OR COLD PACK TREAT MCARE;PT ELECTRICAL STIM UNATTEND: ;PT ELECTRICAL STIM ATTD EA 15 MIN: 48992  -RS     Physical Therapy Interventions (Optional Details) home exercise program;joint mobilization;manual therapy techniques;modalities;neuromuscular re-education;patient/family education;postural re-education;ROM (Range of Motion);strengthening;stretching;swiss ball techniques;taping  -RS     PT Plan Comments We will work to address the soft tissue restrictions and postural faults contributing to upper cervical extension.  We will work to improve upper cervical mobility, instrinsic neck muscle strengthening, and address scapular/thoracic strength deficits  Movement pattern integration focusing on precise motion will  be another focus.  The patient will be progressed on a comprehensive HEP over the duration of the plan of care.  -RS       User Key  (r) = Recorded By, (t) = Taken By, (c) = Cosigned By    Initials Name Provider Type    RS Eliceo Mello, PT DPT Physical Therapist                                    Time Calculation:   Start Time: 0800  Stop Time: 0845  Time Calculation (min): 45 min     Therapy Charges for Today     Code Description Service Date Service Provider Modifiers Qty    [id number] HC PT EVAL LOW COMPLEXITY 3 12/1/2017 Eliceo Mello, PT DPT GP 1                    BERNY Mello, PT DPT  12/1/2017

## 2017-12-01 NOTE — TELEPHONE ENCOUNTER
Dr. Samantha Ibarra  Per Dr. Juana Woody office, he will not see patients with thyroid nodule smaller than 2cm even tho patient has seen him in the past.  Silvia Arreola states Dr. Madalyn Dodge recommends repeat ultrasound and TSH in a year.   Thank you

## 2017-12-01 NOTE — TELEPHONE ENCOUNTER
Mirela called to let David Michele know that she needs a new letter to say that she can't attend any hearings or proceedings. All they did was shorten the hearings to 2 hours and that is not helping her.

## 2017-12-04 ENCOUNTER — TELEPHONE (OUTPATIENT)
Dept: NEUROLOGY | Facility: CLINIC | Age: 27
End: 2017-12-04

## 2017-12-04 NOTE — TELEPHONE ENCOUNTER
----- Message from Louise Drummond MD sent at 12/2/2017 10:30 AM CST -----  Please let her know her valproate level is good and see if her headaches are any better now.  Also let her know her iron saturation is low and she should keep her hematology appointment.

## 2017-12-04 NOTE — TELEPHONE ENCOUNTER
I did speak with Mirela and she did voice understandings. She did state that her headaches are better and she is following up with Hematology.

## 2017-12-04 NOTE — TELEPHONE ENCOUNTER
I did speak with Mirela and she did tell me exactly what she needs in her letter. I did tell her I will have it faxed over this afternoon and she did voice understanding.

## 2017-12-06 ENCOUNTER — OFFICE VISIT (OUTPATIENT)
Dept: NEUROLOGY | Facility: CLINIC | Age: 27
End: 2017-12-06

## 2017-12-06 ENCOUNTER — OFFICE VISIT (OUTPATIENT)
Dept: OTOLARYNGOLOGY | Facility: CLINIC | Age: 27
End: 2017-12-06

## 2017-12-06 ENCOUNTER — APPOINTMENT (OUTPATIENT)
Dept: PHYSICAL THERAPY | Facility: HOSPITAL | Age: 27
End: 2017-12-06

## 2017-12-06 VITALS
WEIGHT: 111 LBS | SYSTOLIC BLOOD PRESSURE: 118 MMHG | HEART RATE: 105 BPM | BODY MASS INDEX: 25.69 KG/M2 | HEIGHT: 55 IN | RESPIRATION RATE: 16 BRPM | DIASTOLIC BLOOD PRESSURE: 76 MMHG | OXYGEN SATURATION: 99 %

## 2017-12-06 VITALS
HEIGHT: 64 IN | DIASTOLIC BLOOD PRESSURE: 80 MMHG | TEMPERATURE: 97.2 F | BODY MASS INDEX: 19.29 KG/M2 | SYSTOLIC BLOOD PRESSURE: 118 MMHG | WEIGHT: 113 LBS

## 2017-12-06 DIAGNOSIS — E04.1 THYROID NODULE: Primary | ICD-10-CM

## 2017-12-06 DIAGNOSIS — G43.011 INTRACTABLE MIGRAINE WITHOUT AURA AND WITH STATUS MIGRAINOSUS: Primary | ICD-10-CM

## 2017-12-06 PROCEDURE — 99213 OFFICE O/P EST LOW 20 MIN: CPT | Performed by: PHYSICIAN ASSISTANT

## 2017-12-06 PROCEDURE — 99214 OFFICE O/P EST MOD 30 MIN: CPT | Performed by: PHYSICIAN ASSISTANT

## 2017-12-06 RX ORDER — AMITRIPTYLINE HYDROCHLORIDE 25 MG/1
50 TABLET, FILM COATED ORAL NIGHTLY
Qty: 60 TABLET | Refills: 2 | Status: SHIPPED | OUTPATIENT
Start: 2017-12-06 | End: 2019-03-22

## 2017-12-06 RX ORDER — SERTRALINE HYDROCHLORIDE 100 MG/1
TABLET, FILM COATED ORAL
Refills: 5 | Status: ON HOLD | COMMUNITY
Start: 2017-11-30 | End: 2018-06-20

## 2017-12-06 NOTE — PROGRESS NOTES
Subjective   Mirela Flores is a 27 y.o. female is here today for follow-up.    HPI Comments: The patient returns with some improvement.  Continues to have daily headaches which are interfering with normal activities.  Today she is having headache accompanied by photophobia and nausea.    Headache    This is a recurrent problem. The current episode started more than 1 month ago. The problem occurs daily. The problem has been waxing and waning. The pain is located in the right unilateral, frontal, retro-orbital and occipital region. The pain radiates to the right neck. The pain quality is similar to prior headaches. The quality of the pain is described as aching, pulsating, throbbing and shooting. The pain is severe. Associated symptoms include dizziness, nausea, neck pain, photophobia and vomiting. Pertinent negatives include no fever or weakness. The symptoms are aggravated by activity, bright light, fatigue, emotional stress, noise and Valsalva. She has tried acetaminophen, antidepressants, Excedrin, ergotamines, darkened room, NSAIDs, triptans, oral narcotics and injectable narcotics for the symptoms. The treatment provided moderate relief. Her past medical history is significant for migraine headaches.   Nausea   Associated symptoms include fatigue, headaches, myalgias, nausea, neck pain and vomiting. Pertinent negatives include no fever or weakness.       The following portions of the patient's history were reviewed and updated as appropriate: allergies, current medications, past family history, past medical history, past social history, past surgical history and problem list.    Review of Systems   Constitutional: Positive for fatigue. Negative for fever.   Eyes: Positive for photophobia and visual disturbance.   Respiratory: Negative.    Cardiovascular: Negative.    Gastrointestinal: Positive for nausea and vomiting.   Endocrine: Negative.    Genitourinary: Negative.    Musculoskeletal: Positive for  myalgias, neck pain and neck stiffness.   Skin: Negative.    Allergic/Immunologic: Negative.    Neurological: Positive for dizziness and headaches. Negative for speech difficulty and weakness.   Hematological: Negative.    Psychiatric/Behavioral: The patient is nervous/anxious.        Objective   Physical Exam   Constitutional: She is oriented to person, place, and time. Vital signs are normal. She appears well-developed and well-nourished. No distress.   HENT:   Head: Normocephalic and atraumatic.   Right Ear: Hearing and external ear normal.   Left Ear: Hearing and external ear normal.   Nose: Nose normal.   Mouth/Throat: Uvula is midline, oropharynx is clear and moist and mucous membranes are normal.   Bilateral auricular temporal branch tenderness to touch left greater than right, bilateral occipital nerve tenderness to touch left greater than right.   Eyes: Conjunctivae, EOM and lids are normal. Pupils are equal, round, and reactive to light. No scleral icterus.   Neck: Phonation normal. Muscular tenderness present. No spinous process tenderness present. Carotid bruit is not present. Decreased range of motion present. No thyroid mass and no thyromegaly present.   Cardiovascular: Normal rate, regular rhythm, S1 normal, S2 normal and normal heart sounds.    No murmur heard.  Pulmonary/Chest: Effort normal and breath sounds normal. No stridor. No respiratory distress. She has no wheezes. She has no rales.   Musculoskeletal: She exhibits no edema or tenderness.        Lumbar back: Normal.   Lymphadenopathy:     She has no cervical adenopathy.   Neurological: She is alert and oriented to person, place, and time. She has normal strength. She displays no atrophy and no tremor. No cranial nerve deficit or sensory deficit. She exhibits normal muscle tone. Coordination and gait normal. GCS eye subscore is 4. GCS verbal subscore is 5. GCS motor subscore is 6.   Reflex Scores:       Tricep reflexes are 2+ on the right side  and 2+ on the left side.       Bicep reflexes are 2+ on the right side and 2+ on the left side.       Brachioradialis reflexes are 2+ on the right side and 2+ on the left side.       Patellar reflexes are 2+ on the right side and 2+ on the left side.       Achilles reflexes are 2+ on the right side and 2+ on the left side.  Skin: Skin is warm and dry. No ecchymosis, no lesion and no rash noted. No cyanosis. Nails show no clubbing.   Psychiatric: She has a normal mood and affect. Her speech is normal and behavior is normal. Judgment and thought content normal. She is not actively hallucinating. Cognition and memory are normal. She is attentive.   Nursing note and vitals reviewed.    Patient's BMI is within normal parameters. No follow-up required.    Assessment/Plan   Mirela was seen today for headache and nausea.    Diagnoses and all orders for this visit:    Intractable migraine without aura and with status migrainosus  -     amitriptyline (ELAVIL) 25 MG tablet; Take 2 tablets by mouth Every Night.  -     Ambulatory Referral to Neurology      We will increase the patient's amitriptyline to 50 mg per day.  She is a candidate for auricular temporal branch blocks and we will make arrangements for these.    10 minutes of 15 minute outpatient visit spent in counseling and coordination of care.  We will plan to see her back in one week and proceed with nerve blocks at that time.          EMR Dragon transcription disclaimer:  Much of this encounter note is an electronic transcription/translation of spoken language to printed text.  The electronic translation of spoken language may permit erroneous, or at times, nonsensical words or phrases to be inadvertently transcribed.  The author has reviewed the note for such errors, however some may still exist.

## 2017-12-07 LAB — REF LAB TEST METHOD: NORMAL

## 2017-12-11 ENCOUNTER — APPOINTMENT (OUTPATIENT)
Dept: PHYSICAL THERAPY | Facility: HOSPITAL | Age: 27
End: 2017-12-11

## 2017-12-13 ENCOUNTER — OFFICE VISIT (OUTPATIENT)
Dept: NEUROLOGY | Facility: CLINIC | Age: 27
End: 2017-12-13

## 2017-12-13 ENCOUNTER — APPOINTMENT (OUTPATIENT)
Dept: PHYSICAL THERAPY | Facility: HOSPITAL | Age: 27
End: 2017-12-13

## 2017-12-13 VITALS
SYSTOLIC BLOOD PRESSURE: 120 MMHG | WEIGHT: 113 LBS | DIASTOLIC BLOOD PRESSURE: 100 MMHG | OXYGEN SATURATION: 99 % | BODY MASS INDEX: 19.29 KG/M2 | RESPIRATION RATE: 16 BRPM | HEIGHT: 64 IN | HEART RATE: 108 BPM

## 2017-12-13 DIAGNOSIS — G43.011 INTRACTABLE MIGRAINE WITHOUT AURA AND WITH STATUS MIGRAINOSUS: Primary | ICD-10-CM

## 2017-12-13 DIAGNOSIS — M54.81 BILATERAL OCCIPITAL NEURALGIA: ICD-10-CM

## 2017-12-13 PROCEDURE — 64405 NJX AA&/STRD GR OCPL NRV: CPT | Performed by: PHYSICIAN ASSISTANT

## 2017-12-13 PROCEDURE — 64400 NJX AA&/STRD TRIGEMINAL NRV: CPT | Performed by: PHYSICIAN ASSISTANT

## 2017-12-13 PROCEDURE — 99213 OFFICE O/P EST LOW 20 MIN: CPT | Performed by: PHYSICIAN ASSISTANT

## 2017-12-13 RX ORDER — METHYLPREDNISOLONE ACETATE 40 MG/ML
12.31 INJECTION, SUSPENSION INTRA-ARTICULAR; INTRALESIONAL; INTRAMUSCULAR; SOFT TISSUE ONCE
Status: COMPLETED | OUTPATIENT
Start: 2017-12-13 | End: 2017-12-13

## 2017-12-13 RX ORDER — MEMANTINE HYDROCHLORIDE 10 MG/1
10 TABLET ORAL 2 TIMES DAILY
Qty: 60 TABLET | Refills: 3 | Status: ON HOLD | OUTPATIENT
Start: 2017-12-13 | End: 2018-06-20

## 2017-12-13 RX ADMIN — METHYLPREDNISOLONE ACETATE 12.31 MG: 40 INJECTION, SUSPENSION INTRA-ARTICULAR; INTRALESIONAL; INTRAMUSCULAR; SOFT TISSUE at 13:47

## 2017-12-13 RX ADMIN — METHYLPREDNISOLONE ACETATE 12.31 MG: 40 INJECTION, SUSPENSION INTRA-ARTICULAR; INTRALESIONAL; INTRAMUSCULAR; SOFT TISSUE at 13:48

## 2017-12-13 RX ADMIN — METHYLPREDNISOLONE ACETATE 12.31 MG: 40 INJECTION, SUSPENSION INTRA-ARTICULAR; INTRALESIONAL; INTRAMUSCULAR; SOFT TISSUE at 13:49

## 2017-12-13 RX ADMIN — METHYLPREDNISOLONE ACETATE 12.31 MG: 40 INJECTION, SUSPENSION INTRA-ARTICULAR; INTRALESIONAL; INTRAMUSCULAR; SOFT TISSUE at 13:53

## 2017-12-13 NOTE — PROGRESS NOTES
Procedure   Nerve Block  Date/Time: 12/13/2017 2:17 PM  Performed by: DARYL ESTRELLA  Authorized by: DARYL ESTRELLA   Consent: Verbal consent obtained.  Risks and benefits: risks, benefits and alternatives were discussed  Consent given by: patient  Patient understanding: patient states understanding of the procedure being performed  Patient consent: the patient's understanding of the procedure matches consent given  Procedure consent: procedure consent matches procedure scheduled  Relevant documents: relevant documents present and verified  Test results: test results available and properly labeled  Site marked: the operative site was not marked  Imaging studies: imaging studies not available  Required items: required blood products, implants, devices, and special equipment available  Patient identity confirmed: verbally with patient  Indications: pain relief  Body area: head  Nerve: trigeminal  Laterality: left    Sedation:  Patient sedated: no  Preparation: Patient was prepped and draped in the usual sterile fashion.  Patient position: sitting  Needle size: 27 G  Location technique: anatomical landmarks  Local Anesthetic: lidocaine 1% without epinephrine  Anesthetic total: 0.7 mL  Outcome: pain improved  Patient tolerance: Patient tolerated the procedure well with no immediate complications

## 2017-12-13 NOTE — PROGRESS NOTES
Procedure   Nerve Block  Date/Time: 12/13/2017 2:18 PM  Performed by: DARYL ESTRELLA  Authorized by: DARYL ESTRELLA   Consent: Verbal consent obtained.  Risks and benefits: risks, benefits and alternatives were discussed  Consent given by: patient  Patient understanding: patient states understanding of the procedure being performed  Patient consent: the patient's understanding of the procedure matches consent given  Procedure consent: procedure consent matches procedure scheduled  Relevant documents: relevant documents present and verified  Test results: test results available and properly labeled  Site marked: the operative site was not marked  Imaging studies: imaging studies not available  Required items: required blood products, implants, devices, and special equipment available  Patient identity confirmed: verbally with patient  Indications: pain relief  Body area: head  Nerve: trigeminal  Laterality: right    Sedation:  Patient sedated: no  Preparation: Patient was prepped and draped in the usual sterile fashion.  Patient position: sitting  Needle size: 27 G  Location technique: ultrasound guidance  Local Anesthetic: lidocaine 1% without epinephrine  Anesthetic total: 0.7 mL  Outcome: pain improved  Patient tolerance: Patient tolerated the procedure well with no immediate complications

## 2017-12-13 NOTE — PROGRESS NOTES
Procedure   Nerve Block  Date/Time: 12/13/2017 2:17 PM  Performed by: DARYL ESTRELLA  Authorized by: DARYL ESTRELLA   Consent: Verbal consent obtained.  Risks and benefits: risks, benefits and alternatives were discussed  Consent given by: patient  Patient understanding: patient states understanding of the procedure being performed  Patient consent: the patient's understanding of the procedure matches consent given  Procedure consent: procedure consent matches procedure scheduled  Relevant documents: relevant documents present and verified  Test results: test results available and properly labeled  Site marked: the operative site was not marked  Imaging studies: imaging studies not available  Required items: required blood products, implants, devices, and special equipment available  Patient identity confirmed: verbally with patient  Indications: pain relief  Body area: head  Nerve: greater occipital  Laterality: left    Sedation:  Patient sedated: no  Preparation: Patient was prepped and draped in the usual sterile fashion.  Patient position: sitting  Needle size: 27 G  Location technique: anatomical landmarks  Local Anesthetic: lidocaine 1% without epinephrine  Anesthetic total: 0.7 mL  Outcome: pain improved  Patient tolerance: Patient tolerated the procedure well with no immediate complications

## 2017-12-13 NOTE — PROGRESS NOTES
Procedure   Nerve Block  Date/Time: 12/13/2017 2:16 PM  Performed by: DARYL ESTRELLA  Authorized by: DARYL ESTRELLA   Consent: Verbal consent obtained.  Risks and benefits: risks, benefits and alternatives were discussed  Consent given by: patient  Patient understanding: patient states understanding of the procedure being performed  Patient consent: the patient's understanding of the procedure matches consent given  Procedure consent: procedure consent matches procedure scheduled  Relevant documents: relevant documents present and verified  Test results: test results available and properly labeled  Site marked: the operative site was not marked  Imaging studies: imaging studies not available  Required items: required blood products, implants, devices, and special equipment available  Patient identity confirmed: verbally with patient  Indications: pain relief  Body area: head  Nerve: greater occipital  Laterality: right    Sedation:  Patient sedated: no  Preparation: Patient was prepped and draped in the usual sterile fashion.  Patient position: sitting  Needle size: 27 G  Location technique: anatomical landmarks  Local Anesthetic: lidocaine 1% without epinephrine  Anesthetic total: 0.7 mL  Outcome: pain improved  Patient tolerance: Patient tolerated the procedure well with no immediate complications

## 2017-12-13 NOTE — PROGRESS NOTES
Subjective   Mirela Flores is a 27 y.o. female is here today for follow-up.    HPI Comments: The patient returns with some improvement.  Continues to have daily headaches which are interfering with normal activities.  Today she is having headache accompanied by photophobia and nausea.    She is requesting to proceed with nerve blocks as discussed with the previous visit.    Headache    This is a recurrent problem. The current episode started more than 1 month ago. The problem occurs daily. The problem has been waxing and waning. The pain is located in the right unilateral, frontal, retro-orbital and occipital region. The pain radiates to the right neck. The pain quality is similar to prior headaches. The quality of the pain is described as aching, pulsating, throbbing and shooting. The pain is severe. Associated symptoms include dizziness, nausea, neck pain, photophobia and vomiting. Pertinent negatives include no fever or weakness. The symptoms are aggravated by activity, bright light, fatigue, emotional stress, noise and Valsalva. She has tried acetaminophen, antidepressants, Excedrin, ergotamines, darkened room, NSAIDs, triptans, oral narcotics and injectable narcotics for the symptoms. The treatment provided moderate relief. Her past medical history is significant for migraine headaches.       The following portions of the patient's history were reviewed and updated as appropriate: allergies, current medications, past family history, past medical history, past social history, past surgical history and problem list.    Review of Systems   Constitutional: Positive for fatigue. Negative for fever.   Eyes: Positive for photophobia and visual disturbance.   Respiratory: Negative.    Cardiovascular: Negative.    Gastrointestinal: Positive for nausea and vomiting.   Endocrine: Negative.    Genitourinary: Negative.    Musculoskeletal: Positive for myalgias, neck pain and neck stiffness.   Skin: Negative.     Allergic/Immunologic: Negative.    Neurological: Positive for dizziness and headaches. Negative for speech difficulty and weakness.   Hematological: Negative.    Psychiatric/Behavioral: The patient is nervous/anxious.        Objective   Physical Exam   Constitutional: She is oriented to person, place, and time. Vital signs are normal. She appears well-developed and well-nourished. No distress.   HENT:   Head: Normocephalic and atraumatic.   Right Ear: Hearing and external ear normal.   Left Ear: Hearing and external ear normal.   Nose: Nose normal.   Mouth/Throat: Uvula is midline, oropharynx is clear and moist and mucous membranes are normal.   Bilateral auricular temporal branch tenderness to touch left greater than right, bilateral occipital nerve tenderness to touch left greater than right.   Eyes: Conjunctivae, EOM and lids are normal. Pupils are equal, round, and reactive to light. No scleral icterus.   Neck: Phonation normal. Muscular tenderness present. No spinous process tenderness present. Carotid bruit is not present. Decreased range of motion present. No thyroid mass and no thyromegaly present.   Cardiovascular: Normal rate, regular rhythm, S1 normal, S2 normal and normal heart sounds.    No murmur heard.  Pulmonary/Chest: Effort normal and breath sounds normal. No stridor. No respiratory distress. She has no wheezes. She has no rales.   Musculoskeletal: She exhibits no edema or tenderness.        Lumbar back: Normal.   Lymphadenopathy:     She has no cervical adenopathy.   Neurological: She is alert and oriented to person, place, and time. She has normal strength. She displays no atrophy and no tremor. No cranial nerve deficit or sensory deficit. She exhibits normal muscle tone. Coordination and gait normal. GCS eye subscore is 4. GCS verbal subscore is 5. GCS motor subscore is 6.   Reflex Scores:       Tricep reflexes are 2+ on the right side and 2+ on the left side.       Bicep reflexes are 2+ on  the right side and 2+ on the left side.       Brachioradialis reflexes are 2+ on the right side and 2+ on the left side.       Patellar reflexes are 2+ on the right side and 2+ on the left side.       Achilles reflexes are 2+ on the right side and 2+ on the left side.  Skin: Skin is warm and dry. No ecchymosis, no lesion and no rash noted. No cyanosis. Nails show no clubbing.   Psychiatric: She has a normal mood and affect. Her speech is normal and behavior is normal. Judgment and thought content normal. She is not actively hallucinating. Cognition and memory are normal. She is attentive.   Nursing note and vitals reviewed.        Assessment/Plan   Mirela was seen today for headache.    Diagnoses and all orders for this visit:    Intractable migraine without aura and with status migrainosus  -     memantine (NAMENDA) 10 MG tablet; Take 1 tablet by mouth 2 (Two) Times a Day.  -     methylPREDNISolone acetate (DEPO-medrol) injection 12.31 mg; 0.31 mL by Peripheral Nerve route 1 (One) Time.  -     methylPREDNISolone acetate (DEPO-medrol) injection 12.31 mg; 0.31 mL by Peripheral Nerve route 1 (One) Time.  -     Nerve Block  -     Nerve Block    Bilateral occipital neuralgia  -     methylPREDNISolone acetate (DEPO-medrol) injection 12.31 mg; 0.31 mL by Peripheral Nerve route 1 (One) Time.  -     methylPREDNISolone acetate (DEPO-medrol) injection 12.31 mg; 0.31 mL by Peripheral Nerve route 1 (One) Time.  -     Nerve Block  -     Nerve Block    Wean and discontinue Neurontin.  Continue Depakote unchanged.  Add memantine 10 mg twice per day.  No other changes or clarifications in her medication regimen today.    10 minutes of a 15 minute outpatient visit was spent in counseling and coordination of care, this time was above beyond that time required for bilateral greater occipital nerve blocks and bilateral auricular temporal branch blocks.    She will return in one week.  She is also a candidate for Botox injections for  prevention of migraine.  We will discuss this further at her one week follow-up.        Patient's BMI is within normal parameters. No follow-up required.    EMR Dragon transcription disclaimer:  Much of this encounter note is an electronic transcription/translation of spoken language to printed text.  The electronic translation of spoken language may permit erroneous, or at times, nonsensical words or phrases to be inadvertently transcribed.  The author has reviewed the note for such errors, however some may still exist.

## 2017-12-15 NOTE — PROGRESS NOTES
I have reviewed the notes, assessments, and/or procedures performed by CITLALLI Glez, I concur with her/his documentation of Mirela Flores.    Ugo Forrest MD

## 2017-12-19 PROCEDURE — 88177 CYTP FNA EVAL EA ADDL: CPT | Performed by: PHYSICIAN ASSISTANT

## 2017-12-19 PROCEDURE — 88334 PATH CONSLTJ SURG CYTO XM EA: CPT | Performed by: PHYSICIAN ASSISTANT

## 2017-12-19 PROCEDURE — 88172 CYTP DX EVAL FNA 1ST EA SITE: CPT | Performed by: PHYSICIAN ASSISTANT

## 2017-12-19 PROCEDURE — 88173 CYTOPATH EVAL FNA REPORT: CPT | Performed by: PHYSICIAN ASSISTANT

## 2017-12-19 PROCEDURE — 88162 CYTOPATH SMEAR OTHER SOURCE: CPT | Performed by: PHYSICIAN ASSISTANT

## 2017-12-20 ENCOUNTER — OFFICE VISIT (OUTPATIENT)
Dept: NEUROLOGY | Facility: CLINIC | Age: 27
End: 2017-12-20

## 2017-12-20 VITALS
SYSTOLIC BLOOD PRESSURE: 116 MMHG | WEIGHT: 106 LBS | HEIGHT: 64 IN | HEART RATE: 97 BPM | OXYGEN SATURATION: 98 % | DIASTOLIC BLOOD PRESSURE: 98 MMHG | BODY MASS INDEX: 18.1 KG/M2 | RESPIRATION RATE: 16 BRPM

## 2017-12-20 DIAGNOSIS — M54.81 BILATERAL OCCIPITAL NEURALGIA: ICD-10-CM

## 2017-12-20 DIAGNOSIS — G43.011 INTRACTABLE MIGRAINE WITHOUT AURA AND WITH STATUS MIGRAINOSUS: Primary | ICD-10-CM

## 2017-12-20 PROCEDURE — 99213 OFFICE O/P EST LOW 20 MIN: CPT | Performed by: PHYSICIAN ASSISTANT

## 2017-12-20 PROCEDURE — 64405 NJX AA&/STRD GR OCPL NRV: CPT | Performed by: PHYSICIAN ASSISTANT

## 2017-12-20 PROCEDURE — 64400 NJX AA&/STRD TRIGEMINAL NRV: CPT | Performed by: PHYSICIAN ASSISTANT

## 2017-12-20 RX ORDER — METHYLPREDNISOLONE ACETATE 40 MG/ML
12.31 INJECTION, SUSPENSION INTRA-ARTICULAR; INTRALESIONAL; INTRAMUSCULAR; SOFT TISSUE ONCE
Status: COMPLETED | OUTPATIENT
Start: 2017-12-20 | End: 2017-12-20

## 2017-12-20 RX ADMIN — METHYLPREDNISOLONE ACETATE 12.31 MG: 40 INJECTION, SUSPENSION INTRA-ARTICULAR; INTRALESIONAL; INTRAMUSCULAR; SOFT TISSUE at 15:02

## 2017-12-20 RX ADMIN — METHYLPREDNISOLONE ACETATE 12.31 MG: 40 INJECTION, SUSPENSION INTRA-ARTICULAR; INTRALESIONAL; INTRAMUSCULAR; SOFT TISSUE at 15:00

## 2017-12-20 RX ADMIN — METHYLPREDNISOLONE ACETATE 12.31 MG: 40 INJECTION, SUSPENSION INTRA-ARTICULAR; INTRALESIONAL; INTRAMUSCULAR; SOFT TISSUE at 15:04

## 2017-12-20 RX ADMIN — METHYLPREDNISOLONE ACETATE 12.31 MG: 40 INJECTION, SUSPENSION INTRA-ARTICULAR; INTRALESIONAL; INTRAMUSCULAR; SOFT TISSUE at 15:06

## 2017-12-21 DIAGNOSIS — D06.9 CARCINOMA IN SITU OF CERVIX, UNSPECIFIED LOCATION: Primary | ICD-10-CM

## 2017-12-21 NOTE — PROGRESS NOTES
Procedure   Nerve Block  Date/Time: 12/20/2017 3:02 PM  Performed by: DARYL ESTRELLA  Authorized by: DARYL ESTRELLA   Consent: Verbal consent obtained.  Risks and benefits: risks, benefits and alternatives were discussed  Consent given by: patient  Patient understanding: patient states understanding of the procedure being performed  Patient consent: the patient's understanding of the procedure matches consent given  Procedure consent: procedure consent matches procedure scheduled  Relevant documents: relevant documents present and verified  Test results: test results available and properly labeled  Site marked: the operative site was not marked  Imaging studies: imaging studies not available  Required items: required blood products, implants, devices, and special equipment available  Patient identity confirmed: verbally with patient  Indications: pain relief  Body area: head  Nerve: greater occipital  Laterality: right    Sedation:  Patient sedated: no  Preparation: Patient was prepped and draped in the usual sterile fashion.  Patient position: sitting  Needle size: 27 G  Location technique: ultrasound guidance  Local Anesthetic: lidocaine 1% without epinephrine  Anesthetic total: 0.7 mL  Outcome: pain improved  Patient tolerance: Patient tolerated the procedure well with no immediate complications

## 2017-12-21 NOTE — PROGRESS NOTES
Subjective   Mirela Flores is a 27 y.o. female is here today for follow-up.    HPI Comments: The patient returns with some improvement.  Continues to have near daily headaches which are interfering with normal activities.  Today she is having headache accompanied by photophobia and nausea.    She is requesting to proceed with repeat nerve blocks as discussed with the previous visit.    Headache    This is a recurrent problem. The current episode started more than 1 month ago. The problem occurs daily. The problem has been waxing and waning. The pain is located in the right unilateral, frontal, retro-orbital and occipital region. The pain radiates to the right neck. The pain quality is similar to prior headaches. The quality of the pain is described as aching, pulsating, throbbing and shooting. The pain is severe. Associated symptoms include dizziness, nausea, neck pain, photophobia and vomiting. Pertinent negatives include no fever or weakness. The symptoms are aggravated by activity, bright light, fatigue, emotional stress, noise and Valsalva. She has tried acetaminophen, antidepressants, Excedrin, ergotamines, darkened room, NSAIDs, triptans, oral narcotics and injectable narcotics for the symptoms. The treatment provided moderate relief. Her past medical history is significant for migraine headaches.       The following portions of the patient's history were reviewed and updated as appropriate: allergies, current medications, past family history, past medical history, past social history, past surgical history and problem list.    Review of Systems   Constitutional: Positive for fatigue. Negative for fever.   Eyes: Positive for photophobia and visual disturbance.   Respiratory: Negative.    Cardiovascular: Negative.    Gastrointestinal: Positive for nausea and vomiting.   Endocrine: Negative.    Genitourinary: Negative.    Musculoskeletal: Positive for myalgias, neck pain and neck stiffness.   Skin: Negative.     Allergic/Immunologic: Negative.    Neurological: Positive for dizziness and headaches. Negative for speech difficulty and weakness.   Hematological: Negative.    Psychiatric/Behavioral: The patient is nervous/anxious.        Objective   Physical Exam   Constitutional: She is oriented to person, place, and time. Vital signs are normal. She appears well-developed and well-nourished. No distress.   HENT:   Head: Normocephalic and atraumatic.   Right Ear: Hearing and external ear normal.   Left Ear: Hearing and external ear normal.   Nose: Nose normal.   Mouth/Throat: Uvula is midline, oropharynx is clear and moist and mucous membranes are normal.   Bilateral auricular temporal branch tenderness to touch left greater than right, bilateral occipital nerve tenderness to touch left greater than right.   Eyes: Conjunctivae, EOM and lids are normal. Pupils are equal, round, and reactive to light. No scleral icterus.   Neck: Phonation normal. Muscular tenderness present. No spinous process tenderness present. Carotid bruit is not present. Decreased range of motion present. No thyroid mass and no thyromegaly present.   Cardiovascular: Normal rate, regular rhythm, S1 normal, S2 normal and normal heart sounds.    No murmur heard.  Pulmonary/Chest: Effort normal and breath sounds normal. No stridor. No respiratory distress. She has no wheezes. She has no rales.   Musculoskeletal: She exhibits no edema or tenderness.        Lumbar back: Normal.   Lymphadenopathy:     She has no cervical adenopathy.   Neurological: She is alert and oriented to person, place, and time. She has normal strength. She displays no atrophy and no tremor. No cranial nerve deficit or sensory deficit. She exhibits normal muscle tone. Coordination and gait normal. GCS eye subscore is 4. GCS verbal subscore is 5. GCS motor subscore is 6.   Reflex Scores:       Tricep reflexes are 2+ on the right side and 2+ on the left side.       Bicep reflexes are 2+ on  the right side and 2+ on the left side.       Brachioradialis reflexes are 2+ on the right side and 2+ on the left side.       Patellar reflexes are 2+ on the right side and 2+ on the left side.       Achilles reflexes are 2+ on the right side and 2+ on the left side.  Skin: Skin is warm and dry. No ecchymosis, no lesion and no rash noted. No cyanosis. Nails show no clubbing.   Psychiatric: She has a normal mood and affect. Her speech is normal and behavior is normal. Judgment and thought content normal. She is not actively hallucinating. Cognition and memory are normal. She is attentive.   Nursing note and vitals reviewed.        Assessment/Plan   Mirela was seen today for follow-up.    Diagnoses and all orders for this visit:    Intractable migraine without aura and with status migrainosus  -     Ambulatory Referral to Neurology  -     methylPREDNISolone acetate (DEPO-medrol) injection 12.31 mg; 0.31 mL by Peripheral Nerve route 1 (One) Time.  -     methylPREDNISolone acetate (DEPO-medrol) injection 12.31 mg; 0.31 mL by Peripheral Nerve route 1 (One) Time.  -     Nerve Block  -     Nerve Block    Bilateral occipital neuralgia  -     methylPREDNISolone acetate (DEPO-medrol) injection 12.31 mg; 0.31 mL by Peripheral Nerve route 1 (One) Time.  -     methylPREDNISolone acetate (DEPO-medrol) injection 12.31 mg; 0.31 mL by Peripheral Nerve route 1 (One) Time.  -     Nerve Block  -     Nerve Block    The patient has had improvement status post nerve blocks as well as pharmacologic treatment.  She continues to have residual pain in tenderness about the occipital nerves and a bifrontal headache.  Sumatriptan is effective, that she is having the use of fairly frequently.  At this point she is having greater than 15 headache days per month with greater than 4 hours per occurrence.  We will arrange for Botox injections.    No changes were made in her medication regimen otherwise today.    10 minutes of a 15 minute outpatient  visit is spent in counseling and coordination of care today.  This time is above and beyond that required for bilateral auricular temporal branch blocks as well as bilateral occipital nerve blocks which are documented separately.        Patient's BMI is within normal parameters. No follow-up required.    EMR Dragon transcription disclaimer:  Much of this encounter note is an electronic transcription/translation of spoken language to printed text.  The electronic translation of spoken language may permit erroneous, or at times, nonsensical words or phrases to be inadvertently transcribed.  The author has reviewed the note for such errors, however some may still exist.

## 2017-12-21 NOTE — PROGRESS NOTES
Procedure   Nerve Block  Date/Time: 12/20/2017 3:00 PM  Performed by: DARYL ESTRELLA  Authorized by: DARYL ESTRELLA   Consent: Verbal consent obtained.  Risks and benefits: risks, benefits and alternatives were discussed  Consent given by: patient  Patient understanding: patient states understanding of the procedure being performed  Patient consent: the patient's understanding of the procedure matches consent given  Procedure consent: procedure consent matches procedure scheduled  Relevant documents: relevant documents present and verified  Test results: test results available and properly labeled  Site marked: the operative site was not marked  Imaging studies: imaging studies not available  Required items: required blood products, implants, devices, and special equipment available  Patient identity confirmed: verbally with patient  Indications: pain relief  Body area: head  Nerve: greater occipital  Laterality: left    Sedation:  Patient sedated: no  Preparation: Patient was prepped and draped in the usual sterile fashion.  Patient position: sitting  Needle size: 27 G  Location technique: anatomical landmarks  Local Anesthetic: lidocaine 1% without epinephrine  Anesthetic total: 0.7 mL  Outcome: pain improved  Patient tolerance: Patient tolerated the procedure well with no immediate complications

## 2017-12-21 NOTE — PROGRESS NOTES
Procedure   Nerve Block  Date/Time: 12/20/2017 3:06 PM  Performed by: DARYL ESTRELLA  Authorized by: DARYL ESTRELLA   Consent: Verbal consent obtained.  Risks and benefits: risks, benefits and alternatives were discussed  Consent given by: patient  Patient understanding: patient states understanding of the procedure being performed  Patient consent: the patient's understanding of the procedure matches consent given  Procedure consent: procedure consent matches procedure scheduled  Relevant documents: relevant documents present and verified  Test results: test results available and properly labeled  Site marked: the operative site was not marked  Imaging studies: imaging studies not available  Required items: required blood products, implants, devices, and special equipment available  Patient identity confirmed: verbally with patient  Indications: pain relief  Body area: head  Nerve: trigeminal  Laterality: left    Sedation:  Patient sedated: no  Preparation: Patient was prepped and draped in the usual sterile fashion.  Patient position: sitting  Needle size: 27 G  Location technique: anatomical landmarks  Local Anesthetic: lidocaine 1% without epinephrine  Anesthetic total: 0.7 mL  Outcome: pain improved  Patient tolerance: Patient tolerated the procedure well with no immediate complications

## 2017-12-21 NOTE — PROGRESS NOTES
Procedure   Nerve Block  Date/Time: 12/20/2017 3:04 PM  Performed by: DARYL ESTRELLA  Authorized by: DARYL ESTRELLA   Consent: Verbal consent obtained.  Risks and benefits: risks, benefits and alternatives were discussed  Consent given by: patient  Patient understanding: patient states understanding of the procedure being performed  Patient consent: the patient's understanding of the procedure matches consent given  Procedure consent: procedure consent matches procedure scheduled  Relevant documents: relevant documents present and verified  Test results: test results available and properly labeled  Site marked: the operative site was not marked  Imaging studies: imaging studies not available  Required items: required blood products, implants, devices, and special equipment available  Patient identity confirmed: verbally with patient  Indications: pain relief  Body area: head  Nerve: trigeminal  Laterality: right    Sedation:  Patient sedated: no  Preparation: Patient was prepped and draped in the usual sterile fashion.  Patient position: sitting  Needle size: 27 G  Location technique: anatomical landmarks  Local Anesthetic: lidocaine 1% without epinephrine  Anesthetic total: 0.7 mL  Outcome: pain improved  Patient tolerance: Patient tolerated the procedure well with no immediate complications

## 2017-12-25 LAB
MYCOBACTERIUM SPEC CULT: NORMAL
NIGHT BLUE STAIN TISS: NORMAL

## 2017-12-26 ENCOUNTER — APPOINTMENT (OUTPATIENT)
Dept: PHYSICAL THERAPY | Facility: HOSPITAL | Age: 27
End: 2017-12-26

## 2017-12-27 ENCOUNTER — OFFICE VISIT (OUTPATIENT)
Dept: ONCOLOGY | Facility: CLINIC | Age: 27
End: 2017-12-27

## 2017-12-27 ENCOUNTER — LAB (OUTPATIENT)
Dept: LAB | Facility: HOSPITAL | Age: 27
End: 2017-12-27

## 2017-12-27 VITALS
RESPIRATION RATE: 16 BRPM | WEIGHT: 112.3 LBS | BODY MASS INDEX: 19.17 KG/M2 | TEMPERATURE: 97.6 F | HEART RATE: 64 BPM | DIASTOLIC BLOOD PRESSURE: 78 MMHG | SYSTOLIC BLOOD PRESSURE: 116 MMHG | OXYGEN SATURATION: 98 % | HEIGHT: 64 IN

## 2017-12-27 DIAGNOSIS — D06.9 CIN III (CERVICAL INTRAEPITHELIAL NEOPLASIA GRADE III) WITH SEVERE DYSPLASIA: Primary | ICD-10-CM

## 2017-12-27 DIAGNOSIS — D50.9 IRON DEFICIENCY ANEMIA, UNSPECIFIED IRON DEFICIENCY ANEMIA TYPE: Primary | ICD-10-CM

## 2017-12-27 LAB
ALBUMIN SERPL-MCNC: 3.7 G/DL (ref 3.5–5)
ALBUMIN/GLOB SERPL: 1.4 G/DL (ref 1.1–2.5)
ALP SERPL-CCNC: 44 U/L (ref 24–120)
ALT SERPL W P-5'-P-CCNC: 33 U/L (ref 0–54)
ANION GAP SERPL CALCULATED.3IONS-SCNC: 7 MMOL/L (ref 4–13)
AST SERPL-CCNC: 19 U/L (ref 7–45)
AUTO MIXED CELLS #: 0.5 10*3/MM3 (ref 0.1–2.6)
AUTO MIXED CELLS %: 7.9 % (ref 0.1–24)
BILIRUB SERPL-MCNC: 0.2 MG/DL (ref 0.1–1)
BUN BLD-MCNC: 9 MG/DL (ref 5–21)
BUN/CREAT SERPL: 15.3
CALCIUM SPEC-SCNC: 9.2 MG/DL (ref 8.4–10.4)
CHLORIDE SERPL-SCNC: 103 MMOL/L (ref 98–110)
CO2 SERPL-SCNC: 29 MMOL/L (ref 24–31)
CREAT BLD-MCNC: 0.59 MG/DL (ref 0.5–1.4)
ERYTHROCYTE [DISTWIDTH] IN BLOOD BY AUTOMATED COUNT: 14 % (ref 12–15)
FERRITIN SERPL-MCNC: 46.9 NG/ML (ref 6.24–137)
GFR SERPL CREATININE-BSD FRML MDRD: 123 ML/MIN/1.73
GLOBULIN UR ELPH-MCNC: 2.6 GM/DL
GLUCOSE BLD-MCNC: 87 MG/DL (ref 70–100)
HCT VFR BLD AUTO: 34 % (ref 37–47)
HGB BLD-MCNC: 11.7 G/DL (ref 12–16)
HOLD SPECIMEN: NORMAL
IRON 24H UR-MRATE: 56 MCG/DL (ref 42–180)
IRON SATN MFR SERPL: 21 % (ref 20–45)
LYMPHOCYTES # BLD AUTO: 2.9 10*3/MM3 (ref 0.8–7)
LYMPHOCYTES NFR BLD AUTO: 43.9 % (ref 15–45)
MCH RBC QN AUTO: 32.1 PG (ref 28–32)
MCHC RBC AUTO-ENTMCNC: 34.4 G/DL (ref 33–36)
MCV RBC AUTO: 93.4 FL (ref 82–98)
NEUTROPHILS # BLD AUTO: 3.3 10*3/MM3 (ref 1.5–8.3)
NEUTROPHILS NFR BLD AUTO: 48.2 % (ref 39–78)
PLATELET # BLD AUTO: 190 10*3/MM3 (ref 130–400)
PMV BLD AUTO: 10.1 FL (ref 6–12)
POTASSIUM BLD-SCNC: 3.6 MMOL/L (ref 3.5–5.3)
PROT SERPL-MCNC: 6.3 G/DL (ref 6.3–8.7)
RBC # BLD AUTO: 3.64 10*6/MM3 (ref 4.2–5.4)
SODIUM BLD-SCNC: 139 MMOL/L (ref 135–145)
TIBC SERPL-MCNC: 265 MCG/DL (ref 225–420)
WBC NRBC COR # BLD: 6.7 10*3/MM3 (ref 4.8–10.8)

## 2017-12-27 PROCEDURE — 36415 COLL VENOUS BLD VENIPUNCTURE: CPT

## 2017-12-27 PROCEDURE — 99213 OFFICE O/P EST LOW 20 MIN: CPT | Performed by: INTERNAL MEDICINE

## 2017-12-27 PROCEDURE — 80053 COMPREHEN METABOLIC PANEL: CPT | Performed by: INTERNAL MEDICINE

## 2017-12-27 PROCEDURE — 82728 ASSAY OF FERRITIN: CPT | Performed by: INTERNAL MEDICINE

## 2017-12-27 PROCEDURE — 83540 ASSAY OF IRON: CPT | Performed by: INTERNAL MEDICINE

## 2017-12-27 PROCEDURE — 85025 COMPLETE CBC W/AUTO DIFF WBC: CPT | Performed by: INTERNAL MEDICINE

## 2017-12-27 PROCEDURE — 83550 IRON BINDING TEST: CPT | Performed by: INTERNAL MEDICINE

## 2017-12-27 NOTE — PROGRESS NOTES
[facility]  HEMATOLOGY & ONCOLOGY    Cancer Staging Information:  No matching staging information was found for the patient.      Subjective     VISIT DIAGNOSIS:   Encounter Diagnosis   Name Primary?   • Iron deficiency anemia, unspecified iron deficiency anemia type Yes       REASON FOR VISIT:     Chief Complaint   Patient presents with   • Anemia     Follow-up        HEMATOLOGY / ONCOLOGY HISTORY:    No history exists.           INTERVAL HISTORY  Patient ID: Mirela Flores is a 27 y.o. year old female summary for iron deficiency anemia.  She is currently on ferrous sulfate without adequate improvement. She has upcoming appointment with GI for EGD and C-scope. Also she also has side effect of far constipation.  Denies nausea or vomiting.  Denies diarrhea.  He denies bright red blood per rectum.  This is the first time she is seeing her period since after giving birth to her child 8 months ago.    Past Medical History:   Past Medical History:   Diagnosis Date   • Anemia affecting pregnancy    • Asthma     as a child   • History of transfusion    • Migraines    • Mono exposure    • S/P thyroid biopsy      Past Surgical History:   Past Surgical History:   Procedure Laterality Date   • CERVICAL CONIZATION Bilateral 7/3/2017    Procedure: CERVICAL CONIZATION, LOOP ELECTROCAUTERY EXCISION PROCEDURE;  Surgeon: Karli Guerrero MD;  Location: [facility] OR;  Service:    • CHOLECYSTECTOMY     • COLONOSCOPY  02/16/2011    normal   • D&C WITH SUCTION     • OVARIAN CYST SURGERY Right    • SALPINGECTOMY Bilateral 8/28/2017    Procedure: laparoscopic salpingectomy;  Surgeon: Karli Guerrero MD;  Location: [facility];  Service:    • TYMPANOSTOMY TUBE PLACEMENT       Social History:   Social History     Social History   • Marital status:      Spouse name: N/A   • Number of children: N/A   • Years of education: N/A     Occupational History   • Not on file.     Social History Main Topics   • Smoking status: Never  Smoker   • Smokeless tobacco: Never Used   • Alcohol use No   • Drug use: No   • Sexual activity: Yes     Partners: Male     Birth control/ protection: None     Other Topics Concern   • Not on file     Social History Narrative     Family History:   Family History   Problem Relation Age of Onset   • Other Father    • Heart murmur Mother    • No Known Problems Paternal Grandfather    • No Known Problems Paternal Grandmother    • Thyroid disease Maternal Grandmother    • Hypertension Maternal Grandmother    • No Known Problems Maternal Grandfather    • Breast cancer Other 60   • Ovarian cancer Neg Hx        Review of Systems   Constitutional: Positive for fatigue. Negative for activity change, appetite change, chills, fever and unexpected weight change.   HENT: Negative.    Eyes: Negative.    Respiratory: Negative.    Cardiovascular: Negative.    Gastrointestinal: Negative.    Endocrine: Negative.    Genitourinary: Negative.    Musculoskeletal: Negative.    Skin: Negative.    Allergic/Immunologic: Negative.    Neurological: Negative.    Psychiatric/Behavioral: The patient is nervous/anxious.         She has a history of anxiety denies controlled on Zoloft.        Performance Status:  Asymptomatic    Medications:    Current Outpatient Prescriptions   Medication Sig Dispense Refill   • amitriptyline (ELAVIL) 25 MG tablet Take 2 tablets by mouth Every Night. 60 tablet 2   • busPIRone (BUSPAR) 15 MG tablet Take 7.5 mg by mouth 2 (Two) Times a Day As Needed.     • Cholecalciferol 1000 units capsule Take 1,000 Units by mouth Daily. 90 capsule 0   • cyclobenzaprine (FLEXERIL) 10 MG tablet 1 tablet 3 times a day for up to 2 days.  Then 1 tablet 3 times a day when necessary for neck pain 9 tablet 0   • divalproex (DEPAKOTE ER) 250 MG 24 hr tablet Take 3 tablets by mouth Daily. 30 tablet 2   • ferrous sulfate 325 (65 FE) MG tablet Take 1 tablet by mouth 2 (Two) Times a Day for 90 days. 60 tablet 2   • indomethacin (INDOCIN) 25  "MG capsule Take 2 capsules by mouth 3 (Three) Times a Day As Needed for Moderate Pain . 60 capsule 2   • lisdexamfetamine (VYVANSE) 40 MG capsule Take 40 mg by mouth Every Morning.     • memantine (NAMENDA) 10 MG tablet Take 1 tablet by mouth 2 (Two) Times a Day. 60 tablet 3   • predniSONE (DELTASONE) 10 MG tablet 50 mg for 3 days, 40 mg for 3 days, 30 mg for 3 days, 20 mg for 3 days, 10 mg for 3 days, then D/C 45 tablet 0   • prochlorperazine (COMPAZINE) 10 MG tablet Take 1 tablet by mouth Every 6 (Six) Hours As Needed for Nausea or Vomiting. 30 tablet 2   • sertraline (ZOLOFT) 100 MG tablet TAKE 1 TABLET BY MOUTH DAILY  5   • SUMAtriptan (IMITREX) 100 MG tablet 1 tab at onset of headache.  May repeat after 2 hours to a maximum of 2 tablets in 24 hours 9 tablet 1     No current facility-administered medications for this visit.        ALLERGIES:    Allergies   Allergen Reactions   • Cephalosporins      Pt states was told can never take them again.    • Penicillins      Reaction as a child.  Patient was told can never take again.    • Topamax [Topiramate] Angioedema   • Adhesive Tape Rash     TEGADERM AND STERI STRIPS   • Tape Rash     TEGADERM AND STERI STRIPS       Objective      Vitals:    12/27/17 0907   BP: 116/78   Pulse: 64   Resp: 16   Temp: 97.6 °F (36.4 °C)   TempSrc: Tympanic   SpO2: 98%   Weight: 50.9 kg (112 lb 4.8 oz)   Height: 162.6 cm (64.02\")         Current Status 11/27/2017   ECOG score 0         Physical Exam  General Appearance: Patient is awake, alert, oriented and in no acute distress. Patient is welldeveloped, wellnourished, and appears stated age.  HEENT: Normocephalic.  Raccon eyes Sclerae clear, conjunctiva pale, extraocular movements intact, pupils, round, reactive to light and  accommodation. Mouth and throat are clear with moist oral mucosa.  NECK: Supple, no jugular venous distention, thyroid not enlarged.  LYMPH: No cervical, supraclavicular, axillary, or inguinal " lymphadenopathy.  CHEST: Equal bilateral expansion, AP  diameter normal, resonant percussion note  LUNGS: Good air movement, no rales, rhonchi, rubs or wheezes with auscultation  CARDIO: Regular sinus rhythm, no murmurs, gallops or rubs.  ABDOMEN: Nondistended, soft, No tenderness, no guarding, no rebound, No hepatosplenomegaly. No abdominal masses. Bowel sounds positive. No hernia  GENITALIA: Not examined.  BREASTS: Not examined.  MUSKEL: No joint swelling, decreased motion, or inflammation  EXTREMS: No edema, clubbing, cyanosis, No varicose veins.  NEURO: Grossly nonfocal, Gait is coordinated and smooth, Cognition is preserved.  SKIN: No rashes, no ecchymoses, no petechia.  PSYCH: Oriented to time, place and person. Memory is preserved. Mood and affect appear normal  RECENT LABS:  Orders Only on 12/21/2017   Component Date Value Ref Range Status   • WBC 12/27/2017 6.70  4.80 - 10.80 10*3/mm3 Final   • RBC 12/27/2017 3.64* 4.20 - 5.40 10*6/mm3 Final   • Hemoglobin 12/27/2017 11.7* 12.0 - 16.0 g/dL Final   • Hematocrit 12/27/2017 34.0* 37.0 - 47.0 % Final   • MCV 12/27/2017 93.4  82.0 - 98.0 fL Final   • MCH 12/27/2017 32.1* 28.0 - 32.0 pg Final   • MCHC 12/27/2017 34.4  33.0 - 36.0 g/dL Final   • RDW 12/27/2017 14.0  12.0 - 15.0 % Final   • MPV 12/27/2017 10.1  6.0 - 12.0 fL Final   • Platelets 12/27/2017 190  130 - 400 10*3/mm3 Final   • Neutrophil % 12/27/2017 48.2  39.0 - 78.0 % Final   • Lymphocyte % 12/27/2017 43.9  15.0 - 45.0 % Final   • Auto Mixed Cells % 12/27/2017 7.9  0.1 - 24.0 % Final   • Neutrophils, Absolute 12/27/2017 3.30  1.50 - 8.30 10*3/mm3 Final   • Lymphocytes, Absolute 12/27/2017 2.90  0.80 - 7.00 10*3/mm3 Final   • Auto Mixed Cells # 12/27/2017 0.50  0.10 - 2.60 10*3/mm3 Final       RADIOLOGY:  Us Thyroid    Result Date: 11/30/2017  Narrative: US THYROID- 11/30/2017 11:24 AM EST  REASON FOR EXAM: thyroid nodule; G43.819-Other migraine, intractable, without status migrainosus   COMPARISON:  NONE.  TECHNIQUE: Multiple longitudinal and transverse real-time sonographic images of the thyroid are obtained.  FINDINGS: The right lobe of the thyroid measures 5.2 x 1.6 x 1.7 cm. The left lobe of the thyroid measures 4.1 x 1.3 x 1.4 cm. The thyroid isthmus measures 0.3 cm in thickness.  The thyroid is homogeneous in echogenicity. A 1.5 x 1.4 x 1.3 cm rounded solid heterogeneously isoechoic nodule is identified in the midportion of the right lobe. This is in a similar location to the previously biopsied cystic solid nodule. Color Doppler demonstrates appropriate flow.      Impression: 1. 1.5 cm solid right thyroid nodule. This is in a similar location to the previously biopsied cystic solid nodule and may reflect the same lesion, although the cystic component of the nodule is no longer identified and the solid component has enlarged up to 1.5 cm. This nodule would be amenable to ultrasound-guided fine-needle aspiration if clinically warranted. This report was finalized on 11/30/2017 13:30 by Dr Jordi Guerrero, .           Assessment/Plan  Mirela Flores is a 27 y.o. year old female     Patient Active Problem List   Diagnosis   • BRETT III (cervical intraepithelial neoplasia grade III) with severe dysplasia   • Migraine without aura with status migrainosus   • Intractable migraine without aura and with status migrainosus   • Neck pain   • Anemia   • Thyroid cyst   • Migraine   • Thyroid nodule   • Bilateral occipital neuralgia          27-year-old female history significant for iron deficiency anemia since 2013, migraine headaches, ADHD, and anxiety referred to me for consult of her anemia.     1. Anemia: Primary bone marrow issues vs nutritional vs GI blood loss. Iron panel on the low side.on oral ferrous sulfate without adequate improvement.  She has an upcoming appointment with GI for both EGD and colonoscopy.  Due to inadequate rise in hemoglobin and constipation we will start IV Venofer 8 doses.  Goal for her  is hemoglobin of 13.5.   2. Migrane: On depokoete. Amytriptyline, buspar neurontin, sumitriptan.  Status post nerve block.     3. Leukocytosis: reactive vs primary bone marrow issues.  Brain cells.  Flow cytometry shows no evidence of lymphoproliferative disorder.   4.ADHA: on Vyvanse     5. Anxiety: on zoloft    6.  Thyroid nodule: FNA performed and pathology came back as Benign follicular nodule          Palmira Perales MD    12/27/2017    9:19 AM

## 2017-12-29 ENCOUNTER — APPOINTMENT (OUTPATIENT)
Dept: PHYSICAL THERAPY | Facility: HOSPITAL | Age: 27
End: 2017-12-29

## 2018-01-03 DIAGNOSIS — E04.1 THYROID NODULE: ICD-10-CM

## 2018-01-10 ENCOUNTER — OFFICE VISIT (OUTPATIENT)
Dept: NEUROLOGY | Facility: CLINIC | Age: 28
End: 2018-01-10

## 2018-01-10 ENCOUNTER — OFFICE VISIT (OUTPATIENT)
Dept: GASTROENTEROLOGY | Facility: CLINIC | Age: 28
End: 2018-01-10

## 2018-01-10 VITALS
OXYGEN SATURATION: 98 % | DIASTOLIC BLOOD PRESSURE: 84 MMHG | TEMPERATURE: 97.7 F | SYSTOLIC BLOOD PRESSURE: 120 MMHG | HEART RATE: 80 BPM | HEIGHT: 64 IN | WEIGHT: 112 LBS | BODY MASS INDEX: 19.12 KG/M2

## 2018-01-10 VITALS
HEIGHT: 64 IN | SYSTOLIC BLOOD PRESSURE: 110 MMHG | BODY MASS INDEX: 19.29 KG/M2 | DIASTOLIC BLOOD PRESSURE: 70 MMHG | HEART RATE: 92 BPM | WEIGHT: 113 LBS | RESPIRATION RATE: 18 BRPM

## 2018-01-10 DIAGNOSIS — K59.00 CONSTIPATION, UNSPECIFIED CONSTIPATION TYPE: ICD-10-CM

## 2018-01-10 DIAGNOSIS — K62.5 RECTAL BLEEDING: Primary | ICD-10-CM

## 2018-01-10 DIAGNOSIS — G43.011 INTRACTABLE MIGRAINE WITHOUT AURA AND WITH STATUS MIGRAINOSUS: Primary | ICD-10-CM

## 2018-01-10 DIAGNOSIS — M54.81 BILATERAL OCCIPITAL NEURALGIA: ICD-10-CM

## 2018-01-10 DIAGNOSIS — D50.9 IRON DEFICIENCY ANEMIA, UNSPECIFIED IRON DEFICIENCY ANEMIA TYPE: ICD-10-CM

## 2018-01-10 PROCEDURE — 99204 OFFICE O/P NEW MOD 45 MIN: CPT | Performed by: NURSE PRACTITIONER

## 2018-01-10 PROCEDURE — 64400 NJX AA&/STRD TRIGEMINAL NRV: CPT | Performed by: PHYSICIAN ASSISTANT

## 2018-01-10 PROCEDURE — 99213 OFFICE O/P EST LOW 20 MIN: CPT | Performed by: PHYSICIAN ASSISTANT

## 2018-01-10 PROCEDURE — 64405 NJX AA&/STRD GR OCPL NRV: CPT | Performed by: PHYSICIAN ASSISTANT

## 2018-01-10 RX ORDER — METHYLPREDNISOLONE ACETATE 40 MG/ML
12.31 INJECTION, SUSPENSION INTRA-ARTICULAR; INTRALESIONAL; INTRAMUSCULAR; SOFT TISSUE ONCE
Status: COMPLETED | OUTPATIENT
Start: 2018-01-10 | End: 2018-01-10

## 2018-01-10 RX ORDER — CYCLOBENZAPRINE HCL 10 MG
TABLET ORAL
Qty: 9 TABLET | Refills: 0 | Status: ON HOLD | OUTPATIENT
Start: 2018-01-10 | End: 2018-06-20

## 2018-01-10 RX ADMIN — METHYLPREDNISOLONE ACETATE 12.31 MG: 40 INJECTION, SUSPENSION INTRA-ARTICULAR; INTRALESIONAL; INTRAMUSCULAR; SOFT TISSUE at 14:29

## 2018-01-10 RX ADMIN — METHYLPREDNISOLONE ACETATE 12.31 MG: 40 INJECTION, SUSPENSION INTRA-ARTICULAR; INTRALESIONAL; INTRAMUSCULAR; SOFT TISSUE at 14:30

## 2018-01-10 NOTE — PROGRESS NOTES
Procedure   Nerve Block  Date/Time: 1/10/2018 2:34 PM  Performed by: DARYL ESTRELLA  Authorized by: DARYL ESTRELLA   Consent: Verbal consent obtained.  Risks and benefits: risks, benefits and alternatives were discussed  Consent given by: patient  Patient understanding: patient states understanding of the procedure being performed  Patient consent: the patient's understanding of the procedure matches consent given  Procedure consent: procedure consent matches procedure scheduled  Relevant documents: relevant documents present and verified  Test results: test results available and properly labeled  Site marked: the operative site was not marked  Imaging studies: imaging studies not available  Required items: required blood products, implants, devices, and special equipment available  Patient identity confirmed: verbally with patient  Indications: pain relief  Body area: head  Nerve: trigeminal  Laterality: right    Sedation:  Patient sedated: no  Preparation: Patient was prepped and draped in the usual sterile fashion.  Patient position: sitting  Needle size: 27 G  Location technique: anatomical landmarks  Local Anesthetic: lidocaine 1% without epinephrine  Anesthetic total: 0.7 mL  Outcome: pain improved  Patient tolerance: Patient tolerated the procedure well with no immediate complications

## 2018-01-10 NOTE — PROGRESS NOTES
Procedure   Nerve Block  Date/Time: 1/10/2018 2:32 PM  Performed by: DARYL ESTRELLA  Authorized by: DARYL ESTRELLA   Consent: Verbal consent obtained.  Risks and benefits: risks, benefits and alternatives were discussed  Consent given by: patient  Patient understanding: patient states understanding of the procedure being performed  Patient consent: the patient's understanding of the procedure matches consent given  Procedure consent: procedure consent matches procedure scheduled  Relevant documents: relevant documents present and verified  Test results: test results available and properly labeled  Site marked: the operative site was not marked  Imaging studies: imaging studies not available  Required items: required blood products, implants, devices, and special equipment available  Patient identity confirmed: verbally with patient  Indications: pain relief  Body area: head  Nerve: greater occipital  Laterality: right    Sedation:  Patient sedated: no  Preparation: Patient was prepped and draped in the usual sterile fashion.  Patient position: sitting  Needle size: 27 G  Location technique: anatomical landmarks  Local Anesthetic: lidocaine 1% without epinephrine  Anesthetic total: 0.7 mL  Outcome: pain improved  Patient tolerance: Patient tolerated the procedure well with no immediate complications

## 2018-01-10 NOTE — PROGRESS NOTES
Procedure   Nerve Block  Date/Time: 1/10/2018 2:33 PM  Performed by: DARYL ESTRELLA  Authorized by: DARYL ESTRELLA   Consent: Verbal consent obtained.  Risks and benefits: risks, benefits and alternatives were discussed  Consent given by: patient  Patient understanding: patient states understanding of the procedure being performed  Patient consent: the patient's understanding of the procedure matches consent given  Procedure consent: procedure consent matches procedure scheduled  Relevant documents: relevant documents present and verified  Test results: test results available and properly labeled  Site marked: the operative site was not marked  Imaging studies: imaging studies not available  Required items: required blood products, implants, devices, and special equipment available  Patient identity confirmed: verbally with patient  Indications: pain relief  Body area: head  Nerve: trigeminal  Laterality: left    Sedation:  Patient sedated: no  Preparation: Patient was prepped and draped in the usual sterile fashion.  Patient position: sitting  Needle size: 27 G  Location technique: anatomical landmarks  Local Anesthetic: lidocaine 1% without epinephrine  Anesthetic total: 0.7 mL  Outcome: pain improved  Patient tolerance: Patient tolerated the procedure well with no immediate complications

## 2018-01-10 NOTE — PROGRESS NOTES
Procedure   Nerve Block  Date/Time: 1/10/2018 2:31 PM  Performed by: DARYL ESTRELLA  Authorized by: DARYL ESTRELLA   Consent: Verbal consent obtained.  Risks and benefits: risks, benefits and alternatives were discussed  Consent given by: patient  Patient understanding: patient states understanding of the procedure being performed  Patient consent: the patient's understanding of the procedure matches consent given  Procedure consent: procedure consent matches procedure scheduled  Relevant documents: relevant documents present and verified  Test results: test results available and properly labeled  Site marked: the operative site was not marked  Imaging studies: imaging studies not available  Required items: required blood products, implants, devices, and special equipment available  Patient identity confirmed: verbally with patient  Indications: pain relief  Body area: head  Nerve: greater occipital  Laterality: left    Sedation:  Patient sedated: no  Preparation: Patient was prepped and draped in the usual sterile fashion.  Patient position: sitting  Needle size: 27 G  Location technique: anatomical landmarks  Local Anesthetic: lidocaine 1% without epinephrine  Anesthetic total: 0.7 mL  Outcome: pain improved  Patient tolerance: Patient tolerated the procedure well with no immediate complications

## 2018-01-10 NOTE — PROGRESS NOTES
Subjective   Mirela Flores is a 27 y.o. female is here today for follow-up.    HPI Comments: The patient returns with some improvement.  Continues to have near daily headaches which are interfering with normal activities.  Today she is having headache accompanied by photophobia and nausea.    She is requesting to proceed with repeat nerve blocks as discussed with the previous visit.    Headache    This is a recurrent problem. The current episode started more than 1 month ago. The problem occurs daily. The problem has been waxing and waning. The pain is located in the right unilateral, frontal, retro-orbital and occipital region. The pain radiates to the right neck. The pain quality is similar to prior headaches. The quality of the pain is described as aching, pulsating, throbbing and shooting. The pain is severe. Associated symptoms include dizziness, nausea, neck pain, photophobia and vomiting. Pertinent negatives include no fever or weakness. The symptoms are aggravated by activity, bright light, fatigue, emotional stress, noise and Valsalva. She has tried acetaminophen, antidepressants, Excedrin, ergotamines, darkened room, NSAIDs, triptans, oral narcotics and injectable narcotics for the symptoms. The treatment provided moderate relief. Her past medical history is significant for migraine headaches.       The following portions of the patient's history were reviewed and updated as appropriate: allergies, current medications, past family history, past medical history, past social history, past surgical history and problem list.    Review of Systems   Constitutional: Positive for fatigue. Negative for fever.   Eyes: Positive for photophobia and visual disturbance.   Respiratory: Negative.    Cardiovascular: Negative.    Gastrointestinal: Positive for nausea and vomiting.   Endocrine: Negative.    Genitourinary: Negative.    Musculoskeletal: Positive for myalgias, neck pain and neck stiffness.   Skin: Negative.     Allergic/Immunologic: Negative.    Neurological: Positive for dizziness and headaches. Negative for speech difficulty and weakness.   Hematological: Negative.    Psychiatric/Behavioral: The patient is nervous/anxious.        Objective   Physical Exam   Constitutional: She is oriented to person, place, and time. Vital signs are normal. She appears well-developed and well-nourished. No distress.   HENT:   Head: Normocephalic and atraumatic.   Right Ear: Hearing and external ear normal.   Left Ear: Hearing and external ear normal.   Nose: Nose normal.   Mouth/Throat: Uvula is midline, oropharynx is clear and moist and mucous membranes are normal.   Bilateral auricular temporal branch tenderness to touch left greater than right, bilateral occipital nerve tenderness to touch left greater than right.   Eyes: Conjunctivae, EOM and lids are normal. Pupils are equal, round, and reactive to light. No scleral icterus.   Neck: Phonation normal. Muscular tenderness present. No spinous process tenderness present. Carotid bruit is not present. Decreased range of motion present. No thyroid mass and no thyromegaly present.   Cardiovascular: Normal rate, regular rhythm, S1 normal, S2 normal and normal heart sounds.    No murmur heard.  Pulmonary/Chest: Effort normal and breath sounds normal. No stridor. No respiratory distress. She has no wheezes. She has no rales.   Musculoskeletal: She exhibits no edema or tenderness.        Lumbar back: Normal.   Lymphadenopathy:     She has no cervical adenopathy.   Neurological: She is alert and oriented to person, place, and time. She has normal strength. She displays no atrophy and no tremor. No cranial nerve deficit or sensory deficit. She exhibits normal muscle tone. Coordination and gait normal. GCS eye subscore is 4. GCS verbal subscore is 5. GCS motor subscore is 6.   Reflex Scores:       Tricep reflexes are 2+ on the right side and 2+ on the left side.       Bicep reflexes are 2+ on  the right side and 2+ on the left side.       Brachioradialis reflexes are 2+ on the right side and 2+ on the left side.       Patellar reflexes are 2+ on the right side and 2+ on the left side.       Achilles reflexes are 2+ on the right side and 2+ on the left side.  Skin: Skin is warm and dry. No ecchymosis, no lesion and no rash noted. No cyanosis. Nails show no clubbing.   Psychiatric: She has a normal mood and affect. Her speech is normal and behavior is normal. Judgment and thought content normal. She is not actively hallucinating. Cognition and memory are normal. She is attentive.   Nursing note and vitals reviewed.        Assessment/Plan   Mirela was seen today for follow-up.    Diagnoses and all orders for this visit:    Intractable migraine without aura and with status migrainosus  -     methylPREDNISolone acetate (DEPO-medrol) injection 12.31 mg; 0.31 mL by Peripheral Nerve route 1 (One) Time.  -     methylPREDNISolone acetate (DEPO-medrol) injection 12.31 mg; 0.31 mL by Peripheral Nerve route 1 (One) Time.  -     Nerve Block  -     Nerve Block    Bilateral occipital neuralgia  -     cyclobenzaprine (FLEXERIL) 10 MG tablet; 1 tablet 3 times a day for up to 2 days.  Then 1 tablet 3 times a day when necessary for neck pain  -     methylPREDNISolone acetate (DEPO-medrol) injection 12.31 mg; 0.31 mL by Peripheral Nerve route 1 (One) Time.  -     methylPREDNISolone acetate (DEPO-medrol) injection 12.31 mg; 0.31 mL by Peripheral Nerve route 1 (One) Time.  -     Nerve Block  -     Nerve Block    The patient is having some improvement in her headaches overall.  She has been approved for Botox and will be scheduling these appointments.    She is tolerating her present medication regimen well and no changes were made in her medication regimen today.    10 minutes of 15 minute outpatient visit was spent in counseling and coordination of care, this time did not include the time spent performing bilateral auricular  temporal branch and bilateral occipital nerve blocks which are documented separately.    EMR Dragon transcription disclaimer:  Much of this encounter note is an electronic transcription/translation of spoken language to printed text.  The electronic translation of spoken language may permit erroneous, or at times, nonsensical words or phrases to be inadvertently transcribed.  The author has reviewed the note for such errors, however some may still exist.

## 2018-01-10 NOTE — PROGRESS NOTES
Chief Complaint   Patient presents with   • Anemia     referred for colon       Subjective     HPI     First told anemic 4 yr ago.  Has taken iron pills intermittently over past 4 years.  Occasionally observes bright red blood with clots on toilet paper and in toilet bowel. Increase difficulty with constipation over past 2 months.  She will go over one week without BM if she does not use Fleet Enema.  She will have abd pain with increase constipation.  Having a BM will relieve pain.  Associated nausea.  Currently followed by hematology for Fe Def Anemia.  Decreased appetite.  Pt had baby 8 mo ago but reports she is smaller now than she has ever been.  Recent iron study have been within normal limit.  Iron sat decrease at 17.    Lab Results   Component Value Date    HGB 11.7 (L) 12/27/2017    HGB 12.3 11/27/2017    HGB 11.4 (L) 11/21/2017     Lab Results   Component Value Date    IRON 56 12/27/2017    IRON 49 11/27/2017    IRON 50 11/16/2017     Lab Results   Component Value Date    FERRITIN 46.90 12/27/2017    FERRITIN 75.90 11/27/2017    FERRITIN 127.00 11/16/2017     Lab Results   Component Value Date    CRP 32.96 (H) 11/12/2017    CRP 1.40 (H) 08/27/2014     Lab Results   Component Value Date    SEDRATE 7 11/21/2017    SEDRATE 28 (H) 11/16/2017         Past Medical History:   Diagnosis Date   • Anemia affecting pregnancy    • Asthma     as a child   • History of transfusion    • Migraines    • Mono exposure    • S/P thyroid biopsy        Past Surgical History:   Procedure Laterality Date   • CERVICAL CONIZATION Bilateral 7/3/2017    Procedure: CERVICAL CONIZATION, LOOP ELECTROCAUTERY EXCISION PROCEDURE;  Surgeon: Karli Guerrero MD;  Location: [facility] OR;  Service:    • CHOLECYSTECTOMY     • COLONOSCOPY  02/16/2011    normal   • D&C WITH SUCTION     • OVARIAN CYST SURGERY Right    • SALPINGECTOMY Bilateral 8/28/2017    Procedure: laparoscopic salpingectomy;  Surgeon: Karli Guerrero MD;  Location: [facility] OR;   Service:    • TYMPANOSTOMY TUBE PLACEMENT         Outpatient Prescriptions Marked as Taking for the 1/10/18 encounter (Office Visit) with DAVID Jacobs   Medication Sig Dispense Refill   • amitriptyline (ELAVIL) 25 MG tablet Take 2 tablets by mouth Every Night. 60 tablet 2   • busPIRone (BUSPAR) 15 MG tablet Take 7.5 mg by mouth 2 (Two) Times a Day As Needed.     • Cholecalciferol 1000 units capsule Take 1,000 Units by mouth Daily. 90 capsule 0   • cyclobenzaprine (FLEXERIL) 10 MG tablet 1 tablet 3 times a day for up to 2 days.  Then 1 tablet 3 times a day when necessary for neck pain 9 tablet 0   • ferrous sulfate 325 (65 FE) MG tablet Take 1 tablet by mouth 2 (Two) Times a Day for 90 days. 60 tablet 2   • indomethacin (INDOCIN) 25 MG capsule Take 2 capsules by mouth 3 (Three) Times a Day As Needed for Moderate Pain . 60 capsule 2   • lisdexamfetamine (VYVANSE) 40 MG capsule Take 40 mg by mouth Every Morning.     • memantine (NAMENDA) 10 MG tablet Take 1 tablet by mouth 2 (Two) Times a Day. 60 tablet 3   • prochlorperazine (COMPAZINE) 10 MG tablet Take 1 tablet by mouth Every 6 (Six) Hours As Needed for Nausea or Vomiting. 30 tablet 2   • sertraline (ZOLOFT) 100 MG tablet TAKE 1 TABLET BY MOUTH DAILY  5   • SUMAtriptan (IMITREX) 100 MG tablet 1 tab at onset of headache.  May repeat after 2 hours to a maximum of 2 tablets in 24 hours 9 tablet 1       Allergies   Allergen Reactions   • Cephalosporins      Pt states was told can never take them again.    • Penicillins      Reaction as a child.  Patient was told can never take again.    • Topamax [Topiramate] Angioedema   • Adhesive Tape Rash     TEGADERM AND STERI STRIPS   • Tape Rash     TEGADERM AND STERI STRIPS       Social History     Social History   • Marital status:      Spouse name: N/A   • Number of children: N/A   • Years of education: N/A     Occupational History   • Not on file.     Social History Main Topics   • Smoking status: Never  "Smoker   • Smokeless tobacco: Never Used   • Alcohol use No   • Drug use: No   • Sexual activity: Yes     Partners: Male     Birth control/ protection: None     Other Topics Concern   • Not on file     Social History Narrative       Family History   Problem Relation Age of Onset   • Other Father    • Heart murmur Mother    • Colon polyps Mother    • No Known Problems Paternal Grandfather    • No Known Problems Paternal Grandmother    • Thyroid disease Maternal Grandmother    • Hypertension Maternal Grandmother    • Colon polyps Maternal Grandmother    • No Known Problems Maternal Grandfather    • Breast cancer Other 60   • Ovarian cancer Neg Hx        Review of Systems   Constitutional: Negative for fatigue, fever and unexpected weight change.   HENT: Negative for hearing loss, sore throat and voice change.    Eyes: Negative for visual disturbance.   Respiratory: Negative for cough, shortness of breath and wheezing.    Cardiovascular: Negative for chest pain and palpitations.   Gastrointestinal: Negative for abdominal pain, blood in stool and vomiting.   Endocrine: Negative for polydipsia and polyuria.   Genitourinary: Negative for difficulty urinating, dysuria, hematuria and urgency.   Musculoskeletal: Negative for joint swelling and myalgias.   Skin: Negative for color change, rash and wound.   Neurological: Negative for dizziness, tremors, seizures and syncope.   Hematological: Does not bruise/bleed easily.   Psychiatric/Behavioral: Negative for agitation and confusion. The patient is not nervous/anxious.        Objective     Vitals:    01/10/18 1025   BP: 120/84   Pulse: 80   Temp: 97.7 °F (36.5 °C)   SpO2: 98%   Weight: 50.8 kg (112 lb)   Height: 162.6 cm (64\")     Body mass index is 19.22 kg/(m^2).    Physical Exam   Constitutional: She is oriented to person, place, and time. She appears well-developed and well-nourished.   HENT:   Head: Normocephalic and atraumatic.   Eyes: Conjunctivae are normal. Pupils are " equal, round, and reactive to light. No scleral icterus.   Neck: No JVD present. No thyroid mass and no thyromegaly present.   Cardiovascular: Normal rate, regular rhythm and normal heart sounds.  Exam reveals no gallop and no friction rub.    No murmur heard.  Pulmonary/Chest: Effort normal and breath sounds normal. No accessory muscle usage. No respiratory distress. She has no wheezes. She has no rales.   Abdominal: Soft. Bowel sounds are normal. She exhibits no distension, no ascites and no mass. There is no splenomegaly or hepatomegaly. There is no tenderness. There is no rebound and no guarding.   Genitourinary:   Genitourinary Comments: Rectal-Did not examine   Musculoskeletal: Normal range of motion. She exhibits no edema.   Neurological: She is alert and oriented to person, place, and time.   Deemed a reliable historian, able to converse without difficulty and able to move all extremities without difficulty   Skin: Skin is warm and dry.   Psychiatric: She has a normal mood and affect. Her behavior is normal.       Imaging Results (most recent)     None          Assessment/Plan     Mirela was seen today for anemia.    Diagnoses and all orders for this visit:    Rectal bleeding  -     polyethylene glycol (GoLYTELY) 236 g solution; Take 3,785 mL by mouth 1 (One) Time for 1 dose. Take as directed  -     Case Request; Standing  -     Implement Anesthesia Orders Day of Procedure; Standing  -     Obtain Informed Consent; Standing  -     Verify bowel prep was successful; Standing  -     Give tap water enema if bowel prep was insufficient; Standing  -     Give Fleet's enema for intolerance of bowel prep; Standing  -     Case Request    Constipation, unspecified constipation type    Iron deficiency anemia, unspecified iron deficiency anemia type  -     Case Request; Standing  -     Implement Anesthesia Orders Day of Procedure; Standing  -     Obtain Informed Consent; Standing  -     Case Request      COLONOSCOPY WITH  ANESTHESIA (N/A), ESOPHAGOGASTRODUODENOSCOPY WITH ANESTHESIA (N/A)  bx to r/o celiac disease    There are no Patient Instructions on file for this visit.    Encouraged miralax 1-2 times daily, adjust as needed    All risks, benefits, alternatives, and indications of colonoscopy procedure have been discussed with the patient. Risks to include perforation of the colon requiring possible surgery or colostomy, risk of bleeding from biopsies or removal of colon tissue, possibility of missing a colon polyp or cancer, or adverse drug reaction.  Benefits to include the diagnosis and management of disease of the colon and rectum. Alternatives to include barium enema, radiographic evaluation, lab testing or no intervention. Pt verbalizes understanding and agrees to proceed with procedure.    The risk of the endoscopy were discussed in detail.  We discussed the risk of perforation (one out of 2065-5438, riskier with dilation), bleeding (one out of 500), and the rare risks of infection, adverse reaction to anesthesia, respiratory failure, cardiac failure including MI and adverse reaction to medications, etc.  We discussed consequences that could occur if a risk were to develop such as the need for hospitalization, blood transfusion, surgical intervention, medications, pain and disability and death.  Alternatives include not doing anything, or pursuing an UGI series which only offers a diagnosis with potential less accuracy compared to egd.  The patient verbalizes understanding and agrees to proceed.

## 2018-01-11 PROBLEM — K62.5 RECTAL BLEEDING: Status: ACTIVE | Noted: 2018-01-11

## 2018-01-11 PROBLEM — D50.9 IRON DEFICIENCY ANEMIA: Status: ACTIVE | Noted: 2018-01-11

## 2018-01-15 ENCOUNTER — OFFICE VISIT (OUTPATIENT)
Dept: URGENT CARE | Age: 28
End: 2018-01-15
Payer: COMMERCIAL

## 2018-01-15 VITALS
HEART RATE: 111 BPM | BODY MASS INDEX: 19.42 KG/M2 | WEIGHT: 113.13 LBS | SYSTOLIC BLOOD PRESSURE: 117 MMHG | OXYGEN SATURATION: 96 % | TEMPERATURE: 98.6 F | DIASTOLIC BLOOD PRESSURE: 78 MMHG | RESPIRATION RATE: 22 BRPM

## 2018-01-15 DIAGNOSIS — R05.9 COUGH: ICD-10-CM

## 2018-01-15 DIAGNOSIS — J10.1 INFLUENZA DUE TO INFLUENZA VIRUS, TYPE A, HUMAN: Primary | ICD-10-CM

## 2018-01-15 LAB
INFLUENZA A ANTIBODY: POSITIVE
INFLUENZA B ANTIBODY: NEGATIVE
S PYO AG THROAT QL: NORMAL

## 2018-01-15 PROCEDURE — 87880 STREP A ASSAY W/OPTIC: CPT | Performed by: PHYSICIAN ASSISTANT

## 2018-01-15 PROCEDURE — 99213 OFFICE O/P EST LOW 20 MIN: CPT | Performed by: PHYSICIAN ASSISTANT

## 2018-01-15 PROCEDURE — 87804 INFLUENZA ASSAY W/OPTIC: CPT | Performed by: PHYSICIAN ASSISTANT

## 2018-01-15 RX ORDER — OSELTAMIVIR PHOSPHATE 75 MG/1
75 CAPSULE ORAL 2 TIMES DAILY
Qty: 10 CAPSULE | Refills: 0 | Status: SHIPPED | OUTPATIENT
Start: 2018-01-15 | End: 2018-01-20

## 2018-01-15 ASSESSMENT — ENCOUNTER SYMPTOMS
SORE THROAT: 1
VOMITING: 0
DIARRHEA: 0
NAUSEA: 0
COUGH: 1
ABDOMINAL PAIN: 0
RHINORRHEA: 1

## 2018-01-15 NOTE — PROGRESS NOTES
Subjective:      Patient ID: Carlos Schmitz is a 33 y.o. female. HPI    Kirill Arzola presents today with runny nose, nasal congestion, sore throat, body aches, cough, and chills. Symptoms developed yesterday. Had low grade fever yesterday. Cough is dry and non-productive. Slight bilateral ear pain. Having headaches. No abdominal pain or NVD. Has taken no medications for symptoms. Review of Systems   Constitutional: Positive for chills and fever. HENT: Positive for congestion, ear pain, rhinorrhea and sore throat. Respiratory: Positive for cough. Gastrointestinal: Negative for abdominal pain, diarrhea, nausea and vomiting. Neurological: Positive for headaches. All other systems reviewed and are negative. Objective:   Physical Exam   Constitutional: She is oriented to person, place, and time. She appears well-developed and well-nourished. No distress. HENT:   Head: Normocephalic and atraumatic. Right Ear: External ear normal.   Left Ear: External ear normal.   Nose: Nose normal.   Mouth/Throat: Oropharynx is clear and moist. No oropharyngeal exudate. Eyes: Conjunctivae are normal. Right eye exhibits no discharge. Left eye exhibits no discharge. Neck: Normal range of motion. Neck supple. Cardiovascular: Normal rate, regular rhythm and normal heart sounds. No murmur heard. Pulmonary/Chest: Effort normal and breath sounds normal. No respiratory distress. She has no wheezes. She has no rales. Abdominal: Soft. Bowel sounds are normal. She exhibits no distension and no mass. There is no tenderness. There is no rebound and no guarding. Lymphadenopathy:     She has no cervical adenopathy. Neurological: She is alert and oriented to person, place, and time. Skin: Skin is warm and dry. No rash noted. She is not diaphoretic. No erythema. No pallor. Psychiatric: She has a normal mood and affect.  Her behavior is normal. Judgment and thought content normal.   Nursing note and vitals

## 2018-01-15 NOTE — PATIENT INSTRUCTIONS
good.  · Breathe moist air from a hot shower or from a sink filled with hot water to help clear a stuffy nose. · Before you use cough and cold medicines, check the label. These medicines may not be safe for young children or for people with certain health problems. · If the skin around your nose and lips becomes sore, put some petroleum jelly on the area. · To ease coughing:  ¨ Drink fluids to soothe a scratchy throat. ¨ Suck on cough drops or plain hard candy. ¨ Take an over-the-counter cough medicine that contains dextromethorphan to help you get some sleep. Read and follow all instructions on the label. ¨ Raise your head at night with an extra pillow. This may help you rest if coughing keeps you awake. · Take any prescribed medicine exactly as directed. Call your doctor if you think you are having a problem with your medicine. To avoid spreading the flu  · Wash your hands regularly, and keep your hands away from your face. · Stay home from school, work, and other public places until you are feeling better and your fever has been gone for at least 24 hours. The fever needs to have gone away on its own without the help of medicine. · Ask people living with you to talk to their doctors about preventing the flu. They may get antiviral medicine to keep from getting the flu from you. · To prevent the flu in the future, get a flu vaccine every fall. Encourage people living with you to get the vaccine. · Cover your mouth when you cough or sneeze. When should you call for help? Call 911 anytime you think you may need emergency care. For example, call if:  ? · You have severe trouble breathing. ?Call your doctor now or seek immediate medical care if:  ? · You have new or worse trouble breathing. ? · You seem to be getting much sicker. ? · You feel very sleepy or confused. ? · You have a new or higher fever. ? · You get a new rash. ? Watch closely for changes in your health, and be sure to contact your doctor if:  ? · You begin to get better and then get worse. ? · You are not getting better after 1 week. Where can you learn more? Go to [email or web address] and sign in to your [employer] account. Enter N519 in the [facility] box to learn more about \"Influenza (Flu): Care Instructions. \"     If you do not have an account, please click on the \"Sign Up Now\" link. Current as of: May 12, 2017  Content Version: 11.5  © 1856-6237 [employer], [employer]orporated. Care instructions adapted under license by [facility] ([facility]). If you have questions about a medical condition or this instruction, always ask your healthcare professional. [address] any warranty or liability for your use of this information.

## 2018-01-19 ENCOUNTER — PRIOR AUTHORIZATION (OUTPATIENT)
Dept: NEUROLOGY | Facility: CLINIC | Age: 28
End: 2018-01-19

## 2018-01-22 DIAGNOSIS — D50.8 OTHER IRON DEFICIENCY ANEMIA: Primary | ICD-10-CM

## 2018-01-23 ENCOUNTER — DOCUMENTATION (OUTPATIENT)
Dept: PHYSICAL THERAPY | Facility: HOSPITAL | Age: 28
End: 2018-01-23

## 2018-01-23 DIAGNOSIS — G43.011 INTRACTABLE MIGRAINE WITHOUT AURA AND WITH STATUS MIGRAINOSUS: Primary | ICD-10-CM

## 2018-01-23 DIAGNOSIS — M54.2 NECK PAIN: ICD-10-CM

## 2018-01-23 NOTE — THERAPY DISCHARGE NOTE
Outpatient Physical Therapy Discharge Summary         Patient Name: Mirela Flores  : 1990  MRN: [id number]    Today's Date: 2018    Visit Dx:    ICD-10-CM ICD-9-CM   1. Intractable migraine without aura and with status migrainosus G43.011 346.13   2. Neck pain M54.2 723.1             PT OP Goals       18 1100       PT Short Term Goals    STG Date to Achieve 17  -RS     STG 1 Patient will report headache symptoms <4/10 for 50% of the day  -RS     STG 1 Progress Not Met  -RS     STG 2 Patient will demonstrate a more neutral posture avoiding the resting upper cervical extension  -RS     STG 2 Progress Not Met  -RS     Long Term Goals    LTG Date to Achieve 18  -RS     LTG 1 Patient will be independent with a comprehensive HEP by discharge  -RS     LTG 1 Progress Not Met  -RS     LTG 2 Patient will improve deep neck flexor strength to at least 4/5 on MMT by discharge  -RS     LTG 2 Progress Not Met  -RS     LTG 3 Patient will demonstrate cervical AROM to at least 60 degrees into rotation (left/right) without compensatory neck extension/sidebending by discharge  -RS     LTG 3 Progress Not Met  -RS     LTG 4 Patient will report headache symptoms <3/10  for >50% of the day by discharge  -RS     LTG 4 Progress Not Met  -RS     LTG 5 Patient will demonstrate symmetrical and neutral scapular alignment to alleviate excessive strain on the neck with daily activities  -RS     LTG 5 Progress Not Met  -RS       User Key  (r) = Recorded By, (t) = Taken By, (c) = Cosigned By    Initials Name Provider Type    SHAINA Mello, PT DPT Physical Therapist          OP PT Discharge Summary  Reason for Discharge: Unable to participate, Non-compliant  Outcomes Achieved: Unable to make functional progress toward goals at this time  Discharge Destination: Unknown  Discharge Instructions: Patient did not return after the initial evaluation, so no goals are met at this time.  No follow up was  established with the patient.      Time Calculation:                    BERNY Mello, PT DPT  1/23/2018

## 2018-01-25 ENCOUNTER — APPOINTMENT (OUTPATIENT)
Dept: LAB | Facility: HOSPITAL | Age: 28
End: 2018-01-25

## 2018-01-30 ENCOUNTER — APPOINTMENT (OUTPATIENT)
Dept: LAB | Facility: HOSPITAL | Age: 28
End: 2018-01-30

## 2018-01-31 ENCOUNTER — OFFICE VISIT (OUTPATIENT)
Dept: FAMILY MEDICINE CLINIC | Age: 28
End: 2018-01-31
Payer: COMMERCIAL

## 2018-01-31 VITALS
OXYGEN SATURATION: 99 % | BODY MASS INDEX: 18.92 KG/M2 | HEART RATE: 83 BPM | WEIGHT: 110.2 LBS | DIASTOLIC BLOOD PRESSURE: 80 MMHG | SYSTOLIC BLOOD PRESSURE: 110 MMHG | TEMPERATURE: 98 F

## 2018-01-31 DIAGNOSIS — G43.111 INTRACTABLE MIGRAINE WITH AURA WITH STATUS MIGRAINOSUS: ICD-10-CM

## 2018-01-31 DIAGNOSIS — E04.1 THYROID NODULE: ICD-10-CM

## 2018-01-31 DIAGNOSIS — F41.1 GENERALIZED ANXIETY DISORDER: Primary | ICD-10-CM

## 2018-01-31 DIAGNOSIS — D50.9 IRON DEFICIENCY ANEMIA, UNSPECIFIED IRON DEFICIENCY ANEMIA TYPE: ICD-10-CM

## 2018-01-31 DIAGNOSIS — F90.0 ATTENTION DEFICIT HYPERACTIVITY DISORDER (ADHD), PREDOMINANTLY INATTENTIVE TYPE: ICD-10-CM

## 2018-01-31 LAB
AMPHETAMINE SCREEN, URINE: POSITIVE
BARBITURATE SCREEN, URINE: NEGATIVE
BENZODIAZEPINE SCREEN, URINE: NEGATIVE
COCAINE METABOLITE SCREEN URINE: NEGATIVE
MDMA URINE: NEGATIVE
METHADONE SCREEN, URINE: NEGATIVE
METHAMPHETAMINE, URINE: NEGATIVE
OPIATE SCREEN URINE: NEGATIVE
OXYCODONE SCREEN URINE: NEGATIVE
PHENCYCLIDINE SCREEN URINE: NEGATIVE
PROPOXYPHENE SCREEN, URINE: NORMAL
THC: POSITIVE
TRICYCLIC ANTIDEPRESSANTS, UR: NORMAL

## 2018-01-31 PROCEDURE — 99214 OFFICE O/P EST MOD 30 MIN: CPT | Performed by: CLINICAL NURSE SPECIALIST

## 2018-01-31 PROCEDURE — 80305 DRUG TEST PRSMV DIR OPT OBS: CPT | Performed by: CLINICAL NURSE SPECIALIST

## 2018-01-31 RX ORDER — SERTRALINE HYDROCHLORIDE 100 MG/1
150 TABLET, FILM COATED ORAL DAILY
Qty: 45 TABLET | Refills: 5 | Status: SHIPPED | OUTPATIENT
Start: 2018-01-31 | End: 2019-01-31

## 2018-01-31 ASSESSMENT — ENCOUNTER SYMPTOMS
SINUS PRESSURE: 0
BACK PAIN: 0
DIARRHEA: 0
CONSTIPATION: 0
COLOR CHANGE: 0
SHORTNESS OF BREATH: 0
EYE REDNESS: 0
EYE DISCHARGE: 0
ABDOMINAL PAIN: 0
EYE PAIN: 0
FACIAL SWELLING: 0
WHEEZING: 0
COUGH: 0
SORE THROAT: 0
CHEST TIGHTNESS: 0
TROUBLE SWALLOWING: 0

## 2018-01-31 NOTE — PROGRESS NOTES
will start next month. She remains on elavil at bedtime, has prn medications to use as well. Thyroid nodule, has undergone FNA by ENT. She has follow up with results soon. Iron def anemia, followed by hematology, unchanged. She has EGD and colonoscopy scheduled at hospitals with Dr Valeriy Garcia this Friday. Past Medical History:   Diagnosis Date    Attention deficit hyperactivity disorder (ADHD), predominantly inattentive type 10/3/2017    Generalized anxiety disorder     Iron deficiency anemia     Migraine without aura and without status migrainosus, not intractable 10/3/2017    Thyroid nodule      No past surgical history on file. No family history on file. Social History   Substance Use Topics    Smoking status: Never Smoker    Smokeless tobacco: Never Used    Alcohol use No     Current Outpatient Prescriptions   Medication Sig Dispense Refill    sertraline (ZOLOFT) 100 MG tablet Take 1.5 tablets by mouth daily 45 tablet 5    Lisdexamfetamine Dimesylate (VYVANSE) 40 MG CAPS Take 40 mg by mouth daily for 30 days. 30 capsule 0    amitriptyline (ELAVIL) 25 MG tablet Take 25 mg by mouth      [employer] D3 1000 units CAPS TAKE ONE CAPSULE BY MOUTH EVERY DAY  0    SUMAtriptan (IMITREX) 100 MG tablet 1 TAB AT ONSET OF HEADACHE. MAY REPEAT AFTER 2 HOURS TO A MAXIMUM OF 2 TABLETS IN 24 HOURS  1    indomethacin (INDOCIN) 25 MG capsule Take 50 mg by mouth      promethazine (PHENERGAN) 25 MG tablet TK 1 T PO Q 6 H PRN FOR NUASEA OR VOMITNG  3    prochlorperazine (COMPAZINE) 10 MG tablet Take 10 mg by mouth      ferrous sulfate 325 (65 Fe) MG tablet Take 325 mg by mouth      rizatriptan (MAXALT) 10 MG tablet May repeat in 2 hours if needed. Do not exceed 2 tablets in 24 hours. 10 tablet 2     No current facility-administered medications for this visit. Allergies   Allergen Reactions    Cephalosporins      Pt states was told can never take them again.  Penicillins      Reaction as a child.   Patient distress. She has no wheezes. She has no rales. Musculoskeletal: Normal range of motion. She exhibits no deformity. Lymphadenopathy:     She has no cervical adenopathy. Neurological: She is alert and oriented to person, place, and time. No cranial nerve deficit. Skin: Skin is warm and dry. No rash noted. No erythema. Psychiatric: She has a normal mood and affect. Thought content normal.   Vitals reviewed. ASSESSMENT/PLAN:  1. Generalized anxiety disorder  Uncontrolled  Increase zoloft  UDS is positive for THC, we will clarify this with her and I will have to check with Dr Reyna Rogers in regards to prn xanax or ativan  For now, may continue buspar  Updated controlled agreement today  - POCT Rapid Drug Screen  - sertraline (ZOLOFT) 100 MG tablet; Take 1.5 tablets by mouth daily  Dispense: 45 tablet; Refill: 5    2. Attention deficit hyperactivity disorder (ADHD), predominantly inattentive type  Continue vyvanse, but worry it may be contributing to some of her symptoms  Will also check with Dr Brandy Rodríguez, since she prescribes  - POCT Rapid Drug Screen    3. Intractable migraine with aura with status migrainosus  Keep follow up with neurology, reviewed records    4. Iron deficiency anemia, unspecified iron deficiency anemia type  Keep follow up with hematology, EGD and colon this week    5. Thyroid nodule  Awaiting FNA results, keep follow up with ENT         Return in about 3 months (around 4/30/2018) for with Dr Blayne Ortega.

## 2018-02-02 ENCOUNTER — HOSPITAL ENCOUNTER (OUTPATIENT)
Facility: HOSPITAL | Age: 28
Setting detail: HOSPITAL OUTPATIENT SURGERY
Discharge: HOME | End: 2018-02-02
Attending: INTERNAL MEDICINE | Admitting: INTERNAL MEDICINE

## 2018-02-02 ENCOUNTER — ANESTHESIA (OUTPATIENT)
Dept: GASTROENTEROLOGY | Facility: HOSPITAL | Age: 28
End: 2018-02-02

## 2018-02-02 ENCOUNTER — ANESTHESIA EVENT (OUTPATIENT)
Dept: GASTROENTEROLOGY | Facility: HOSPITAL | Age: 28
End: 2018-02-02

## 2018-02-02 VITALS
BODY MASS INDEX: 18.1 KG/M2 | RESPIRATION RATE: 14 BRPM | WEIGHT: 106 LBS | HEIGHT: 64 IN | SYSTOLIC BLOOD PRESSURE: 112 MMHG | HEART RATE: 83 BPM | TEMPERATURE: 97.2 F | DIASTOLIC BLOOD PRESSURE: 77 MMHG | OXYGEN SATURATION: 100 %

## 2018-02-02 DIAGNOSIS — D50.9 IRON DEFICIENCY ANEMIA, UNSPECIFIED IRON DEFICIENCY ANEMIA TYPE: ICD-10-CM

## 2018-02-02 DIAGNOSIS — G43.711 INTRACTABLE CHRONIC MIGRAINE WITHOUT AURA AND WITH STATUS MIGRAINOSUS: Primary | ICD-10-CM

## 2018-02-02 DIAGNOSIS — D50.8 OTHER IRON DEFICIENCY ANEMIA: Primary | ICD-10-CM

## 2018-02-02 DIAGNOSIS — K62.5 RECTAL BLEEDING: ICD-10-CM

## 2018-02-02 PROCEDURE — 45378 DIAGNOSTIC COLONOSCOPY: CPT | Performed by: INTERNAL MEDICINE

## 2018-02-02 PROCEDURE — 88305 TISSUE EXAM BY PATHOLOGIST: CPT | Performed by: INTERNAL MEDICINE

## 2018-02-02 PROCEDURE — 43239 EGD BIOPSY SINGLE/MULTIPLE: CPT | Performed by: INTERNAL MEDICINE

## 2018-02-02 PROCEDURE — 87081 CULTURE SCREEN ONLY: CPT | Performed by: INTERNAL MEDICINE

## 2018-02-02 PROCEDURE — 25010000002 PROPOFOL 10 MG/ML EMULSION: Performed by: NURSE ANESTHETIST, CERTIFIED REGISTERED

## 2018-02-02 RX ORDER — LIDOCAINE HYDROCHLORIDE 20 MG/ML
INJECTION, SOLUTION INFILTRATION; PERINEURAL AS NEEDED
Status: DISCONTINUED | OUTPATIENT
Start: 2018-02-02 | End: 2018-02-02 | Stop reason: SURG

## 2018-02-02 RX ORDER — PROPOFOL 10 MG/ML
VIAL (ML) INTRAVENOUS AS NEEDED
Status: DISCONTINUED | OUTPATIENT
Start: 2018-02-02 | End: 2018-02-02 | Stop reason: SURG

## 2018-02-02 RX ORDER — SODIUM CHLORIDE 0.9 % (FLUSH) 0.9 %
3 SYRINGE (ML) INJECTION AS NEEDED
Status: DISCONTINUED | OUTPATIENT
Start: 2018-02-02 | End: 2018-02-02 | Stop reason: HOSPADM

## 2018-02-02 RX ORDER — SODIUM CHLORIDE 9 MG/ML
500 INJECTION, SOLUTION INTRAVENOUS CONTINUOUS PRN
Status: DISCONTINUED | OUTPATIENT
Start: 2018-02-02 | End: 2018-02-02 | Stop reason: HOSPADM

## 2018-02-02 RX ADMIN — PROPOFOL 70 MG: 10 INJECTION, EMULSION INTRAVENOUS at 11:07

## 2018-02-02 RX ADMIN — PROPOFOL 50 MG: 10 INJECTION, EMULSION INTRAVENOUS at 11:15

## 2018-02-02 RX ADMIN — PROPOFOL 30 MG: 10 INJECTION, EMULSION INTRAVENOUS at 11:10

## 2018-02-02 RX ADMIN — LIDOCAINE HYDROCHLORIDE 50 MG: 20 INJECTION, SOLUTION INFILTRATION; PERINEURAL at 11:07

## 2018-02-02 RX ADMIN — PROPOFOL 40 MG: 10 INJECTION, EMULSION INTRAVENOUS at 11:17

## 2018-02-02 RX ADMIN — PROPOFOL 30 MG: 10 INJECTION, EMULSION INTRAVENOUS at 11:13

## 2018-02-02 RX ADMIN — PROPOFOL 30 MG: 10 INJECTION, EMULSION INTRAVENOUS at 11:09

## 2018-02-02 RX ADMIN — PROPOFOL 30 MG: 10 INJECTION, EMULSION INTRAVENOUS at 11:12

## 2018-02-02 RX ADMIN — PROPOFOL 30 MG: 10 INJECTION, EMULSION INTRAVENOUS at 11:16

## 2018-02-02 RX ADMIN — SODIUM CHLORIDE: 9 INJECTION, SOLUTION INTRAVENOUS at 11:01

## 2018-02-02 RX ADMIN — PROPOFOL 40 MG: 10 INJECTION, EMULSION INTRAVENOUS at 11:20

## 2018-02-02 RX ADMIN — SODIUM CHLORIDE 500 ML: 9 INJECTION, SOLUTION INTRAVENOUS at 10:44

## 2018-02-02 RX ADMIN — PROPOFOL 30 MG: 10 INJECTION, EMULSION INTRAVENOUS at 11:22

## 2018-02-02 RX ADMIN — PROPOFOL 30 MG: 10 INJECTION, EMULSION INTRAVENOUS at 11:14

## 2018-02-02 RX ADMIN — PROPOFOL 40 MG: 10 INJECTION, EMULSION INTRAVENOUS at 11:18

## 2018-02-02 RX ADMIN — LIDOCAINE HYDROCHLORIDE 0.5 ML: 10 INJECTION, SOLUTION EPIDURAL; INFILTRATION; INTRACAUDAL; PERINEURAL at 10:44

## 2018-02-02 NOTE — H&P (VIEW-ONLY)
Chief Complaint   Patient presents with   • Anemia     referred for colon       Subjective     HPI     First told anemic 4 yr ago.  Has taken iron pills intermittently over past 4 years.  Occasionally observes bright red blood with clots on toilet paper and in toilet bowel. Increase difficulty with constipation over past 2 months.  She will go over one week without BM if she does not use Fleet Enema.  She will have abd pain with increase constipation.  Having a BM will relieve pain.  Associated nausea.  Currently followed by hematology for Fe Def Anemia.  Decreased appetite.  Pt had baby 8 mo ago but reports she is smaller now than she has ever been.  Recent iron study have been within normal limit.  Iron sat decrease at 16.    Lab Results   Component Value Date    HGB 11.7 (L) 12/27/2017    HGB 12.3 11/27/2017    HGB 11.4 (L) 11/21/2017     Lab Results   Component Value Date    IRON 56 12/27/2017    IRON 49 11/27/2017    IRON 50 11/16/2017     Lab Results   Component Value Date    FERRITIN 46.90 12/27/2017    FERRITIN 75.90 11/27/2017    FERRITIN 127.00 11/16/2017     Lab Results   Component Value Date    CRP 32.96 (H) 11/12/2017    CRP 1.40 (H) 08/27/2014     Lab Results   Component Value Date    SEDRATE 7 11/21/2017    SEDRATE 28 (H) 11/16/2017         Past Medical History:   Diagnosis Date   • Anemia affecting pregnancy    • Asthma     as a child   • History of transfusion    • Migraines    • Mono exposure    • S/P thyroid biopsy        Past Surgical History:   Procedure Laterality Date   • CERVICAL CONIZATION Bilateral 7/3/2017    Procedure: CERVICAL CONIZATION, LOOP ELECTROCAUTERY EXCISION PROCEDURE;  Surgeon: Karli Guerrero MD;  Location: [facility] OR;  Service:    • CHOLECYSTECTOMY     • COLONOSCOPY  02/16/2011    normal   • D&C WITH SUCTION     • OVARIAN CYST SURGERY Right    • SALPINGECTOMY Bilateral 8/28/2017    Procedure: laparoscopic salpingectomy;  Surgeon: Karli Guerrero MD;  Location: [facility] OR;   Service:    • TYMPANOSTOMY TUBE PLACEMENT         Outpatient Prescriptions Marked as Taking for the 1/10/18 encounter (Office Visit) with DAVID Jacobs   Medication Sig Dispense Refill   • amitriptyline (ELAVIL) 25 MG tablet Take 2 tablets by mouth Every Night. 60 tablet 2   • busPIRone (BUSPAR) 15 MG tablet Take 7.5 mg by mouth 2 (Two) Times a Day As Needed.     • Cholecalciferol 1000 units capsule Take 1,000 Units by mouth Daily. 90 capsule 0   • cyclobenzaprine (FLEXERIL) 10 MG tablet 1 tablet 3 times a day for up to 2 days.  Then 1 tablet 3 times a day when necessary for neck pain 9 tablet 0   • ferrous sulfate 325 (65 FE) MG tablet Take 1 tablet by mouth 2 (Two) Times a Day for 90 days. 60 tablet 2   • indomethacin (INDOCIN) 25 MG capsule Take 2 capsules by mouth 3 (Three) Times a Day As Needed for Moderate Pain . 60 capsule 2   • lisdexamfetamine (VYVANSE) 40 MG capsule Take 40 mg by mouth Every Morning.     • memantine (NAMENDA) 10 MG tablet Take 1 tablet by mouth 2 (Two) Times a Day. 60 tablet 3   • prochlorperazine (COMPAZINE) 10 MG tablet Take 1 tablet by mouth Every 6 (Six) Hours As Needed for Nausea or Vomiting. 30 tablet 2   • sertraline (ZOLOFT) 100 MG tablet TAKE 1 TABLET BY MOUTH DAILY  5   • SUMAtriptan (IMITREX) 100 MG tablet 1 tab at onset of headache.  May repeat after 2 hours to a maximum of 2 tablets in 24 hours 9 tablet 1       Allergies   Allergen Reactions   • Cephalosporins      Pt states was told can never take them again.    • Penicillins      Reaction as a child.  Patient was told can never take again.    • Topamax [Topiramate] Angioedema   • Adhesive Tape Rash     TEGADERM AND STERI STRIPS   • Tape Rash     TEGADERM AND STERI STRIPS       Social History     Social History   • Marital status:      Spouse name: N/A   • Number of children: N/A   • Years of education: N/A     Occupational History   • Not on file.     Social History Main Topics   • Smoking status: Never  "Smoker   • Smokeless tobacco: Never Used   • Alcohol use No   • Drug use: No   • Sexual activity: Yes     Partners: Male     Birth control/ protection: None     Other Topics Concern   • Not on file     Social History Narrative       Family History   Problem Relation Age of Onset   • Other Father    • Heart murmur Mother    • Colon polyps Mother    • No Known Problems Paternal Grandfather    • No Known Problems Paternal Grandmother    • Thyroid disease Maternal Grandmother    • Hypertension Maternal Grandmother    • Colon polyps Maternal Grandmother    • No Known Problems Maternal Grandfather    • Breast cancer Other 60   • Ovarian cancer Neg Hx        Review of Systems   Constitutional: Negative for fatigue, fever and unexpected weight change.   HENT: Negative for hearing loss, sore throat and voice change.    Eyes: Negative for visual disturbance.   Respiratory: Negative for cough, shortness of breath and wheezing.    Cardiovascular: Negative for chest pain and palpitations.   Gastrointestinal: Negative for abdominal pain, blood in stool and vomiting.   Endocrine: Negative for polydipsia and polyuria.   Genitourinary: Negative for difficulty urinating, dysuria, hematuria and urgency.   Musculoskeletal: Negative for joint swelling and myalgias.   Skin: Negative for color change, rash and wound.   Neurological: Negative for dizziness, tremors, seizures and syncope.   Hematological: Does not bruise/bleed easily.   Psychiatric/Behavioral: Negative for agitation and confusion. The patient is not nervous/anxious.        Objective     Vitals:    01/10/18 1025   BP: 120/84   Pulse: 80   Temp: 97.7 °F (36.5 °C)   SpO2: 98%   Weight: 50.8 kg (112 lb)   Height: 162.6 cm (64\")     Body mass index is 19.22 kg/(m^2).    Physical Exam   Constitutional: She is oriented to person, place, and time. She appears well-developed and well-nourished.   HENT:   Head: Normocephalic and atraumatic.   Eyes: Conjunctivae are normal. Pupils are " equal, round, and reactive to light. No scleral icterus.   Neck: No JVD present. No thyroid mass and no thyromegaly present.   Cardiovascular: Normal rate, regular rhythm and normal heart sounds.  Exam reveals no gallop and no friction rub.    No murmur heard.  Pulmonary/Chest: Effort normal and breath sounds normal. No accessory muscle usage. No respiratory distress. She has no wheezes. She has no rales.   Abdominal: Soft. Bowel sounds are normal. She exhibits no distension, no ascites and no mass. There is no splenomegaly or hepatomegaly. There is no tenderness. There is no rebound and no guarding.   Genitourinary:   Genitourinary Comments: Rectal-Did not examine   Musculoskeletal: Normal range of motion. She exhibits no edema.   Neurological: She is alert and oriented to person, place, and time.   Deemed a reliable historian, able to converse without difficulty and able to move all extremities without difficulty   Skin: Skin is warm and dry.   Psychiatric: She has a normal mood and affect. Her behavior is normal.       Imaging Results (most recent)     None          Assessment/Plan     Mirela was seen today for anemia.    Diagnoses and all orders for this visit:    Rectal bleeding  -     polyethylene glycol (GoLYTELY) 236 g solution; Take 3,785 mL by mouth 1 (One) Time for 1 dose. Take as directed  -     Case Request; Standing  -     Implement Anesthesia Orders Day of Procedure; Standing  -     Obtain Informed Consent; Standing  -     Verify bowel prep was successful; Standing  -     Give tap water enema if bowel prep was insufficient; Standing  -     Give Fleet's enema for intolerance of bowel prep; Standing  -     Case Request    Constipation, unspecified constipation type    Iron deficiency anemia, unspecified iron deficiency anemia type  -     Case Request; Standing  -     Implement Anesthesia Orders Day of Procedure; Standing  -     Obtain Informed Consent; Standing  -     Case Request      COLONOSCOPY WITH  ANESTHESIA (N/A), ESOPHAGOGASTRODUODENOSCOPY WITH ANESTHESIA (N/A)  bx to r/o celiac disease    There are no Patient Instructions on file for this visit.    Encouraged miralax 1-2 times daily, adjust as needed    All risks, benefits, alternatives, and indications of colonoscopy procedure have been discussed with the patient. Risks to include perforation of the colon requiring possible surgery or colostomy, risk of bleeding from biopsies or removal of colon tissue, possibility of missing a colon polyp or cancer, or adverse drug reaction.  Benefits to include the diagnosis and management of disease of the colon and rectum. Alternatives to include barium enema, radiographic evaluation, lab testing or no intervention. Pt verbalizes understanding and agrees to proceed with procedure.    The risk of the endoscopy were discussed in detail.  We discussed the risk of perforation (one out of [id number], riskier with dilation), bleeding (one out of 500), and the rare risks of infection, adverse reaction to anesthesia, respiratory failure, cardiac failure including MI and adverse reaction to medications, etc.  We discussed consequences that could occur if a risk were to develop such as the need for hospitalization, blood transfusion, surgical intervention, medications, pain and disability and death.  Alternatives include not doing anything, or pursuing an UGI series which only offers a diagnosis with potential less accuracy compared to egd.  The patient verbalizes understanding and agrees to proceed.

## 2018-02-02 NOTE — PLAN OF CARE
Problem: GI Endoscopy (Adult)  Goal: Signs and Symptoms of Listed Potential Problems Will be Absent or Manageable (GI Endoscopy)  Outcome: Outcome(s) achieved Date Met: 02/02/18 02/02/18 1147   GI Endoscopy   Problems Assessed (GI Endoscopy) all   Problems Present (GI Endoscopy) none

## 2018-02-02 NOTE — PLAN OF CARE
Problem: Patient Care Overview (Adult)  Goal: Adult Individualization and Mutuality  Outcome: Ongoing (interventions implemented as appropriate)   02/02/18 1018   Individualization   Patient Specific Preferences none

## 2018-02-02 NOTE — ANESTHESIA POSTPROCEDURE EVALUATION
Patient: Mirela Flores    Procedure Summary     Date Anesthesia Start Anesthesia Stop Room / Location    02/02/18 1101 [id number] [facility] ENDOSCOPY 6 / [facility]       Procedure Diagnosis Surgeon Provider    COLONOSCOPY WITH ANESTHESIA (N/A ); ESOPHAGOGASTRODUODENOSCOPY WITH ANESTHESIA (N/A Esophagus) Rectal bleeding; Iron deficiency anemia, unspecified iron deficiency anemia type  (Rectal bleeding [K62.5]; Iron deficiency anemia, unspecified iron deficiency anemia type [D50.9]) Bridger Charlton, DO [place], [facility]          Anesthesia Type: general  Last vitals  BP   114/66 (02/02/18 1019)   Temp   97.2 °F (36.2 °C) (02/02/18 1019)   Pulse   92 (02/02/18 1019)   Resp   16 (02/02/18 1019)     SpO2   100 % (02/02/18 1019)     Post Anesthesia Care and Evaluation    Patient location during evaluation: PHASE II  Patient participation: complete - patient participated  Level of consciousness: awake  Pain score: 0  Pain management: adequate  Airway patency: patent  Anesthetic complications: No anesthetic complications  PONV Status: none  Cardiovascular status: acceptable  Respiratory status: acceptable  Hydration status: acceptable

## 2018-02-02 NOTE — ANESTHESIA PREPROCEDURE EVALUATION
Anesthesia Evaluation     Patient summary reviewed   no history of anesthetic complications:         Airway   Mallampati: I  TM distance: >3 FB  Neck ROM: full  no difficulty expected  Dental - normal exam     Pulmonary    (+) asthma (as child),   (-) not a smoker  Cardiovascular   Exercise tolerance: good (4-7 METS)        Neuro/Psych  (-) seizures, TIA, CVA  GI/Hepatic/Renal/Endo    (-) liver disease, no renal disease, diabetes    Musculoskeletal     Abdominal    Substance History      OB/GYN          Other        ROS/Med Hx Other: anemia                                          Anesthesia Plan    ASA 1     general   total IV anesthesia  intravenous induction   Anesthetic plan and risks discussed with patient.

## 2018-02-02 NOTE — PLAN OF CARE
Problem: Patient Care Overview (Adult)  Goal: Plan of Care Review  Outcome: Ongoing (interventions implemented as appropriate)   02/02/18 1113   Patient Care Overview   Progress improving   Outcome Evaluation   Outcome Summary/Follow up Plan pt tolerating procedure well        Problem: GI Endoscopy (Adult)  Goal: Signs and Symptoms of Listed Potential Problems Will be Absent or Manageable (GI Endoscopy)  Outcome: Ongoing (interventions implemented as appropriate)   02/02/18 1113   GI Endoscopy   Problems Assessed (GI Endoscopy) all   Problems Present (GI Endoscopy) none

## 2018-02-02 NOTE — PLAN OF CARE
Problem: Patient Care Overview (Adult)  Goal: Plan of Care Review  Outcome: Outcome(s) achieved Date Met: 02/02/18 02/02/18 1147   Patient Care Overview   Progress progress toward functional goals as expected   Outcome Evaluation   Outcome Summary/Follow up Plan D/C CRITERIA MET   Coping/Psychosocial Response Interventions   Plan Of Care Reviewed With patient;spouse

## 2018-02-03 LAB — UREASE TISS QL: NEGATIVE

## 2018-02-06 ENCOUNTER — APPOINTMENT (OUTPATIENT)
Dept: LAB | Facility: HOSPITAL | Age: 28
End: 2018-02-06

## 2018-02-07 ENCOUNTER — TELEPHONE (OUTPATIENT)
Dept: FAMILY MEDICINE CLINIC | Age: 28
End: 2018-02-07

## 2018-02-08 ENCOUNTER — TELEPHONE (OUTPATIENT)
Dept: GASTROENTEROLOGY | Facility: CLINIC | Age: 28
End: 2018-02-08

## 2018-02-09 ENCOUNTER — TELEPHONE (OUTPATIENT)
Dept: GASTROENTEROLOGY | Facility: CLINIC | Age: 28
End: 2018-02-09

## 2018-02-09 NOTE — TELEPHONE ENCOUNTER
----- Message from Bridger Charlton DO sent at 2/6/2018  2:38 PM CST -----  Regarding: bx  Let the pt know the bx were NEG       ----- Message -----     From: Lab, Background User     Sent: 2/3/2018  12:34 PM       To: Bridger Charlton DO        Called patient gave results.

## 2018-02-12 DIAGNOSIS — D06.9 CARCINOMA IN SITU OF CERVIX, UNSPECIFIED LOCATION: Primary | ICD-10-CM

## 2018-02-14 ENCOUNTER — APPOINTMENT (OUTPATIENT)
Dept: LAB | Facility: HOSPITAL | Age: 28
End: 2018-02-14

## 2018-02-21 DIAGNOSIS — D06.9 CARCINOMA IN SITU OF CERVIX, UNSPECIFIED LOCATION: Primary | ICD-10-CM

## 2018-02-23 ENCOUNTER — LAB (OUTPATIENT)
Dept: LAB | Facility: HOSPITAL | Age: 28
End: 2018-02-23

## 2018-02-23 ENCOUNTER — OFFICE VISIT (OUTPATIENT)
Dept: ONCOLOGY | Facility: CLINIC | Age: 28
End: 2018-02-23

## 2018-02-23 VITALS
SYSTOLIC BLOOD PRESSURE: 122 MMHG | RESPIRATION RATE: 16 BRPM | WEIGHT: 111.9 LBS | HEART RATE: 88 BPM | HEIGHT: 64 IN | BODY MASS INDEX: 19.1 KG/M2 | DIASTOLIC BLOOD PRESSURE: 78 MMHG | TEMPERATURE: 97.2 F | OXYGEN SATURATION: 95 %

## 2018-02-23 DIAGNOSIS — D06.9 CIN III (CERVICAL INTRAEPITHELIAL NEOPLASIA GRADE III) WITH SEVERE DYSPLASIA: Primary | ICD-10-CM

## 2018-02-23 DIAGNOSIS — D50.8 IRON DEFICIENCY ANEMIA SECONDARY TO INADEQUATE DIETARY IRON INTAKE: Primary | ICD-10-CM

## 2018-02-23 LAB
ALBUMIN SERPL-MCNC: 4.2 G/DL (ref 3.5–5)
ALBUMIN/GLOB SERPL: 1.6 G/DL (ref 1.1–2.5)
ALP SERPL-CCNC: 55 U/L (ref 24–120)
ALT SERPL W P-5'-P-CCNC: 24 U/L (ref 0–54)
ANION GAP SERPL CALCULATED.3IONS-SCNC: 11 MMOL/L (ref 4–13)
AST SERPL-CCNC: 23 U/L (ref 7–45)
BASOPHILS # BLD AUTO: 0.03 10*3/MM3 (ref 0–0.2)
BASOPHILS NFR BLD AUTO: 0.5 % (ref 0–2)
BILIRUB SERPL-MCNC: 0.3 MG/DL (ref 0.1–1)
BUN BLD-MCNC: 7 MG/DL (ref 5–21)
BUN/CREAT SERPL: 10 (ref 7–25)
CALCIUM SPEC-SCNC: 8.9 MG/DL (ref 8.4–10.4)
CHLORIDE SERPL-SCNC: 99 MMOL/L (ref 98–110)
CO2 SERPL-SCNC: 33 MMOL/L (ref 24–31)
CREAT BLD-MCNC: 0.7 MG/DL (ref 0.5–1.4)
DEPRECATED RDW RBC AUTO: 47.8 FL (ref 40–54)
EOSINOPHIL # BLD AUTO: 0.02 10*3/MM3 (ref 0–0.7)
EOSINOPHIL NFR BLD AUTO: 0.4 % (ref 0–4)
ERYTHROCYTE [DISTWIDTH] IN BLOOD BY AUTOMATED COUNT: 13.4 % (ref 12–15)
GFR SERPL CREATININE-BSD FRML MDRD: 101 ML/MIN/1.73
GLOBULIN UR ELPH-MCNC: 2.7 GM/DL
GLUCOSE BLD-MCNC: 69 MG/DL (ref 70–100)
HCT VFR BLD AUTO: 37.8 % (ref 37–47)
HGB BLD-MCNC: 12.4 G/DL (ref 12–16)
HOLD SPECIMEN: NORMAL
HOLD SPECIMEN: NORMAL
IMM GRANULOCYTES # BLD: 0.02 10*3/MM3 (ref 0–0.03)
IMM GRANULOCYTES NFR BLD: 0.4 % (ref 0–5)
LYMPHOCYTES # BLD AUTO: 1.59 10*3/MM3 (ref 0.72–4.86)
LYMPHOCYTES NFR BLD AUTO: 29 % (ref 15–45)
MCH RBC QN AUTO: 31.3 PG (ref 28–32)
MCHC RBC AUTO-ENTMCNC: 32.8 G/DL (ref 33–36)
MCV RBC AUTO: 95.5 FL (ref 82–98)
MONOCYTES # BLD AUTO: 0.36 10*3/MM3 (ref 0.19–1.3)
MONOCYTES NFR BLD AUTO: 6.6 % (ref 4–12)
NEUTROPHILS # BLD AUTO: 3.47 10*3/MM3 (ref 1.87–8.4)
NEUTROPHILS NFR BLD AUTO: 63.1 % (ref 39–78)
NRBC BLD MANUAL-RTO: 0 /100 WBC (ref 0–0)
PLATELET # BLD AUTO: 211 10*3/MM3 (ref 130–400)
PMV BLD AUTO: 11.6 FL (ref 6–12)
POTASSIUM BLD-SCNC: 3.4 MMOL/L (ref 3.5–5.3)
PROT SERPL-MCNC: 6.9 G/DL (ref 6.3–8.7)
RBC # BLD AUTO: 3.96 10*6/MM3 (ref 4.2–5.4)
SODIUM BLD-SCNC: 143 MMOL/L (ref 135–145)
WBC NRBC COR # BLD: 5.49 10*3/MM3 (ref 4.8–10.8)

## 2018-02-23 PROCEDURE — 99213 OFFICE O/P EST LOW 20 MIN: CPT | Performed by: INTERNAL MEDICINE

## 2018-02-23 PROCEDURE — 36415 COLL VENOUS BLD VENIPUNCTURE: CPT

## 2018-02-23 PROCEDURE — 80053 COMPREHEN METABOLIC PANEL: CPT | Performed by: INTERNAL MEDICINE

## 2018-02-23 PROCEDURE — 85025 COMPLETE CBC W/AUTO DIFF WBC: CPT | Performed by: INTERNAL MEDICINE

## 2018-02-23 NOTE — PROGRESS NOTES
[facility]  HEMATOLOGY & ONCOLOGY    Cancer Staging Information:  No matching staging information was found for the patient.      Subjective     VISIT DIAGNOSIS:   Encounter Diagnosis   Name Primary?   • Iron deficiency anemia secondary to inadequate dietary iron intake Yes       REASON FOR VISIT:     Chief Complaint   Patient presents with   • Anemia        HEMATOLOGY / ONCOLOGY HISTORY:    No history exists.           INTERVAL HISTORY  Patient ID: Mirela Flores is a 27 y.o. year old female summary for iron deficiency anemia.  She is currently on ferrous sulfate without adequate improvement. She has upcoming appointment with GI for EGD and C-scope. Also she also has side effect of far constipation.  Denies nausea or vomiting.  Denies diarrhea.  He denies bright red blood per rectum.  This is the first time she is seeing her period since after giving birth to her child 8 months ago.  -- underwent egd and c-scope unremarkable. Does not take oral iron because it makes her sick.She was not able to get in contact with our office to schedule her infusion.    Past Medical History:   Past Medical History:   Diagnosis Date   • Anemia affecting pregnancy    • Asthma     as a child   • History of transfusion    • Migraines    • Mono exposure    • S/P thyroid biopsy      Past Surgical History:   Past Surgical History:   Procedure Laterality Date   • CERVICAL CONIZATION Bilateral 7/3/2017    Procedure: CERVICAL CONIZATION, LOOP ELECTROCAUTERY EXCISION PROCEDURE;  Surgeon: Karli Guerrero MD;  Location: [facility] OR;  Service:    • CHOLECYSTECTOMY     • COLONOSCOPY  02/16/2011    normal   • COLONOSCOPY N/A 2/2/2018    Procedure: COLONOSCOPY WITH ANESTHESIA;  Surgeon: Bridger Charlton DO;  Location: [facility] ENDOSCOPY;  Service:    • D&C WITH SUCTION     • ENDOSCOPY N/A 2/2/2018    Procedure: ESOPHAGOGASTRODUODENOSCOPY WITH ANESTHESIA;  Surgeon: Bridger Charlton DO;  Location: [facility] ENDOSCOPY;  Service:    •  OVARIAN CYST SURGERY Right    • SALPINGECTOMY Bilateral 8/28/2017    Procedure: laparoscopic salpingectomy;  Surgeon: Karli Guerrero MD;  Location: [facility];  Service:    • TYMPANOSTOMY TUBE PLACEMENT       Social History:   Social History     Social History   • Marital status:      Spouse name: N/A   • Number of children: N/A   • Years of education: N/A     Occupational History   • Not on file.     Social History Main Topics   • Smoking status: Never Smoker   • Smokeless tobacco: Never Used   • Alcohol use No   • Drug use: No   • Sexual activity: Yes     Partners: Male     Birth control/ protection: None     Other Topics Concern   • Not on file     Social History Narrative     Family History:   Family History   Problem Relation Age of Onset   • Other Father    • Heart murmur Mother    • Colon polyps Mother    • No Known Problems Paternal Grandfather    • No Known Problems Paternal Grandmother    • Thyroid disease Maternal Grandmother    • Hypertension Maternal Grandmother    • Colon polyps Maternal Grandmother    • No Known Problems Maternal Grandfather    • Breast cancer Other 60   • Ovarian cancer Neg Hx        Review of Systems   Constitutional: Positive for fatigue. Negative for activity change, appetite change, chills, fever and unexpected weight change.   HENT: Negative.    Eyes: Negative.    Respiratory: Negative.    Cardiovascular: Negative.    Gastrointestinal: Negative.    Endocrine: Negative.    Genitourinary: Negative.    Musculoskeletal: Negative.    Skin: Negative.    Allergic/Immunologic: Negative.    Neurological: Negative.    Psychiatric/Behavioral: The patient is nervous/anxious.         She has a history of anxiety denies controlled on Zoloft.        Performance Status:  Asymptomatic    Medications:    Current Outpatient Prescriptions   Medication Sig Dispense Refill   • amitriptyline (ELAVIL) 25 MG tablet Take 2 tablets by mouth Every Night. 60 tablet 2   • Cholecalciferol 1000 units  "capsule Take 1,000 Units by mouth Daily. 90 capsule 0   • cyclobenzaprine (FLEXERIL) 10 MG tablet 1 tablet 3 times a day for up to 2 days.  Then 1 tablet 3 times a day when necessary for neck pain 9 tablet 0   • ferrous sulfate 325 (65 FE) MG tablet Take 1 tablet by mouth 2 (Two) Times a Day for 90 days. 60 tablet 2   • indomethacin (INDOCIN) 25 MG capsule Take 2 capsules by mouth 3 (Three) Times a Day As Needed for Moderate Pain . 60 capsule 2   • lisdexamfetamine (VYVANSE) 40 MG capsule Take 40 mg by mouth Every Morning.     • memantine (NAMENDA) 10 MG tablet Take 1 tablet by mouth 2 (Two) Times a Day. 60 tablet 3   • OnabotulinumtoxinA 200 units reconstituted solution Inject into head every 12 weeks per MD in MD office 200 Units 3   • prochlorperazine (COMPAZINE) 10 MG tablet Take 1 tablet by mouth Every 6 (Six) Hours As Needed for Nausea or Vomiting. 30 tablet 2   • sertraline (ZOLOFT) 100 MG tablet TAKE 1 TABLET BY MOUTH DAILY  5   • SUMAtriptan (IMITREX) 100 MG tablet 1 tab at onset of headache.  May repeat after 2 hours to a maximum of 2 tablets in 24 hours 9 tablet 1     No current facility-administered medications for this visit.        ALLERGIES:    Allergies   Allergen Reactions   • Cephalosporins      Pt states was told can never take them again.    • Penicillins      Reaction as a child.  Patient was told can never take again.    • Topamax [Topiramate] Angioedema   • Adhesive Tape Rash     TEGADERM AND STERI STRIPS   • Tape Rash     TEGADERM AND STERI STRIPS       Objective      Vitals:    02/23/18 1002   BP: 122/78   Pulse: 88   Resp: 16   Temp: 97.2 °F (36.2 °C)   TempSrc: Tympanic   SpO2: 95%   Weight: 50.8 kg (111 lb 14.4 oz)   Height: 162.6 cm (64.02\")         Current Status 11/27/2017   ECOG score 0         Physical Exam    General Appearance: Patient is awake, alert, oriented and in no acute distress. Patient is welldeveloped, wellnourished, and appears stated age.  HEENT: Normocephalic.  Raccon " eyes Sclerae clear, conjunctiva pale, extraocular movements intact, pupils, round, reactive to light and  accommodation. Mouth and throat are clear with moist oral mucosa.  NECK: Supple, no jugular venous distention, thyroid not enlarged.  LYMPH: No cervical, supraclavicular, axillary, or inguinal lymphadenopathy.  CHEST: Equal bilateral expansion, AP  diameter normal, resonant percussion note  LUNGS: Good air movement, no rales, rhonchi, rubs or wheezes with auscultation  CARDIO: Regular sinus rhythm, no murmurs, gallops or rubs.  ABDOMEN: Nondistended, soft, No tenderness, no guarding, no rebound, No hepatosplenomegaly. No abdominal masses. Bowel sounds positive. No hernia  GENITALIA: Not examined.  BREASTS: Not examined.  MUSKEL: No joint swelling, decreased motion, or inflammation  EXTREMS: No edema, clubbing, cyanosis, No varicose veins.  NEURO: Grossly nonfocal, Gait is coordinated and smooth, Cognition is preserved.  SKIN: No rashes, no ecchymoses, no petechia.  PSYCH: Oriented to time, place and person. Memory is preserved. Mood and affect appear normal  RECENT LABS:  No visits with results within 7 Day(s) from this visit.  Latest known visit with results is:    Admission on 02/02/2018, Discharged on 02/02/2018   Component Date Value Ref Range Status   • Case Report 02/02/2018    Final                    Value:Surgical Pathology Report                         Case: [id number]                                  Authorizing Provider:  Bridger Charlton DO       Collected:           02/02/2018 11:08 AM          Ordering Location:     [facility]     Received:            02/02/2018 02:37 PM                                 ENDOSCOPY                                                                    Pathologist:           Michelle Hameed MD                                                         Specimen:    Small Intestine, bx of small bowel                                                        •  Final Diagnosis 02/02/2018    Final                    Value:This result contains rich text formatting which cannot be displayed here.   • Gross Description 02/02/2018    Final                    Value:This result contains rich text formatting which cannot be displayed here.   • Microscopic Description 02/02/2018    Final                    Value:This result contains rich text formatting which cannot be displayed here.   • Urease 02/02/2018 Negative  Negative Final       RADIOLOGY:  No results found.         Assessment/Plan  Mirela Flores is a 27 y.o. year old female     Patient Active Problem List   Diagnosis   • BRETT III (cervical intraepithelial neoplasia grade III) with severe dysplasia   • Migraine without aura with status migrainosus   • Intractable migraine without aura and with status migrainosus   • Neck pain   • Anemia   • Thyroid cyst   • Migraine   • Thyroid nodule   • Bilateral occipital neuralgia   • Rectal bleeding   • Iron deficiency anemia          27-year-old female history significant for iron deficiency anemia since 2013, migraine headaches, ADHD, and anxiety referred to me for consult of her anemia.     1. Anemia: Primary bone marrow issues vs nutritional vs GI blood loss. Iron panel on the low side.on oral ferrous sulfate without adequate improvement.    -- egd and c-scope unremarkable.   Due to inadequate rise in hemoglobin and constipation we will start IV Venofer 8 doses.  Goal for her is hemoglobin of 13.5.   -will schedule for venofer  2. Migrane: On depokoete. Amytriptyline, buspar neurontin, sumitriptan.  Status post nerve block.     3. Leukocytosis: reactive vs primary bone marrow issues.  Brain cells.  Flow cytometry shows no evidence of lymphoproliferative disorder.   4.ADHA: on Vyvanse     5. Anxiety: on zoloft    6.  Thyroid nodule: FNA performed and pathology came back as Benign follicular nodule          Palmira Perales MD    2/23/2018    10:17 AM

## 2018-03-12 DIAGNOSIS — D50.8 IRON DEFICIENCY ANEMIA SECONDARY TO INADEQUATE DIETARY IRON INTAKE: ICD-10-CM

## 2018-03-13 RX ORDER — SODIUM CHLORIDE 9 MG/ML
250 INJECTION, SOLUTION INTRAVENOUS ONCE
Status: CANCELLED | OUTPATIENT
Start: 2018-04-02

## 2018-03-13 RX ORDER — FAMOTIDINE 10 MG/ML
20 INJECTION, SOLUTION INTRAVENOUS AS NEEDED
Status: CANCELLED | OUTPATIENT
Start: 2018-03-23

## 2018-03-13 RX ORDER — SODIUM CHLORIDE 9 MG/ML
250 INJECTION, SOLUTION INTRAVENOUS ONCE
Status: CANCELLED | OUTPATIENT
Start: 2018-03-19

## 2018-03-13 RX ORDER — DIPHENHYDRAMINE HYDROCHLORIDE 50 MG/ML
50 INJECTION INTRAMUSCULAR; INTRAVENOUS AS NEEDED
Status: CANCELLED | OUTPATIENT
Start: 2018-03-19

## 2018-03-13 RX ORDER — SODIUM CHLORIDE 9 MG/ML
250 INJECTION, SOLUTION INTRAVENOUS ONCE
Status: CANCELLED | OUTPATIENT
Start: 2018-04-13

## 2018-03-13 RX ORDER — FAMOTIDINE 10 MG/ML
20 INJECTION, SOLUTION INTRAVENOUS AS NEEDED
Status: CANCELLED | OUTPATIENT
Start: 2018-03-30

## 2018-03-13 RX ORDER — DIPHENHYDRAMINE HYDROCHLORIDE 50 MG/ML
50 INJECTION INTRAMUSCULAR; INTRAVENOUS AS NEEDED
OUTPATIENT
Start: 2018-04-13

## 2018-03-13 RX ORDER — SODIUM CHLORIDE 9 MG/ML
250 INJECTION, SOLUTION INTRAVENOUS ONCE
Status: CANCELLED | OUTPATIENT
Start: 2018-03-30

## 2018-03-13 RX ORDER — MEPERIDINE HYDROCHLORIDE 50 MG/ML
25 INJECTION INTRAMUSCULAR; INTRAVENOUS; SUBCUTANEOUS
Status: CANCELLED | OUTPATIENT
Start: 2018-03-19

## 2018-03-13 RX ORDER — MEPERIDINE HYDROCHLORIDE 50 MG/ML
25 INJECTION INTRAMUSCULAR; INTRAVENOUS; SUBCUTANEOUS
OUTPATIENT
Start: 2018-04-16

## 2018-03-13 RX ORDER — FAMOTIDINE 10 MG/ML
20 INJECTION, SOLUTION INTRAVENOUS AS NEEDED
OUTPATIENT
Start: 2018-04-16

## 2018-03-13 RX ORDER — DIPHENHYDRAMINE HYDROCHLORIDE 50 MG/ML
50 INJECTION INTRAMUSCULAR; INTRAVENOUS AS NEEDED
Status: CANCELLED | OUTPATIENT
Start: 2018-03-23

## 2018-03-13 RX ORDER — DIPHENHYDRAMINE HYDROCHLORIDE 50 MG/ML
50 INJECTION INTRAMUSCULAR; INTRAVENOUS AS NEEDED
Status: CANCELLED | OUTPATIENT
Start: 2018-03-26

## 2018-03-13 RX ORDER — DIPHENHYDRAMINE HYDROCHLORIDE 50 MG/ML
50 INJECTION INTRAMUSCULAR; INTRAVENOUS AS NEEDED
OUTPATIENT
Start: 2018-04-16

## 2018-03-13 RX ORDER — DIPHENHYDRAMINE HYDROCHLORIDE 50 MG/ML
50 INJECTION INTRAMUSCULAR; INTRAVENOUS AS NEEDED
Status: CANCELLED | OUTPATIENT
Start: 2018-03-16

## 2018-03-13 RX ORDER — MEPERIDINE HYDROCHLORIDE 50 MG/ML
25 INJECTION INTRAMUSCULAR; INTRAVENOUS; SUBCUTANEOUS
OUTPATIENT
Start: 2018-04-13

## 2018-03-13 RX ORDER — MEPERIDINE HYDROCHLORIDE 50 MG/ML
25 INJECTION INTRAMUSCULAR; INTRAVENOUS; SUBCUTANEOUS
Status: CANCELLED | OUTPATIENT
Start: 2018-03-30

## 2018-03-13 RX ORDER — FAMOTIDINE 10 MG/ML
20 INJECTION, SOLUTION INTRAVENOUS AS NEEDED
OUTPATIENT
Start: 2018-04-13

## 2018-03-13 RX ORDER — FAMOTIDINE 10 MG/ML
20 INJECTION, SOLUTION INTRAVENOUS AS NEEDED
Status: CANCELLED | OUTPATIENT
Start: 2018-03-16

## 2018-03-13 RX ORDER — MEPERIDINE HYDROCHLORIDE 50 MG/ML
25 INJECTION INTRAMUSCULAR; INTRAVENOUS; SUBCUTANEOUS
Status: CANCELLED | OUTPATIENT
Start: 2018-03-26

## 2018-03-13 RX ORDER — DIPHENHYDRAMINE HYDROCHLORIDE 50 MG/ML
50 INJECTION INTRAMUSCULAR; INTRAVENOUS AS NEEDED
Status: CANCELLED | OUTPATIENT
Start: 2018-04-02

## 2018-03-13 RX ORDER — SODIUM CHLORIDE 9 MG/ML
250 INJECTION, SOLUTION INTRAVENOUS ONCE
Status: CANCELLED | OUTPATIENT
Start: 2018-03-16

## 2018-03-13 RX ORDER — MEPERIDINE HYDROCHLORIDE 50 MG/ML
25 INJECTION INTRAMUSCULAR; INTRAVENOUS; SUBCUTANEOUS
Status: CANCELLED | OUTPATIENT
Start: 2018-04-02

## 2018-03-13 RX ORDER — SODIUM CHLORIDE 9 MG/ML
250 INJECTION, SOLUTION INTRAVENOUS ONCE
OUTPATIENT
Start: 2018-04-16

## 2018-03-13 RX ORDER — FAMOTIDINE 10 MG/ML
20 INJECTION, SOLUTION INTRAVENOUS AS NEEDED
Status: CANCELLED | OUTPATIENT
Start: 2018-03-26

## 2018-03-13 RX ORDER — DIPHENHYDRAMINE HYDROCHLORIDE 50 MG/ML
50 INJECTION INTRAMUSCULAR; INTRAVENOUS AS NEEDED
Status: CANCELLED | OUTPATIENT
Start: 2018-03-30

## 2018-03-13 RX ORDER — FAMOTIDINE 10 MG/ML
20 INJECTION, SOLUTION INTRAVENOUS AS NEEDED
Status: CANCELLED | OUTPATIENT
Start: 2018-03-19

## 2018-03-13 RX ORDER — MEPERIDINE HYDROCHLORIDE 50 MG/ML
25 INJECTION INTRAMUSCULAR; INTRAVENOUS; SUBCUTANEOUS
Status: CANCELLED | OUTPATIENT
Start: 2018-03-23

## 2018-03-13 RX ORDER — SODIUM CHLORIDE 9 MG/ML
250 INJECTION, SOLUTION INTRAVENOUS ONCE
Status: CANCELLED | OUTPATIENT
Start: 2018-03-26

## 2018-03-13 RX ORDER — MEPERIDINE HYDROCHLORIDE 50 MG/ML
25 INJECTION INTRAMUSCULAR; INTRAVENOUS; SUBCUTANEOUS
Status: CANCELLED | OUTPATIENT
Start: 2018-03-16

## 2018-03-13 RX ORDER — FAMOTIDINE 10 MG/ML
20 INJECTION, SOLUTION INTRAVENOUS AS NEEDED
Status: CANCELLED | OUTPATIENT
Start: 2018-04-02

## 2018-03-13 RX ORDER — SODIUM CHLORIDE 9 MG/ML
250 INJECTION, SOLUTION INTRAVENOUS ONCE
Status: CANCELLED | OUTPATIENT
Start: 2018-03-23

## 2018-03-16 ENCOUNTER — INFUSION (OUTPATIENT)
Dept: ONCOLOGY | Facility: HOSPITAL | Age: 28
End: 2018-03-16

## 2018-03-16 VITALS
SYSTOLIC BLOOD PRESSURE: 108 MMHG | DIASTOLIC BLOOD PRESSURE: 75 MMHG | HEART RATE: 81 BPM | HEIGHT: 64 IN | OXYGEN SATURATION: 100 % | BODY MASS INDEX: 19.12 KG/M2 | WEIGHT: 112 LBS | TEMPERATURE: 97.7 F | RESPIRATION RATE: 20 BRPM

## 2018-03-16 DIAGNOSIS — D50.8 IRON DEFICIENCY ANEMIA SECONDARY TO INADEQUATE DIETARY IRON INTAKE: Primary | ICD-10-CM

## 2018-03-16 PROCEDURE — 25010000002 IRON SUCROSE PER 1 MG: Performed by: INTERNAL MEDICINE

## 2018-03-16 PROCEDURE — 96365 THER/PROPH/DIAG IV INF INIT: CPT

## 2018-03-16 RX ORDER — DIPHENHYDRAMINE HYDROCHLORIDE 50 MG/ML
50 INJECTION INTRAMUSCULAR; INTRAVENOUS AS NEEDED
Status: DISCONTINUED | OUTPATIENT
Start: 2018-03-16 | End: 2018-03-16 | Stop reason: HOSPADM

## 2018-03-16 RX ORDER — SODIUM CHLORIDE 9 MG/ML
250 INJECTION, SOLUTION INTRAVENOUS ONCE
Status: COMPLETED | OUTPATIENT
Start: 2018-03-16 | End: 2018-03-16

## 2018-03-16 RX ORDER — FAMOTIDINE 10 MG/ML
20 INJECTION, SOLUTION INTRAVENOUS AS NEEDED
Status: DISCONTINUED | OUTPATIENT
Start: 2018-03-16 | End: 2018-03-16 | Stop reason: HOSPADM

## 2018-03-16 RX ORDER — MEPERIDINE HYDROCHLORIDE 25 MG/ML
25 INJECTION INTRAMUSCULAR; INTRAVENOUS; SUBCUTANEOUS
Status: DISCONTINUED | OUTPATIENT
Start: 2018-03-16 | End: 2018-03-16 | Stop reason: HOSPADM

## 2018-03-16 RX ADMIN — IRON SUCROSE 200 MG: 20 INJECTION, SOLUTION INTRAVENOUS at 11:11

## 2018-03-16 RX ADMIN — SODIUM CHLORIDE 250 ML: 9 INJECTION, SOLUTION INTRAVENOUS at 10:55

## 2018-03-16 NOTE — PATIENT INSTRUCTIONS
Iron Deficiency Anemia, Adult  Iron-deficiency anemia is when you have a low amount of red blood cells or hemoglobin. This happens because you have too little iron in your body. Hemoglobin carries oxygen to parts of the body. Anemia can cause your body to not get enough oxygen. It may or may not cause symptoms.  Follow these instructions at home:  Medicines   · Take over-the-counter and prescription medicines only as told by your doctor. This includes iron pills (supplements) and vitamins.  · If you cannot handle taking iron pills by mouth, ask your doctor about getting iron through:  ¨ A vein (intravenously).  ¨ A shot (injection) into a muscle.  · Take iron pills when your stomach is empty. If you cannot handle this, take them with food.  · Do not drink milk or take antacids at the same time as your iron pills.  · To prevent trouble pooping (constipation), eat fiber or take medicine (stool softener) as told by your doctor.  Eating and drinking   · Talk with your doctor before changing the foods you eat. He or she may tell you to eat foods that have a lot of iron, such as:  ¨ Liver.  ¨ Lowfat (lean) beef.  ¨ Breads and cereals that have iron added to them (fortified breads and cereals).  ¨ Eggs.  ¨ Dried fruit.  ¨ Dark green, leafy vegetables.  · Drink enough fluid to keep your pee (urine) clear or pale yellow.  · Eat fresh fruits and vegetables that are high in vitamin C. They help your body to use iron. Foods with a lot of vitamin C include:  ¨ Oranges.  ¨ Peppers.  ¨ Tomatoes.  ¨ Mangoes.  General instructions   · Return to your normal activities as told by your doctor. Ask your doctor what activities are safe for you.  · Keep yourself clean, and keep things clean around you (your surroundings). Anemia can make you get sick more easily.  · Keep all follow-up visits as told by your doctor. This is important.  Contact a doctor if:  · You feel sick to your stomach (nauseous).  · You throw up (vomit).  · You feel  weak.  · You are sweating for no clear reason.  · You have trouble pooping, such as:  ¨ Pooping (having a bowel movement) less than 3 times a week.  ¨ Straining to poop.  ¨ Having poop that is hard, dry, or larger than normal.  ¨ Feeling full or bloated.  ¨ Pain in the lower belly.  ¨ Not feeling better after pooping.  Get help right away if:  · You pass out (faint). If this happens, do not drive yourself to the hospital. Call your local emergency services (911 in the [place]  · You have chest pain.  · You have shortness of breath that:  ¨ Is very bad.  ¨ Gets worse with physical activity.  · You have a fast heartbeat.  · You get light-headed when getting up from sitting or lying down.  This information is not intended to replace advice given to you by your health care provider. Make sure you discuss any questions you have with your health care provider.  Document Released: 01/20/2012 Document Revised: 09/06/2017 Document Reviewed: 09/06/2017  [employer] Patient Education © 2017 [employer]  Iron Sucrose injection  What is this medicine?  IRON SUCROSE (MARQUIS zeeshan JOSHUA voras) is an iron complex. Iron is used to make healthy red blood cells, which carry oxygen and nutrients throughout the body. This medicine is used to treat iron deficiency anemia in people with chronic kidney disease.  This medicine may be used for other purposes; ask your health care provider or pharmacist if you have questions.  COMMON BRAND NAME(S): Venofer  What should I tell my health care provider before I take this medicine?  They need to know if you have any of these conditions:  -anemia not caused by low iron levels  -heart disease  -high levels of iron in the blood  -kidney disease  -liver disease  -an unusual or allergic reaction to iron, other medicines, foods, dyes, or preservatives  -pregnant or trying to get pregnant  -breast-feeding  How should I use this medicine?  This medicine is for infusion into a vein. It is given by a  health care professional in a hospital or clinic setting.  Talk to your pediatrician regarding the use of this medicine in children. While this drug may be prescribed for children as young as 2 years for selected conditions, precautions do apply.  Overdosage: If you think you have taken too much of this medicine contact a poison control center or emergency room at once.  NOTE: This medicine is only for you. Do not share this medicine with others.  What if I miss a dose?  It is important not to miss your dose. Call your doctor or health care professional if you are unable to keep an appointment.  What may interact with this medicine?  Do not take this medicine with any of the following medications:  -deferoxamine  -dimercaprol  -other iron products  This medicine may also interact with the following medications:  -chloramphenicol  -deferasirox  This list may not describe all possible interactions. Give your health care provider a list of all the medicines, herbs, non-prescription drugs, or dietary supplements you use. Also tell them if you smoke, drink alcohol, or use illegal drugs. Some items may interact with your medicine.  What should I watch for while using this medicine?  Visit your doctor or healthcare professional regularly. Tell your doctor or healthcare professional if your symptoms do not start to get better or if they get worse. You may need blood work done while you are taking this medicine.  You may need to follow a special diet. Talk to your doctor. Foods that contain iron include: whole grains/cereals, dried fruits, beans, or peas, leafy green vegetables, and organ meats (liver, kidney).  What side effects may I notice from receiving this medicine?  Side effects that you should report to your doctor or health care professional as soon as possible:  -allergic reactions like skin rash, itching or hives, swelling of the face, lips, or tongue  -breathing problems  -changes in blood  pressure  -cough  -fast, irregular heartbeat  -feeling faint or lightheaded, falls  -fever or chills  -flushing, sweating, or hot feelings  -joint or muscle aches/pains  -seizures  -swelling of the ankles or feet  -unusually weak or tired  Side effects that usually do not require medical attention (report to your doctor or health care professional if they continue or are bothersome):  -diarrhea  -feeling achy  -headache  -irritation at site where injected  -nausea, vomiting  -stomach upset  -tiredness  This list may not describe all possible side effects. Call your doctor for medical advice about side effects. You may report side effects to FDA at 5-044-TDQ-7578.  Where should I keep my medicine?  This drug is given in a hospital or clinic and will not be stored at home.  NOTE: This sheet is a summary. It may not cover all possible information. If you have questions about this medicine, talk to your doctor, pharmacist, or health care provider.  © 2018 Elsevier/Gold Standard (2012-09-27 17:14:35)

## 2018-03-19 ENCOUNTER — INFUSION (OUTPATIENT)
Dept: ONCOLOGY | Facility: HOSPITAL | Age: 28
End: 2018-03-19

## 2018-03-19 VITALS
HEIGHT: 64 IN | HEART RATE: 72 BPM | RESPIRATION RATE: 18 BRPM | DIASTOLIC BLOOD PRESSURE: 66 MMHG | WEIGHT: 118 LBS | OXYGEN SATURATION: 99 % | TEMPERATURE: 98.3 F | BODY MASS INDEX: 20.14 KG/M2 | SYSTOLIC BLOOD PRESSURE: 113 MMHG

## 2018-03-19 DIAGNOSIS — D50.8 IRON DEFICIENCY ANEMIA SECONDARY TO INADEQUATE DIETARY IRON INTAKE: Primary | ICD-10-CM

## 2018-03-19 PROCEDURE — 25010000002 IRON SUCROSE PER 1 MG: Performed by: INTERNAL MEDICINE

## 2018-03-19 PROCEDURE — 96365 THER/PROPH/DIAG IV INF INIT: CPT

## 2018-03-19 RX ORDER — MEPERIDINE HYDROCHLORIDE 25 MG/ML
25 INJECTION INTRAMUSCULAR; INTRAVENOUS; SUBCUTANEOUS
Status: DISCONTINUED | OUTPATIENT
Start: 2018-03-19 | End: 2018-03-19 | Stop reason: HOSPADM

## 2018-03-19 RX ORDER — SODIUM CHLORIDE 9 MG/ML
250 INJECTION, SOLUTION INTRAVENOUS ONCE
Status: COMPLETED | OUTPATIENT
Start: 2018-03-19 | End: 2018-03-19

## 2018-03-19 RX ORDER — FAMOTIDINE 10 MG/ML
20 INJECTION, SOLUTION INTRAVENOUS AS NEEDED
Status: DISCONTINUED | OUTPATIENT
Start: 2018-03-19 | End: 2018-03-19 | Stop reason: HOSPADM

## 2018-03-19 RX ORDER — DIPHENHYDRAMINE HYDROCHLORIDE 50 MG/ML
50 INJECTION INTRAMUSCULAR; INTRAVENOUS AS NEEDED
Status: DISCONTINUED | OUTPATIENT
Start: 2018-03-19 | End: 2018-03-19 | Stop reason: HOSPADM

## 2018-03-19 RX ADMIN — SODIUM CHLORIDE 250 ML: 9 INJECTION, SOLUTION INTRAVENOUS at 09:50

## 2018-03-19 RX ADMIN — IRON SUCROSE 200 MG: 20 INJECTION, SOLUTION INTRAVENOUS at 09:54

## 2018-03-21 ENCOUNTER — CLINICAL SUPPORT (OUTPATIENT)
Dept: NEUROLOGY | Facility: CLINIC | Age: 28
End: 2018-03-21

## 2018-03-21 DIAGNOSIS — G43.011 INTRACTABLE MIGRAINE WITHOUT AURA AND WITH STATUS MIGRAINOSUS: Primary | ICD-10-CM

## 2018-03-21 PROCEDURE — 64615 CHEMODENERV MUSC MIGRAINE: CPT | Performed by: PSYCHIATRY & NEUROLOGY

## 2018-03-21 NOTE — PROGRESS NOTES
Procedure Note:  Mirela Flores  03/21/2018    Procedure:  Botulinum Toxin Injection  Indication for Procedure: Chronic Migraines    The risks and benefits were explained to the patient including local infection, mild bleeding, paralysis of muscles, and possible allergic reaction.  The patient expressed understanding and informed consent was obtained.    Initial Headache Frequency:30  Current Headache Frequency:30    Initial Duration (hours):24  Current Duration (hours):24     10 Units divided in 2 sites: 5 Units Right, 5 Units Left  Procerus: 5 Units  Frontalis 20 units divided in 4 sites: 10 Units Right, 10 Units Left  Temporalis 40 Units divided in 8 sites: 20 Units Right, 20 Units Left  Occipitalis 30 Units divided in 6 sites: 15 Units Right, 15 Units Left  Cervical Paraspinal 20 Units divided in 4 sites: 10 Units Right, 10 Units Left  Trapezius 30 Units divided in 6 sites: 15 Units Right, 15 Units Left              200 units were mixed in total with 45 units being discarded.    PATIENT SUPPLIED BOTOX.     The patient tolerated the procedure well with no complications and no blood loss.    Follow up for repeat injections in 12 weeks.    Scribed for Ugo Forrest MD by Kristie Cespedes MA. 3/21/2018  12:09 PM    I performed the procedure myself and agree with documentation above.    Ugo Forrest MD  03/21/18

## 2018-03-23 ENCOUNTER — INFUSION (OUTPATIENT)
Dept: ONCOLOGY | Facility: HOSPITAL | Age: 28
End: 2018-03-23

## 2018-03-23 VITALS
BODY MASS INDEX: 18.61 KG/M2 | WEIGHT: 109 LBS | HEART RATE: 73 BPM | TEMPERATURE: 97.3 F | DIASTOLIC BLOOD PRESSURE: 55 MMHG | OXYGEN SATURATION: 99 % | HEIGHT: 64 IN | SYSTOLIC BLOOD PRESSURE: 90 MMHG | RESPIRATION RATE: 18 BRPM

## 2018-03-23 DIAGNOSIS — D50.8 IRON DEFICIENCY ANEMIA SECONDARY TO INADEQUATE DIETARY IRON INTAKE: Primary | ICD-10-CM

## 2018-03-23 PROCEDURE — 25010000002 IRON SUCROSE PER 1 MG: Performed by: INTERNAL MEDICINE

## 2018-03-23 PROCEDURE — 96365 THER/PROPH/DIAG IV INF INIT: CPT

## 2018-03-23 RX ORDER — FAMOTIDINE 10 MG/ML
20 INJECTION, SOLUTION INTRAVENOUS AS NEEDED
Status: DISCONTINUED | OUTPATIENT
Start: 2018-03-23 | End: 2018-03-23 | Stop reason: HOSPADM

## 2018-03-23 RX ORDER — DIPHENHYDRAMINE HYDROCHLORIDE 50 MG/ML
50 INJECTION INTRAMUSCULAR; INTRAVENOUS AS NEEDED
Status: DISCONTINUED | OUTPATIENT
Start: 2018-03-23 | End: 2018-03-23 | Stop reason: HOSPADM

## 2018-03-23 RX ORDER — SODIUM CHLORIDE 9 MG/ML
250 INJECTION, SOLUTION INTRAVENOUS ONCE
Status: COMPLETED | OUTPATIENT
Start: 2018-03-23 | End: 2018-03-23

## 2018-03-23 RX ORDER — MEPERIDINE HYDROCHLORIDE 25 MG/ML
25 INJECTION INTRAMUSCULAR; INTRAVENOUS; SUBCUTANEOUS
Status: DISCONTINUED | OUTPATIENT
Start: 2018-03-23 | End: 2018-03-23 | Stop reason: HOSPADM

## 2018-03-23 RX ADMIN — IRON SUCROSE 200 MG: 20 INJECTION, SOLUTION INTRAVENOUS at 09:55

## 2018-03-23 RX ADMIN — SODIUM CHLORIDE 250 ML: 9 INJECTION, SOLUTION INTRAVENOUS at 09:38

## 2018-03-26 ENCOUNTER — INFUSION (OUTPATIENT)
Dept: ONCOLOGY | Facility: HOSPITAL | Age: 28
End: 2018-03-26

## 2018-03-26 VITALS
BODY MASS INDEX: 19.46 KG/M2 | DIASTOLIC BLOOD PRESSURE: 64 MMHG | SYSTOLIC BLOOD PRESSURE: 106 MMHG | TEMPERATURE: 97.6 F | RESPIRATION RATE: 18 BRPM | HEIGHT: 64 IN | HEART RATE: 63 BPM | OXYGEN SATURATION: 100 % | WEIGHT: 114 LBS

## 2018-03-26 DIAGNOSIS — D50.8 IRON DEFICIENCY ANEMIA SECONDARY TO INADEQUATE DIETARY IRON INTAKE: Primary | ICD-10-CM

## 2018-03-26 PROCEDURE — 96365 THER/PROPH/DIAG IV INF INIT: CPT

## 2018-03-26 PROCEDURE — 96413 CHEMO IV INFUSION 1 HR: CPT

## 2018-03-26 PROCEDURE — 25010000002 IRON SUCROSE PER 1 MG: Performed by: INTERNAL MEDICINE

## 2018-03-26 RX ORDER — MEPERIDINE HYDROCHLORIDE 25 MG/ML
25 INJECTION INTRAMUSCULAR; INTRAVENOUS; SUBCUTANEOUS
Status: DISCONTINUED | OUTPATIENT
Start: 2018-03-26 | End: 2018-03-26 | Stop reason: HOSPADM

## 2018-03-26 RX ORDER — SODIUM CHLORIDE 9 MG/ML
250 INJECTION, SOLUTION INTRAVENOUS ONCE
Status: COMPLETED | OUTPATIENT
Start: 2018-03-26 | End: 2018-03-26

## 2018-03-26 RX ORDER — DIPHENHYDRAMINE HYDROCHLORIDE 50 MG/ML
50 INJECTION INTRAMUSCULAR; INTRAVENOUS AS NEEDED
Status: DISCONTINUED | OUTPATIENT
Start: 2018-03-26 | End: 2018-03-26 | Stop reason: HOSPADM

## 2018-03-26 RX ORDER — FAMOTIDINE 10 MG/ML
20 INJECTION, SOLUTION INTRAVENOUS AS NEEDED
Status: DISCONTINUED | OUTPATIENT
Start: 2018-03-26 | End: 2018-03-26 | Stop reason: HOSPADM

## 2018-03-26 RX ADMIN — SODIUM CHLORIDE 250 ML: 9 INJECTION, SOLUTION INTRAVENOUS at 10:19

## 2018-03-26 RX ADMIN — IRON SUCROSE 200 MG: 20 INJECTION, SOLUTION INTRAVENOUS at 10:19

## 2018-03-30 ENCOUNTER — INFUSION (OUTPATIENT)
Dept: ONCOLOGY | Facility: HOSPITAL | Age: 28
End: 2018-03-30

## 2018-03-30 VITALS
HEART RATE: 78 BPM | OXYGEN SATURATION: 100 % | RESPIRATION RATE: 16 BRPM | HEIGHT: 64 IN | SYSTOLIC BLOOD PRESSURE: 120 MMHG | TEMPERATURE: 98.7 F | DIASTOLIC BLOOD PRESSURE: 70 MMHG | WEIGHT: 109 LBS | BODY MASS INDEX: 18.61 KG/M2

## 2018-03-30 DIAGNOSIS — D50.8 IRON DEFICIENCY ANEMIA SECONDARY TO INADEQUATE DIETARY IRON INTAKE: Primary | ICD-10-CM

## 2018-03-30 PROCEDURE — 96365 THER/PROPH/DIAG IV INF INIT: CPT

## 2018-03-30 PROCEDURE — 25010000002 IRON SUCROSE PER 1 MG: Performed by: INTERNAL MEDICINE

## 2018-03-30 RX ORDER — SODIUM CHLORIDE 9 MG/ML
250 INJECTION, SOLUTION INTRAVENOUS ONCE
Status: COMPLETED | OUTPATIENT
Start: 2018-03-30 | End: 2018-03-30

## 2018-03-30 RX ADMIN — IRON SUCROSE 200 MG: 20 INJECTION, SOLUTION INTRAVENOUS at 10:38

## 2018-03-30 RX ADMIN — SODIUM CHLORIDE 250 ML: 9 INJECTION, SOLUTION INTRAVENOUS at 10:37

## 2018-04-04 ENCOUNTER — TELEPHONE (OUTPATIENT)
Dept: FAMILY MEDICINE CLINIC | Age: 28
End: 2018-04-04

## 2018-04-09 ENCOUNTER — TELEPHONE (OUTPATIENT)
Dept: FAMILY MEDICINE CLINIC | Age: 28
End: 2018-04-09

## 2018-04-09 ENCOUNTER — INFUSION (OUTPATIENT)
Dept: ONCOLOGY | Facility: HOSPITAL | Age: 28
End: 2018-04-09

## 2018-04-09 VITALS
HEART RATE: 81 BPM | DIASTOLIC BLOOD PRESSURE: 81 MMHG | OXYGEN SATURATION: 98 % | RESPIRATION RATE: 16 BRPM | BODY MASS INDEX: 19.29 KG/M2 | SYSTOLIC BLOOD PRESSURE: 113 MMHG | HEIGHT: 64 IN | TEMPERATURE: 97 F | WEIGHT: 113 LBS

## 2018-04-09 DIAGNOSIS — D50.8 IRON DEFICIENCY ANEMIA SECONDARY TO INADEQUATE DIETARY IRON INTAKE: Primary | ICD-10-CM

## 2018-04-09 PROCEDURE — 96365 THER/PROPH/DIAG IV INF INIT: CPT

## 2018-04-09 PROCEDURE — 25010000002 IRON SUCROSE PER 1 MG: Performed by: INTERNAL MEDICINE

## 2018-04-09 RX ORDER — SODIUM CHLORIDE 9 MG/ML
250 INJECTION, SOLUTION INTRAVENOUS ONCE
Status: COMPLETED | OUTPATIENT
Start: 2018-04-09 | End: 2018-04-09

## 2018-04-09 RX ORDER — MEPERIDINE HYDROCHLORIDE 25 MG/ML
25 INJECTION INTRAMUSCULAR; INTRAVENOUS; SUBCUTANEOUS
Status: DISCONTINUED | OUTPATIENT
Start: 2018-04-09 | End: 2018-04-09 | Stop reason: HOSPADM

## 2018-04-09 RX ORDER — FAMOTIDINE 10 MG/ML
20 INJECTION, SOLUTION INTRAVENOUS AS NEEDED
Status: DISCONTINUED | OUTPATIENT
Start: 2018-04-09 | End: 2018-04-09 | Stop reason: HOSPADM

## 2018-04-09 RX ORDER — DIPHENHYDRAMINE HYDROCHLORIDE 50 MG/ML
50 INJECTION INTRAMUSCULAR; INTRAVENOUS AS NEEDED
Status: DISCONTINUED | OUTPATIENT
Start: 2018-04-09 | End: 2018-04-09 | Stop reason: HOSPADM

## 2018-04-09 RX ADMIN — SODIUM CHLORIDE 250 ML: 9 INJECTION, SOLUTION INTRAVENOUS at 10:15

## 2018-04-09 RX ADMIN — IRON SUCROSE 200 MG: 20 INJECTION, SOLUTION INTRAVENOUS at 10:15

## 2018-04-10 ENCOUNTER — APPOINTMENT (OUTPATIENT)
Dept: GENERAL RADIOLOGY | Facility: HOSPITAL | Age: 28
End: 2018-04-10

## 2018-04-10 ENCOUNTER — HOSPITAL ENCOUNTER (EMERGENCY)
Facility: HOSPITAL | Age: 28
Discharge: HOME | End: 2018-04-10
Admitting: FAMILY MEDICINE

## 2018-04-10 VITALS
HEIGHT: 69 IN | DIASTOLIC BLOOD PRESSURE: 74 MMHG | RESPIRATION RATE: 16 BRPM | TEMPERATURE: 98.8 F | HEART RATE: 75 BPM | OXYGEN SATURATION: 99 % | SYSTOLIC BLOOD PRESSURE: 116 MMHG | BODY MASS INDEX: 16.88 KG/M2 | WEIGHT: 114 LBS

## 2018-04-10 DIAGNOSIS — J06.9 VIRAL URI WITH COUGH: ICD-10-CM

## 2018-04-10 DIAGNOSIS — G43.809 OTHER MIGRAINE WITHOUT STATUS MIGRAINOSUS, NOT INTRACTABLE: Primary | ICD-10-CM

## 2018-04-10 PROCEDURE — 96375 TX/PRO/DX INJ NEW DRUG ADDON: CPT

## 2018-04-10 PROCEDURE — 99283 EMERGENCY DEPT VISIT LOW MDM: CPT

## 2018-04-10 PROCEDURE — 25010000002 KETOROLAC TROMETHAMINE PER 15 MG: Performed by: PHYSICIAN ASSISTANT

## 2018-04-10 PROCEDURE — 25010000002 DIPHENHYDRAMINE PER 50 MG: Performed by: PHYSICIAN ASSISTANT

## 2018-04-10 PROCEDURE — 25010000002 DEXAMETHASONE PER 1 MG: Performed by: PHYSICIAN ASSISTANT

## 2018-04-10 PROCEDURE — 25010000002 PROCHLORPERAZINE EDISYLATE PER 10 MG: Performed by: PHYSICIAN ASSISTANT

## 2018-04-10 PROCEDURE — 96374 THER/PROPH/DIAG INJ IV PUSH: CPT

## 2018-04-10 PROCEDURE — 25010000002 METOCLOPRAMIDE PER 10 MG: Performed by: PHYSICIAN ASSISTANT

## 2018-04-10 PROCEDURE — 71046 X-RAY EXAM CHEST 2 VIEWS: CPT

## 2018-04-10 RX ORDER — DEXAMETHASONE SODIUM PHOSPHATE 10 MG/ML
10 INJECTION INTRAMUSCULAR; INTRAVENOUS ONCE
Status: COMPLETED | OUTPATIENT
Start: 2018-04-10 | End: 2018-04-10

## 2018-04-10 RX ORDER — PREDNISONE 20 MG/1
20 TABLET ORAL DAILY
Qty: 5 TABLET | Refills: 0 | Status: ON HOLD | OUTPATIENT
Start: 2018-04-10 | End: 2018-06-20

## 2018-04-10 RX ORDER — METOCLOPRAMIDE HYDROCHLORIDE 5 MG/ML
10 INJECTION INTRAMUSCULAR; INTRAVENOUS ONCE
Status: COMPLETED | OUTPATIENT
Start: 2018-04-10 | End: 2018-04-10

## 2018-04-10 RX ORDER — KETOROLAC TROMETHAMINE 15 MG/ML
15 INJECTION, SOLUTION INTRAMUSCULAR; INTRAVENOUS ONCE
Status: COMPLETED | OUTPATIENT
Start: 2018-04-10 | End: 2018-04-10

## 2018-04-10 RX ORDER — LEVOCETIRIZINE DIHYDROCHLORIDE 5 MG/1
5 TABLET, FILM COATED ORAL EVERY EVENING
Qty: 5 TABLET | Refills: 0 | Status: SHIPPED | OUTPATIENT
Start: 2018-04-10 | End: 2018-10-18

## 2018-04-10 RX ORDER — DIPHENHYDRAMINE HYDROCHLORIDE 50 MG/ML
25 INJECTION INTRAMUSCULAR; INTRAVENOUS ONCE
Status: COMPLETED | OUTPATIENT
Start: 2018-04-10 | End: 2018-04-10

## 2018-04-10 RX ADMIN — KETOROLAC TROMETHAMINE 15 MG: 15 INJECTION, SOLUTION INTRAMUSCULAR; INTRAVENOUS at 21:01

## 2018-04-10 RX ADMIN — DEXAMETHASONE SODIUM PHOSPHATE 10 MG: 10 INJECTION INTRAMUSCULAR; INTRAVENOUS at 20:05

## 2018-04-10 RX ADMIN — METOCLOPRAMIDE 10 MG: 5 INJECTION, SOLUTION INTRAMUSCULAR; INTRAVENOUS at 21:02

## 2018-04-10 RX ADMIN — DIPHENHYDRAMINE HYDROCHLORIDE 25 MG: 50 INJECTION, SOLUTION INTRAMUSCULAR; INTRAVENOUS at 20:05

## 2018-04-10 RX ADMIN — PROCHLORPERAZINE EDISYLATE 10 MG: 5 INJECTION INTRAMUSCULAR; INTRAVENOUS at 20:06

## 2018-04-11 NOTE — ED NOTES
Provider ask to find out if pt breastfeeding prior to admin medications. Pt states 'I am not breastfeeding, had to quit because of migraines.' Provider informed pt denies breastfeeding     Taylor Reyes RN  04/10/18 1956

## 2018-04-11 NOTE — ED PROVIDER NOTES
Subjective   28-year-old female presents with chief complaint of migraines and cough for one week.  The patient reports she sees Dr. Forrest has been taking multiple medications for her migraines but they are not helping.  She confirms photophobia, phonophobia, nausea, vomiting.  Her migraine has been present for 24 hours.  Denies one-sided weakness, numbness, tingling, loss of range of motion of her upper and lower extremities, loss of strength.  The patient also notes the past one week she has had a cough she describes her cough the first 4 days being dry but now it is productive of yellow phlegm.  No fevers chills chest pain or shortness of breath          Review of Systems   All other systems reviewed and are negative.      Past Medical History:   Diagnosis Date   • Anemia affecting pregnancy    • Asthma     as a child   • History of transfusion    • Migraines    • Mono exposure    • S/P thyroid biopsy        Allergies   Allergen Reactions   • Cephalosporins      Pt states was told can never take them again.    • Penicillins      Reaction as a child.  Patient was told can never take again.    • Topamax [Topiramate] Angioedema   • Adhesive Tape Rash     TEGADERM AND STERI STRIPS   • Tape Rash     TEGADERM AND STERI STRIPS       Past Surgical History:   Procedure Laterality Date   • CERVICAL CONIZATION Bilateral 7/3/2017    Procedure: CERVICAL CONIZATION, LOOP ELECTROCAUTERY EXCISION PROCEDURE;  Surgeon: Karli Guerrero MD;  Location: [facility] OR;  Service:    • CHOLECYSTECTOMY     • COLONOSCOPY  02/16/2011    normal   • COLONOSCOPY N/A 2/2/2018    Procedure: COLONOSCOPY WITH ANESTHESIA;  Surgeon: Bridger Charlton DO;  Location: [facility] ENDOSCOPY;  Service:    • D&C WITH SUCTION     • ENDOSCOPY N/A 2/2/2018    Procedure: ESOPHAGOGASTRODUODENOSCOPY WITH ANESTHESIA;  Surgeon: Bridger Charlton DO;  Location: [facility] ENDOSCOPY;  Service:    • OVARIAN CYST SURGERY Right    • SALPINGECTOMY Bilateral 8/28/2017    Procedure:  laparoscopic salpingectomy;  Surgeon: Karli Guerrero MD;  Location: [facility];  Service:    • TYMPANOSTOMY TUBE PLACEMENT         Family History   Problem Relation Age of Onset   • Other Father    • Heart murmur Mother    • Colon polyps Mother    • No Known Problems Paternal Grandfather    • No Known Problems Paternal Grandmother    • Thyroid disease Maternal Grandmother    • Hypertension Maternal Grandmother    • Colon polyps Maternal Grandmother    • No Known Problems Maternal Grandfather    • Breast cancer Other 60   • Ovarian cancer Neg Hx        Social History     Social History   • Marital status:      Social History Main Topics   • Smoking status: Never Smoker   • Smokeless tobacco: Never Used   • Alcohol use No   • Drug use: No   • Sexual activity: Yes     Partners: Male     Birth control/ protection: None     Other Topics Concern   • Not on file           Objective   Physical Exam   Constitutional: She is oriented to person, place, and time. She appears well-developed and well-nourished.   HENT:   Head: Normocephalic.   Eyes: EOM are normal. Pupils are equal, round, and reactive to light.   Neck: Normal range of motion. Neck supple.   Cardiovascular: Normal rate and regular rhythm.    Pulmonary/Chest: Effort normal and breath sounds normal. No respiratory distress. She has no wheezes.   Abdominal: Soft.   Lymphadenopathy:     She has no cervical adenopathy.   Neurological: She is alert and oriented to person, place, and time.   Skin: Skin is warm and dry.   Psychiatric: She has a normal mood and affect. Her behavior is normal.   Nursing note and vitals reviewed.      Procedures         ED Course  ED Course                  MDM  Number of Diagnoses or Management Options  Diagnosis management comments: Migraine improved, we will discharge the patient stable condition and offer strong return precautions, will treat cough with steroids and xyzal       Amount and/or Complexity of Data Reviewed  Tests in  the radiology section of CPT®: ordered and reviewed    Risk of Complications, Morbidity, and/or Mortality  Presenting problems: moderate  Diagnostic procedures: moderate  Management options: moderate    Patient Progress  Patient progress: stable      Final diagnoses:   Other migraine without status migrainosus, not intractable   Viral URI with cough            Germán Fay PA-C  04/10/18 [id number]

## 2018-04-11 NOTE — ED NOTES
"Pt reports \"im here for a couple different things, I have a migraine and I see Lon, I have meds but they don;t work\" Pt reports sore throat and cough. Pt states it started as dry cough. Pt states a cough x 1 week     Maggi Gil  04/10/18 1932    "

## 2018-04-13 ENCOUNTER — INFUSION (OUTPATIENT)
Dept: ONCOLOGY | Facility: HOSPITAL | Age: 28
End: 2018-04-13

## 2018-04-13 VITALS
HEART RATE: 82 BPM | BODY MASS INDEX: 17.18 KG/M2 | RESPIRATION RATE: 18 BRPM | WEIGHT: 116 LBS | OXYGEN SATURATION: 100 % | SYSTOLIC BLOOD PRESSURE: 116 MMHG | TEMPERATURE: 98.5 F | HEIGHT: 69 IN | DIASTOLIC BLOOD PRESSURE: 72 MMHG

## 2018-04-13 DIAGNOSIS — D50.8 IRON DEFICIENCY ANEMIA SECONDARY TO INADEQUATE DIETARY IRON INTAKE: Primary | ICD-10-CM

## 2018-04-13 PROCEDURE — 25010000002 IRON SUCROSE PER 1 MG: Performed by: INTERNAL MEDICINE

## 2018-04-13 PROCEDURE — 96365 THER/PROPH/DIAG IV INF INIT: CPT

## 2018-04-13 RX ORDER — SODIUM CHLORIDE 9 MG/ML
250 INJECTION, SOLUTION INTRAVENOUS ONCE
Status: COMPLETED | OUTPATIENT
Start: 2018-04-13 | End: 2018-04-13

## 2018-04-13 RX ADMIN — SODIUM CHLORIDE 250 ML: 9 INJECTION, SOLUTION INTRAVENOUS at 11:10

## 2018-04-13 RX ADMIN — IRON SUCROSE 200 MG: 20 INJECTION, SOLUTION INTRAVENOUS at 11:14

## 2018-04-16 DIAGNOSIS — C06.9 MALIGNANT NEOPLASM OF MOUTH (HCC): Primary | ICD-10-CM

## 2018-04-16 RX ORDER — SUMATRIPTAN 100 MG/1
TABLET, FILM COATED ORAL
Qty: 9 TABLET | Refills: 1 | Status: ON HOLD | OUTPATIENT
Start: 2018-04-16 | End: 2018-06-20

## 2018-04-19 ENCOUNTER — APPOINTMENT (OUTPATIENT)
Dept: LAB | Facility: HOSPITAL | Age: 28
End: 2018-04-19

## 2018-04-23 DIAGNOSIS — D06.9 CARCINOMA IN SITU OF CERVIX, UNSPECIFIED LOCATION: Primary | ICD-10-CM

## 2018-04-24 ENCOUNTER — APPOINTMENT (OUTPATIENT)
Dept: LAB | Facility: HOSPITAL | Age: 28
End: 2018-04-24

## 2018-04-30 DIAGNOSIS — D06.9 CARCINOMA IN SITU OF CERVIX, UNSPECIFIED LOCATION: Primary | ICD-10-CM

## 2018-05-02 ENCOUNTER — APPOINTMENT (OUTPATIENT)
Dept: LAB | Facility: HOSPITAL | Age: 28
End: 2018-05-02

## 2018-06-20 ENCOUNTER — APPOINTMENT (OUTPATIENT)
Dept: CT IMAGING | Facility: HOSPITAL | Age: 28
End: 2018-06-20

## 2018-06-20 ENCOUNTER — HOSPITAL ENCOUNTER (INPATIENT)
Facility: HOSPITAL | Age: 28
LOS: 4 days | Discharge: HOME | End: 2018-06-24
Attending: EMERGENCY MEDICINE | Admitting: INTERNAL MEDICINE

## 2018-06-20 DIAGNOSIS — K52.9 COLITIS: Primary | ICD-10-CM

## 2018-06-20 LAB
ADV 40+41 DNA STL QL NAA+NON-PROBE: NOT DETECTED
ALBUMIN SERPL-MCNC: 4.2 G/DL (ref 3.5–5)
ALBUMIN/GLOB SERPL: 1.4 G/DL (ref 1.1–2.5)
ALP SERPL-CCNC: 59 U/L (ref 24–120)
ALT SERPL W P-5'-P-CCNC: 23 U/L (ref 0–54)
ANION GAP SERPL CALCULATED.3IONS-SCNC: 15 MMOL/L (ref 4–13)
APTT PPP: 32.7 SECONDS (ref 24.1–34.8)
AST SERPL-CCNC: 20 U/L (ref 7–45)
ASTRO TYP 1-8 RNA STL QL NAA+NON-PROBE: NOT DETECTED
BACTERIA UR QL AUTO: ABNORMAL /HPF
BILIRUB SERPL-MCNC: 0.5 MG/DL (ref 0.1–1)
BILIRUB UR QL STRIP: NEGATIVE
BUN BLD-MCNC: 20 MG/DL (ref 5–21)
BUN/CREAT SERPL: 31.3 (ref 7–25)
C CAYETANENSIS DNA STL QL NAA+NON-PROBE: NOT DETECTED
C DIFF TOX GENS STL QL NAA+PROBE: NOT DETECTED
CALCIUM SPEC-SCNC: 9.2 MG/DL (ref 8.4–10.4)
CAMPY SP DNA.DIARRHEA STL QL NAA+PROBE: NOT DETECTED
CHLORIDE SERPL-SCNC: 99 MMOL/L (ref 98–110)
CLARITY UR: CLEAR
CO2 SERPL-SCNC: 24 MMOL/L (ref 24–31)
COLOR UR: ABNORMAL
CREAT BLD-MCNC: 0.64 MG/DL (ref 0.5–1.4)
CRP SERPL-MCNC: 15.83 MG/DL (ref 0–0.99)
CRYPTOSP STL CULT: NOT DETECTED
DEPRECATED RDW RBC AUTO: 42.4 FL (ref 40–54)
E COLI DNA SPEC QL NAA+PROBE: NOT DETECTED
E HISTOLYT AG STL-ACNC: NOT DETECTED
EAEC PAA PLAS AGGR+AATA ST NAA+NON-PRB: NOT DETECTED
EC STX1 + STX2 GENES STL NAA+PROBE: NOT DETECTED
EPEC EAE GENE STL QL NAA+NON-PROBE: NOT DETECTED
ERYTHROCYTE [DISTWIDTH] IN BLOOD BY AUTOMATED COUNT: 12.4 % (ref 12–15)
ETEC LTA+ST1A+ST1B TOX ST NAA+NON-PROBE: NOT DETECTED
G LAMBLIA DNA SPEC QL NAA+PROBE: NOT DETECTED
GFR SERPL CREATININE-BSD FRML MDRD: 111 ML/MIN/1.73
GIANT PLATELETS: ABNORMAL
GLOBULIN UR ELPH-MCNC: 2.9 GM/DL
GLUCOSE BLD-MCNC: 106 MG/DL (ref 70–100)
GLUCOSE UR STRIP-MCNC: NEGATIVE MG/DL
HCG SERPL QL: NEGATIVE
HCT VFR BLD AUTO: 40.5 % (ref 37–47)
HGB BLD-MCNC: 13.9 G/DL (ref 12–16)
HGB UR QL STRIP.AUTO: ABNORMAL
HYALINE CASTS UR QL AUTO: ABNORMAL /LPF
INR PPP: 1.05 (ref 0.91–1.09)
KETONES UR QL STRIP: ABNORMAL
LEUKOCYTE ESTERASE UR QL STRIP.AUTO: ABNORMAL
LIPASE SERPL-CCNC: 245 U/L (ref 23–203)
LYMPHOCYTES # BLD MANUAL: 0.69 10*3/MM3 (ref 0.72–4.86)
LYMPHOCYTES NFR BLD MANUAL: 11.1 % (ref 4–12)
LYMPHOCYTES NFR BLD MANUAL: 14.1 % (ref 15–45)
MCH RBC QN AUTO: 31.7 PG (ref 28–32)
MCHC RBC AUTO-ENTMCNC: 34.3 G/DL (ref 33–36)
MCV RBC AUTO: 92.5 FL (ref 82–98)
MONOCYTES # BLD AUTO: 0.55 10*3/MM3 (ref 0.19–1.3)
NEUTROPHILS # BLD AUTO: 3.48 10*3/MM3 (ref 1.87–8.4)
NEUTROPHILS NFR BLD MANUAL: 54.5 % (ref 39–78)
NEUTS BAND NFR BLD MANUAL: 16.2 % (ref 0–10)
NITRITE UR QL STRIP: NEGATIVE
NOROVIRUS GI+II RNA STL QL NAA+NON-PROBE: NOT DETECTED
P SHIGELLOIDES DNA STL QL NAA+NON-PROBE: NOT DETECTED
PH UR STRIP.AUTO: 6.5 [PH] (ref 5–8)
PLATELET # BLD AUTO: 167 10*3/MM3 (ref 130–400)
PMV BLD AUTO: 11.3 FL (ref 6–12)
POTASSIUM BLD-SCNC: 3.2 MMOL/L (ref 3.5–5.3)
PROT SERPL-MCNC: 7.1 G/DL (ref 6.3–8.7)
PROT UR QL STRIP: ABNORMAL
PROTHROMBIN TIME: 14 SECONDS (ref 11.9–14.6)
RBC # BLD AUTO: 4.38 10*6/MM3 (ref 4.2–5.4)
RBC # UR: ABNORMAL /HPF
RBC MORPH BLD: NORMAL
REF LAB TEST METHOD: ABNORMAL
RV RNA STL NAA+PROBE: NOT DETECTED
SALMONELLA DNA SPEC QL NAA+PROBE: DETECTED
SAPO I+II+IV+V RNA STL QL NAA+NON-PROBE: NOT DETECTED
SHIGELLA SP+EIEC IPAH STL QL NAA+PROBE: NOT DETECTED
SODIUM BLD-SCNC: 138 MMOL/L (ref 135–145)
SP GR UR STRIP: >=1.03 (ref 1–1.03)
SQUAMOUS #/AREA URNS HPF: ABNORMAL /HPF
UROBILINOGEN UR QL STRIP: ABNORMAL
V CHOLERAE DNA SPEC QL NAA+PROBE: NOT DETECTED
VARIANT LYMPHS NFR BLD MANUAL: 4 % (ref 0–5)
VIBRIO DNA SPEC NAA+PROBE: NOT DETECTED
WBC MORPH BLD: NORMAL
WBC NRBC COR # BLD: 4.92 10*3/MM3 (ref 4.8–10.8)
WBC UR QL AUTO: ABNORMAL /HPF
YERSINIA STL CULT: NOT DETECTED

## 2018-06-20 PROCEDURE — 25010000002 PROMETHAZINE PER 50 MG: Performed by: INTERNAL MEDICINE

## 2018-06-20 PROCEDURE — 85025 COMPLETE CBC W/AUTO DIFF WBC: CPT | Performed by: EMERGENCY MEDICINE

## 2018-06-20 PROCEDURE — 83690 ASSAY OF LIPASE: CPT | Performed by: EMERGENCY MEDICINE

## 2018-06-20 PROCEDURE — 25010000002 METHYLPREDNISOLONE PER 40 MG: Performed by: INTERNAL MEDICINE

## 2018-06-20 PROCEDURE — 25810000003 SODIUM CHLORIDE 0.9 % WITH KCL 20 MEQ 20-0.9 MEQ/L-% SOLUTION: Performed by: INTERNAL MEDICINE

## 2018-06-20 PROCEDURE — 87077 CULTURE AEROBIC IDENTIFY: CPT | Performed by: INTERNAL MEDICINE

## 2018-06-20 PROCEDURE — 0 IOPAMIDOL PER 1 ML: Performed by: EMERGENCY MEDICINE

## 2018-06-20 PROCEDURE — 25010000002 LEVOFLOXACIN PER 250 MG: Performed by: EMERGENCY MEDICINE

## 2018-06-20 PROCEDURE — 25010000002 MORPHINE SULFATE (PF) 2 MG/ML SOLUTION: Performed by: INTERNAL MEDICINE

## 2018-06-20 PROCEDURE — 99284 EMERGENCY DEPT VISIT MOD MDM: CPT

## 2018-06-20 PROCEDURE — 85610 PROTHROMBIN TIME: CPT | Performed by: EMERGENCY MEDICINE

## 2018-06-20 PROCEDURE — 81001 URINALYSIS AUTO W/SCOPE: CPT | Performed by: EMERGENCY MEDICINE

## 2018-06-20 PROCEDURE — 85007 BL SMEAR W/DIFF WBC COUNT: CPT | Performed by: EMERGENCY MEDICINE

## 2018-06-20 PROCEDURE — 86140 C-REACTIVE PROTEIN: CPT | Performed by: INTERNAL MEDICINE

## 2018-06-20 PROCEDURE — 87081 CULTURE SCREEN ONLY: CPT | Performed by: INTERNAL MEDICINE

## 2018-06-20 PROCEDURE — 85730 THROMBOPLASTIN TIME PARTIAL: CPT | Performed by: EMERGENCY MEDICINE

## 2018-06-20 PROCEDURE — 25010000002 ONDANSETRON PER 1 MG: Performed by: EMERGENCY MEDICINE

## 2018-06-20 PROCEDURE — 25010000002 DIPHENHYDRAMINE PER 50 MG: Performed by: INTERNAL MEDICINE

## 2018-06-20 PROCEDURE — 80053 COMPREHEN METABOLIC PANEL: CPT | Performed by: EMERGENCY MEDICINE

## 2018-06-20 PROCEDURE — 87507 IADNA-DNA/RNA PROBE TQ 12-25: CPT | Performed by: INTERNAL MEDICINE

## 2018-06-20 PROCEDURE — 84703 CHORIONIC GONADOTROPIN ASSAY: CPT | Performed by: EMERGENCY MEDICINE

## 2018-06-20 PROCEDURE — 74177 CT ABD & PELVIS W/CONTRAST: CPT

## 2018-06-20 PROCEDURE — 25010000002 ONDANSETRON PER 1 MG: Performed by: INTERNAL MEDICINE

## 2018-06-20 RX ORDER — ONDANSETRON 2 MG/ML
4 INJECTION INTRAMUSCULAR; INTRAVENOUS ONCE
Status: COMPLETED | OUTPATIENT
Start: 2018-06-20 | End: 2018-06-20

## 2018-06-20 RX ORDER — SERTRALINE HYDROCHLORIDE 100 MG/1
150 TABLET, FILM COATED ORAL DAILY
COMMUNITY
End: 2018-06-24 | Stop reason: HOSPADM

## 2018-06-20 RX ORDER — AMITRIPTYLINE HYDROCHLORIDE 25 MG/1
50 TABLET, FILM COATED ORAL NIGHTLY
Status: DISCONTINUED | OUTPATIENT
Start: 2018-06-20 | End: 2018-06-24 | Stop reason: HOSPADM

## 2018-06-20 RX ORDER — ACETAMINOPHEN 325 MG/1
650 TABLET ORAL EVERY 4 HOURS PRN
Status: DISCONTINUED | OUTPATIENT
Start: 2018-06-20 | End: 2018-06-24 | Stop reason: HOSPADM

## 2018-06-20 RX ORDER — SERTRALINE HYDROCHLORIDE 100 MG/1
100 TABLET, FILM COATED ORAL DAILY
Status: DISCONTINUED | OUTPATIENT
Start: 2018-06-20 | End: 2018-06-24 | Stop reason: HOSPADM

## 2018-06-20 RX ORDER — LEVOFLOXACIN 5 MG/ML
750 INJECTION, SOLUTION INTRAVENOUS ONCE
Status: COMPLETED | OUTPATIENT
Start: 2018-06-20 | End: 2018-06-20

## 2018-06-20 RX ORDER — ONDANSETRON 2 MG/ML
4 INJECTION INTRAMUSCULAR; INTRAVENOUS EVERY 6 HOURS PRN
Status: DISCONTINUED | OUTPATIENT
Start: 2018-06-20 | End: 2018-06-24 | Stop reason: HOSPADM

## 2018-06-20 RX ORDER — CYCLOBENZAPRINE HCL 10 MG
10 TABLET ORAL 3 TIMES DAILY PRN
COMMUNITY
End: 2019-03-22

## 2018-06-20 RX ORDER — SODIUM CHLORIDE AND POTASSIUM CHLORIDE 150; 900 MG/100ML; MG/100ML
75 INJECTION, SOLUTION INTRAVENOUS CONTINUOUS
Status: DISCONTINUED | OUTPATIENT
Start: 2018-06-20 | End: 2018-06-24 | Stop reason: HOSPADM

## 2018-06-20 RX ORDER — MORPHINE SULFATE 2 MG/ML
1 INJECTION, SOLUTION INTRAMUSCULAR; INTRAVENOUS EVERY 4 HOURS PRN
Status: DISCONTINUED | OUTPATIENT
Start: 2018-06-20 | End: 2018-06-24

## 2018-06-20 RX ORDER — PROMETHAZINE HYDROCHLORIDE 25 MG/ML
12.5 INJECTION, SOLUTION INTRAMUSCULAR; INTRAVENOUS EVERY 6 HOURS PRN
Status: DISCONTINUED | OUTPATIENT
Start: 2018-06-20 | End: 2018-06-24 | Stop reason: HOSPADM

## 2018-06-20 RX ORDER — SUMATRIPTAN 50 MG/1
50 TABLET, FILM COATED ORAL
Status: DISCONTINUED | OUTPATIENT
Start: 2018-06-20 | End: 2018-06-24 | Stop reason: HOSPADM

## 2018-06-20 RX ORDER — POTASSIUM CHLORIDE 14.9 MG/ML
10 INJECTION INTRAVENOUS
Status: COMPLETED | OUTPATIENT
Start: 2018-06-20 | End: 2018-06-20

## 2018-06-20 RX ORDER — METHYLPREDNISOLONE SODIUM SUCCINATE 40 MG/ML
40 INJECTION, POWDER, LYOPHILIZED, FOR SOLUTION INTRAMUSCULAR; INTRAVENOUS ONCE
Status: COMPLETED | OUTPATIENT
Start: 2018-06-20 | End: 2018-06-20

## 2018-06-20 RX ORDER — SUMATRIPTAN 100 MG/1
100 TABLET, FILM COATED ORAL TAKE AS DIRECTED
COMMUNITY
End: 2018-09-24 | Stop reason: SDUPTHER

## 2018-06-20 RX ORDER — LEVOFLOXACIN 5 MG/ML
500 INJECTION, SOLUTION INTRAVENOUS EVERY 24 HOURS
Status: DISCONTINUED | OUTPATIENT
Start: 2018-06-21 | End: 2018-06-20

## 2018-06-20 RX ORDER — SODIUM CHLORIDE 0.9 % (FLUSH) 0.9 %
1-10 SYRINGE (ML) INJECTION AS NEEDED
Status: DISCONTINUED | OUTPATIENT
Start: 2018-06-20 | End: 2018-06-24 | Stop reason: HOSPADM

## 2018-06-20 RX ORDER — NALOXONE HCL 0.4 MG/ML
0.4 VIAL (ML) INJECTION
Status: DISCONTINUED | OUTPATIENT
Start: 2018-06-20 | End: 2018-06-24

## 2018-06-20 RX ORDER — SODIUM CHLORIDE 0.9 % (FLUSH) 0.9 %
10 SYRINGE (ML) INJECTION AS NEEDED
Status: DISCONTINUED | OUTPATIENT
Start: 2018-06-20 | End: 2018-06-24 | Stop reason: HOSPADM

## 2018-06-20 RX ORDER — DIPHENHYDRAMINE HYDROCHLORIDE 50 MG/ML
25 INJECTION INTRAMUSCULAR; INTRAVENOUS EVERY 6 HOURS PRN
Status: DISCONTINUED | OUTPATIENT
Start: 2018-06-20 | End: 2018-06-24 | Stop reason: HOSPADM

## 2018-06-20 RX ADMIN — DIPHENHYDRAMINE HYDROCHLORIDE 25 MG: 50 INJECTION, SOLUTION INTRAMUSCULAR; INTRAVENOUS at 15:24

## 2018-06-20 RX ADMIN — PROMETHAZINE HYDROCHLORIDE 12.5 MG: 25 INJECTION INTRAMUSCULAR; INTRAVENOUS at 18:12

## 2018-06-20 RX ADMIN — AMITRIPTYLINE HYDROCHLORIDE 50 MG: 25 TABLET, FILM COATED ORAL at 19:40

## 2018-06-20 RX ADMIN — METRONIDAZOLE 500 MG: 500 INJECTION, SOLUTION INTRAVENOUS at 12:52

## 2018-06-20 RX ADMIN — MORPHINE SULFATE 1 MG: 2 INJECTION, SOLUTION INTRAMUSCULAR; INTRAVENOUS at 15:33

## 2018-06-20 RX ADMIN — POTASSIUM CHLORIDE 10 MEQ: 200 INJECTION, SOLUTION INTRAVENOUS at 16:49

## 2018-06-20 RX ADMIN — MORPHINE SULFATE 1 MG: 2 INJECTION, SOLUTION INTRAMUSCULAR; INTRAVENOUS at 19:50

## 2018-06-20 RX ADMIN — IOPAMIDOL 100 ML: 755 INJECTION, SOLUTION INTRAVENOUS at 10:35

## 2018-06-20 RX ADMIN — POTASSIUM CHLORIDE AND SODIUM CHLORIDE 150 ML/HR: 900; 150 INJECTION, SOLUTION INTRAVENOUS at 23:28

## 2018-06-20 RX ADMIN — POTASSIUM CHLORIDE AND SODIUM CHLORIDE 150 ML/HR: 900; 150 INJECTION, SOLUTION INTRAVENOUS at 15:32

## 2018-06-20 RX ADMIN — ACETAMINOPHEN 650 MG: 325 TABLET, FILM COATED ORAL at 17:52

## 2018-06-20 RX ADMIN — METRONIDAZOLE 500 MG: 500 INJECTION, SOLUTION INTRAVENOUS at 19:39

## 2018-06-20 RX ADMIN — POTASSIUM CHLORIDE 10 MEQ: 200 INJECTION, SOLUTION INTRAVENOUS at 17:51

## 2018-06-20 RX ADMIN — ONDANSETRON 4 MG: 2 INJECTION, SOLUTION INTRAMUSCULAR; INTRAVENOUS at 11:10

## 2018-06-20 RX ADMIN — METHYLPREDNISOLONE SODIUM SUCCINATE 40 MG: 40 INJECTION, POWDER, FOR SOLUTION INTRAMUSCULAR; INTRAVENOUS at 15:24

## 2018-06-20 RX ADMIN — SODIUM CHLORIDE 1000 ML: 9 INJECTION, SOLUTION INTRAVENOUS at 09:24

## 2018-06-20 RX ADMIN — POTASSIUM CHLORIDE 10 MEQ: 200 INJECTION, SOLUTION INTRAVENOUS at 15:33

## 2018-06-20 RX ADMIN — LEVOFLOXACIN 750 MG: 5 INJECTION, SOLUTION INTRAVENOUS at 14:07

## 2018-06-20 RX ADMIN — ONDANSETRON 4 MG: 2 INJECTION, SOLUTION INTRAMUSCULAR; INTRAVENOUS at 15:24

## 2018-06-20 RX ADMIN — ONDANSETRON 4 MG: 2 INJECTION, SOLUTION INTRAMUSCULAR; INTRAVENOUS at 21:26

## 2018-06-20 RX ADMIN — POTASSIUM CHLORIDE 10 MEQ: 200 INJECTION, SOLUTION INTRAVENOUS at 19:38

## 2018-06-20 NOTE — ED NOTES
Subjective   C/o vomiting and diarrhea for 3 days with severe cramping abdominal pain.  Last night had large BM of just bright red blood.  This AM still had more diarrhea with blood but not as much.  Had colonoscopy several months ago that was okay.        History provided by:  Patient   used: No    Abdominal Pain   Pain location:  Generalized  Pain quality: aching and cramping    Pain radiates to:  Does not radiate  Pain severity:  Severe  Onset quality:  Gradual  Duration:  3 days  Timing:  Intermittent  Progression:  Waxing and waning  Chronicity:  New  Context: not alcohol use, not awakening from sleep, not diet changes, not eating, not laxative use, not medication withdrawal, not previous surgeries, not recent illness, not recent sexual activity, not recent travel, not retching, not sick contacts, not suspicious food intake and not trauma    Relieved by:  Nothing  Worsened by:  Nothing  Ineffective treatments:  None tried  Associated symptoms: diarrhea, hematochezia, nausea and vomiting    Associated symptoms: no anorexia, no belching, no chest pain, no chills, no constipation, no cough, no dysuria, no fatigue, no fever, no flatus, no hematemesis, no hematuria, no melena, no shortness of breath, no sore throat, no vaginal bleeding and no vaginal discharge    Risk factors: no alcohol abuse, no aspirin use, not elderly, has not had multiple surgeries, no NSAID use, not obese, not pregnant and no recent hospitalization        Review of Systems   Constitutional: Negative.  Negative for chills, fatigue and fever.   HENT: Negative.  Negative for sore throat.    Respiratory: Negative.  Negative for cough and shortness of breath.    Cardiovascular: Negative.  Negative for chest pain.   Gastrointestinal: Positive for abdominal pain, diarrhea, hematochezia, nausea and vomiting. Negative for anorexia, constipation, flatus, hematemesis and melena.   Genitourinary: Negative.  Negative for dysuria,  "hematuria, vaginal bleeding and vaginal discharge.   Musculoskeletal: Negative.    Skin: Negative.    Neurological: Negative.    Hematological: Negative.    Psychiatric/Behavioral: Negative.    All other systems reviewed and are negative.      Past Medical History:   Diagnosis Date   • Anemia affecting pregnancy    • Asthma     as a child   • History of transfusion    • Migraines    • Mono exposure    • S/P thyroid biopsy        Allergies   Allergen Reactions   • Cephalosporins      Pt states was told can never take them again.    • Penicillins      Reaction as a child.  Patient was told can never take again.    • Reglan [Metoclopramide] Other (See Comments)     \"CHEST PAIN\"   • Topamax [Topiramate] Angioedema   • Adhesive Tape Rash     TEGADERM AND STERI STRIPS   • Tape Rash     TEGADERM AND STERI STRIPS       Past Surgical History:   Procedure Laterality Date   • CERVICAL CONIZATION Bilateral 7/3/2017    Procedure: CERVICAL CONIZATION, LOOP ELECTROCAUTERY EXCISION PROCEDURE;  Surgeon: Karli Guerrero MD;  Location: [facility] OR;  Service:    • CHOLECYSTECTOMY     • COLONOSCOPY  02/16/2011    normal   • COLONOSCOPY N/A 2/2/2018    Procedure: COLONOSCOPY WITH ANESTHESIA;  Surgeon: Bridger Charlton DO;  Location: [facility] ENDOSCOPY;  Service:    • D&C WITH SUCTION     • ENDOSCOPY N/A 2/2/2018    Procedure: ESOPHAGOGASTRODUODENOSCOPY WITH ANESTHESIA;  Surgeon: Bridger Charlton DO;  Location: [facility] ENDOSCOPY;  Service:    • OVARIAN CYST SURGERY Right    • SALPINGECTOMY Bilateral 8/28/2017    Procedure: laparoscopic salpingectomy;  Surgeon: Karli Guerrero MD;  Location: [facility] OR;  Service:    • TYMPANOSTOMY TUBE PLACEMENT         Family History   Problem Relation Age of Onset   • Other Father    • Heart murmur Mother    • Colon polyps Mother    • No Known Problems Paternal Grandfather    • No Known Problems Paternal Grandmother    • Thyroid disease Maternal Grandmother    • Hypertension Maternal Grandmother    • Colon " polyps Maternal Grandmother    • No Known Problems Maternal Grandfather    • Breast cancer Other 60   • Ovarian cancer Neg Hx        Social History     Social History   • Marital status:      Social History Main Topics   • Smoking status: Never Smoker   • Smokeless tobacco: Never Used   • Alcohol use No   • Drug use: No   • Sexual activity: Yes     Partners: Male     Birth control/ protection: None     Other Topics Concern   • Not on file       Prior to Admission medications    Medication Sig Start Date End Date Taking? Authorizing Provider   amitriptyline (ELAVIL) 25 MG tablet Take 2 tablets by mouth Every Night. 12/6/17   CITLALLI Graves   Cholecalciferol 1000 units capsule Take 1,000 Units by mouth Daily. 11/17/17 11/17/18  Louise Drummond MD   cyclobenzaprine (FLEXERIL) 10 MG tablet 1 tablet 3 times a day for up to 2 days.  Then 1 tablet 3 times a day when necessary for neck pain 1/10/18   CITLALLI Graves   indomethacin (INDOCIN) 25 MG capsule Take 2 capsules by mouth 3 (Three) Times a Day As Needed for Moderate Pain . 11/29/17   CITLALLI Graves   levocetirizine (XYZAL) 5 MG tablet Take 1 tablet by mouth Every Evening. 4/10/18   Germán Fay PA-C   lisdexamfetamine (VYVANSE) 40 MG capsule Take 40 mg by mouth Every Morning.    Historical Provider, MD   memantine (NAMENDA) 10 MG tablet Take 1 tablet by mouth 2 (Two) Times a Day. 12/13/17   CITLALLI Graves   OnabotulinumtoxinA 200 units reconstituted solution Inject into head every 12 weeks per MD in MD office 2/21/18   Ugo Forrest MD   predniSONE (DELTASONE) 20 MG tablet Take 1 tablet by mouth Daily. 4/10/18   Germán Fay PA-C   prochlorperazine (COMPAZINE) 10 MG tablet Take 1 tablet by mouth Every 6 (Six) Hours As Needed for Nausea or Vomiting. 11/29/17   CITLALLI Graves   sertraline (ZOLOFT) 100 MG tablet TAKE 1 TABLET BY MOUTH DAILY 11/30/17   Historical Provider, MD    SUMAtriptan (IMITREX) 100 MG tablet 1 TAB AT ONSET OF HEADACHE. MAY REPEAT AFTER 2 HOURS TO A MAXIMUM OF 2 TABLETS IN 24 HOURS 4/16/18   CITLALLI Graves       Medications   sodium chloride 0.9 % flush 10 mL (not administered)   levoFLOXacin (LEVAQUIN) 750 mg/150 mL D5W (premix) (LEVAQUIN) 750 mg (not administered)   metroNIDAZOLE (FLAGYL) IVPB 500 mg (500 mg Intravenous New Bag 6/20/18 1252)   sodium chloride 0.9 % bolus 1,000 mL (0 mL Intravenous Stopped 6/20/18 1032)   iopamidol (ISOVUE-370) 76 % injection 100 mL (100 mL Intravenous Given 6/20/18 1035)   ondansetron (ZOFRAN) injection 4 mg (4 mg Intravenous Given 6/20/18 1110)       Vitals:    06/20/18 1341   BP:    Pulse: 73   Resp:    Temp:    SpO2: 100%         Objective   Physical Exam   Constitutional: She is oriented to person, place, and time. She appears well-developed and well-nourished.   HENT:   Head: Normocephalic and atraumatic.   Neck: Normal range of motion. Neck supple.   Cardiovascular: Normal rate and regular rhythm.    Pulmonary/Chest: Effort normal and breath sounds normal.   Abdominal: Soft. Bowel sounds are normal. There is tenderness.   Musculoskeletal: Normal range of motion.   Neurological: She is alert and oriented to person, place, and time.   Skin: Skin is warm and dry.   Psychiatric: She has a normal mood and affect. Her behavior is normal.   Nursing note and vitals reviewed.      Procedures         Lab Results (last 24 hours)     Procedure Component Value Units Date/Time    CBC & Differential [[id number]] Collected:  06/20/18 0925    Specimen:  Blood Updated:  06/20/18 1003    Narrative:       The following orders were created for panel order CBC & Differential.  Procedure                               Abnormality         Status                     ---------                               -----------         ------                     Manual Differential[[id number]]          Abnormal            Final result               CBC  Auto Differential[[id number]]        Normal              Final result                 Please view results for these tests on the individual orders.    Comprehensive Metabolic Panel [[id number]]  (Abnormal) Collected:  06/20/18 0925    Specimen:  Blood Updated:  06/20/18 0946     Glucose 106 (H) mg/dL      BUN 20 mg/dL      Creatinine 0.64 mg/dL      Sodium 138 mmol/L      Potassium 3.2 (L) mmol/L      Chloride 99 mmol/L      CO2 24.0 mmol/L      Calcium 9.2 mg/dL      Total Protein 7.1 g/dL      Albumin 4.20 g/dL      ALT (SGPT) 23 U/L      AST (SGOT) 20 U/L      Alkaline Phosphatase 59 U/L      Total Bilirubin 0.5 mg/dL      eGFR Non African Amer 111 mL/min/1.73      Globulin 2.9 gm/dL      A/G Ratio 1.4 g/dL      BUN/Creatinine Ratio 31.3 (H)     Anion Gap 15.0 (H) mmol/L     aPTT [[id number]]  (Normal) Collected:  06/20/18 0925    Specimen:  Blood Updated:  06/20/18 0946     PTT 32.7 seconds     Protime-INR [[id number]]  (Normal) Collected:  06/20/18 0925    Specimen:  Blood Updated:  06/20/18 0946     Protime 14.0 Seconds      INR 1.05    Lipase [[id number]]  (Abnormal) Collected:  06/20/18 0925    Specimen:  Blood Updated:  06/20/18 0946     Lipase 245 (H) U/L     hCG, Serum, Qualitative [[id number]]  (Normal) Collected:  06/20/18 0925    Specimen:  Blood Updated:  06/20/18 0952     HCG Qualitative Negative    CBC Auto Differential [[id number]]  (Normal) Collected:  06/20/18 0925    Specimen:  Blood Updated:  06/20/18 1003     WBC 4.92 10*3/mm3      RBC 4.38 10*6/mm3      Hemoglobin 13.9 g/dL      Hematocrit 40.5 %      MCV 92.5 fL      MCH 31.7 pg      MCHC 34.3 g/dL      RDW 12.4 %      RDW-SD 42.4 fl      MPV 11.3 fL      Platelets 167 10*3/mm3     Narrative:       The previously reported component NRBC is no longer being reported.    Manual Differential [[id number]]  (Abnormal) Collected:  06/20/18 0925    Specimen:  Blood Updated:  06/20/18 1003     Neutrophil % 54.5 %      Lymphocyte % 14.1 (L) %      Monocyte %  11.1 %      Bands %  16.2 (H) %      Atypical Lymphocyte % 4.0 %      Neutrophils Absolute 3.48 10*3/mm3      Lymphocytes Absolute 0.69 (L) 10*3/mm3      Monocytes Absolute 0.55 10*3/mm3      RBC Morphology Normal     WBC Morphology Normal     Giant Platelets Slight/1+    Urinalysis With / Culture If Indicated - Urine, Clean Catch [[id number]]  (Abnormal) Collected:  06/20/18 0955    Specimen:  Urine from Urine, Clean Catch Updated:  06/20/18 1012     Color, UA Dark Yellow (A)     Appearance, UA Clear     pH, UA 6.5     Specific Gravity, UA >=1.030     Glucose, UA Negative     Ketones, UA >=160 mg/dL (4+) (A)     Bilirubin, UA Negative     Blood, UA Trace (A)     Protein, UA 30 mg/dL (1+) (A)     Leuk Esterase, UA Trace (A)     Nitrite, UA Negative     Urobilinogen, UA 1.0 E.U./dL    Urinalysis, Microscopic Only - Urine, Clean Catch [[id number]]  (Abnormal) Collected:  06/20/18 0955    Specimen:  Urine from Urine, Clean Catch Updated:  06/20/18 1012     RBC, UA 13-20 (A) /HPF      WBC, UA 6-12 (A) /HPF      Bacteria, UA None Seen /HPF      Squamous Epithelial Cells, UA 7-12 (A) /HPF      Hyaline Casts, UA 3-6 /LPF      Methodology Automated Microscopy          CT Abdomen Pelvis With Contrast   Final Result       Diffuse colitis without complication. Consider infectious colitis or   potentially inflammatory bowel disease.   This report was finalized on 06/20/2018 11:14 by Dr Chirag Gardner, .          ED Course          MDM  Number of Diagnoses or Management Options  Colitis: new and requires workup     Amount and/or Complexity of Data Reviewed  Clinical lab tests: ordered and reviewed  Tests in the radiology section of CPT®: ordered and reviewed  Tests in the medicine section of CPT®: reviewed and ordered  Discuss the patient with other providers: yes    Risk of Complications, Morbidity, and/or Mortality  Presenting problems: moderate  Diagnostic procedures: moderate  Management options: moderate    Patient  Progress  Patient progress: stable      Final diagnoses:   Colitis          Ad Sparks Jr., MD  06/20/18 9451

## 2018-06-20 NOTE — PLAN OF CARE
Problem: Patient Care Overview  Goal: Plan of Care Review  Outcome: Ongoing (interventions implemented as appropriate)   06/20/18 1610   Coping/Psychosocial   Plan of Care Reviewed With patient   Plan of Care Review   Progress no change   OTHER   Outcome Summary Patient admitted to 484. C/O pain, prn pain medication per orders. C/O nausea, prn Zofran per orders. Potassium runs orders. NSR per tele. VSS. Will continue to monitor.        Problem: Bowel Disease, Inflammatory (Adult)  Goal: Signs and Symptoms of Listed Potential Problems Will be Absent, Minimized or Managed (Bowel Disease, Inflammatory)  Outcome: Ongoing (interventions implemented as appropriate)      Problem: Pain, Acute (Adult)  Goal: Identify Related Risk Factors and Signs and Symptoms  Outcome: Outcome(s) achieved Date Met: 06/20/18    Goal: Acceptable Pain Control/Comfort Level  Outcome: Ongoing (interventions implemented as appropriate)

## 2018-06-20 NOTE — H&P
[facility] Medicine Services  HISTORY AND PHYSICAL    Date of Admission: 6/20/2018  Primary Care Physician: Lizzie Hernandez MD    Subjective     Chief Complaint: Bloody diarrhea    History of Present Illness  Patient is a 28-year-old  female with past medical history significant for migraine headaches, asthma, and reported anemia with iron deficiency, the presented to our hospital today with a chief complaint of abdominal cramping and bloody diarrhea.  Patient reports that she has had bloody diarrhea over the course of the past couple of days.  She also reports having abdominal cramping which is been severe per her report during that time.  She localizes her abdominal pain to the periumbilical region.  She has not had any fevers or chills.  She denies any known sick contacts.  She has not had any recent travel.  She is unaware of any unusual food exposure recently.  She does report a family medical history of Crohn's disease, however records indicate that she had a colonoscopy and upper endoscopy back in February 2018 which did not reveal any acute findings.  She does report having multiple episodes of nonbloody and nonbilious emesis over the past couple of days as well.  She reports no hematemesis.  She has never had similar symptoms like this in the past.    Review of Systems     Otherwise complete ROS reviewed and negative except as mentioned in the HPI.    Past Medical History:   Past Medical History:   Diagnosis Date   • Anemia affecting pregnancy    • Asthma     as a child   • History of transfusion    • Migraines    • Mono exposure    • S/P thyroid biopsy      Past Surgical History:  Past Surgical History:   Procedure Laterality Date   • CERVICAL CONIZATION Bilateral 7/3/2017    Procedure: CERVICAL CONIZATION, LOOP ELECTROCAUTERY EXCISION PROCEDURE;  Surgeon: Karli Guerrero MD;  Location: [facility];  Service:    • CHOLECYSTECTOMY     • COLONOSCOPY  02/16/2011  "   normal   • COLONOSCOPY N/A 2/2/2018    Procedure: COLONOSCOPY WITH ANESTHESIA;  Surgeon: Bridger Charlton DO;  Location: [facility] ENDOSCOPY;  Service:    • D&C WITH SUCTION     • ENDOSCOPY N/A 2/2/2018    Procedure: ESOPHAGOGASTRODUODENOSCOPY WITH ANESTHESIA;  Surgeon: Bridger Charlton DO;  Location: [facility] ENDOSCOPY;  Service:    • OVARIAN CYST SURGERY Right    • SALPINGECTOMY Bilateral 8/28/2017    Procedure: laparoscopic salpingectomy;  Surgeon: Karli Guerrero MD;  Location: [facility] OR;  Service:    • TYMPANOSTOMY TUBE PLACEMENT       Social History:  reports that she has never smoked. She has never used smokeless tobacco. She reports that she does not drink alcohol or use drugs.    Family History: family history includes Breast cancer (age of onset: 60) in her other; Colon polyps in her maternal grandmother and mother; Heart murmur in her mother; Hypertension in her maternal grandmother; No Known Problems in her maternal grandfather, paternal grandfather, and paternal grandmother; Other in her father; Thyroid disease in her maternal grandmother.       Allergies:  Allergies   Allergen Reactions   • Cephalosporins      Pt states was told can never take them again.    • Penicillins      Reaction as a child.  Patient was told can never take again.    • Reglan [Metoclopramide] Other (See Comments)     \"CHEST PAIN\"   • Topamax [Topiramate] Angioedema   • Adhesive Tape Rash     TEGADERM AND STERI STRIPS   • Tape Rash     TEGADERM AND STERI STRIPS     Medications:  Prior to Admission medications    Medication Sig Start Date End Date Taking? Authorizing Provider   amitriptyline (ELAVIL) 25 MG tablet Take 2 tablets by mouth Every Night. 12/6/17   CITLALLI Graves   Cholecalciferol 1000 units capsule Take 1,000 Units by mouth Daily. 11/17/17 11/17/18  Louise Drummond MD   cyclobenzaprine (FLEXERIL) 10 MG tablet 1 tablet 3 times a day for up to 2 days.  Then 1 tablet 3 times a day when necessary for neck " "pain 1/10/18   CITLALLI Graves   indomethacin (INDOCIN) 25 MG capsule Take 2 capsules by mouth 3 (Three) Times a Day As Needed for Moderate Pain . 11/29/17   CITLALLI Graves   levocetirizine (XYZAL) 5 MG tablet Take 1 tablet by mouth Every Evening. 4/10/18   Germán Fay PA-C   lisdexamfetamine (VYVANSE) 40 MG capsule Take 40 mg by mouth Every Morning.    Historical Provider, MD   memantine (NAMENDA) 10 MG tablet Take 1 tablet by mouth 2 (Two) Times a Day. 12/13/17   CITLALLI Graves   OnabotulinumtoxinA 200 units reconstituted solution Inject into head every 12 weeks per MD in MD office 2/21/18   Ugo Forrest MD   predniSONE (DELTASONE) 20 MG tablet Take 1 tablet by mouth Daily. 4/10/18   Germán Fay PA-C   prochlorperazine (COMPAZINE) 10 MG tablet Take 1 tablet by mouth Every 6 (Six) Hours As Needed for Nausea or Vomiting. 11/29/17   CITLALLI Graves   sertraline (ZOLOFT) 100 MG tablet TAKE 1 TABLET BY MOUTH DAILY 11/30/17   Historical Provider, MD   SUMAtriptan (IMITREX) 100 MG tablet 1 TAB AT ONSET OF HEADACHE. MAY REPEAT AFTER 2 HOURS TO A MAXIMUM OF 2 TABLETS IN 24 HOURS 4/16/18   CITLALLI Graves     Objective     Vital Signs: /67 (BP Location: Right arm, Patient Position: Sitting)   Pulse 65   Temp 97.8 °F (36.6 °C)   Resp 16   Ht 162.6 cm (64\")   Wt 49.9 kg (110 lb)   SpO2 100%   BMI 18.88 kg/m²   Physical Exam   Constitutional: No distress.   HENT:   Head: Normocephalic.   Mouth/Throat: No oropharyngeal exudate (MM dry).   Eyes: Pupils are equal, round, and reactive to light. No scleral icterus.   Neck: No tracheal deviation present.   Cardiovascular: Normal rate and regular rhythm.    Pulmonary/Chest: Effort normal. No respiratory distress.   Abdominal: Soft. She exhibits no distension. There is tenderness (mild periumbilical TTP). There is no rebound and no guarding.   Musculoskeletal: She exhibits no edema. "   Neurological: She is alert.   Skin: Skin is warm and dry. She is not diaphoretic.   Psychiatric: She has a normal mood and affect. Her behavior is normal.   Vitals reviewed.    Results Reviewed:  Lab Results (last 24 hours)     Procedure Component Value Units Date/Time    Urinalysis With / Culture If Indicated - Urine, Clean Catch [[id number]]  (Abnormal) Collected:  06/20/18 0955    Specimen:  Urine from Urine, Clean Catch Updated:  06/20/18 1012     Color, UA Dark Yellow (A)     Appearance, UA Clear     pH, UA 6.5     Specific Gravity, UA >=1.030     Glucose, UA Negative     Ketones, UA >=160 mg/dL (4+) (A)     Bilirubin, UA Negative     Blood, UA Trace (A)     Protein, UA 30 mg/dL (1+) (A)     Leuk Esterase, UA Trace (A)     Nitrite, UA Negative     Urobilinogen, UA 1.0 E.U./dL    Urinalysis, Microscopic Only - Urine, Clean Catch [[id number]]  (Abnormal) Collected:  06/20/18 0955    Specimen:  Urine from Urine, Clean Catch Updated:  06/20/18 1012     RBC, UA 13-20 (A) /HPF      WBC, UA 6-12 (A) /HPF      Bacteria, UA None Seen /HPF      Squamous Epithelial Cells, UA 7-12 (A) /HPF      Hyaline Casts, UA 3-6 /LPF      Methodology Automated Microscopy    CBC & Differential [[id number]] Collected:  06/20/18 0925    Specimen:  Blood Updated:  06/20/18 1003    Narrative:       The following orders were created for panel order CBC & Differential.  Procedure                               Abnormality         Status                     ---------                               -----------         ------                     Manual Differential[[id number]]          Abnormal            Final result               CBC Auto Differential[[id number]]        Normal              Final result                 Please view results for these tests on the individual orders.    CBC Auto Differential [[id number]]  (Normal) Collected:  06/20/18 0925    Specimen:  Blood Updated:  06/20/18 1003     WBC 4.92 10*3/mm3      RBC 4.38 10*6/mm3       Hemoglobin 13.9 g/dL      Hematocrit 40.5 %      MCV 92.5 fL      MCH 31.7 pg      MCHC 34.3 g/dL      RDW 12.4 %      RDW-SD 42.4 fl      MPV 11.3 fL      Platelets 167 10*3/mm3     Narrative:       The previously reported component NRBC is no longer being reported.    Manual Differential [[id number]]  (Abnormal) Collected:  06/20/18 0925    Specimen:  Blood Updated:  06/20/18 1003     Neutrophil % 54.5 %      Lymphocyte % 14.1 (L) %      Monocyte % 11.1 %      Bands %  16.2 (H) %      Atypical Lymphocyte % 4.0 %      Neutrophils Absolute 3.48 10*3/mm3      Lymphocytes Absolute 0.69 (L) 10*3/mm3      Monocytes Absolute 0.55 10*3/mm3      RBC Morphology Normal     WBC Morphology Normal     Giant Platelets Slight/1+    hCG, Serum, Qualitative [[id number]]  (Normal) Collected:  06/20/18 0925    Specimen:  Blood Updated:  06/20/18 0952     HCG Qualitative Negative    aPTT [[id number]]  (Normal) Collected:  06/20/18 0925    Specimen:  Blood Updated:  06/20/18 0946     PTT 32.7 seconds     Protime-INR [[id number]]  (Normal) Collected:  06/20/18 0925    Specimen:  Blood Updated:  06/20/18 0946     Protime 14.0 Seconds      INR 1.05    Comprehensive Metabolic Panel [[id number]]  (Abnormal) Collected:  06/20/18 0925    Specimen:  Blood Updated:  06/20/18 0946     Glucose 106 (H) mg/dL      BUN 20 mg/dL      Creatinine 0.64 mg/dL      Sodium 138 mmol/L      Potassium 3.2 (L) mmol/L      Chloride 99 mmol/L      CO2 24.0 mmol/L      Calcium 9.2 mg/dL      Total Protein 7.1 g/dL      Albumin 4.20 g/dL      ALT (SGPT) 23 U/L      AST (SGOT) 20 U/L      Alkaline Phosphatase 59 U/L      Total Bilirubin 0.5 mg/dL      eGFR Non African Amer 111 mL/min/1.73      Globulin 2.9 gm/dL      A/G Ratio 1.4 g/dL      BUN/Creatinine Ratio 31.3 (H)     Anion Gap 15.0 (H) mmol/L     Lipase [[id number]]  (Abnormal) Collected:  06/20/18 0925    Specimen:  Blood Updated:  06/20/18 0946     Lipase 245 (H) U/L         Imaging Results (last 24 hours)      Procedure Component Value Units Date/Time    CT Abdomen Pelvis With Contrast [[id number]] Collected:  06/20/18 1109     Updated:  06/20/18 1117    Narrative:          History:  28-year-old with abdominal pain and rectal bleeding.     Reference:  CT abdomen pelvis September 2012.     Technique  Contrast-enhanced CT abdomen/pelvis was performed with coronal and  sagittal reformatted images provided.     For this CT exam, one or more of the following dose reduction techniques  was employed:  -automated exposure control  -mA and/or kVp adjustment for patient size  -iterative reconstruction      mGy-cm     Findings     Chest  Incidental scanning through the lower chest demonstrates clear lung  bases.     Abdomen  Liver and spleen within normal limits. Prior cholecystectomy. Pancreas  and adrenal glands unremarkable. No renal abnormality. Large vascular  structures of the abdomen are normal. No lymphadenopathy. No free air or  free fluid.     Small bowel loops show normal caliber throughout. There is diffuse  moderate wall thickening of the entire colon with some sparing in the  rectum. Mild pericolonic inflammatory stranding. Normal appendix. No  abscess is seen.     Pelvis  Urinary bladder nondistended and not well assessed otherwise. Uterus is  unremarkable. There is a small amount of free fluid in the cul-de-sac.  No lymphadenopathy.     No acute osseous abnormalities. No abnormality of the sacroiliac joints.          Impression:          Diffuse colitis without complication. Consider infectious colitis or  potentially inflammatory bowel disease.  This report was finalized on 06/20/2018 11:14 by Dr Chirag Gardner, .        I have personally reviewed and interpreted the radiology studies and ECG obtained at time of admission.     Assessment / Plan     Assessment:   Hospital Problem List     Colitis        Assessment:  1.  Abdominal pain  2.  Diarrhea - bloody  3.  Diffuse colitis on abdominal imaging  4.  Nausea and  vomiting  5.  Hypokalemia  6.  Migraine headaches    Plan:   1.  Gastrointestinal PCR panel  2.  IVFs  3.  K supplement  4.  IV Flagyl and Levaquin  5.  GI consultation  6.  IV Zofran and Phenergan PRN  7.  Clear liquid diet  8.  SCDs; no Lovenox for now  9.  Workup ongoing      Alon Montejo MD   06/20/18   12:50 PM

## 2018-06-21 LAB
ALBUMIN SERPL-MCNC: 3 G/DL (ref 3.5–5)
ALBUMIN/GLOB SERPL: 1.2 G/DL (ref 1.1–2.5)
ALP SERPL-CCNC: 37 U/L (ref 24–120)
ALT SERPL W P-5'-P-CCNC: 24 U/L (ref 0–54)
ANION GAP SERPL CALCULATED.3IONS-SCNC: 12 MMOL/L (ref 4–13)
AST SERPL-CCNC: 15 U/L (ref 7–45)
BILIRUB SERPL-MCNC: 0.3 MG/DL (ref 0.1–1)
BUN BLD-MCNC: 12 MG/DL (ref 5–21)
BUN/CREAT SERPL: 25 (ref 7–25)
CALCIUM SPEC-SCNC: 8.4 MG/DL (ref 8.4–10.4)
CHLORIDE SERPL-SCNC: 112 MMOL/L (ref 98–110)
CO2 SERPL-SCNC: 16 MMOL/L (ref 24–31)
CREAT BLD-MCNC: 0.48 MG/DL (ref 0.5–1.4)
DEPRECATED RDW RBC AUTO: 44.1 FL (ref 40–54)
ERYTHROCYTE [DISTWIDTH] IN BLOOD BY AUTOMATED COUNT: 12.5 % (ref 12–15)
GFR SERPL CREATININE-BSD FRML MDRD: >150 ML/MIN/1.73
GLOBULIN UR ELPH-MCNC: 2.6 GM/DL
GLUCOSE BLD-MCNC: 81 MG/DL (ref 70–100)
HCT VFR BLD AUTO: 33.3 % (ref 37–47)
HGB BLD-MCNC: 11.4 G/DL (ref 12–16)
MCH RBC QN AUTO: 32.6 PG (ref 28–32)
MCHC RBC AUTO-ENTMCNC: 34.2 G/DL (ref 33–36)
MCV RBC AUTO: 95.1 FL (ref 82–98)
PLATELET # BLD AUTO: 121 10*3/MM3 (ref 130–400)
PMV BLD AUTO: 11.8 FL (ref 6–12)
POTASSIUM BLD-SCNC: 4.4 MMOL/L (ref 3.5–5.3)
PROT SERPL-MCNC: 5.6 G/DL (ref 6.3–8.7)
RBC # BLD AUTO: 3.5 10*6/MM3 (ref 4.2–5.4)
SODIUM BLD-SCNC: 140 MMOL/L (ref 135–145)
WBC NRBC COR # BLD: 4.37 10*3/MM3 (ref 4.8–10.8)

## 2018-06-21 PROCEDURE — 80053 COMPREHEN METABOLIC PANEL: CPT | Performed by: INTERNAL MEDICINE

## 2018-06-21 PROCEDURE — 25010000002 PROMETHAZINE PER 50 MG: Performed by: INTERNAL MEDICINE

## 2018-06-21 PROCEDURE — 25010000002 MORPHINE SULFATE (PF) 2 MG/ML SOLUTION: Performed by: INTERNAL MEDICINE

## 2018-06-21 PROCEDURE — 25810000003 SODIUM CHLORIDE 0.9 % WITH KCL 20 MEQ 20-0.9 MEQ/L-% SOLUTION: Performed by: INTERNAL MEDICINE

## 2018-06-21 PROCEDURE — 85027 COMPLETE CBC AUTOMATED: CPT | Performed by: INTERNAL MEDICINE

## 2018-06-21 PROCEDURE — 99221 1ST HOSP IP/OBS SF/LOW 40: CPT | Performed by: INTERNAL MEDICINE

## 2018-06-21 RX ADMIN — MORPHINE SULFATE 1 MG: 2 INJECTION, SOLUTION INTRAMUSCULAR; INTRAVENOUS at 03:21

## 2018-06-21 RX ADMIN — METRONIDAZOLE 500 MG: 500 INJECTION, SOLUTION INTRAVENOUS at 03:52

## 2018-06-21 RX ADMIN — MORPHINE SULFATE 1 MG: 2 INJECTION, SOLUTION INTRAMUSCULAR; INTRAVENOUS at 08:26

## 2018-06-21 RX ADMIN — PROMETHAZINE HYDROCHLORIDE 12.5 MG: 25 INJECTION INTRAMUSCULAR; INTRAVENOUS at 17:12

## 2018-06-21 RX ADMIN — METRONIDAZOLE 500 MG: 500 INJECTION, SOLUTION INTRAVENOUS at 12:31

## 2018-06-21 RX ADMIN — METRONIDAZOLE 500 MG: 500 INJECTION, SOLUTION INTRAVENOUS at 20:02

## 2018-06-21 RX ADMIN — PROMETHAZINE HYDROCHLORIDE 12.5 MG: 25 INJECTION INTRAMUSCULAR; INTRAVENOUS at 09:46

## 2018-06-21 RX ADMIN — POTASSIUM CHLORIDE AND SODIUM CHLORIDE 150 ML/HR: 900; 150 INJECTION, SOLUTION INTRAVENOUS at 14:13

## 2018-06-21 RX ADMIN — MORPHINE SULFATE 1 MG: 2 INJECTION, SOLUTION INTRAMUSCULAR; INTRAVENOUS at 12:33

## 2018-06-21 RX ADMIN — POTASSIUM CHLORIDE AND SODIUM CHLORIDE 150 ML/HR: 900; 150 INJECTION, SOLUTION INTRAVENOUS at 07:30

## 2018-06-21 RX ADMIN — POTASSIUM CHLORIDE AND SODIUM CHLORIDE 150 ML/HR: 900; 150 INJECTION, SOLUTION INTRAVENOUS at 21:39

## 2018-06-21 RX ADMIN — AMITRIPTYLINE HYDROCHLORIDE 50 MG: 25 TABLET, FILM COATED ORAL at 21:38

## 2018-06-21 RX ADMIN — MORPHINE SULFATE 1 MG: 2 INJECTION, SOLUTION INTRAMUSCULAR; INTRAVENOUS at 20:09

## 2018-06-21 NOTE — PROGRESS NOTES
Discharge Planning Assessment  [facility]     Patient Name: Mirela Flores  MRN: [id number]  Today's Date: 6/21/2018    Admit Date: 6/20/2018          Discharge Needs Assessment     Row Name 06/21/18 1138       Living Environment    Lives With spouse;child(alonso), dependent    Name(s) of Who Lives With Patient Jai    Current Living Arrangements home/apartment/condo    Primary Care Provided by self    Provides Primary Care For child(alonso)    Family Caregiver if Needed none    Quality of Family Relationships supportive;helpful;involved    Able to Return to Prior Arrangements yes       Resource/Environmental Concerns    Resource/Environmental Concerns none    Transportation Concerns car, none       Transition Planning    Patient/Family Anticipates Transition to home with family    Patient/Family Anticipated Services at Transition none    Transportation Anticipated family or friend will provide;car, drives self       Discharge Needs Assessment    Readmission Within the Last 30 Days no previous admission in last 30 days    Concerns to be Addressed financial/insurance    Equipment Currently Used at Home none    Anticipated Changes Related to Illness none    Equipment Needed After Discharge none            Discharge Plan     Row Name 06/21/18 4180       Plan    Plan Home    Patient/Family in Agreement with Plan yes    Plan Comments Spoke with pt to assess for home needs. Pt lives with spouse and 3 children ages 4/3/1.  Pt does not have health ins. so called [employer] in [place] [address] and she says pt is not eligible for assistance.  Pt independent and no home needs identified.         Destination     No service coordination in this encounter.      Durable Medical Equipment     No service coordination in this encounter.      Dialysis/Infusion     No service coordination in this encounter.      Home Medical Care     No service coordination in this encounter.      Social Care     No service coordination in this encounter.                 Demographic Summary    No documentation.           Functional Status    No documentation.           Psychosocial    No documentation.           Abuse/Neglect    No documentation.           Legal    No documentation.           Substance Abuse    No documentation.           Patient Forms    No documentation.         ALECIA Miller

## 2018-06-21 NOTE — PROGRESS NOTES
"    [facility] Medicine Services  INPATIENT PROGRESS NOTE    Length of Stay: 1  Date of Admission: 6/20/2018  Primary Care Physician: Lizzie Hernandez MD    Subjective   Chief Complaint: bloody diarrhea, lower abdominal cramping  HPI   Complains of nausea with vomiting.  Reports 5 watery bloody bowel movements in the emergency room.  Additional dark bloody bowel movement after admission to the floor.  Reports continuous lower abdominal cramping pain level 7 out of 10.  Decreased appetite and unable to tolerate oral intake. Tells me \"I thought I had food poisoning\".    Review of Systems   Constitutional: Positive for appetite change. Negative for diaphoresis, fever and unexpected weight change.   HENT: Negative for trouble swallowing.    Eyes: Negative for photophobia and visual disturbance.   Respiratory: Negative for cough, shortness of breath and wheezing.    Cardiovascular: Negative for chest pain, palpitations and leg swelling.   Gastrointestinal: Positive for abdominal pain (Lower abdominal pain), blood in stool, diarrhea (Bloody diarrhea), nausea and vomiting.   Endocrine: Negative for cold intolerance and polyuria.   Genitourinary: Negative for dysuria, hematuria and urgency.   Musculoskeletal: Negative for back pain and gait problem.   Skin: Negative for wound.   Allergic/Immunologic: Negative for immunocompromised state.   Neurological: Positive for weakness.   Hematological: Negative for adenopathy. Does not bruise/bleed easily.   Psychiatric/Behavioral: Negative for agitation, behavioral problems and confusion.      All pertinent negatives and positives are as above. All other systems have been reviewed and are negative unless otherwise stated.     Objective    Temp:  [97.6 °F (36.4 °C)-98.4 °F (36.9 °C)] 97.6 °F (36.4 °C)  Heart Rate:  [63-78] 68  Resp:  [14-18] 14  BP: (107-113)/(61-76) 112/76  Physical Exam   Constitutional: She is oriented to person, place, and " time. She appears well-developed and well-nourished.   HENT:   Head: Normocephalic and atraumatic.   Eyes: EOM are normal. Pupils are equal, round, and reactive to light.   Neck: Normal range of motion. Neck supple.   Cardiovascular: Normal rate, regular rhythm, normal heart sounds and intact distal pulses.  Exam reveals no gallop and no friction rub.    No murmur heard.  Normal sinus rhythm 54-81 on telemetry.   Pulmonary/Chest: Effort normal and breath sounds normal. No respiratory distress. She has no wheezes. She has no rales.   Abdominal: There is tenderness (Bilateral lower abdominal tenderness).   Genitourinary:   Genitourinary Comments: Voiding   Musculoskeletal: She exhibits no edema or deformity.   Neurological: She is alert and oriented to person, place, and time.   Skin: Skin is warm and dry.   Psychiatric: She has a normal mood and affect. Her behavior is normal. Judgment and thought content normal.     Results Review:  I have reviewed the labs, radiology results, and diagnostic studies.    Laboratory Data:     Results from last 7 days  Lab Units 06/21/18  0652 06/20/18  0925   WBC 10*3/mm3 4.37* 4.92   HEMOGLOBIN g/dL 11.4* 13.9   HEMATOCRIT % 33.3* 40.5   PLATELETS 10*3/mm3 121* 167          Results from last 7 days  Lab Units 06/21/18  0652 06/20/18  0925   SODIUM mmol/L 140 138   POTASSIUM mmol/L 4.4 3.2*   CHLORIDE mmol/L 112* 99   CO2 mmol/L 16.0* 24.0   BUN mg/dL 12 20   CREATININE mg/dL 0.48* 0.64   CALCIUM mg/dL 8.4 9.2   BILIRUBIN mg/dL 0.3 0.5   ALK PHOS U/L 37 59   ALT (SGPT) U/L 24 23   AST (SGOT) U/L 15 20   GLUCOSE mg/dL 81 106*      Campylobacter Not Detected    Clostridium difficile (toxin A/B) Not Detected    Plesiomonas shigelloides Not Detected    Salmonella Detected (A)    Vibrio Not Detected    Vibrio cholerae Not Detected    Yersinia enterocolitica Not Detected    Enteroaggregative E. coli (EAEC) Not Detected    Enteropathogenic E. coli (EPEC) Not Detected    Enterotoxigenic E.  coli (ETEC) lt/st Not Detected    Shiga-like toxin-producing E. coli (STEC) stx1/stx2 Not Detected    E. coli O157 Not Detected    Shigella/Enteroinvasive E. coli (EIEC) Not Detected    Cryptosporidium Not Detected    Cyclospora cayetanensis Not Detected    Entamoeba histolytica Not Detected    Giardia lamblia Not Detected    Adenovirus F40/41 Not Detected    Astrovirus Not Detected    Norovirus GI/GII Not Detected    Rotavirus A Not Detected    Sapovirus (I, II, IV or V) Not Detected          Imaging Results (all)     Procedure Component Value Units Date/Time    CT Abdomen Pelvis With Contrast [[id number]] Collected:  06/20/18 1109     Updated:  06/20/18 1117    Narrative:          History:  28-year-old with abdominal pain and rectal bleeding.     Reference:  CT abdomen pelvis September 2012.     Technique  Contrast-enhanced CT abdomen/pelvis was performed with coronal and  sagittal reformatted images provided.     For this CT exam, one or more of the following dose reduction techniques  was employed:  -automated exposure control  -mA and/or kVp adjustment for patient size  -iterative reconstruction      mGy-cm     Findings     Chest  Incidental scanning through the lower chest demonstrates clear lung  bases.     Abdomen  Liver and spleen within normal limits. Prior cholecystectomy. Pancreas  and adrenal glands unremarkable. No renal abnormality. Large vascular  structures of the abdomen are normal. No lymphadenopathy. No free air or  free fluid.     Small bowel loops show normal caliber throughout. There is diffuse  moderate wall thickening of the entire colon with some sparing in the  rectum. Mild pericolonic inflammatory stranding. Normal appendix. No  abscess is seen.     Pelvis  Urinary bladder nondistended and not well assessed otherwise. Uterus is  unremarkable. There is a small amount of free fluid in the cul-de-sac.  No lymphadenopathy.     No acute osseous abnormalities. No abnormality of the  sacroiliac joints.          Impression:          Diffuse colitis without complication. Consider infectious colitis or  potentially inflammatory bowel disease.  This report was finalized on 06/20/2018 11:14 by Dr Chirag Gardner, .        Intake/Output    Intake/Output Summary (Last 24 hours) at 06/21/18 0743  Last data filed at 06/21/18 0729   Gross per 24 hour   Intake          3773.13 ml   Output                0 ml   Net          3773.13 ml       Scheduled Meds    amitriptyline 50 mg Oral Nightly   metroNIDAZOLE 500 mg Intravenous Q8H   sertraline 100 mg Oral Daily       I have reviewed the patient current medications.     Assessment/Plan     Hospital Problem List     Colitis        Assessment:  1.  Diffuse lower abdominal pain  2.  Diarrhea - bloody, Salmonella detected GI panel  3.  Diffuse colitis on abdominal imaging infectious secondary to Salmonella  4.  Nausea and vomiting  5.  Hypokalemia, resolved with hydration  6.  Migraine headaches     Plan:  1.  GI panel positive for salmonella  2.  IV fluids at 150 mL per hour.  Continues to have nausea with vomiting in addition to diarrhea.  3.  Flagyl IV day 2  4.  GI consulted,  5.  Zofran 4 mg every 6 hours as needed for nausea  6.  Place SCDs bilateral lower extremities.   7.  Clear liquid diet as tolerated.  She continues to have nausea with vomiting.  8.  Basic metabolic panel tomorrow to monitor hypokalemia      The above documentation resulted from a face-to-face encounter by me Tamiko HERNANDEZ, [facility].      Discharge Planning: I expect patient to be discharged to home  in 2  days.    DAVID Ye   06/21/18   7:43 AM    I personally evaluated and examined the patient in conjunction with DAVID Mcadams and agree with the assessment, treatment plan, and disposition of the patient as recorded by her. My history, exam, and further recommendations are: When patient was administered a dose of Levaquin yesterday afternoon she had abrupt onset of  urticarial-like lesions on her legs and torso.  Levaquin was subsequently discontinued.  Patient was given a dose of Benadryl in addition to steroid.  She also has allergies to penicillin and cephalosporins.  Her gastrointestinal PCR panel has returned positive for salmonella.  It appears that the frequency of her bloody diarrhea is improving, as is her abdominal pain and cramping and nausea.  Appreciate gastroenterology input.  Review of up-to-date indicates that Bactrim in addition to azithromycin are other treatment options for salmonella gastrointestinal infection, however it also states that antibiotic treatment is generally not recommended for immunocompetent individuals between 12 months and 50 years of age who have documented Salmonella gastroenteritis with mild to moderate symptoms, as the illness is typically self-limited. We will continue hydration with intravenous fluids.  CBC and metabolic panel in the morning tomorrow.  Patient's abdomen is soft.  She is nontoxic appearing at this time.  Continue supportive care.  Appreciate gastroenterology.        Alon Montejo MD  06/21/18  9:49 AM

## 2018-06-21 NOTE — PLAN OF CARE
Problem: Patient Care Overview  Goal: Plan of Care Review  Outcome: Ongoing (interventions implemented as appropriate)   06/21/18 9213   Coping/Psychosocial   Plan of Care Reviewed With patient   Plan of Care Review   Progress no change   OTHER   Outcome Summary Patient continues to c/o pain and nausea. PRN meds given with some relief. Unable to tolerate clears. Continue IV fluids and IV flagyl. VSS.        Problem: Bowel Disease, Inflammatory (Adult)  Goal: Signs and Symptoms of Listed Potential Problems Will be Absent, Minimized or Managed (Bowel Disease, Inflammatory)  Outcome: Ongoing (interventions implemented as appropriate)      Problem: Pain, Acute (Adult)  Goal: Acceptable Pain Control/Comfort Level  Outcome: Ongoing (interventions implemented as appropriate)

## 2018-06-21 NOTE — PLAN OF CARE
Problem: Patient Care Overview  Goal: Plan of Care Review  Outcome: Ongoing (interventions implemented as appropriate)   06/21/18 8567   Coping/Psychosocial   Plan of Care Reviewed With patient   Plan of Care Review   Progress no change   OTHER   Outcome Summary Patient c/o pain. PRN medication given with relief. C/o nausea, PRN zofran given. Continue IV fluids and IV flagyl. VSS. Will continue to monitor.       Problem: Bowel Disease, Inflammatory (Adult)  Goal: Signs and Symptoms of Listed Potential Problems Will be Absent, Minimized or Managed (Bowel Disease, Inflammatory)  Outcome: Ongoing (interventions implemented as appropriate)      Problem: Pain, Acute (Adult)  Goal: Acceptable Pain Control/Comfort Level  Outcome: Ongoing (interventions implemented as appropriate)

## 2018-06-21 NOTE — PLAN OF CARE
Problem: Patient Care Overview  Goal: Plan of Care Review  Outcome: Ongoing (interventions implemented as appropriate)   06/21/18 6099   Coping/Psychosocial   Plan of Care Reviewed With patient   Plan of Care Review   Progress no change   OTHER   Outcome Summary Initial RDN eval. Pt reports very poor appetite and limited intake. c/o pain, nausea, and vomitting. Continues on clear liquid diet. Will continue to follow.

## 2018-06-21 NOTE — CONSULTS
[facility]              Initial Inpatient Consult Note  Mirela Flores  1990    Referring Provider: Alon Montejo MD    Admission: 6/20/2018  Consult date: 6/21/2018  Chief complaint: abdominal pain    Subjective     History of present illness:  Pt is a 28 year old female acute onset of nausea vomiting on Monday (4 days) that persisted with associated abdominal pain and bloody diarrhea. Her abdominal pain was a severe cramp rated 10 out of 10 starting to improve today she says less tender. She has had one loose bowel this am no BRB noted it was more dark in color. No fever chills or sweats. No nausea or vomiting. WBC normal, H/H WNL, liver functions normal. No foreign travel, no known food exposure. Stool cultures are positive for salmonella. CT scan of the abdomen with contrast 6/20/2018 diffuse colitis without complication consider infectious colitis or potentially IBD. She has had workup to include Endo/Colon 2/2/2018 erosive gastropathy colon 2/2/2018 normal colon by Dr Charlton.   Past Medical History:  Past Medical History:   Diagnosis Date   • Anemia affecting pregnancy    • Asthma     as a child   • History of transfusion    • Migraines    • Mono exposure    • S/P thyroid biopsy        Past Surgical History:  Past Surgical History:   Procedure Laterality Date   • CERVICAL CONIZATION Bilateral 7/3/2017    Procedure: CERVICAL CONIZATION, LOOP ELECTROCAUTERY EXCISION PROCEDURE;  Surgeon: Karli Guerrero MD;  Location: [facility] OR;  Service:    • CHOLECYSTECTOMY     • COLONOSCOPY  02/16/2011    normal   • COLONOSCOPY N/A 2/2/2018    Procedure: COLONOSCOPY WITH ANESTHESIA;  Surgeon: Bridger Charlton DO;  Location: [facility] ENDOSCOPY;  Service:    • D&C WITH SUCTION     • ENDOSCOPY N/A 2/2/2018    Procedure: ESOPHAGOGASTRODUODENOSCOPY WITH ANESTHESIA;  Surgeon: Bridger Charlton DO;  Location: [facility] ENDOSCOPY;  Service:    • OVARIAN CYST SURGERY Right    • SALPINGECTOMY Bilateral  8/28/2017    Procedure: laparoscopic salpingectomy;  Surgeon: Karli Guerrero MD;  Location: [facility];  Service:    • TYMPANOSTOMY TUBE PLACEMENT          Social History:   Social History   Substance Use Topics   • Smoking status: Never Smoker   • Smokeless tobacco: Never Used   • Alcohol use No        Family History:  Family History   Problem Relation Age of Onset   • Other Father    • Heart murmur Mother    • Colon polyps Mother    • No Known Problems Paternal Grandfather    • No Known Problems Paternal Grandmother    • Thyroid disease Maternal Grandmother    • Hypertension Maternal Grandmother    • Colon polyps Maternal Grandmother    • No Known Problems Maternal Grandfather    • Breast cancer Other 60   • Ovarian cancer Neg Hx        Home Meds:  Prescriptions Prior to Admission   Medication Sig Dispense Refill Last Dose   • cyclobenzaprine (FLEXERIL) 10 MG tablet Take 10 mg by mouth 3 (Three) Times a Day As Needed for Muscle Spasms.   Past Month at Unknown time   • prochlorperazine (COMPAZINE) 10 MG tablet Take 1 tablet by mouth Every 6 (Six) Hours As Needed for Nausea or Vomiting. 30 tablet 2 Past Month at Unknown time   • amitriptyline (ELAVIL) 25 MG tablet Take 2 tablets by mouth Every Night. 60 tablet 2 Unknown at Unknown time   • Cholecalciferol 1000 units capsule Take 1,000 Units by mouth Daily. 90 capsule 0 Unknown at Unknown time   • levocetirizine (XYZAL) 5 MG tablet Take 1 tablet by mouth Every Evening. 5 tablet 0 Unknown at Unknown time   • lisdexamfetamine (VYVANSE) 40 MG capsule Take 40 mg by mouth Every Morning.   Unknown at Unknown time   • sertraline (ZOLOFT) 100 MG tablet Take 150 mg by mouth Daily.   Unknown at Unknown time   • SUMAtriptan (IMITREX) 100 MG tablet Take 100 mg by mouth Take As Directed. One dose at onset of headache, may repeat one time in 2 hours if needed   Unknown at Unknown time       Current Meds:   Hospital Medications (active)       Dose Frequency Start End     "acetaminophen (TYLENOL) tablet 650 mg 650 mg Every 4 Hours PRN 6/20/2018     Sig - Route: Take 2 tablets by mouth Every 4 (Four) Hours As Needed for Mild Pain . - Oral    amitriptyline (ELAVIL) tablet 50 mg 50 mg Nightly 6/20/2018     Sig - Route: Take 2 tablets by mouth Every Night. - Oral    diphenhydrAMINE (BENADRYL) injection 25 mg 25 mg Every 6 Hours PRN 6/20/2018     Sig - Route: Infuse 0.5 mL into a venous catheter Every 6 (Six) Hours As Needed for Allergies. - Intravenous    iopamidol (ISOVUE-370) 76 % injection 100 mL 100 mL Once in Imaging 6/20/2018 6/20/2018    Sig - Route: Infuse 100 mL into a venous catheter Once. - Intravenous    levoFLOXacin (LEVAQUIN) 750 mg/150 mL D5W (premix) (LEVAQUIN) 750 mg 750 mg Once 6/20/2018 6/20/2018    Sig - Route: Infuse 150 mL into a venous catheter 1 (One) Time. - Intravenous    methylPREDNISolone sodium succinate (SOLU-Medrol) injection 40 mg 40 mg Once 6/20/2018 6/20/2018    Sig - Route: Infuse 1 mL into a venous catheter 1 (One) Time. - Intravenous    metroNIDAZOLE (FLAGYL) IVPB 500 mg 500 mg Once 6/20/2018 6/20/2018    Sig - Route: Infuse 100 mL into a venous catheter 1 (One) Time. - Intravenous    metroNIDAZOLE (FLAGYL) IVPB 500 mg 500 mg Every 8 Hours 6/20/2018 6/25/2018    Sig - Route: Infuse 100 mL into a venous catheter Every 8 (Eight) Hours. - Intravenous    Morphine sulfate (PF) injection 1 mg 1 mg Every 4 Hours PRN 6/20/2018 6/30/2018    Sig - Route: Infuse 0.5 mL into a venous catheter Every 4 (Four) Hours As Needed for Severe Pain . - Intravenous    Linked Group 1:  \"And\" Linked Group Details        naloxone (NARCAN) injection 0.4 mg 0.4 mg Every 5 Minutes PRN 6/20/2018     Sig - Route: Infuse 1 mL into a venous catheter Every 5 (Five) Minutes As Needed for Respiratory Depression. - Intravenous    Linked Group 1:  \"And\" Linked Group Details        ondansetron (ZOFRAN) injection 4 mg 4 mg Once 6/20/2018 6/20/2018    Sig - Route: Infuse 2 mL into a venous " "catheter 1 (One) Time. - Intravenous    ondansetron (ZOFRAN) injection 4 mg 4 mg Every 6 Hours PRN 6/20/2018     Sig - Route: Infuse 2 mL into a venous catheter Every 6 (Six) Hours As Needed for Nausea or Vomiting. - Intravenous    potassium chloride 10 mEq in 50 mL IVPB 10 mEq Every 1 Hour 6/20/2018 6/20/2018    Sig - Route: Infuse 50 mL into a venous catheter Every 1 (One) Hour. - Intravenous    promethazine (PHENERGAN) injection 12.5 mg 12.5 mg Every 6 Hours PRN 6/20/2018     Sig - Route: Infuse 0.5 mL into a venous catheter Every 6 (Six) Hours As Needed for Nausea or Vomiting. - Intravenous    sertraline (ZOLOFT) tablet 100 mg 100 mg Daily 6/20/2018     Sig - Route: Take 1 tablet by mouth Daily. - Oral    sodium chloride 0.9 % bolus 1,000 mL 1,000 mL Once 6/20/2018 6/20/2018    Sig - Route: Infuse 1,000 mL into a venous catheter 1 (One) Time. - Intravenous    sodium chloride 0.9 % flush 1-10 mL 1-10 mL As Needed 6/20/2018     Sig - Route: Infuse 1-10 mL into a venous catheter As Needed for Line Care. - Intravenous    sodium chloride 0.9 % flush 10 mL 10 mL As Needed 6/20/2018     Sig - Route: Infuse 10 mL into a venous catheter As Needed for Line Care. - Intravenous    Linked Group 2:  \"And\" Linked Group Details        sodium chloride 0.9 % with KCl 20 mEq/L infusion 150 mL/hr Continuous 6/20/2018     Sig - Route: Infuse 150 mL/hr into a venous catheter Continuous. - Intravenous    SUMAtriptan (IMITREX) tablet 50 mg 50 mg Every 2 Hours PRN 6/20/2018     Sig - Route: Take 1 tablet by mouth Every 2 (Two) Hours As Needed for Migraine. - Oral    levoFLOXacin (LEVAQUIN) 500 mg/100 mL D5W (premix) (LEVAQUIN) 500 mg (Discontinued) 500 mg Every 24 Hours 6/21/2018 6/20/2018    Sig - Route: Infuse 100 mL into a venous catheter Daily. - Intravenous          Allergies:  Allergies   Allergen Reactions   • Cephalosporins      Pt states was told can never take them again.    • Penicillins      Reaction as a child.  Patient was " "told can never take again.    • Topamax [Topiramate] Angioedema   • Reglan [Metoclopramide] Other (See Comments)     \"CHEST PAIN\"   • Adhesive Tape Rash     TEGADERM AND STERI STRIPS   • Tape Rash     TEGADERM AND STERI STRIPS       Review of Systems  Review of Systems   Constitutional: Negative for activity change, appetite change, chills, diaphoresis, fatigue, fever and unexpected weight change.   HENT: Negative for ear pain, hearing loss, mouth sores, sore throat, trouble swallowing and voice change.    Eyes: Negative.    Respiratory: Negative for cough, choking, shortness of breath and wheezing.    Cardiovascular: Negative for chest pain and palpitations.   Gastrointestinal: Positive for abdominal pain, blood in stool and diarrhea. Negative for constipation, nausea and vomiting.   Endocrine: Negative for cold intolerance and heat intolerance.   Genitourinary: Negative for decreased urine volume, dysuria, frequency, hematuria and urgency.   Musculoskeletal: Negative for back pain, gait problem and myalgias.   Skin: Negative for color change, pallor and rash.   Allergic/Immunologic: Negative for food allergies and immunocompromised state.   Neurological: Negative for dizziness, tremors, seizures, syncope, weakness, light-headedness, numbness and headaches.   Hematological: Negative for adenopathy. Does not bruise/bleed easily.   Psychiatric/Behavioral: Negative for agitation and confusion. The patient is not nervous/anxious.    All other systems reviewed and are negative.       Objective     Vital Signs  Temp:  [97.6 °F (36.4 °C)-98.4 °F (36.9 °C)] 97.9 °F (36.6 °C)  Heart Rate:  [59-78] 59  Resp:  [14-18] 18  BP: (107-118)/(61-76) 118/76  Body mass index is 18.79 kg/m².    Physical Exam:  General Appearance:    Alert, cooperative, in no acute distress   Head:    Normocephalic, without obvious abnormality, atraumatic   Eyes:            Lids and lashes normal, conjunctivae and sclerae normal, no   Icterus, " conjunctival pallor   Throat:   No oral lesions, no thrush, oral mucosa moist, posterior oropharynx clear   Neck:   No adenopathy, supple, trachea midline, no thyromegaly, no   carotid bruit, no JVD   Lungs:     Clear to auscultation,respirations regular, even and                   unlabored    Heart:    Regular rhythm and normal rate, normal S1 and S2, no            murmur   Chest Wall:    No abnormalities observed   Abdomen:     Normal bowel sounds, no masses, no organomegaly, soft        Tender generalized throughout, non-distended, no guarding, no rebound                 tenderness   Rectal:     Deferred   Extremities:   no edema, no cyanosis   Skin:   No open lesions, bruising or rash   Lymph nodes:   No palpable cervical adenopathy   Psychiatric:  Judgement and insight: normal   Orientation to person place and time: normal   Mood and affect: normal     Results Review:    Results from last 7 days  Lab Units 06/21/18  0652 06/20/18  0925   WBC 10*3/mm3 4.37* 4.92   HEMOGLOBIN g/dL 11.4* 13.9   HEMATOCRIT % 33.3* 40.5   PLATELETS 10*3/mm3 121* 167         Results from last 7 days  Lab Units 06/21/18  0652 06/20/18  0925   SODIUM mmol/L 140 138   POTASSIUM mmol/L 4.4 3.2*   CHLORIDE mmol/L 112* 99   CO2 mmol/L 16.0* 24.0   BUN mg/dL 12 20   CREATININE mg/dL 0.48* 0.64   CALCIUM mg/dL 8.4 9.2   BILIRUBIN mg/dL 0.3 0.5   ALK PHOS U/L 37 59   ALT (SGPT) U/L 24 23   AST (SGOT) U/L 15 20   GLUCOSE mg/dL 81 106*         Results from last 7 days  Lab Units 06/20/18  0925   INR  1.05        Radiology Review:  Imaging Results (last 72 hours)     Procedure Component Value Units Date/Time    CT Abdomen Pelvis With Contrast [[id number]] Collected:  06/20/18 1109     Updated:  06/20/18 1117    Narrative:          History:  28-year-old with abdominal pain and rectal bleeding.     Reference:  CT abdomen pelvis September 2012.     Technique  Contrast-enhanced CT abdomen/pelvis was performed with coronal and  sagittal reformatted  images provided.     For this CT exam, one or more of the following dose reduction techniques  was employed:  -automated exposure control  -mA and/or kVp adjustment for patient size  -iterative reconstruction      mGy-cm     Findings     Chest  Incidental scanning through the lower chest demonstrates clear lung  bases.     Abdomen  Liver and spleen within normal limits. Prior cholecystectomy. Pancreas  and adrenal glands unremarkable. No renal abnormality. Large vascular  structures of the abdomen are normal. No lymphadenopathy. No free air or  free fluid.     Small bowel loops show normal caliber throughout. There is diffuse  moderate wall thickening of the entire colon with some sparing in the  rectum. Mild pericolonic inflammatory stranding. Normal appendix. No  abscess is seen.     Pelvis  Urinary bladder nondistended and not well assessed otherwise. Uterus is  unremarkable. There is a small amount of free fluid in the cul-de-sac.  No lymphadenopathy.     No acute osseous abnormalities. No abnormality of the sacroiliac joints.          Impression:          Diffuse colitis without complication. Consider infectious colitis or  potentially inflammatory bowel disease.  This report was finalized on 06/20/2018 11:14 by Dr Chirag Gardner, .          Assessment/Plan     Active Problems:    Colitis      1. Infectious colitis-salmonella  2. Diarrhea with BRBPR  3. Rash-with Levaquin this has been discontinued and rash is improved today    Typically Salmonella is self limiting seems to be improving somewhat today. Cont supportive care will follow. Her most recent Endo/Colon reviewed.     EMR Dragon/transcription disclaimer: Much of this encounter note is electronic transcription/translation of spoken language to printed text. The electronic translation of spoken language may be erroneous, or at times, nonsensical words or phrases may be inadvertently transcribed. Although I have reviewed the note for such errors, some  may still exist.  DAVID oBwden  [facility]  06/21/18  10:07 AM      I performed a history, physical examination, reviewed the progress note/consult note, and discussed the management of this patient with DAVID Armstrong as her supervising physician.  I agree with the documented findings and plan of care as outlined with any exceptions or new recommendations noted below.    Salmonella.  Unable to identify where she may have contracted this.  Agree with conservative management at this time.  Continue hydration.  Watch renal function.  Advance diet as tolerated.    EMR Dragon/transcription disclaimer: Much of this encounter note is an electronic transcription/translation of spoken language to printed text.  The electronic translation of spoken language may permit erroneous, or at times, nonsensical words or phrases to be inadvertently transcribed.  Although I have reviewed the note for such errors, some may still exist.      Jonathan Singh MD  2:19 PM  6/21/2018

## 2018-06-22 LAB
ANION GAP SERPL CALCULATED.3IONS-SCNC: 11 MMOL/L (ref 4–13)
BUN BLD-MCNC: 6 MG/DL (ref 5–21)
BUN/CREAT SERPL: 12.5 (ref 7–25)
CALCIUM SPEC-SCNC: 8 MG/DL (ref 8.4–10.4)
CHLORIDE SERPL-SCNC: 104 MMOL/L (ref 98–110)
CO2 SERPL-SCNC: 22 MMOL/L (ref 24–31)
CREAT BLD-MCNC: 0.48 MG/DL (ref 0.5–1.4)
DEPRECATED RDW RBC AUTO: 41.2 FL (ref 40–54)
ERYTHROCYTE [DISTWIDTH] IN BLOOD BY AUTOMATED COUNT: 12.3 % (ref 12–15)
GFR SERPL CREATININE-BSD FRML MDRD: >150 ML/MIN/1.73
GLUCOSE BLD-MCNC: 80 MG/DL (ref 70–100)
HCT VFR BLD AUTO: 31.9 % (ref 37–47)
HGB BLD-MCNC: 11.2 G/DL (ref 12–16)
MCH RBC QN AUTO: 32.4 PG (ref 28–32)
MCHC RBC AUTO-ENTMCNC: 35.1 G/DL (ref 33–36)
MCV RBC AUTO: 92.2 FL (ref 82–98)
PLATELET # BLD AUTO: 139 10*3/MM3 (ref 130–400)
PMV BLD AUTO: 11 FL (ref 6–12)
POTASSIUM BLD-SCNC: 4 MMOL/L (ref 3.5–5.3)
RBC # BLD AUTO: 3.46 10*6/MM3 (ref 4.2–5.4)
SODIUM BLD-SCNC: 137 MMOL/L (ref 135–145)
WBC NRBC COR # BLD: 4.58 10*3/MM3 (ref 4.8–10.8)

## 2018-06-22 PROCEDURE — 85027 COMPLETE CBC AUTOMATED: CPT | Performed by: INTERNAL MEDICINE

## 2018-06-22 PROCEDURE — 80048 BASIC METABOLIC PNL TOTAL CA: CPT | Performed by: NURSE PRACTITIONER

## 2018-06-22 PROCEDURE — 25010000002 ONDANSETRON PER 1 MG: Performed by: INTERNAL MEDICINE

## 2018-06-22 PROCEDURE — 99232 SBSQ HOSP IP/OBS MODERATE 35: CPT | Performed by: INTERNAL MEDICINE

## 2018-06-22 PROCEDURE — 25010000002 PROMETHAZINE PER 50 MG: Performed by: INTERNAL MEDICINE

## 2018-06-22 PROCEDURE — 25010000002 MORPHINE SULFATE (PF) 2 MG/ML SOLUTION: Performed by: INTERNAL MEDICINE

## 2018-06-22 PROCEDURE — 25810000003 SODIUM CHLORIDE 0.9 % WITH KCL 20 MEQ 20-0.9 MEQ/L-% SOLUTION: Performed by: INTERNAL MEDICINE

## 2018-06-22 PROCEDURE — 25810000003 SODIUM CHLORIDE 0.9 % WITH KCL 20 MEQ 20-0.9 MEQ/L-% SOLUTION: Performed by: NURSE PRACTITIONER

## 2018-06-22 RX ADMIN — PROMETHAZINE HYDROCHLORIDE 12.5 MG: 25 INJECTION INTRAMUSCULAR; INTRAVENOUS at 15:06

## 2018-06-22 RX ADMIN — POTASSIUM CHLORIDE AND SODIUM CHLORIDE 100 ML/HR: 900; 150 INJECTION, SOLUTION INTRAVENOUS at 16:43

## 2018-06-22 RX ADMIN — ONDANSETRON 4 MG: 2 INJECTION, SOLUTION INTRAMUSCULAR; INTRAVENOUS at 07:56

## 2018-06-22 RX ADMIN — SERTRALINE 100 MG: 100 TABLET, FILM COATED ORAL at 07:56

## 2018-06-22 RX ADMIN — AMITRIPTYLINE HYDROCHLORIDE 50 MG: 25 TABLET, FILM COATED ORAL at 21:07

## 2018-06-22 RX ADMIN — METRONIDAZOLE 500 MG: 500 INJECTION, SOLUTION INTRAVENOUS at 11:16

## 2018-06-22 RX ADMIN — MORPHINE SULFATE 1 MG: 2 INJECTION, SOLUTION INTRAMUSCULAR; INTRAVENOUS at 11:16

## 2018-06-22 RX ADMIN — MORPHINE SULFATE 1 MG: 2 INJECTION, SOLUTION INTRAMUSCULAR; INTRAVENOUS at 06:10

## 2018-06-22 RX ADMIN — METRONIDAZOLE 500 MG: 500 INJECTION, SOLUTION INTRAVENOUS at 03:27

## 2018-06-22 RX ADMIN — PROMETHAZINE HYDROCHLORIDE 12.5 MG: 25 INJECTION INTRAMUSCULAR; INTRAVENOUS at 06:10

## 2018-06-22 RX ADMIN — MORPHINE SULFATE 1 MG: 2 INJECTION, SOLUTION INTRAMUSCULAR; INTRAVENOUS at 19:52

## 2018-06-22 RX ADMIN — POTASSIUM CHLORIDE AND SODIUM CHLORIDE 150 ML/HR: 900; 150 INJECTION, SOLUTION INTRAVENOUS at 05:22

## 2018-06-22 RX ADMIN — PROMETHAZINE HYDROCHLORIDE 12.5 MG: 25 INJECTION INTRAMUSCULAR; INTRAVENOUS at 21:43

## 2018-06-22 RX ADMIN — METRONIDAZOLE 500 MG: 500 INJECTION, SOLUTION INTRAVENOUS at 19:52

## 2018-06-22 NOTE — PROGRESS NOTES
[facility]  Inpatient Progress Note    Reason for Follow Up:  Lower abdominal cramping, diarrhea    Subjective     Subjective:   Patient complains of lower abdominal cramping this morning.  She did try to eat mashed potatoes last evening and subsequently vomited.  She has had watery bowel movements described as black in color.  She has continued to observed small amounts of bright red blood in stool.    Endo/Colon 2/2/2018 erosive gastropathy colon 2/2/2018 normal colon by Dr Charlton.      Current Facility-Administered Medications:   •  acetaminophen (TYLENOL) tablet 650 mg, 650 mg, Oral, Q4H PRN, Alon Montejo MD, 650 mg at 06/20/18 1752  •  amitriptyline (ELAVIL) tablet 50 mg, 50 mg, Oral, Nightly, Alon Montejo MD, 50 mg at 06/21/18 2138  •  diphenhydrAMINE (BENADRYL) injection 25 mg, 25 mg, Intravenous, Q6H PRN, Alon Montejo MD, 25 mg at 06/20/18 1524  •  metroNIDAZOLE (FLAGYL) IVPB 500 mg, 500 mg, Intravenous, Q8H, Alon Montejo MD, Last Rate: 0 mL/hr at 06/21/18 1400, 500 mg at 06/22/18 0327  •  Morphine sulfate (PF) injection 1 mg, 1 mg, Intravenous, Q4H PRN, 1 mg at 06/22/18 0610 **AND** naloxone (NARCAN) injection 0.4 mg, 0.4 mg, Intravenous, Q5 Min PRN, Alon Montejo MD  •  ondansetron (ZOFRAN) injection 4 mg, 4 mg, Intravenous, Q6H PRN, Alon Montejo MD, 4 mg at 06/22/18 0756  •  promethazine (PHENERGAN) injection 12.5 mg, 12.5 mg, Intravenous, Q6H PRN, Alon Montejo MD, 12.5 mg at 06/22/18 0610  •  sertraline (ZOLOFT) tablet 100 mg, 100 mg, Oral, Daily, Alon Montejo MD, 100 mg at 06/22/18 0756  •  sodium chloride 0.9 % flush 1-10 mL, 1-10 mL, Intravenous, PRN, Alon Montejo MD  •  Insert peripheral IV, , , Once **AND** sodium chloride 0.9 % flush 10 mL, 10 mL, Intravenous, PRN, Ad Sparks Jr., MD  •  sodium chloride 0.9 % with KCl 20 mEq/L infusion, 100 mL/hr, Intravenous, Continuous, Tamiko Franz, APRN,  Last Rate: 100 mL/hr at 06/22/18 0642, 100 mL/hr at 06/22/18 0642  •  SUMAtriptan (IMITREX) tablet 50 mg, 50 mg, Oral, Q2H PRN, Alon Montejo MD    Review of Systems     Constitution:  negative for chills, fatigue and fevers  Eyes:  negativefor blurriness and change of vision  ENT:   negative for sore throat and voice change  Respiratory: negative for  cough and shortness of air  Cardiovascular:  Negative for chest pain or palpitations  Gastrointestinal:  negative for  See HPI  Genitourinary:  negativefor  blood in urine and painful urination  Integument: negative for  rash and redness  Hematologic / Lymphatic: negative for  excessive bleeding and easy bruising  Musculoskeletal: negative for  joint pain and joint stiffness out of the ordinary  Neurological:  negative for  seizures and speech abnormal  Behavioral/Psych:  negative for  anxiety and depression out of the ordinary  Endocrine: negative for  diabetes:and weight loss, unintended  Allergies / Immunologic:  negative for  anaphylaxis and rash        Objective     Vital Signs  Temp:  [97.4 °F (36.3 °C)-98.9 °F (37.2 °C)] 98.9 °F (37.2 °C)  Heart Rate:  [57-60] 57  Resp:  [14-18] 14  BP: (111-136)/(71-82) 129/81  Body mass index is 18.94 kg/m².    Intake/Output Summary (Last 24 hours) at 06/22/18 1106  Last data filed at 06/22/18 0900   Gross per 24 hour   Intake          3600.28 ml   Output                0 ml   Net          3600.28 ml     I/O this shift:  In: 200 [P.O.:200]  Out: -        Physical exam   Physical Exam:   General: patient awake, alert and cooperative   Eyes: Normal lids and lashes, no scleral icterus   Neck: supple, normal ROM   Skin: warm and dry, not jaundiced   Cardiovascular: regular rhythm and rate, no murmurs auscultated   Pulm: clear to auscultation bilaterally, regular and unlabored   Abdomen: soft, Mildly tender, nondistended; normal bowel sounds   Rectal: deferred   Extremities: no rash or edema   Psychiatric: Normal mood and  behavior; memory intact      Results Review:    I have reviewed all of the patients current test results    Results from last 7 days  Lab Units 06/22/18  0556 06/21/18  0652 06/20/18  0925   WBC 10*3/mm3 4.58* 4.37* 4.92   HEMOGLOBIN g/dL 11.2* 11.4* 13.9   HEMATOCRIT % 31.9* 33.3* 40.5   PLATELETS 10*3/mm3 139 121* 167         Results from last 7 days  Lab Units 06/22/18  0556 06/21/18  0652 06/20/18  0925   SODIUM mmol/L 137 140 138   POTASSIUM mmol/L 4.0 4.4 3.2*   CHLORIDE mmol/L 104 112* 99   CO2 mmol/L 22.0* 16.0* 24.0   BUN mg/dL 6 12 20   CREATININE mg/dL 0.48* 0.48* 0.64   CALCIUM mg/dL 8.0* 8.4 9.2   BILIRUBIN mg/dL  --  0.3 0.5   ALK PHOS U/L  --  37 59   ALT (SGPT) U/L  --  24 23   AST (SGOT) U/L  --  15 20   GLUCOSE mg/dL 80 81 106*         Results from last 7 days  Lab Units 06/20/18  0925   INR  1.05         Lab Results  Lab Value Date/Time   LIPASE 245 (H) 06/20/2018 0925   LIPASE 153 08/18/2016 1752   LIPASE 46 06/10/2016 0935   LIPASE 48 08/13/2014 1645   LIPASE 64 07/30/2014 0436   LIPASE 54 07/12/2014 1303       Radiology:    Imaging Results (last 24 hours)     ** No results found for the last 24 hours. **            Assessment/Plan     Patient Active Problem List   Diagnosis Code   • BRETT III (cervical intraepithelial neoplasia grade III) with severe dysplasia D06.9   • Migraine without aura with status migrainosus G43.001   • Intractable migraine without aura and with status migrainosus G43.011   • Neck pain M54.2   • Anemia D64.9   • Thyroid cyst E04.1   • Migraine G43.909   • Thyroid nodule E04.1   • Bilateral occipital neuralgia M54.81   • Rectal bleeding K62.5   • Iron deficiency anemia D50.9   • Colitis K52.9       Pressure and/plan    Salmonella, infectious colitis  Diarrhea  Blood per rectum    Recommend leaving patient on clear liquid diet at this time.  We will continue to follow.    Juan José Riojas, DAVID 8:21 AM    Imp/Plan    1. Acute bacterial colitis  2. Abd pain  3. BRBPR    Clear  Liquids until she improves further  Will continue to follow    Bridger Charlton,   06/22/18  11:06 AM

## 2018-06-22 NOTE — PLAN OF CARE
Problem: Patient Care Overview  Goal: Plan of Care Review  Outcome: Ongoing (interventions implemented as appropriate)   06/22/18 0435   Coping/Psychosocial   Plan of Care Reviewed With patient   Plan of Care Review   Progress no change   OTHER   Outcome Summary PRN pain med given x1 with relief. IV fluids, ABX continue. Clear liquid diet continues. VSS.        Problem: Bowel Disease, Inflammatory (Adult)  Goal: Signs and Symptoms of Listed Potential Problems Will be Absent, Minimized or Managed (Bowel Disease, Inflammatory)  Outcome: Ongoing (interventions implemented as appropriate)   06/22/18 0435   Goal/Outcome Evaluation   Problems Assessed (Inflammatory Bowel Disease) all   Problems Present (Inflammatory Bowel) diarrhea;pain;undernutrition       Problem: Pain, Acute (Adult)  Goal: Identify Related Risk Factors and Signs and Symptoms  Outcome: Ongoing (interventions implemented as appropriate)   06/22/18 0435   Pain, Acute (Adult)   Related Risk Factors (Acute Pain) disease process;patient perception;persistent pain   Signs and Symptoms (Acute Pain) constipation/diarrhea;fatigue/weakness;nausea/vomiting/anorexia;verbalization of pain descriptors     Goal: Acceptable Pain Control/Comfort Level  Outcome: Ongoing (interventions implemented as appropriate)   06/22/18 0435   Pain, Acute (Adult)   Acceptable Pain Control/Comfort Level making progress toward outcome

## 2018-06-22 NOTE — PLAN OF CARE
Problem: Patient Care Overview  Goal: Plan of Care Review  Outcome: Ongoing (interventions implemented as appropriate)   06/22/18 0435 06/22/18 1548   Coping/Psychosocial   Plan of Care Reviewed With --  patient   Plan of Care Review   Progress --  no change   OTHER   Outcome Summary PRN pain med given x1 with relief. IV fluids, ABX continue. Clear liquid diet continues. VSS.  --        Problem: Bowel Disease, Inflammatory (Adult)  Goal: Signs and Symptoms of Listed Potential Problems Will be Absent, Minimized or Managed (Bowel Disease, Inflammatory)  Outcome: Ongoing (interventions implemented as appropriate)      Problem: Pain, Acute (Adult)  Goal: Identify Related Risk Factors and Signs and Symptoms  Outcome: Ongoing (interventions implemented as appropriate)    Goal: Acceptable Pain Control/Comfort Level  Outcome: Ongoing (interventions implemented as appropriate)

## 2018-06-22 NOTE — PROGRESS NOTES
[facility] Medicine Services  INPATIENT PROGRESS NOTE    Length of Stay: 2  Date of Admission: 6/20/2018  Primary Care Physician: Lizzie Hernandez MD    Subjective   Chief Complaint: Lower abdominal cramping, diarrhea  HPI   Reports nausea with vomiting.  Attempted to eat mashed potatoes yesterday and vomited. Reports3 watery bloody bowel movements past 24 hours.  Stools are now dark in color almost black.  She complains of lower abdominal cramping pain level 5 out of 10 worse prior to diarrhea.  She denies chest pain, palpitations or shortness of breath.  She denies dysuria.  Feels weak and tired.    Review of Systems   Constitutional: Positive for appetite change. Negative for diaphoresis, fever and unexpected weight change.   HENT: Negative for trouble swallowing.    Eyes: Negative for photophobia and visual disturbance.   Respiratory: Negative for cough, shortness of breath and wheezing.    Cardiovascular: Negative for chest pain, palpitations and leg swelling.   Gastrointestinal: Positive for abdominal pain (Lower abdominal pain), blood in stool, diarrhea (Bloody diarrhea), nausea and vomiting.   Endocrine: Negative for cold intolerance and polyuria.   Genitourinary: Negative for dysuria, hematuria and urgency.   Musculoskeletal: Negative for back pain and gait problem.   Skin: Negative for wound.   Allergic/Immunologic: Negative for immunocompromised state.   Neurological: Positive for weakness.   Hematological: Negative for adenopathy. Does not bruise/bleed easily.   Psychiatric/Behavioral: Negative for agitation, behavioral problems and confusion.   All pertinent negatives and positives are as above. All other systems have been reviewed and are negative unless otherwise stated.     Objective    Temp:  [97.4 °F (36.3 °C)-98 °F (36.7 °C)] 97.9 °F (36.6 °C)  Heart Rate:  [57-60] 60  Resp:  [16-18] 16  BP: (111-136)/(71-82) 111/76  Physical Exam  Constitutional: She is  oriented to person, place, and time. She appears well-developed and well-nourished.   HENT:   Head: Normocephalic and atraumatic.   Eyes: EOM are normal. Pupils are equal, round, and reactive to light.   Neck: Normal range of motion. Neck supple.   Cardiovascular: Normal rate, regular rhythm, normal heart sounds and intact distal pulses.  Exam reveals no gallop and no friction rub.    No murmur heard.  Normal sinus rhythm 56-74 on telemetry.   Pulmonary/Chest: Effort normal and breath sounds normal. No respiratory distress. She has no wheezes. She has no rales.   Abdominal: There is tenderness (Bilateral lower abdominal tenderness, improving).   Genitourinary:   Genitourinary Comments: Voiding   Musculoskeletal: She exhibits no edema or deformity.   Neurological: She is alert and oriented to person, place, and time.   Skin: Skin is warm and dry.   Psychiatric: She has a normal mood and affect. Her behavior is normal. Judgment and thought content normal.      Results Review:  I have reviewed the labs, radiology results, and diagnostic studies.    Laboratory Data:     Results from last 7 days  Lab Units 06/22/18  0556 06/21/18  0652 06/20/18  0925   WBC 10*3/mm3 4.58* 4.37* 4.92   HEMOGLOBIN g/dL 11.2* 11.4* 13.9   HEMATOCRIT % 31.9* 33.3* 40.5   PLATELETS 10*3/mm3 139 121* 167          Results from last 7 days  Lab Units 06/21/18  0652 06/20/18  0925   SODIUM mmol/L 140 138   POTASSIUM mmol/L 4.4 3.2*   CHLORIDE mmol/L 112* 99   CO2 mmol/L 16.0* 24.0   BUN mg/dL 12 20   CREATININE mg/dL 0.48* 0.64   CALCIUM mg/dL 8.4 9.2   BILIRUBIN mg/dL 0.3 0.5   ALK PHOS U/L 37 59   ALT (SGPT) U/L 24 23   AST (SGOT) U/L 15 20   GLUCOSE mg/dL 81 106*     Stool Culture   Date Value Ref Range Status   06/20/2018 Culture in progress  Preliminary      Campylobacter Not Detected    Clostridium difficile (toxin A/B) Not Detected    Plesiomonas shigelloides Not Detected    Salmonella Detected (A)    Vibrio Not Detected    Vibrio cholerae  Not Detected    Yersinia enterocolitica Not Detected    Enteroaggregative E. coli (EAEC) Not Detected    Enteropathogenic E. coli (EPEC) Not Detected    Enterotoxigenic E. coli (ETEC) lt/st Not Detected    Shiga-like toxin-producing E. coli (STEC) stx1/stx2 Not Detected    E. coli O157 Not Detected    Shigella/Enteroinvasive E. coli (EIEC) Not Detected    Cryptosporidium Not Detected    Cyclospora cayetanensis Not Detected    Entamoeba histolytica Not Detected    Giardia lamblia Not Detected    Adenovirus F40/41 Not Detected    Astrovirus Not Detected    Norovirus GI/GII Not Detected    Rotavirus A Not Detected    Sapovirus (I, II, IV or V) Not Detected   Stool Culture, Targeted - Stool, Per Rectum [[id number]] Collected: 06/20/18 2102   Lab Status: Preliminary result Specimen: Stool from Per Rectum Updated: 06/21/18 0811    Stool Culture Culture in progress     Imaging Results (all)     Procedure Component Value Units Date/Time    CT Abdomen Pelvis With Contrast [[id number]] Collected:  06/20/18 1109     Updated:  06/20/18 1117    Narrative:          History:  28-year-old with abdominal pain and rectal bleeding.     Reference:  CT abdomen pelvis September 2012.     Technique  Contrast-enhanced CT abdomen/pelvis was performed with coronal and  sagittal reformatted images provided.     For this CT exam, one or more of the following dose reduction techniques  was employed:  -automated exposure control  -mA and/or kVp adjustment for patient size  -iterative reconstruction      mGy-cm     Findings     Chest  Incidental scanning through the lower chest demonstrates clear lung  bases.     Abdomen  Liver and spleen within normal limits. Prior cholecystectomy. Pancreas  and adrenal glands unremarkable. No renal abnormality. Large vascular  structures of the abdomen are normal. No lymphadenopathy. No free air or  free fluid.     Small bowel loops show normal caliber throughout. There is diffuse  moderate wall thickening  of the entire colon with some sparing in the  rectum. Mild pericolonic inflammatory stranding. Normal appendix. No  abscess is seen.     Pelvis  Urinary bladder nondistended and not well assessed otherwise. Uterus is  unremarkable. There is a small amount of free fluid in the cul-de-sac.  No lymphadenopathy.     No acute osseous abnormalities. No abnormality of the sacroiliac joints.          Impression:          Diffuse colitis without complication. Consider infectious colitis or  potentially inflammatory bowel disease.  This report was finalized on 06/20/2018 11:14 by Dr Chirag Gardner, .          Intake/Output    Intake/Output Summary (Last 24 hours) at 06/22/18 0616  Last data filed at 06/22/18 0521   Gross per 24 hour   Intake          5645.46 ml   Output                0 ml   Net          5645.46 ml       Scheduled Meds    amitriptyline 50 mg Oral Nightly   metroNIDAZOLE 500 mg Intravenous Q8H   sertraline 100 mg Oral Daily       I have reviewed the patient current medications.     Assessment/Plan     Hospital Problem List     Colitis        Assessment:  1.  Diffuse infectious colitis, Salmonella  2.  Diffuse lower abdominal pain secondary to #1  3.  Bloody diarrhea, Salmonella detected GI panel  4.  Nausea with vomiting  5.  Hypokalemia, resolved with hydration  6.  Migraine headaches  7.  Rash with Levaquin.  Discontinued and rash improved    Plan:  1.  GI panel positive for Salmonella  2.  IV fluids at 150 mL per hour.  Will decrease to 100 mL per hour.  She continues to have nausea with occasional vomiting in addition to diarrhea.  3.  Flagyl IV day 3. May consider discontinuing.  4.  GI following.  Conservative management.  Hydration.  Advance diet as tolerated.  5.  Zofran 4 mg IV every 6 hours as needed for nausea  6.  Clear liquid diet.  We will advance to full liquids as tolerated  7.  Basic metabolic panel tomorrow to monitor hypokalemia    The above documentation resulted from a face-to-face encounter by  me Tamiko HERNANDEZ, [facility].    Patient states that she vomits when ever she eats. Did not eat today. 2 BMs. No family present. Abdomen soft.     Discharge Planning: I expect patient to be discharged to home in 2 days.    DAVID Ye   06/22/18   6:16 AM

## 2018-06-22 NOTE — PROGRESS NOTES
Continued Stay Note   [place]     Patient Name: Mirela Flores  MRN: [id number]  Today's Date: 6/22/2018    Admit Date: 6/20/2018          Discharge Plan     Row Name 06/22/18 0931       Plan    Plan Home    Patient/Family in Agreement with Plan yes    Plan Comments Pt lives with spouse and 3 children ages 4/3/1.  Pt does not have health ins. so called [employer] in [place] [id number] and she says pt is not eligible for assistance.  Pt independent and no home needs identified.               Discharge Codes    No documentation.           ALECIA Miller

## 2018-06-23 LAB
ANION GAP SERPL CALCULATED.3IONS-SCNC: 16 MMOL/L (ref 4–13)
BUN BLD-MCNC: 8 MG/DL (ref 5–21)
BUN/CREAT SERPL: 14 (ref 7–25)
CALCIUM SPEC-SCNC: 8.8 MG/DL (ref 8.4–10.4)
CHLORIDE SERPL-SCNC: 101 MMOL/L (ref 98–110)
CO2 SERPL-SCNC: 22 MMOL/L (ref 24–31)
CREAT BLD-MCNC: 0.57 MG/DL (ref 0.5–1.4)
GFR SERPL CREATININE-BSD FRML MDRD: 127 ML/MIN/1.73
GLUCOSE BLD-MCNC: 71 MG/DL (ref 70–100)
POTASSIUM BLD-SCNC: 3.5 MMOL/L (ref 3.5–5.3)
SODIUM BLD-SCNC: 139 MMOL/L (ref 135–145)

## 2018-06-23 PROCEDURE — 25810000003 SODIUM CHLORIDE 0.9 % WITH KCL 20 MEQ 20-0.9 MEQ/L-% SOLUTION: Performed by: NURSE PRACTITIONER

## 2018-06-23 PROCEDURE — 80048 BASIC METABOLIC PNL TOTAL CA: CPT | Performed by: NURSE PRACTITIONER

## 2018-06-23 PROCEDURE — 25010000002 PROMETHAZINE PER 50 MG: Performed by: INTERNAL MEDICINE

## 2018-06-23 PROCEDURE — 99232 SBSQ HOSP IP/OBS MODERATE 35: CPT | Performed by: INTERNAL MEDICINE

## 2018-06-23 PROCEDURE — 25010000002 ONDANSETRON PER 1 MG: Performed by: INTERNAL MEDICINE

## 2018-06-23 PROCEDURE — 25010000002 MORPHINE SULFATE (PF) 2 MG/ML SOLUTION: Performed by: INTERNAL MEDICINE

## 2018-06-23 RX ORDER — POTASSIUM CHLORIDE 750 MG/1
40 CAPSULE, EXTENDED RELEASE ORAL ONCE
Status: COMPLETED | OUTPATIENT
Start: 2018-06-23 | End: 2018-06-23

## 2018-06-23 RX ADMIN — POTASSIUM CHLORIDE AND SODIUM CHLORIDE 100 ML/HR: 900; 150 INJECTION, SOLUTION INTRAVENOUS at 04:12

## 2018-06-23 RX ADMIN — PROMETHAZINE HYDROCHLORIDE 12.5 MG: 25 INJECTION INTRAMUSCULAR; INTRAVENOUS at 09:54

## 2018-06-23 RX ADMIN — METRONIDAZOLE 500 MG: 500 INJECTION, SOLUTION INTRAVENOUS at 04:12

## 2018-06-23 RX ADMIN — METRONIDAZOLE 500 MG: 500 INJECTION, SOLUTION INTRAVENOUS at 21:01

## 2018-06-23 RX ADMIN — ONDANSETRON 4 MG: 2 INJECTION, SOLUTION INTRAMUSCULAR; INTRAVENOUS at 01:41

## 2018-06-23 RX ADMIN — PROMETHAZINE HYDROCHLORIDE 12.5 MG: 25 INJECTION INTRAMUSCULAR; INTRAVENOUS at 16:07

## 2018-06-23 RX ADMIN — MORPHINE SULFATE 1 MG: 2 INJECTION, SOLUTION INTRAMUSCULAR; INTRAVENOUS at 14:17

## 2018-06-23 RX ADMIN — SERTRALINE 100 MG: 100 TABLET, FILM COATED ORAL at 08:22

## 2018-06-23 RX ADMIN — METRONIDAZOLE 500 MG: 500 INJECTION, SOLUTION INTRAVENOUS at 12:09

## 2018-06-23 RX ADMIN — POTASSIUM CHLORIDE 40 MEQ: 750 CAPSULE, EXTENDED RELEASE ORAL at 08:23

## 2018-06-23 RX ADMIN — POTASSIUM CHLORIDE AND SODIUM CHLORIDE 75 ML/HR: 900; 150 INJECTION, SOLUTION INTRAVENOUS at 20:28

## 2018-06-23 RX ADMIN — AMITRIPTYLINE HYDROCHLORIDE 50 MG: 25 TABLET, FILM COATED ORAL at 21:01

## 2018-06-23 RX ADMIN — MORPHINE SULFATE 1 MG: 2 INJECTION, SOLUTION INTRAMUSCULAR; INTRAVENOUS at 21:10

## 2018-06-23 NOTE — PROGRESS NOTES
[facility] Medicine Services  INPATIENT PROGRESS NOTE    Length of Stay: 3  Date of Admission: 6/20/2018  Primary Care Physician: Lizzie Hernandez MD    Subjective   Chief Complaint: diarrhea  HPI   Up in bed.  She did not try to eat yesterday due to nausea.  She took Sprite this morning.  She has in applesauce this morning and has diarrhea.  She reports 6 diarrhea stools over the past 24 hours.  She reports stools are dark in color.  Less abdominal cramping.  Reports abdominal cramping pain level at 4 and worse when she attempts to eat or drink.    Review of Systems   Constitutional: Positive for appetite change. Negative for diaphoresis, fever and unexpected weight change.   HENT: Negative for trouble swallowing.    Eyes: Negative for photophobia and visual disturbance.   Respiratory: Negative for cough, shortness of breath and wheezing.    Cardiovascular: Negative for chest pain, palpitations and leg swelling.   Gastrointestinal: Positive for abdominal pain (Lower abdominal pain, less), blood in stool, diarrhea (Bloody diarrhea), nausea and vomiting.   Endocrine: Negative for cold intolerance and polyuria.   Genitourinary: Negative for dysuria, hematuria and urgency.   Musculoskeletal: Negative for back pain and gait problem.   Skin: Negative for wound.   Allergic/Immunologic: Negative for immunocompromised state.   Neurological: Positive for weakness.   Hematological: Negative for adenopathy. Does not bruise/bleed easily.   Psychiatric/Behavioral: Negative for agitation, behavioral problems and confusion.   All pertinent negatives and positives are as above. All other systems have been reviewed and are negative unless otherwise stated.     Objective    Temp:  [97 °F (36.1 °C)-98.5 °F (36.9 °C)] 97 °F (36.1 °C)  Heart Rate:  [61-91] 62  Resp:  [16] 16  BP: (104-130)/(63-83) 130/83  Physical Exam  Constitutional: She is oriented to person, place, and time. She appears  well-developed and well-nourished.   HENT:   Head: Normocephalic and atraumatic.   Eyes: EOM are normal. Pupils are equal, round, and reactive to light.   Neck: Normal range of motion. Neck supple.   Cardiovascular: Normal rate, regular rhythm, normal heart sounds and intact distal pulses.  Exam reveals no gallop and no friction rub.    No murmur heard.  Normal sinus rhythm 62-68 on telemetry.   Pulmonary/Chest: Effort normal and breath sounds normal. No respiratory distress. She has no wheezes. She has no rales.   Abdominal: There is tenderness (Bilateral lower abdominal tenderness, improving).   Genitourinary:   Genitourinary Comments: Voiding   Musculoskeletal: She exhibits no edema or deformity.   Neurological: She is alert and oriented to person, place, and time.   Skin: Skin is warm and dry.   Psychiatric: She has a normal mood and affect. Her behavior is normal. Judgment and thought content normal.      Results Review:  I have reviewed the labs, radiology results, and diagnostic studies.    Laboratory Data:     Results from last 7 days  Lab Units 06/22/18  0556 06/21/18  0652 06/20/18  0925   WBC 10*3/mm3 4.58* 4.37* 4.92   HEMOGLOBIN g/dL 11.2* 11.4* 13.9   HEMATOCRIT % 31.9* 33.3* 40.5   PLATELETS 10*3/mm3 139 121* 167          Results from last 7 days  Lab Units 06/23/18  0515 06/22/18  0556 06/21/18  0652 06/20/18  0925   SODIUM mmol/L 139 137 140 138   POTASSIUM mmol/L 3.5 4.0 4.4 3.2*   CHLORIDE mmol/L 101 104 112* 99   CO2 mmol/L 22.0* 22.0* 16.0* 24.0   BUN mg/dL 8 6 12 20   CREATININE mg/dL 0.57 0.48* 0.48* 0.64   CALCIUM mg/dL 8.8 8.0* 8.4 9.2   BILIRUBIN mg/dL  --   --  0.3 0.5   ALK PHOS U/L  --   --  37 59   ALT (SGPT) U/L  --   --  24 23   AST (SGOT) U/L  --   --  15 20   GLUCOSE mg/dL 71 80 81 106*     Stool Culture   Date Value Ref Range Status   06/20/2018 Culture in progress  Preliminary   06/20/2018 Moderate growth (3+) Salmonella species (A)  Preliminary     Campylobacter Not Detected      Clostridium difficile (toxin A/B) Not Detected    Plesiomonas shigelloides Not Detected    Salmonella Detected (A)    Vibrio Not Detected    Vibrio cholerae Not Detected    Yersinia enterocolitica Not Detected    Enteroaggregative E. coli (EAEC) Not Detected    Enteropathogenic E. coli (EPEC) Not Detected    Enterotoxigenic E. coli (ETEC) lt/st Not Detected    Shiga-like toxin-producing E. coli (STEC) stx1/stx2 Not Detected    E. coli O157 Not Detected    Shigella/Enteroinvasive E. coli (EIEC) Not Detected    Cryptosporidium Not Detected    Cyclospora cayetanensis Not Detected    Entamoeba histolytica Not Detected    Giardia lamblia Not Detected    Adenovirus F40/41 Not Detected    Astrovirus Not Detected    Norovirus GI/GII Not Detected    Rotavirus A Not Detected    Sapovirus (I, II, IV or V) Not Detected   Stool Culture, Targeted - Stool, Per Rectum [[id number]] (Abnormal) Collected: 06/20/18 2102   Lab Status: Preliminary result Specimen: Stool from Per Rectum Updated: 06/22/18 1518    Stool Culture Culture in progress     Moderate growth (3+) Salmonella species (A)     Imaging Results (all)     Procedure Component Value Units Date/Time    CT Abdomen Pelvis With Contrast [[id number]] Collected:  06/20/18 1109     Updated:  06/20/18 1117    Narrative:          History:  28-year-old with abdominal pain and rectal bleeding.     Reference:  CT abdomen pelvis September 2012.     Technique  Contrast-enhanced CT abdomen/pelvis was performed with coronal and  sagittal reformatted images provided.     For this CT exam, one or more of the following dose reduction techniques  was employed:  -automated exposure control  -mA and/or kVp adjustment for patient size  -iterative reconstruction      mGy-cm     Findings     Chest  Incidental scanning through the lower chest demonstrates clear lung  bases.     Abdomen  Liver and spleen within normal limits. Prior cholecystectomy. Pancreas  and adrenal glands unremarkable. No  renal abnormality. Large vascular  structures of the abdomen are normal. No lymphadenopathy. No free air or  free fluid.     Small bowel loops show normal caliber throughout. There is diffuse  moderate wall thickening of the entire colon with some sparing in the  rectum. Mild pericolonic inflammatory stranding. Normal appendix. No  abscess is seen.     Pelvis  Urinary bladder nondistended and not well assessed otherwise. Uterus is  unremarkable. There is a small amount of free fluid in the cul-de-sac.  No lymphadenopathy.     No acute osseous abnormalities. No abnormality of the sacroiliac joints.          Impression:          Diffuse colitis without complication. Consider infectious colitis or  potentially inflammatory bowel disease.  This report was finalized on 06/20/2018 11:14 by Dr Chirag Gardner, .          Intake/Output    Intake/Output Summary (Last 24 hours) at 06/23/18 0937  Last data filed at 06/23/18 0411   Gross per 24 hour   Intake          2283.72 ml   Output                0 ml   Net          2283.72 ml       Scheduled Meds    amitriptyline 50 mg Oral Nightly   metroNIDAZOLE 500 mg Intravenous Q8H   sertraline 100 mg Oral Daily       I have reviewed the patient current medications.     Assessment/Plan     Hospital Problem List     Colitis        Assessment:  1.  Diffuse infectious colitis, Salmonella  2.  Diffuse lower abdominal pain secondary to #1  3.  Bloody diarrhea, Salmonella detected GI panel  4.  Nausea with vomiting  5.  Hypokalemia, resolved with hydration  6.  Migraine headaches  7.  Rash with Levaquin.  Discontinued and rash improved    Plan:  1.  GI panel positive for Salmonella  2.  IV fluids at 100 mL per hour. Decrease to 75 mL per hour.  She continues to have nausea and diarrhea.  3.  Flagyl IV day 4  4.  Diet advanced to GI soft.  She ate applesauce and then developed diarrhea.   5.  Dr. Charlton has seen today.  Reports still with active diarrhea after taking by mouth today.  Not ready  for discharge today per Dr. Charlton  6.  Zofran and Phenergan as needed for nausea  7.  Potassium 40 mEq orally ×1 dose.  Potassium 3.5.  Continues with diarrhea.  8.  Basic metabolic panel tomorrow to monitor hypokalemia    The above documentation resulted from a face-to-face encounter by me Tamiko HERNANDEZ, [facility].    Plan reviewed. Anticipate discharge tomorrow. Patient is young and resilient. No complicating factors like diabetes.     Discharge Planning: I expect patient to be discharged to home  in 1  day.    DAVID Ye   06/23/18   9:37 AM

## 2018-06-23 NOTE — PROGRESS NOTES
[facility]  Inpatient Progress Note    Reason for Follow Up:  Diarrhea    Subjective     Subjective:   Patient did not try to eat yesterday.  She ate applesauce this morning and had diarrhea.  She reports a proximally 6-8 diarrheal episode yesterday.  Stool is now dark and she is unable to determine if there is any blood present.  Abdominal cramping has improved and is now only described as a pressure sensation.  Her stomach does hurt worse when she eats or drinks anything.    Current Facility-Administered Medications:   •  acetaminophen (TYLENOL) tablet 650 mg, 650 mg, Oral, Q4H PRN, Alon Montejo MD, 650 mg at 06/20/18 1752  •  amitriptyline (ELAVIL) tablet 50 mg, 50 mg, Oral, Nightly, Alon Montejo MD, 50 mg at 06/22/18 2107  •  diphenhydrAMINE (BENADRYL) injection 25 mg, 25 mg, Intravenous, Q6H PRN, Alon Montejo MD, 25 mg at 06/20/18 1524  •  metroNIDAZOLE (FLAGYL) IVPB 500 mg, 500 mg, Intravenous, Q8H, Alon Montejo MD, Last Rate: 0 mL/hr at 06/21/18 1400, 500 mg at 06/23/18 0412  •  Morphine sulfate (PF) injection 1 mg, 1 mg, Intravenous, Q4H PRN, 1 mg at 06/22/18 1952 **AND** naloxone (NARCAN) injection 0.4 mg, 0.4 mg, Intravenous, Q5 Min PRN, Alon Montejo MD  •  ondansetron (ZOFRAN) injection 4 mg, 4 mg, Intravenous, Q6H PRN, Alon Montejo MD, 4 mg at 06/23/18 0141  •  promethazine (PHENERGAN) injection 12.5 mg, 12.5 mg, Intravenous, Q6H PRN, Alon Montejo MD, 12.5 mg at 06/22/18 2143  •  sertraline (ZOLOFT) tablet 100 mg, 100 mg, Oral, Daily, Alon Montejo MD, 100 mg at 06/23/18 0822  •  sodium chloride 0.9 % flush 1-10 mL, 1-10 mL, Intravenous, PRN, Alon Montejo MD  •  Insert peripheral IV, , , Once **AND** sodium chloride 0.9 % flush 10 mL, 10 mL, Intravenous, PRN, Ad Sparks Jr., MD  •  sodium chloride 0.9 % with KCl 20 mEq/L infusion, 100 mL/hr, Intravenous, Continuous, Tamiko Franz, APRN, Last Rate: 100  mL/hr at 06/23/18 0412, 100 mL/hr at 06/23/18 0412  •  SUMAtriptan (IMITREX) tablet 50 mg, 50 mg, Oral, Q2H PRN, Alon Montejo MD    Review of Systems     Constitution:  negative for chills, fatigue and fevers  Eyes:  negativefor blurriness and change of vision  ENT:   negative for sore throat and voice change  Respiratory: negative for  cough and shortness of air  Cardiovascular:  Negative for chest pain or palpitations  Gastrointestinal:  negative for  See HPI  Genitourinary:  negativefor  blood in urine and painful urination  Integument: negative for  rash and redness  Hematologic / Lymphatic: negative for  excessive bleeding and easy bruising  Musculoskeletal: negative for  joint pain and joint stiffness out of the ordinary  Neurological:  negative for  seizures and speech abnormal  Behavioral/Psych:  negative for  anxiety and depression out of the ordinary  Endocrine: negative for  diabetes:and weight loss, unintended  Allergies / Immunologic:  negative for  anaphylaxis and rash        Objective     Vital Signs  Temp:  [97 °F (36.1 °C)-98.5 °F (36.9 °C)] 97 °F (36.1 °C)  Heart Rate:  [61-91] 62  Resp:  [16] 16  BP: (104-130)/(63-83) 130/83  Body mass index is 19.01 kg/m².    Intake/Output Summary (Last 24 hours) at 06/23/18 0920  Last data filed at 06/23/18 0411   Gross per 24 hour   Intake          2283.72 ml   Output                0 ml   Net          2283.72 ml     No intake/output data recorded.       Physical exam   Physical Exam:   General: patient awake, alert and cooperative   Eyes: Normal lids and lashes, no scleral icterus   Neck: supple, normal ROM   Skin: warm and dry, not jaundiced   Cardiovascular: regular rhythm and rate, no murmurs auscultated   Pulm: clear to auscultation bilaterally, regular and unlabored   Abdomen: soft, MILD tenderness , nondistended; normal bowel sounds   Rectal: deferred   Extremities: no rash or edema   Psychiatric: Normal mood and behavior; memory  intact      Results Review:    I have reviewed all of the patients current test results    Results from last 7 days  Lab Units 06/22/18  0556 06/21/18  0652 06/20/18  0925   WBC 10*3/mm3 4.58* 4.37* 4.92   HEMOGLOBIN g/dL 11.2* 11.4* 13.9   HEMATOCRIT % 31.9* 33.3* 40.5   PLATELETS 10*3/mm3 139 121* 167         Results from last 7 days  Lab Units 06/23/18  0515 06/22/18  0556 06/21/18  0652 06/20/18  0925   SODIUM mmol/L 139 137 140 138   POTASSIUM mmol/L 3.5 4.0 4.4 3.2*   CHLORIDE mmol/L 101 104 112* 99   CO2 mmol/L 22.0* 22.0* 16.0* 24.0   BUN mg/dL 8 6 12 20   CREATININE mg/dL 0.57 0.48* 0.48* 0.64   CALCIUM mg/dL 8.8 8.0* 8.4 9.2   BILIRUBIN mg/dL  --   --  0.3 0.5   ALK PHOS U/L  --   --  37 59   ALT (SGPT) U/L  --   --  24 23   AST (SGOT) U/L  --   --  15 20   GLUCOSE mg/dL 71 80 81 106*         Results from last 7 days  Lab Units 06/20/18  0925   INR  1.05         Lab Results  Lab Value Date/Time   LIPASE 245 (H) 06/20/2018 0925   LIPASE 153 08/18/2016 1752   LIPASE 46 06/10/2016 0935   LIPASE 48 08/13/2014 1645   LIPASE 64 07/30/2014 0436   LIPASE 54 07/12/2014 1303       Radiology:    Imaging Results (last 24 hours)     ** No results found for the last 24 hours. **            Assessment/Plan     Patient Active Problem List   Diagnosis Code   • BRETT III (cervical intraepithelial neoplasia grade III) with severe dysplasia D06.9   • Migraine without aura with status migrainosus G43.001   • Intractable migraine without aura and with status migrainosus G43.011   • Neck pain M54.2   • Anemia D64.9   • Thyroid cyst E04.1   • Migraine G43.909   • Thyroid nodule E04.1   • Bilateral occipital neuralgia M54.81   • Rectal bleeding K62.5   • Iron deficiency anemia D50.9   • Colitis K52.9       Impression/plan    Salmonella, infectious colitis  Diarrhea    Overall patient appears improved.  She will need to avoid dairy.  Okay for sips of Sprite or water but would avoid eating for now.  Maybe she can try food later  today.    Juan José Riojas, APRN 9:07 AM      Imp/Plan    1. Acute infectious colitis  2. Diarrhea  3. Dehydration  4. Abd pain    Still with active diarrhea after trying to take PO this am  Not quite ready for d/c today    Bridger Charlton, DO  06/23/18  9:20 AM

## 2018-06-23 NOTE — PLAN OF CARE
Problem: Patient Care Overview  Goal: Plan of Care Review  Outcome: Ongoing (interventions implemented as appropriate)   06/23/18 0556   Coping/Psychosocial   Plan of Care Reviewed With patient   Plan of Care Review   Progress no change   OTHER   Outcome Summary PRN pain and nausea meds given with some relief. IV fluids and IV ABX given as ordered. Pt not tolerating full liquids. VSS. Continue to monitor.        Problem: Bowel Disease, Inflammatory (Adult)  Goal: Signs and Symptoms of Listed Potential Problems Will be Absent, Minimized or Managed (Bowel Disease, Inflammatory)  Outcome: Ongoing (interventions implemented as appropriate)      Problem: Pain, Acute (Adult)  Goal: Identify Related Risk Factors and Signs and Symptoms  Outcome: Outcome(s) achieved Date Met: 06/23/18    Goal: Acceptable Pain Control/Comfort Level  Outcome: Ongoing (interventions implemented as appropriate)

## 2018-06-23 NOTE — PLAN OF CARE
Problem: Patient Care Overview  Goal: Plan of Care Review   06/23/18 1403   Coping/Psychosocial   Plan of Care Reviewed With patient   Plan of Care Review   Progress improving   OTHER   Outcome Summary Nausea and diarrhea continue but are lessening. Patient looking forward to discharge.

## 2018-06-24 VITALS
DIASTOLIC BLOOD PRESSURE: 67 MMHG | BODY MASS INDEX: 20.23 KG/M2 | HEIGHT: 64 IN | RESPIRATION RATE: 14 BRPM | WEIGHT: 118.5 LBS | SYSTOLIC BLOOD PRESSURE: 109 MMHG | TEMPERATURE: 97.2 F | HEART RATE: 68 BPM | OXYGEN SATURATION: 98 %

## 2018-06-24 LAB
ANION GAP SERPL CALCULATED.3IONS-SCNC: 15 MMOL/L (ref 4–13)
BUN BLD-MCNC: 8 MG/DL (ref 5–21)
BUN/CREAT SERPL: 13.3 (ref 7–25)
CALCIUM SPEC-SCNC: 8.6 MG/DL (ref 8.4–10.4)
CHLORIDE SERPL-SCNC: 103 MMOL/L (ref 98–110)
CO2 SERPL-SCNC: 22 MMOL/L (ref 24–31)
CREAT BLD-MCNC: 0.6 MG/DL (ref 0.5–1.4)
GFR SERPL CREATININE-BSD FRML MDRD: 120 ML/MIN/1.73
GLUCOSE BLD-MCNC: 69 MG/DL (ref 70–100)
POTASSIUM BLD-SCNC: 4.1 MMOL/L (ref 3.5–5.3)
SODIUM BLD-SCNC: 140 MMOL/L (ref 135–145)

## 2018-06-24 PROCEDURE — 25010000002 PROMETHAZINE PER 50 MG: Performed by: INTERNAL MEDICINE

## 2018-06-24 PROCEDURE — 80048 BASIC METABOLIC PNL TOTAL CA: CPT | Performed by: NURSE PRACTITIONER

## 2018-06-24 PROCEDURE — 99231 SBSQ HOSP IP/OBS SF/LOW 25: CPT | Performed by: INTERNAL MEDICINE

## 2018-06-24 PROCEDURE — 25810000003 SODIUM CHLORIDE 0.9 % WITH KCL 20 MEQ 20-0.9 MEQ/L-% SOLUTION: Performed by: NURSE PRACTITIONER

## 2018-06-24 RX ORDER — ONDANSETRON 4 MG/1
4 TABLET, FILM COATED ORAL EVERY 8 HOURS PRN
Qty: 9 TABLET | Refills: 0 | Status: SHIPPED | OUTPATIENT
Start: 2018-06-24 | End: 2018-09-14

## 2018-06-24 RX ORDER — HYDROCODONE BITARTRATE AND ACETAMINOPHEN 5; 325 MG/1; MG/1
1 TABLET ORAL EVERY 6 HOURS PRN
Status: DISCONTINUED | OUTPATIENT
Start: 2018-06-24 | End: 2018-06-24 | Stop reason: HOSPADM

## 2018-06-24 RX ADMIN — METRONIDAZOLE 500 MG: 500 INJECTION, SOLUTION INTRAVENOUS at 03:18

## 2018-06-24 RX ADMIN — POTASSIUM CHLORIDE AND SODIUM CHLORIDE 75 ML/HR: 900; 150 INJECTION, SOLUTION INTRAVENOUS at 09:41

## 2018-06-24 RX ADMIN — PROMETHAZINE HYDROCHLORIDE 12.5 MG: 25 INJECTION INTRAMUSCULAR; INTRAVENOUS at 09:21

## 2018-06-24 RX ADMIN — SERTRALINE 100 MG: 100 TABLET, FILM COATED ORAL at 08:28

## 2018-06-24 NOTE — DISCHARGE SUMMARY
[facility]  DISCHARGE SUMMARY       Date of Admission: 6/20/2018  Date of Discharge:  6/24/2018  Primary Care Physician: Lizzie Hernandez MD    Discharge Diagnoses:  Hospital Problem List     * (Principal)Colitis    Overview Signed 6/24/2018 11:40 AM by DAVID Chavez     Infectious colitis, salmonella detected on GI panel             Discharge diagnoses   1.  Diffuse infectious colitis, Salmonella  2.  Diffuse lower abdominal pain secondary to #1  3.  Bloody diarrhea, Salmonella detected GI panel  4.  Nausea with vomiting  5.  Hypokalemia, resolved with hydration  6.  Migraine headaches  7.  Rash with Levaquin.  Discontinued and rash improved     Presenting Problem/History of Present Illness:  Colitis [K52.9]     Chief Complaint on Day of Discharge: Less abdominal pain.  No vomiting.  Stools having formed consistency.  History of Present Illness on Day of Discharge:   Lying in bed.  Has eaten breakfast.  She ate applesauce and then had semi-formed stool.  Less abdominal pain.  No vomiting the past 24 hours.  Occasional nausea.  She denies chest pain or palpitations.  She is tolerating liquids.  Have advised to slowly increase oral intake.    Consults: Dr. Jonathan Singh, gastroenterology    Procedures Performed: none    Pertinent Test Results:      Results from last 7 days  Lab Units 06/22/18  0556 06/21/18  0652 06/20/18  0925   WBC 10*3/mm3 4.58* 4.37* 4.92   HEMOGLOBIN g/dL 11.2* 11.4* 13.9   HEMATOCRIT % 31.9* 33.3* 40.5   PLATELETS 10*3/mm3 139 121* 167          Results from last 7 days  Lab Units 06/24/18  0602 06/23/18  0515 06/22/18  0556 06/21/18  0652 06/20/18  0925   SODIUM mmol/L 140 139 137 140 138   POTASSIUM mmol/L 4.1 3.5 4.0 4.4 3.2*   CHLORIDE mmol/L 103 101 104 112* 99   CO2 mmol/L 22.0* 22.0* 22.0* 16.0* 24.0   BUN mg/dL 8 8 6 12 20   CREATININE mg/dL 0.60 0.57 0.48* 0.48* 0.64   CALCIUM mg/dL 8.6 8.8 8.0* 8.4 9.2   BILIRUBIN mg/dL   --   --   --  0.3 0.5   ALK PHOS U/L  --   --   --  37 59   ALT (SGPT) U/L  --   --   --  24 23   AST (SGOT) U/L  --   --   --  15 20   GLUCOSE mg/dL 69* 71 80 81 106*     Gastrointestinal Panel, PCR - Stool, Per Rectum [[id number]] (Abnormal) Collected: 06/20/18 2102   Lab Status: Final result Specimen: Stool from Per Rectum Updated: 06/20/18 2238    Campylobacter Not Detected    Clostridium difficile (toxin A/B) Not Detected    Plesiomonas shigelloides Not Detected    Salmonella Detected (A)    Vibrio Not Detected    Vibrio cholerae Not Detected    Yersinia enterocolitica Not Detected    Enteroaggregative E. coli (EAEC) Not Detected    Enteropathogenic E. coli (EPEC) Not Detected    Enterotoxigenic E. coli (ETEC) lt/st Not Detected    Shiga-like toxin-producing E. coli (STEC) stx1/stx2 Not Detected    E. coli O157 Not Detected    Shigella/Enteroinvasive E. coli (EIEC) Not Detected    Cryptosporidium Not Detected    Cyclospora cayetanensis Not Detected    Entamoeba histolytica Not Detected    Giardia lamblia Not Detected    Adenovirus F40/41 Not Detected    Astrovirus Not Detected    Norovirus GI/GII Not Detected    Rotavirus A Not Detected    Sapovirus (I, II, IV or V) Not Detected   Stool Culture, Targeted - Stool, Per Rectum [[id number]] (Abnormal) Collected: 06/20/18 2102   Lab Status: Preliminary result Specimen: Stool from Per Rectum Updated: 06/22/18 1518    Stool Culture Culture in progress     Moderate growth (3+) Salmonella species (A)   Urinalysis With / Culture If Indicated - Urine, Clean Catch [[id number]] (Abnormal) Collected: 06/20/18 0955   Lab Status: Final result Specimen: Urine from Urine, Clean Catch Updated: 06/20/18 1012    Color, UA Dark Yellow (A)    Appearance, UA Clear    pH, UA 6.5    Specific Gravity, UA >=1.030    Glucose, UA Negative    Ketones, UA >=160 mg/dL (4+) (A)    Bilirubin, UA Negative    Blood, UA Trace (A)    Protein, UA 30 mg/dL (1+) (A)    Leuk Esterase, UA Trace (A)     Nitrite, UA Negative    Urobilinogen, UA 1.0 E.U./dL   Urinalysis, Microscopic Only - Urine, Clean Catch [[id number]] (Abnormal) Collected: 06/20/18 0955   Lab Status: Final result Specimen: Urine from Urine, Clean Catch Updated: 06/20/18 1012    RBC, UA 13-20 (A) /HPF     WBC, UA 6-12 (A) /HPF     Bacteria, UA None Seen /HPF     Squamous Epithelial Cells, UA 7-12 (A) /HPF     Hyaline Casts, UA 3-6 /LPF     Methodology Automated Microscopy     aPTT [[id number]] (Normal) Collected: 06/20/18 0925   Lab Status: Final result Specimen: Blood Updated: 06/20/18 0946    PTT 32.7 seconds    Protime-INR [[id number]] (Normal) Collected: 06/20/18 0925   Lab Status: Final result Specimen: Blood Updated: 06/20/18 0946    Protime 14.0 Seconds     INR 1.05   Lipase [[id number]] (Abnormal) Collected: 06/20/18 0925   Lab Status: Final result Specimen: Blood Updated: 06/20/18 0946    Lipase 245 (H) U/L    hCG, Serum, Qualitative [[id number]] (Normal) Collected: 06/20/18 0925   Lab Status: Final result Specimen: Blood Updated: 06/20/18 0952    HCG Qualitative Negative   CBC Auto Differential [[id number]] (Normal) Collected: 06/20/18 0925   Lab Status: Final result Specimen: Blood Updated: 06/20/18 1003    WBC 4.92 10*3/mm3     RBC 4.38 10*6/mm3     Hemoglobin 13.9 g/dL     Hematocrit 40.5 %     MCV 92.5 fL     MCH 31.7 pg     MCHC 34.3 g/dL     RDW 12.4 %     RDW-SD 42.4 fl     MPV 11.3 fL     Platelets 167 10*3/mm3    Narrative:     The previously reported component NRBC is no longer being reported.   C-reactive Protein [[id number]] (Abnormal) Collected: 06/20/18 0925   Lab Status: Final result Specimen: Blood Updated: 06/20/18 1557    C-Reactive Protein 15.83 (H) mg/dL      Culture Data:   Stool Culture   Date Value Ref Range Status   06/20/2018 Culture in progress  Preliminary   06/20/2018 Moderate growth (3+) Salmonella species (A)  Preliminary     Imaging Results (all)     Procedure Component Value Units Date/Time    CT Abdomen Pelvis  With Contrast [[id number]] Collected:  06/20/18 1109     Updated:  06/20/18 1117    Narrative:          History:  28-year-old with abdominal pain and rectal bleeding.     Reference:  CT abdomen pelvis September 2012.     Technique  Contrast-enhanced CT abdomen/pelvis was performed with coronal and  sagittal reformatted images provided.     For this CT exam, one or more of the following dose reduction techniques  was employed:  -automated exposure control  -mA and/or kVp adjustment for patient size  -iterative reconstruction      mGy-cm     Findings     Chest  Incidental scanning through the lower chest demonstrates clear lung  bases.     Abdomen  Liver and spleen within normal limits. Prior cholecystectomy. Pancreas  and adrenal glands unremarkable. No renal abnormality. Large vascular  structures of the abdomen are normal. No lymphadenopathy. No free air or  free fluid.     Small bowel loops show normal caliber throughout. There is diffuse  moderate wall thickening of the entire colon with some sparing in the  rectum. Mild pericolonic inflammatory stranding. Normal appendix. No  abscess is seen.     Pelvis  Urinary bladder nondistended and not well assessed otherwise. Uterus is  unremarkable. There is a small amount of free fluid in the cul-de-sac.  No lymphadenopathy.     No acute osseous abnormalities. No abnormality of the sacroiliac joints.          Impression:          Diffuse colitis without complication. Consider infectious colitis or  potentially inflammatory bowel disease.  This report was finalized on 06/20/2018 11:14 by Dr Chirag Gardner, .        Hospital Course  Patient is a 28 y.o. female presented to [facility] emergency room 6/20/18 with complaints of abdominal cramping and bloody diarrhea over the last several days.  She complained of abdominal cramping severe in the periumbilical region.  She denied fever or chills.  She denied any recent sick contact.  No recent travel.  Patient  denied any unusual food exposure.  She reported a family history of Crohn's disease; however, she had endoscopy and colonoscopy in February 2018 that did not reveal any acute findings.  She reported multiple episodes of non-bloody and non-bilous emesis over the last several days.  She denied hematemesis.  She denied any similar symptoms in the past.  WBC 4.9, creatinine 0.64, potassium 3.2, lipase 245.  CT scan of the abdomen noted diffuse colitis without complications.  Consider infectious colitis or potentially inflammatory bowel disease.  She received normal saline fluid bolus, Levaquin IV, Flagyl IV in the emergency room.    She was admitted to the medical floor with abdominal pain, diffuse colitis and bloody diarrhea.  She was placed on IV fluids with potassium supplement.  Flagyl continued. After patient received dose of Levaquin in the emergency room she had abrupt onset of urticarial -like lesions on her legs and torso.  Levaquin was discontinued.  She was given Benadryl in addition to steroids.  She reports allergies to penicillin and cephalosporins. She was allowed clear liquid diet as tolerated.  She continued to have diarrhea and diffuse lower abdominal cramping.  GI panel positive for salmonella.  Gastroenterology consulted.  She was seen by Dr. Lyle.  Impression Salmonella and patient unable to identify how she may have contracted this.  Conservative management.  Continue hydration and watch renal function.  Advance diet as tolerated.  Up-to-date was reviewed by Dr. Montejo and literature indicates Bactrim in addition to azithromycin or other treatment options for salmonella gastrointestinal infection; however, it states the antibody treatment is generally not recommended for immunocompetent individuals between 12 months and 50 years with mild-to-moderate symptoms as illness is typically self limiting.  We continue to hydrate with IV fluids.  Abdomen remains soft.  Patient appeared nontoxic.  She  "continued to complain of nausea required Zofran and Phenergan.  Diet was slowly advanced.  She reported loose stools after eating.  IV fluid hydration continued.  Stools were dark in color and eventually were normal color with semi-formed bowel movement on date of discharge.  She reports less abdominal cramping and was tolerating GI soft bland diet without vomiting.  She did report mild nausea.    On 6/24/18 she is medically stable for discharge.  She had semi-formed brown stool after eating breakfast today.  She reported nausea without vomiting.  She is tolerating liquids.  She reports only mild pressure lower abdomen.  Patient has followed with Dr. Lizzie Hernandez in the past.  However she indicates that she would like to see Dr. Dean Tapia for primary care after discharge as her  is followed by Dr. Tapia.  Have arranged follow-up on 6/27/18 with Dr. Tapia to establish care as new patient.    Physical Exam on Discharge:  /68 (BP Location: Left arm, Patient Position: Lying)   Pulse 70   Temp 97.5 °F (36.4 °C) (Temporal Artery )   Resp 14   Ht 162.6 cm (64.02\")   Wt 53.8 kg (118 lb 8 oz)   SpO2 98%   BMI 20.33 kg/m²   Physical Exam  Constitutional: She is oriented to person, place, and time. She appears well-developed and well-nourished.   HENT:   Head: Normocephalic and atraumatic.   Eyes: EOM are normal. Pupils are equal, round, and reactive to light.   Neck: Normal range of motion. Neck supple.   Cardiovascular: Normal rate, regular rhythm, normal heart sounds and intact distal pulses.  Exam reveals no gallop and no friction rub.    No murmur heard.  Normal sinus rhythm 62-70 on telemetry.   Pulmonary/Chest: Effort normal and breath sounds normal. No respiratory distress. She has no wheezes. She has no rales.   Abdominal: There is no tenderness  Genitourinary:   Genitourinary Comments: Voiding   Musculoskeletal: She exhibits no edema or deformity.   Neurological: She is alert and oriented to " person, place, and time.   Skin: Skin is warm and dry.   Psychiatric: She has a normal mood and affect. Her behavior is normal. Judgment and thought content normal.     Condition on Discharge:  Stable    Discharge Disposition: Home with family    Discharge Diet:   Diet Instructions     Diet: Soft Texture; Thin Liquids, No Restrictions; Whole       Discharge Diet:  Soft Texture    Fluid Consistency:  Thin Liquids, No Restrictions    Soft Options:  Whole    GI bland soft,       GI soft, bland, avoid dairy    Activity at Discharge:   Activity Instructions     Activity as Tolerated          as tolerated    Discharge Care Plan / Instructions:   1.  Should she have worsening returning symptoms of abdominal cramping with bloody diarrhea she should seek medical attention.  2.  Adequate hydration after discharge.    Discharge Medications:     Discharge Medications      New Medications      Instructions Start Date   ondansetron 4 MG tablet  Commonly known as:  ZOFRAN   4 mg, Oral, Every 8 Hours PRN         Continue These Medications      Instructions Start Date   amitriptyline 25 MG tablet  Commonly known as:  ELAVIL   50 mg, Oral, Nightly      Cholecalciferol 1000 units capsule   1,000 Units, Oral, Daily      cyclobenzaprine 10 MG tablet  Commonly known as:  FLEXERIL   10 mg, Oral, 3 Times Daily PRN      levocetirizine 5 MG tablet  Commonly known as:  XYZAL   5 mg, Oral, Every Evening      prochlorperazine 10 MG tablet  Commonly known as:  COMPAZINE   10 mg, Oral, Every 6 Hours PRN      SUMAtriptan 100 MG tablet  Commonly known as:  IMITREX   100 mg, Oral, Take As Directed, One dose at onset of headache, may repeat one time in 2 hours if needed       VYVANSE 40 MG capsule  Generic drug:  lisdexamfetamine   40 mg, Oral, Every Morning         Stop These Medications    sertraline 100 MG tablet  Commonly known as:  ZOLOFT            Follow-up Appointments:   Follow-up Information     Dean Tapia MD Follow up on 6/27/2018.     Specialties:  Pediatrics, Internal Medicine  Why:  follow up June 27 at 2:15 pm  Contact information:  [address]  [place]  [place] [address]  [phone]             Lizzie Hernandez MD .    Specialty:  Family Medicine  Contact information:  [address] [place] [address] [address]  [place] [address]  [phone]                 Test Results Pending at Discharge: None    The above documentation resulted from a face-to-face encounter by me Tamiko HERNANDEZ, [facility].    DAVID Ye  06/24/18  11:47 AM    Time:  This discharge process required 35 minutes for completion.     Plan discussed with Dr. Nelson, Dr. Charlton, and patient.    Agree with discharge plan. Hydration and OP follow up.     Time spent in face-to-face evaluation, chart review, planning and education 35 minutes.  Please note that portions of this note may have been completed with a voice recognition program. Efforts were made to edit the dictations, but occasionally words are mistranscribed.

## 2018-06-24 NOTE — PROGRESS NOTES
[facility]  Inpatient Progress Note    Reason for Follow Up:  Diarrhea    Subjective     Subjective:   Patient's diet was advanced to soft texture yesterday and she continued to experience a diarrheal episodes.  She reports last stool was clear and watery.  She did not have to wake up during the night to have a bowel movement and feels more rested today.  Abdominal cramping occurs after eating and prior to having a bowel movement.  She has not tried to eat yet this morning.    Current Facility-Administered Medications:   •  acetaminophen (TYLENOL) tablet 650 mg, 650 mg, Oral, Q4H PRN, Alon Montejo MD, 650 mg at 06/20/18 1752  •  amitriptyline (ELAVIL) tablet 50 mg, 50 mg, Oral, Nightly, Alon Montejo MD, 50 mg at 06/23/18 2101  •  diphenhydrAMINE (BENADRYL) injection 25 mg, 25 mg, Intravenous, Q6H PRN, Alon Montejo MD, 25 mg at 06/20/18 1524  •  HYDROcodone-acetaminophen (NORCO) 5-325 MG per tablet 1 tablet, 1 tablet, Oral, Q6H PRN, Tamiko Franz, APRN  •  metroNIDAZOLE (FLAGYL) IVPB 500 mg, 500 mg, Intravenous, Q8H, Alon Montejo MD, Last Rate: 0 mL/hr at 06/23/18 1438, 500 mg at 06/24/18 0318  •  ondansetron (ZOFRAN) injection 4 mg, 4 mg, Intravenous, Q6H PRN, Alon Montejo MD, 4 mg at 06/23/18 0141  •  promethazine (PHENERGAN) injection 12.5 mg, 12.5 mg, Intravenous, Q6H PRN, Alon Montejo MD, 12.5 mg at 06/23/18 1607  •  sertraline (ZOLOFT) tablet 100 mg, 100 mg, Oral, Daily, Alon Montejo MD, 100 mg at 06/24/18 0828  •  sodium chloride 0.9 % flush 1-10 mL, 1-10 mL, Intravenous, PRN, Alon Montejo MD  •  Insert peripheral IV, , , Once **AND** sodium chloride 0.9 % flush 10 mL, 10 mL, Intravenous, PRN, Ad Sparks Jr., MD  •  sodium chloride 0.9 % with KCl 20 mEq/L infusion, 75 mL/hr, Intravenous, Continuous, DAVID Chavez, Last Rate: 75 mL/hr at 06/23/18 2028, 75 mL/hr at 06/23/18 2028  •  SUMAtriptan (IMITREX)  tablet 50 mg, 50 mg, Oral, Q2H PRN, Alon Montejo MD    Review of Systems     Constitution:  negative for chills, fatigue and fevers  Eyes:  negativefor blurriness and change of vision  ENT:   negative for sore throat and voice change  Respiratory: negative for  cough and shortness of air  Cardiovascular:  Negative for chest pain or palpitations  Gastrointestinal:  negative for  See HPI  Genitourinary:  negativefor  blood in urine and painful urination  Integument: negative for  rash and redness  Hematologic / Lymphatic: negative for  excessive bleeding and easy bruising  Musculoskeletal: negative for  joint pain and joint stiffness out of the ordinary  Neurological:  negative for  seizures and speech abnormal  Behavioral/Psych:  negative for  anxiety and depression out of the ordinary  Endocrine: negative for  diabetes:and weight loss, unintended  Allergies / Immunologic:  negative for  anaphylaxis and rash        Objective     Vital Signs  Temp:  [97.2 °F (36.2 °C)-98.1 °F (36.7 °C)] 97.5 °F (36.4 °C)  Heart Rate:  [65-82] 70  Resp:  [14-16] 14  BP: (103-134)/(64-89) 112/68  Body mass index is 20.33 kg/m².    Intake/Output Summary (Last 24 hours) at 06/24/18 0859  Last data filed at 06/23/18 2027   Gross per 24 hour   Intake             1100 ml   Output                0 ml   Net             1100 ml     No intake/output data recorded.       Physical exam   Physical Exam:   General: patient awake, alert and cooperative   Eyes: Normal lids and lashes, no scleral icterus   Neck: supple, normal ROM   Skin: warm and dry, not jaundiced   Cardiovascular: regular rhythm and rate, no murmurs auscultated   Pulm: clear to auscultation bilaterally, regular and unlabored   Abdomen: soft, nontender, nondistended; normal bowel sounds   Rectal: deferred   Extremities: no rash or edema   Psychiatric: Normal mood and behavior; memory intact      Results Review:    I have reviewed all of the patients current test  results    Results from last 7 days  Lab Units 06/22/18  0556 06/21/18  0652 06/20/18  0925   WBC 10*3/mm3 4.58* 4.37* 4.92   HEMOGLOBIN g/dL 11.2* 11.4* 13.9   HEMATOCRIT % 31.9* 33.3* 40.5   PLATELETS 10*3/mm3 139 121* 167         Results from last 7 days  Lab Units 06/24/18  0602 06/23/18  0515 06/22/18  0556 06/21/18  0652 06/20/18  0925   SODIUM mmol/L 140 139 137 140 138   POTASSIUM mmol/L 4.1 3.5 4.0 4.4 3.2*   CHLORIDE mmol/L 103 101 104 112* 99   CO2 mmol/L 22.0* 22.0* 22.0* 16.0* 24.0   BUN mg/dL 8 8 6 12 20   CREATININE mg/dL 0.60 0.57 0.48* 0.48* 0.64   CALCIUM mg/dL 8.6 8.8 8.0* 8.4 9.2   BILIRUBIN mg/dL  --   --   --  0.3 0.5   ALK PHOS U/L  --   --   --  37 59   ALT (SGPT) U/L  --   --   --  24 23   AST (SGOT) U/L  --   --   --  15 20   GLUCOSE mg/dL 69* 71 80 81 106*         Results from last 7 days  Lab Units 06/20/18  0925   INR  1.05         Lab Results  Lab Value Date/Time   LIPASE 245 (H) 06/20/2018 0925   LIPASE 153 08/18/2016 1752   LIPASE 46 06/10/2016 0935   LIPASE 48 08/13/2014 1645   LIPASE 64 07/30/2014 0436   LIPASE 54 07/12/2014 1303       Radiology:    Imaging Results (last 24 hours)     ** No results found for the last 24 hours. **            Assessment/Plan     Patient Active Problem List   Diagnosis Code   • BRETT III (cervical intraepithelial neoplasia grade III) with severe dysplasia D06.9   • Migraine without aura with status migrainosus G43.001   • Intractable migraine without aura and with status migrainosus G43.011   • Neck pain M54.2   • Anemia D64.9   • Thyroid cyst E04.1   • Migraine G43.909   • Thyroid nodule E04.1   • Bilateral occipital neuralgia M54.81   • Rectal bleeding K62.5   • Iron deficiency anemia D50.9   • Colitis K52.9       Impression/plan  1. Acute infectious diarrhea  2. Diarrhea  3. Abd pain    Hopefully home soon  Advance diet ans see how she does      Juan José Riojas, APRN 8:53 AM    Bridger Charlton, DO  06/24/18  8:59 AM

## 2018-06-24 NOTE — PLAN OF CARE
Problem: Patient Care Overview  Goal: Plan of Care Review  Outcome: Ongoing (interventions implemented as appropriate)   06/24/18 6769   Coping/Psychosocial   Plan of Care Reviewed With patient   Plan of Care Review   Progress improving   OTHER   Outcome Summary Patient reports loose stools still and continued abdominal pain and tenderness. Medicated x 1 this shift for abdominal pain. IV fluids continue. Possible home today.       Problem: Bowel Disease, Inflammatory (Adult)  Goal: Signs and Symptoms of Listed Potential Problems Will be Absent, Minimized or Managed (Bowel Disease, Inflammatory)  Outcome: Ongoing (interventions implemented as appropriate)      Problem: Pain, Acute (Adult)  Goal: Acceptable Pain Control/Comfort Level  Outcome: Ongoing (interventions implemented as appropriate)

## 2018-06-26 ENCOUNTER — TELEPHONE (OUTPATIENT)
Dept: INTERNAL MEDICINE | Age: 28
End: 2018-06-26

## 2018-06-26 DIAGNOSIS — A02.0 COLITIS DUE TO SALMONELLA SPECIES: Primary | ICD-10-CM

## 2018-07-03 LAB — BACTERIA SPEC AEROBE CULT: ABNORMAL

## 2018-07-20 ENCOUNTER — HOSPITAL ENCOUNTER (EMERGENCY)
Facility: HOSPITAL | Age: 28
Discharge: HOME | End: 2018-07-20
Admitting: EMERGENCY MEDICINE

## 2018-07-20 VITALS
DIASTOLIC BLOOD PRESSURE: 72 MMHG | SYSTOLIC BLOOD PRESSURE: 138 MMHG | TEMPERATURE: 98.1 F | HEIGHT: 64 IN | WEIGHT: 116 LBS | HEART RATE: 66 BPM | BODY MASS INDEX: 19.81 KG/M2 | RESPIRATION RATE: 16 BRPM | OXYGEN SATURATION: 100 %

## 2018-07-20 DIAGNOSIS — G43.819 OTHER MIGRAINE WITHOUT STATUS MIGRAINOSUS, INTRACTABLE: Primary | ICD-10-CM

## 2018-07-20 PROCEDURE — 25010000002 DIPHENHYDRAMINE PER 50 MG: Performed by: PHYSICIAN ASSISTANT

## 2018-07-20 PROCEDURE — 96374 THER/PROPH/DIAG INJ IV PUSH: CPT

## 2018-07-20 PROCEDURE — 96375 TX/PRO/DX INJ NEW DRUG ADDON: CPT

## 2018-07-20 PROCEDURE — 25010000002 METHYLPREDNISOLONE PER 125 MG: Performed by: PHYSICIAN ASSISTANT

## 2018-07-20 PROCEDURE — 99284 EMERGENCY DEPT VISIT MOD MDM: CPT

## 2018-07-20 PROCEDURE — 25010000002 PROCHLORPERAZINE EDISYLATE PER 10 MG: Performed by: PHYSICIAN ASSISTANT

## 2018-07-20 PROCEDURE — 96361 HYDRATE IV INFUSION ADD-ON: CPT

## 2018-07-20 RX ORDER — DIPHENHYDRAMINE HYDROCHLORIDE 50 MG/ML
25 INJECTION INTRAMUSCULAR; INTRAVENOUS ONCE
Status: COMPLETED | OUTPATIENT
Start: 2018-07-20 | End: 2018-07-20

## 2018-07-20 RX ORDER — METHYLPREDNISOLONE SODIUM SUCCINATE 125 MG/2ML
125 INJECTION, POWDER, LYOPHILIZED, FOR SOLUTION INTRAMUSCULAR; INTRAVENOUS ONCE
Status: COMPLETED | OUTPATIENT
Start: 2018-07-20 | End: 2018-07-20

## 2018-07-20 RX ADMIN — METHYLPREDNISOLONE SODIUM SUCCINATE 125 MG: 125 INJECTION, POWDER, FOR SOLUTION INTRAMUSCULAR; INTRAVENOUS at 21:00

## 2018-07-20 RX ADMIN — SODIUM CHLORIDE 1000 ML: 9 INJECTION, SOLUTION INTRAVENOUS at 21:00

## 2018-07-20 RX ADMIN — PROCHLORPERAZINE EDISYLATE 10 MG: 5 INJECTION INTRAMUSCULAR; INTRAVENOUS at 21:02

## 2018-07-20 RX ADMIN — DIPHENHYDRAMINE HYDROCHLORIDE 25 MG: 50 INJECTION, SOLUTION INTRAMUSCULAR; INTRAVENOUS at 21:04

## 2018-09-14 ENCOUNTER — OFFICE VISIT (OUTPATIENT)
Dept: OBSTETRICS AND GYNECOLOGY | Facility: CLINIC | Age: 28
End: 2018-09-14

## 2018-09-14 ENCOUNTER — PROCEDURE VISIT (OUTPATIENT)
Dept: OBSTETRICS AND GYNECOLOGY | Facility: CLINIC | Age: 28
End: 2018-09-14

## 2018-09-14 VITALS
WEIGHT: 102 LBS | BODY MASS INDEX: 17.42 KG/M2 | HEIGHT: 64 IN | SYSTOLIC BLOOD PRESSURE: 116 MMHG | DIASTOLIC BLOOD PRESSURE: 86 MMHG

## 2018-09-14 DIAGNOSIS — Z01.419 WELL WOMAN EXAM WITH ROUTINE GYNECOLOGICAL EXAM: Primary | ICD-10-CM

## 2018-09-14 DIAGNOSIS — T14.8XXA BRUISING: ICD-10-CM

## 2018-09-14 DIAGNOSIS — R10.2 PELVIC PAIN: Primary | ICD-10-CM

## 2018-09-14 DIAGNOSIS — R10.2 PELVIC PAIN: ICD-10-CM

## 2018-09-14 DIAGNOSIS — R63.4 WEIGHT LOSS, UNINTENTIONAL: ICD-10-CM

## 2018-09-14 DIAGNOSIS — Z78.9 NONSMOKER: ICD-10-CM

## 2018-09-14 LAB
ALBUMIN SERPL-MCNC: 4.4 G/DL (ref 3.5–5)
ALBUMIN/GLOB SERPL: 1.9 G/DL (ref 1.1–2.5)
ALP SERPL-CCNC: 41 U/L (ref 24–120)
ALT SERPL-CCNC: 20 U/L (ref 0–54)
AST SERPL-CCNC: 20 U/L (ref 7–45)
BILIRUB SERPL-MCNC: 0.5 MG/DL (ref 0.1–1)
BUN SERPL-MCNC: 13 MG/DL (ref 5–21)
BUN/CREAT SERPL: 22.4 (ref 7–25)
CALCIUM SERPL-MCNC: 9.3 MG/DL (ref 8.4–10.4)
CHLORIDE SERPL-SCNC: 99 MMOL/L (ref 98–110)
CHOLEST SERPL-MCNC: 115 MG/DL (ref 130–200)
CO2 SERPL-SCNC: 33 MMOL/L (ref 24–31)
CREAT SERPL-MCNC: 0.58 MG/DL (ref 0.5–1.4)
DIFFERENTIAL COMMENT: ABNORMAL
EOSINOPHIL # BLD MANUAL: 0.11 10*3/MM3 (ref 0–0.7)
EOSINOPHIL NFR BLD MANUAL: 2 % (ref 0–4)
ERYTHROCYTE [DISTWIDTH] IN BLOOD BY AUTOMATED COUNT: 12.3 % (ref 12–15)
GLOBULIN SER CALC-MCNC: 2.3 GM/DL
GLUCOSE SERPL-MCNC: 72 MG/DL (ref 70–100)
HBA1C MFR BLD: 4.7 %
HCT VFR BLD AUTO: 39.6 % (ref 37–47)
HDLC SERPL-MCNC: 77 MG/DL
HGB BLD-MCNC: 13 G/DL (ref 12–16)
LDLC SERPL CALC-MCNC: 20 MG/DL (ref 0–99)
LYMPHOCYTES # BLD MANUAL: 2.67 10*3/MM3 (ref 0.72–4.86)
LYMPHOCYTES NFR BLD MANUAL: 49.5 % (ref 15–45)
MCH RBC QN AUTO: 31.6 PG (ref 28–32)
MCHC RBC AUTO-ENTMCNC: 32.8 G/DL (ref 33–36)
MCV RBC AUTO: 96.4 FL (ref 82–98)
MONOCYTES # BLD MANUAL: 0.16 10*3/MM3 (ref 0.19–1.3)
MONOCYTES NFR BLD MANUAL: 3 % (ref 4–12)
NEUTROPHILS # BLD MANUAL: 2.23 10*3/MM3 (ref 1.87–8.4)
NEUTROPHILS NFR BLD MANUAL: 41.4 % (ref 39–78)
PLATELET # BLD AUTO: 213 10*3/MM3 (ref 130–400)
PLATELET BLD QL SMEAR: ABNORMAL
POTASSIUM SERPL-SCNC: 3.6 MMOL/L (ref 3.5–5.3)
PROT SERPL-MCNC: 6.7 G/DL (ref 6.3–8.7)
RBC # BLD AUTO: 4.11 10*6/MM3 (ref 4.2–5.4)
RBC MORPH BLD: ABNORMAL
SODIUM SERPL-SCNC: 141 MMOL/L (ref 135–145)
TRIGL SERPL-MCNC: 92 MG/DL (ref 0–149)
TSH SERPL DL<=0.005 MIU/L-ACNC: 0.65 MIU/ML (ref 0.47–4.68)
VLDLC SERPL CALC-MCNC: 18.4 MG/DL
WBC # BLD AUTO: 5.39 10*3/MM3 (ref 4.8–10.8)

## 2018-09-14 PROCEDURE — 99395 PREV VISIT EST AGE 18-39: CPT | Performed by: OBSTETRICS & GYNECOLOGY

## 2018-09-14 PROCEDURE — G0123 SCREEN CERV/VAG THIN LAYER: HCPCS | Performed by: OBSTETRICS & GYNECOLOGY

## 2018-09-14 PROCEDURE — 76830 TRANSVAGINAL US NON-OB: CPT | Performed by: OBSTETRICS & GYNECOLOGY

## 2018-09-14 PROCEDURE — 99213 OFFICE O/P EST LOW 20 MIN: CPT | Performed by: OBSTETRICS & GYNECOLOGY
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
## 2018-09-18 NOTE — PROGRESS NOTES
Please let patient know that labs were unremarkable, and her pelvic u/s was normal.  I think she needs to contact her PCP about the dramatic weight loss.  Justin

## 2018-09-19 LAB
GEN CATEG CVX/VAG CYTO-IMP: ABNORMAL
LAB AP CASE REPORT: ABNORMAL
LAB AP GYN ADDITIONAL INFORMATION: ABNORMAL
LAB AP GYN OTHER FINDINGS: ABNORMAL
PATH INTERP SPEC-IMP: ABNORMAL
STAT OF ADQ CVX/VAG CYTO-IMP: ABNORMAL

## 2018-09-24 DIAGNOSIS — G43.011 INTRACTABLE MIGRAINE WITHOUT AURA AND WITH STATUS MIGRAINOSUS: ICD-10-CM

## 2018-09-24 RX ORDER — PROCHLORPERAZINE MALEATE 10 MG
10 TABLET ORAL EVERY 6 HOURS PRN
Qty: 30 TABLET | Refills: 0 | Status: SHIPPED | OUTPATIENT
Start: 2018-09-24 | End: 2018-11-19 | Stop reason: SDUPTHER

## 2018-09-24 RX ORDER — SUMATRIPTAN 100 MG/1
100 TABLET, FILM COATED ORAL TAKE AS DIRECTED
Qty: 9 TABLET | Refills: 0 | Status: SHIPPED | OUTPATIENT
Start: 2018-09-24 | End: 2018-11-19 | Stop reason: SDUPTHER

## 2018-09-24 RX ORDER — INDOMETHACIN 25 MG/1
25 CAPSULE ORAL 3 TIMES DAILY PRN
Qty: 60 CAPSULE | Refills: 1 | Status: SHIPPED | OUTPATIENT
Start: 2018-09-24 | End: 2019-03-22 | Stop reason: SDUPTHER

## 2018-09-25 ENCOUNTER — OFFICE VISIT (OUTPATIENT)
Dept: OBSTETRICS AND GYNECOLOGY | Facility: CLINIC | Age: 28
End: 2018-09-25

## 2018-09-25 VITALS
SYSTOLIC BLOOD PRESSURE: 130 MMHG | DIASTOLIC BLOOD PRESSURE: 82 MMHG | HEIGHT: 64 IN | WEIGHT: 103 LBS | BODY MASS INDEX: 17.58 KG/M2

## 2018-09-25 DIAGNOSIS — R87.611 PAPANICOLAOU SMEAR OF CERVIX WITH ATYPICAL SQUAMOUS CELLS CANNOT EXCLUDE HIGH GRADE SQUAMOUS INTRAEPITHELIAL LESION (ASC-H): Primary | ICD-10-CM

## 2018-09-25 DIAGNOSIS — D06.9 CIN III (CERVICAL INTRAEPITHELIAL NEOPLASIA GRADE III) WITH SEVERE DYSPLASIA: ICD-10-CM

## 2018-09-25 DIAGNOSIS — Z78.9 NONSMOKER: ICD-10-CM

## 2018-09-25 PROCEDURE — 88305 TISSUE EXAM BY PATHOLOGIST: CPT | Performed by: OBSTETRICS & GYNECOLOGY

## 2018-09-25 PROCEDURE — 57454 BX/CURETT OF CERVIX W/SCOPE: CPT | Performed by: OBSTETRICS & GYNECOLOGY

## 2018-09-25 RX ORDER — LAMOTRIGINE 25 MG/1
25 TABLET ORAL DAILY
COMMUNITY
Start: 2018-09-21 | End: 2022-05-05

## 2018-09-25 NOTE — PROGRESS NOTES
Colposcopy    Date of procedure:  9/25/2018    Risks and benefits discussed? yes           All questions answered? yes              Local anesthesia used:  no    Pre-op indication: ASC-H    Prior history of:           LEEP with BRETT III  Risk factors:  Smoker: no     New sex partner in past year: no    Procedure documentation:    The cervix was washed with acetic acid and then viewed colposcopically.  The findings were as follows:  The transformation zone was able to be seen adequately.    An ECC was performed.    Mosaicism, punctation, and microwarty change in LEEP bed      Ectocervical biopsies were taken from 12 o'clock.  Silver nitrate was applied to acheive hemostasis..    Colposcopic Impression: Adequate colposcopy  Colposcopic findings are consistent with PAP      Plan: Will base further treatment on pathology results.  Patient wants to have a hysterectomy if pathology warrants further treatment.  Briefly discussed and questions answered.   present for procedure and discussion.      This note was electronically signed.    Karli Guerrero MD  September 25, 2018  9:39 AM

## 2018-09-30 LAB
CYTO UR: NORMAL
LAB AP CASE REPORT: NORMAL
LAB AP CLINICAL INFORMATION: NORMAL
PATH REPORT.FINAL DX SPEC: NORMAL
PATH REPORT.GROSS SPEC: NORMAL

## 2018-10-03 ENCOUNTER — OFFICE VISIT (OUTPATIENT)
Dept: OBSTETRICS AND GYNECOLOGY | Facility: CLINIC | Age: 28
End: 2018-10-03

## 2018-10-03 VITALS — SYSTOLIC BLOOD PRESSURE: 120 MMHG | DIASTOLIC BLOOD PRESSURE: 80 MMHG | WEIGHT: 102 LBS | BODY MASS INDEX: 17.51 KG/M2

## 2018-10-03 DIAGNOSIS — N93.8 DUB (DYSFUNCTIONAL UTERINE BLEEDING): ICD-10-CM

## 2018-10-03 DIAGNOSIS — D06.9 CIN III (CERVICAL INTRAEPITHELIAL NEOPLASIA GRADE III) WITH SEVERE DYSPLASIA: Primary | ICD-10-CM

## 2018-10-03 DIAGNOSIS — Z78.9 NONSMOKER: ICD-10-CM

## 2018-10-03 DIAGNOSIS — N94.10 DYSPAREUNIA, FEMALE: ICD-10-CM

## 2018-10-03 DIAGNOSIS — R10.2 PELVIC PAIN: ICD-10-CM

## 2018-10-03 DIAGNOSIS — F41.9 ANXIETY: ICD-10-CM

## 2018-10-03 PROCEDURE — 99214 OFFICE O/P EST MOD 30 MIN: CPT | Performed by: OBSTETRICS & GYNECOLOGY

## 2018-10-03 RX ORDER — PHENAZOPYRIDINE HYDROCHLORIDE 100 MG/1
200 TABLET, FILM COATED ORAL ONCE
Status: CANCELLED | OUTPATIENT
Start: 2018-10-03 | End: 2018-10-03

## 2018-10-03 NOTE — H&P
[facility]  Mirela Flores  : 1990  MRN: [id number]  CSN: [id number]    History and Physical    Subjective   Mirela Flores is a 28 y.o. year old  who presents for consultation about multiple issues.  She wants to discuss:  1. Long h/o abnormal PAP's since she was 19 years old.  The patient is s/p LEEP for BRETT III and continues to have abnormal PAP's.  2. Chronic pelvic pain for years  3.  Pain with sex.  She used to only have this problem some times, but now she has pain with every occurrence.  4.  Intermenstrual bleeding that is light, but annoying  5.  Anxiety about getting cervical cancer since she has been having abnormal PAP's for so long.    The patient wants to have a hysterectomy since she has already has a bilateral salpingectomy and cannot have any more children.    Past Medical History:   Diagnosis Date   • Anemia affecting pregnancy    • Asthma     as a child   • History of transfusion    • Migraines    • Mono exposure    • S/P thyroid biopsy      Past Surgical History:   Procedure Laterality Date   • CERVICAL CONIZATION Bilateral 7/3/2017    Procedure: CERVICAL CONIZATION, LOOP ELECTROCAUTERY EXCISION PROCEDURE;  Surgeon: Karli Guerrero MD;  Location: [facility] OR;  Service:    • CHOLECYSTECTOMY     • COLONOSCOPY  2011    normal   • COLONOSCOPY N/A 2018    Procedure: COLONOSCOPY WITH ANESTHESIA;  Surgeon: Bridger Charlton DO;  Location: [facility] ENDOSCOPY;  Service:    • D&C WITH SUCTION     • ENDOSCOPY N/A 2018    Procedure: ESOPHAGOGASTRODUODENOSCOPY WITH ANESTHESIA;  Surgeon: Bridger Charlton DO;  Location: [facility] ENDOSCOPY;  Service:    • OVARIAN CYST SURGERY Right    • SALPINGECTOMY Bilateral 2017    Procedure: laparoscopic salpingectomy;  Surgeon: Karli Guerrero MD;  Location: [facility] OR;  Service:    • TYMPANOSTOMY TUBE PLACEMENT       Smoking status: Never Smoker                                                              Smokeless tobacco: Never Used             "              Current Outpatient Prescriptions:   •  amitriptyline (ELAVIL) 25 MG tablet, Take 2 tablets by mouth Every Night., Disp: 60 tablet, Rfl: 2  •  Cholecalciferol 1000 units capsule, Take 1,000 Units by mouth Daily., Disp: 90 capsule, Rfl: 0  •  cyclobenzaprine (FLEXERIL) 10 MG tablet, Take 10 mg by mouth 3 (Three) Times a Day As Needed for Muscle Spasms., Disp: , Rfl:   •  indomethacin (INDOCIN) 25 MG capsule, Take 1 capsule by mouth 3 (Three) Times a Day As Needed for Mild Pain ., Disp: 60 capsule, Rfl: 1  •  lamoTRIgine (LaMICtal) 25 MG tablet, , Disp: , Rfl:   •  levocetirizine (XYZAL) 5 MG tablet, Take 1 tablet by mouth Every Evening., Disp: 5 tablet, Rfl: 0  •  lisdexamfetamine (VYVANSE) 40 MG capsule, Take 40 mg by mouth Every Morning., Disp: , Rfl:   •  prochlorperazine (COMPAZINE) 10 MG tablet, Take 1 tablet by mouth Every 6 (Six) Hours As Needed for Nausea or Vomiting., Disp: 30 tablet, Rfl: 0  •  SUMAtriptan (IMITREX) 100 MG tablet, Take one tablet at onset of headache. May repeat dose one time in 2 hours if headache not relieved., Disp: 9 tablet, Rfl: 0    Allergies   Allergen Reactions   • Cephalosporins      Pt states was told can never take them again.    • Penicillins      Reaction as a child.  Patient was told can never take again.    • Topamax [Topiramate] Angioedema   • Reglan [Metoclopramide] Other (See Comments)     \"CHEST PAIN\"   • Adhesive Tape Rash     TEGADERM AND STERI STRIPS   • Levofloxacin Rash     abrupt onset of urticarial-like lesions on her legs and torso   • Tape Rash     TEGADERM AND STERI STRIPS       Family History   Problem Relation Age of Onset   • Other Father    • Heart murmur Mother    • Colon polyps Mother    • No Known Problems Paternal Grandfather    • No Known Problems Paternal Grandmother    • Thyroid disease Maternal Grandmother    • Hypertension Maternal Grandmother    • Colon polyps Maternal Grandmother    • No Known Problems Maternal Grandfather    • Breast " cancer Other 60   • Ovarian cancer Neg Hx      Review of Systems   Constitutional: Negative for activity change and unexpected weight change.   HENT: Negative for congestion.    Respiratory: Negative for shortness of breath.    Cardiovascular: Negative for chest pain.   Gastrointestinal: Positive for abdominal pain (lower abdomen) and constipation. Negative for blood in stool and diarrhea.   Endocrine: Negative for cold intolerance and heat intolerance.   Genitourinary: Positive for dyspareunia (every time), pelvic pain and vaginal bleeding (bleeding between menstrual cycles). Negative for vaginal discharge.   Musculoskeletal: Negative for arthralgias, back pain, neck pain and neck stiffness.   Skin: Negative for rash.   Neurological: Negative for dizziness and headaches.   Psychiatric/Behavioral: Negative for dysphoric mood and sleep disturbance. The patient is nervous/anxious (only about possibility of getting cancer).          Objective   /80   Wt 46.3 kg (102 lb)   LMP 09/10/2018 (Exact Date)   Breastfeeding? No   BMI 17.51 kg/m²      Physical Exam   Physical Exam   Constitutional: She is oriented to person, place, and time. She appears well-developed and well-nourished. No distress.   HENT:   Head: Normocephalic and atraumatic.   Eyes: EOM are normal.   Neck: Normal range of motion. No thyromegaly present.   Cardiovascular: Normal rate and regular rhythm.    No murmur heard.  Pulmonary/Chest: Effort normal and breath sounds normal. She has no wheezes.   Abdominal: Soft. She exhibits no distension. There is no tenderness.   Musculoskeletal: Normal range of motion.   Neurological: She is alert and oriented to person, place, and time.   Skin: Skin is warm and dry.   Psychiatric: She has a normal mood and affect. Her behavior is normal. Judgment normal.   Nursing note and vitals reviewed.      Labs  Lab Results   Component Value Date     09/14/2018    HGB 13.0 09/14/2018    HCT 39.6 09/14/2018     WBC 4.58 (L) 06/22/2018     09/14/2018    K 3.6 09/14/2018    CL 99 09/14/2018    CO2 33.0 (H) 09/14/2018    BUN 13 09/14/2018    CREATININE 0.58 09/14/2018    GLUCOSE 69 (L) 06/24/2018    ALBUMIN 4.40 09/14/2018    CALCIUM 9.3 09/14/2018    AST 20 09/14/2018    ALT 20 09/14/2018    BILITOT 0.5 09/14/2018        Assessment & Plan    Mirela was seen today for abnormal pap smear.    Diagnoses and all orders for this visit:    BRETT III (cervical intraepithelial neoplasia grade III) with severe dysplasia: Patient already s/p salpingectomy for sterilization.  She now desires hysterectomy for persistent concerns about abnormal PAP's that has been going on for many years and has remained abnormal despite a LEEP.  She is very concerned about developing cervical cancer.  Ovarian conservation encouraged, patient agrees.  Risks of laparoscopic surgery were reviewed to include, but are not limited to, the possibility of bowel perforation requiring possible bowel resection and/or colostomy, possible bladder injury or possible and possible vascular injury which could require the need for a blood transfusion.  Possible need for conversion to a laparotomy was also discussed.  The patient's questions were answered to her satisfaction.  Post-op restrictions and typical post-op course were also reviewed.  -     Case Request; Standing  -     CBC and Differential; Future  -     ECG 12 Lead; Future  -     phenazopyridine (PYRIDIUM) tablet 200 mg; Take 2 tablets by mouth 1 (One) Time.  -     clindamycin (CLEOCIN) 600 mg in dextrose (D5W) 5 % 100 mL IVPB; Infuse 600 mg into a venous catheter 60 Minutes Prior to Surgery.  -     gentamicin (GARAMYCIN) 230 mg in sodium chloride 0.9 % 100 mL IVPB; Infuse 230 mg into a venous catheter 1 (One) Time.  -     Case Request    Pelvic pain    Anxiety    Dyspareunia, female    DUB (dysfunctional uterine bleeding)  Comments:  Having bleeding between menstrual cycles    Nonsmoker    Other orders  -      Follow Anesthesia Guidelines / Standing Orders; Future  -     Follow Anesthesia Guidelines / Standing Orders; Standing  -     Type & Screen; Standing  -     Obtain informed consent; Standing  -     Place sequential compression device; Standing        Karli MD Cesar  10/3/2018  9:23 AM

## 2018-10-03 NOTE — PROGRESS NOTES
[facility]  Mirela Flores  : 1990  MRN: [id number]  CSN: [id number]    History and Physical    Subjective   Mirela Flores is a 28 y.o. year old  who presents for consultation about multiple issues.  She wants to discuss:  1. Long h/o abnormal PAP's since she was 19 years old.  The patient is s/p LEEP for BRETT III and continues to have abnormal PAP's.  2. Chronic pelvic pain for years  3.  Pain with sex.  She used to only have this problem some times, but now she has pain with every occurrence.  4.  Intermenstrual bleeding that is light, but annoying  5.  Anxiety about getting cervical cancer since she has been having abnormal PAP's for so long.    The patient wants to have a hysterectomy since she has already has a bilateral salpingectomy and cannot have any more children.    Past Medical History:   Diagnosis Date   • Anemia affecting pregnancy    • Asthma     as a child   • History of transfusion    • Migraines    • Mono exposure    • S/P thyroid biopsy      Past Surgical History:   Procedure Laterality Date   • CERVICAL CONIZATION Bilateral 7/3/2017    Procedure: CERVICAL CONIZATION, LOOP ELECTROCAUTERY EXCISION PROCEDURE;  Surgeon: Karli Guerrero MD;  Location: [facility] OR;  Service:    • CHOLECYSTECTOMY     • COLONOSCOPY  2011    normal   • COLONOSCOPY N/A 2018    Procedure: COLONOSCOPY WITH ANESTHESIA;  Surgeon: Bridger Charlton DO;  Location: [facility] ENDOSCOPY;  Service:    • D&C WITH SUCTION     • ENDOSCOPY N/A 2018    Procedure: ESOPHAGOGASTRODUODENOSCOPY WITH ANESTHESIA;  Surgeon: Bridger Charlton DO;  Location: [facility] ENDOSCOPY;  Service:    • OVARIAN CYST SURGERY Right    • SALPINGECTOMY Bilateral 2017    Procedure: laparoscopic salpingectomy;  Surgeon: Karli Guerrero MD;  Location: [facility] OR;  Service:    • TYMPANOSTOMY TUBE PLACEMENT       Smoking status: Never Smoker                                                              Smokeless tobacco: Never Used             "              Current Outpatient Prescriptions:   •  amitriptyline (ELAVIL) 25 MG tablet, Take 2 tablets by mouth Every Night., Disp: 60 tablet, Rfl: 2  •  Cholecalciferol 1000 units capsule, Take 1,000 Units by mouth Daily., Disp: 90 capsule, Rfl: 0  •  cyclobenzaprine (FLEXERIL) 10 MG tablet, Take 10 mg by mouth 3 (Three) Times a Day As Needed for Muscle Spasms., Disp: , Rfl:   •  indomethacin (INDOCIN) 25 MG capsule, Take 1 capsule by mouth 3 (Three) Times a Day As Needed for Mild Pain ., Disp: 60 capsule, Rfl: 1  •  lamoTRIgine (LaMICtal) 25 MG tablet, , Disp: , Rfl:   •  levocetirizine (XYZAL) 5 MG tablet, Take 1 tablet by mouth Every Evening., Disp: 5 tablet, Rfl: 0  •  lisdexamfetamine (VYVANSE) 40 MG capsule, Take 40 mg by mouth Every Morning., Disp: , Rfl:   •  prochlorperazine (COMPAZINE) 10 MG tablet, Take 1 tablet by mouth Every 6 (Six) Hours As Needed for Nausea or Vomiting., Disp: 30 tablet, Rfl: 0  •  SUMAtriptan (IMITREX) 100 MG tablet, Take one tablet at onset of headache. May repeat dose one time in 2 hours if headache not relieved., Disp: 9 tablet, Rfl: 0    Allergies   Allergen Reactions   • Cephalosporins      Pt states was told can never take them again.    • Penicillins      Reaction as a child.  Patient was told can never take again.    • Topamax [Topiramate] Angioedema   • Reglan [Metoclopramide] Other (See Comments)     \"CHEST PAIN\"   • Adhesive Tape Rash     TEGADERM AND STERI STRIPS   • Levofloxacin Rash     abrupt onset of urticarial-like lesions on her legs and torso   • Tape Rash     TEGADERM AND STERI STRIPS       Family History   Problem Relation Age of Onset   • Other Father    • Heart murmur Mother    • Colon polyps Mother    • No Known Problems Paternal Grandfather    • No Known Problems Paternal Grandmother    • Thyroid disease Maternal Grandmother    • Hypertension Maternal Grandmother    • Colon polyps Maternal Grandmother    • No Known Problems Maternal Grandfather    • Breast " cancer Other 60   • Ovarian cancer Neg Hx      Review of Systems   Constitutional: Negative for activity change and unexpected weight change.   HENT: Negative for congestion.    Respiratory: Negative for shortness of breath.    Cardiovascular: Negative for chest pain.   Gastrointestinal: Positive for abdominal pain (lower abdomen) and constipation. Negative for blood in stool and diarrhea.   Endocrine: Negative for cold intolerance and heat intolerance.   Genitourinary: Positive for dyspareunia (every time), pelvic pain and vaginal bleeding (bleeding between menstrual cycles). Negative for vaginal discharge.   Musculoskeletal: Negative for arthralgias, back pain, neck pain and neck stiffness.   Skin: Negative for rash.   Neurological: Negative for dizziness and headaches.   Psychiatric/Behavioral: Negative for dysphoric mood and sleep disturbance. The patient is nervous/anxious (only about possibility of getting cancer).          Objective   /80   Wt 46.3 kg (102 lb)   LMP 09/10/2018 (Exact Date)   Breastfeeding? No   BMI 17.51 kg/m²     Physical Exam   Physical Exam   Constitutional: She is oriented to person, place, and time. She appears well-developed and well-nourished. No distress.   HENT:   Head: Normocephalic and atraumatic.   Eyes: EOM are normal.   Neck: Normal range of motion. No thyromegaly present.   Cardiovascular: Normal rate and regular rhythm.    No murmur heard.  Pulmonary/Chest: Effort normal and breath sounds normal. She has no wheezes.   Abdominal: Soft. She exhibits no distension. There is no tenderness.   Musculoskeletal: Normal range of motion.   Neurological: She is alert and oriented to person, place, and time.   Skin: Skin is warm and dry.   Psychiatric: She has a normal mood and affect. Her behavior is normal. Judgment normal.   Nursing note and vitals reviewed.      Labs  Lab Results   Component Value Date     09/14/2018    HGB 13.0 09/14/2018    HCT 39.6 09/14/2018     WBC 4.58 (L) 06/22/2018     09/14/2018    K 3.6 09/14/2018    CL 99 09/14/2018    CO2 33.0 (H) 09/14/2018    BUN 13 09/14/2018    CREATININE 0.58 09/14/2018    GLUCOSE 69 (L) 06/24/2018    ALBUMIN 4.40 09/14/2018    CALCIUM 9.3 09/14/2018    AST 20 09/14/2018    ALT 20 09/14/2018    BILITOT 0.5 09/14/2018        Assessment & Plan    Mirela was seen today for abnormal pap smear.    Diagnoses and all orders for this visit:    BRETT III (cervical intraepithelial neoplasia grade III) with severe dysplasia: Patient already s/p salpingectomy for sterilization.  She now desires hysterectomy for persistent concerns about abnormal PAP's that has been going on for many years and has remained abnormal despite a LEEP.  She is very concerned about developing cervical cancer.  Ovarian conservation encouraged, patient agrees.  Risks of laparoscopic surgery were reviewed to include, but are not limited to, the possibility of bowel perforation requiring possible bowel resection and/or colostomy, possible bladder injury or possible and possible vascular injury which could require the need for a blood transfusion.  Possible need for conversion to a laparotomy was also discussed.  The patient's questions were answered to her satisfaction.  Post-op restrictions and typical post-op course were also reviewed.  -     Case Request; Standing  -     CBC and Differential; Future  -     ECG 12 Lead; Future  -     phenazopyridine (PYRIDIUM) tablet 200 mg; Take 2 tablets by mouth 1 (One) Time.  -     clindamycin (CLEOCIN) 600 mg in dextrose (D5W) 5 % 100 mL IVPB; Infuse 600 mg into a venous catheter 60 Minutes Prior to Surgery.  -     gentamicin (GARAMYCIN) 230 mg in sodium chloride 0.9 % 100 mL IVPB; Infuse 230 mg into a venous catheter 1 (One) Time.  -     Case Request    Pelvic pain    Anxiety    Dyspareunia, female    DUB (dysfunctional uterine bleeding)  Comments:  Having bleeding between menstrual cycles    Nonsmoker    Other orders  -      Follow Anesthesia Guidelines / Standing Orders; Future  -     Follow Anesthesia Guidelines / Standing Orders; Standing  -     Type & Screen; Standing  -     Obtain informed consent; Standing  -     Place sequential compression device; Standing        Karli MD Cesar  10/3/2018  9:23 AM

## 2018-10-08 ENCOUNTER — TELEPHONE (OUTPATIENT)
Dept: NEUROLOGY | Facility: CLINIC | Age: 28
End: 2018-10-08

## 2018-10-08 ENCOUNTER — RESULTS ENCOUNTER (OUTPATIENT)
Dept: OBSTETRICS AND GYNECOLOGY | Facility: CLINIC | Age: 28
End: 2018-10-08

## 2018-10-08 DIAGNOSIS — D06.9 CIN III (CERVICAL INTRAEPITHELIAL NEOPLASIA GRADE III) WITH SEVERE DYSPLASIA: ICD-10-CM

## 2018-10-15 ENCOUNTER — APPOINTMENT (OUTPATIENT)
Dept: PREADMISSION TESTING | Facility: HOSPITAL | Age: 28
End: 2018-10-15

## 2018-10-17 ENCOUNTER — APPOINTMENT (OUTPATIENT)
Dept: PREADMISSION TESTING | Facility: HOSPITAL | Age: 28
End: 2018-10-17

## 2018-10-18 ENCOUNTER — APPOINTMENT (OUTPATIENT)
Dept: PREADMISSION TESTING | Facility: HOSPITAL | Age: 28
End: 2018-10-18

## 2018-10-18 VITALS
RESPIRATION RATE: 16 BRPM | HEART RATE: 70 BPM | SYSTOLIC BLOOD PRESSURE: 133 MMHG | DIASTOLIC BLOOD PRESSURE: 97 MMHG | WEIGHT: 105.38 LBS | OXYGEN SATURATION: 100 % | HEIGHT: 64 IN | BODY MASS INDEX: 17.99 KG/M2

## 2018-10-18 DIAGNOSIS — D06.9 CIN III (CERVICAL INTRAEPITHELIAL NEOPLASIA GRADE III) WITH SEVERE DYSPLASIA: ICD-10-CM

## 2018-10-18 LAB
ANION GAP SERPL CALCULATED.3IONS-SCNC: 7 MMOL/L (ref 4–13)
BASOPHILS # BLD AUTO: 0.06 10*3/MM3 (ref 0–0.2)
BASOPHILS NFR BLD AUTO: 0.9 % (ref 0–2)
BUN BLD-MCNC: 13 MG/DL (ref 5–21)
BUN/CREAT SERPL: 27.1 (ref 7–25)
CALCIUM SPEC-SCNC: 9 MG/DL (ref 8.4–10.4)
CHLORIDE SERPL-SCNC: 100 MMOL/L (ref 98–110)
CO2 SERPL-SCNC: 32 MMOL/L (ref 24–31)
CREAT BLD-MCNC: 0.48 MG/DL (ref 0.5–1.4)
DEPRECATED RDW RBC AUTO: 41.6 FL (ref 40–54)
EOSINOPHIL # BLD AUTO: 0.06 10*3/MM3 (ref 0–0.7)
EOSINOPHIL NFR BLD AUTO: 0.9 % (ref 0–4)
ERYTHROCYTE [DISTWIDTH] IN BLOOD BY AUTOMATED COUNT: 12 % (ref 12–15)
GFR SERPL CREATININE-BSD FRML MDRD: >150 ML/MIN/1.73
GLUCOSE BLD-MCNC: 97 MG/DL (ref 70–100)
HCT VFR BLD AUTO: 35.3 % (ref 37–47)
HGB BLD-MCNC: 12.3 G/DL (ref 12–16)
IMM GRANULOCYTES # BLD: 0.02 10*3/MM3 (ref 0–0.03)
IMM GRANULOCYTES NFR BLD: 0.3 % (ref 0–5)
LYMPHOCYTES # BLD AUTO: 3.01 10*3/MM3 (ref 0.72–4.86)
LYMPHOCYTES NFR BLD AUTO: 45.3 % (ref 15–45)
MCH RBC QN AUTO: 32.7 PG (ref 28–32)
MCHC RBC AUTO-ENTMCNC: 34.8 G/DL (ref 33–36)
MCV RBC AUTO: 93.9 FL (ref 82–98)
MONOCYTES # BLD AUTO: 0.4 10*3/MM3 (ref 0.19–1.3)
MONOCYTES NFR BLD AUTO: 6 % (ref 4–12)
NEUTROPHILS # BLD AUTO: 3.09 10*3/MM3 (ref 1.87–8.4)
NEUTROPHILS NFR BLD AUTO: 46.6 % (ref 39–78)
NRBC BLD MANUAL-RTO: 0 /100 WBC (ref 0–0)
PLATELET # BLD AUTO: 217 10*3/MM3 (ref 130–400)
PMV BLD AUTO: 11.7 FL (ref 6–12)
POTASSIUM BLD-SCNC: 3.6 MMOL/L (ref 3.5–5.3)
RBC # BLD AUTO: 3.76 10*6/MM3 (ref 4.2–5.4)
SODIUM BLD-SCNC: 139 MMOL/L (ref 135–145)
WBC NRBC COR # BLD: 6.64 10*3/MM3 (ref 4.8–10.8)

## 2018-10-18 PROCEDURE — 93010 ELECTROCARDIOGRAM REPORT: CPT | Performed by: INTERNAL MEDICINE

## 2018-10-18 PROCEDURE — 85025 COMPLETE CBC W/AUTO DIFF WBC: CPT | Performed by: OBSTETRICS & GYNECOLOGY

## 2018-10-18 PROCEDURE — 93005 ELECTROCARDIOGRAM TRACING: CPT

## 2018-10-18 PROCEDURE — 36415 COLL VENOUS BLD VENIPUNCTURE: CPT

## 2018-10-18 PROCEDURE — 80048 BASIC METABOLIC PNL TOTAL CA: CPT | Performed by: OBSTETRICS & GYNECOLOGY

## 2018-10-18 NOTE — DISCHARGE INSTRUCTIONS
DAY OF SURGERY INSTRUCTIONS        YOUR SURGEON: Karli Guerrero    PROCEDURE: Total Laparoscopic Hysterectomy with Davinci Si Robot, Cystoscopy     DATE OF SURGERY: October 29, 2018     ARRIVAL TIME: AS DIRECTED BY OFFICE    DAY OF SURGERY TAKE ONLY THESE MEDICATIONS UNLESS OTHERWISE INSTRUCTED BY YOUR PHYSICIAN: none        MANAGING PAIN AFTER SURGERY    We know you are probably wondering what your pain will be like after surgery.  Following surgery it is unrealistic to expect you will not have pain.   Pain is how our bodies let us know that something is wrong or cautions us to be careful.  That said, our goal is to make your pain tolerable.    Methods we may use to treat your pain include (oral or IV medications, PCAs, epidurals, nerve blocks, etc.)   While some procedures require IV pain medications for a short time after surgery, transitioning to pain medications by mouth allows for better management of pain.   Your nurse will encourage you to take oral pain medications whenever possible.  IV medications work almost immediately, but only last a short while.  Taking medications by mouth allows for a more constant level of medication in your blood stream for a longer period of time.      Once your pain is out of control it is harder to get back under control.  It is important you are aware when your next dose of pain medication is due.  If you are admitted, your nurse may write the time of your next dose on the white board in your room to help you remember.      We are interested in your pain and encourage you to inform us about aggravating factors during your visit.   Many times a simple repositioning every few hours can make a big difference.    If your physician says it is okay, do not let your pain prevent you from getting out of bed. Be sure to call your nurse for assistance prior to getting up so you do not fall.      Before surgery, please decide your tolerable pain goal.  These faces help describe the  pain ratings we use on a 0-10 scale.   Be prepared to tell us your goal and whether or not you take pain or anxiety medications at home.          BEFORE YOU COME TO THE HOSPITAL  (Pre-op instructions)  • Do not eat, drink, smoke or chew gum after midnight the night before surgery.  This also includes no mints.  • Morning of surgery take only the medicines you have been instructed with a sip of water unless otherwise instructed  by your physician.  • Do not shave, wear makeup or dark nail polish.  • Remove all jewelry including rings.  • Leave anything you consider valuable at home.  • Leave your suitcase in the car until after your surgery.  • Bring the following with you if applicable:  o Picture ID and insurance, Medicare or Medicaid cards  o Co-pay/deductible required by insurance (cash, check, credit card)  o Copy of advance directive, living will or power-of- documents if not brought to PAT  o CPAP or BIPAP mask and tubing  o Relaxation aids (MP3 player, book, magazine)  • On the day of surgery check in at registration located at the main entrance of the hospital.       Outpatient Surgery Guidelines, Adult  Outpatient procedures are those for which the person having the procedure is allowed to go home the same day as the procedure. Various procedures are done on an outpatient basis. You should follow some general guidelines if you will be having an outpatient procedure.  LET YOUR HEALTH CARE PROVIDER KNOW ABOUT:  · Any allergies you have.  · All medicines you are taking, including vitamins, herbs, eye drops, creams, and over-the-counter medicines.  · Previous problems you or members of your family have had with the use of anesthetics.  · Any blood disorders you have.  · Previous surgeries you have had.  · Medical conditions you have.  RISKS AND COMPLICATIONS  Your health care provider will discuss possible risks and complications with you before surgery. Common risks and complications include:     · Problems due to the use of anesthetics.  · Blood loss and replacement (does not apply to minor surgical procedures).  · Temporary increase in pain due to surgery.  · Uncorrected pain or problems that the surgery was meant to correct.  · Infection.  · New damage.  BEFORE THE PROCEDURE  · Ask your health care provider about changing or stopping your regular medicines. You may need to stop taking certain medicines in the days or weeks before the procedure.  · Stop smoking at least 2 weeks before surgery. This lowers your risk for complications during and after surgery. Ask your health care provider for help with this if needed.  · Eat your usual meals and a light supper the day before surgery. Continue fluid intake. Do not drink alcohol.  · Do not eat or drink after midnight the night before your surgery.   · Arrange for someone to take you home and to stay with you for 24 hours after the procedure. Medicine given for your procedure may affect your ability to drive or to care for yourself.  · Call your health care provider's office if you develop an illness or problem that may prevent you from safely having your procedure.  AFTER THE PROCEDURE  After surgery, you will be taken to a recovery area, where your progress will be monitored. If there are no complications, you will be allowed to go home when you are awake, stable, and taking fluids well. You may have numbness around the surgical site. Healing will take some time. You will have tenderness at the surgical site and may have some swelling and bruising. You may also have some nausea.  HOME CARE INSTRUCTIONS  · Do not drive for 24 hours, or as directed by your health care provider. Do not drive while taking prescription pain medicines.  · Do not drink alcohol for 24 hours.  · Do not make important decisions or sign legal documents for 24 hours.  · You may resume a normal diet and activities as directed.  · Do not lift anything heavier than 10 pounds (4.5 kg) or  play contact sports until your health care provider says it is okay.  · Change your bandages (dressings) as directed.  · Only take over-the-counter or prescription medicines as directed by your health care provider.  · Follow up with your health care provider as directed.  SEEK MEDICAL CARE IF:  · You have increased bleeding (more than a small spot) from the surgical site.  · You have redness, swelling, or increasing pain in the wound.  · You see pus coming from the wound.  · You have a fever.  · You notice a bad smell coming from the wound or dressing.  · You feel lightheaded or faint.  · You develop a rash.  · You have trouble breathing.  · You develop allergies.  MAKE SURE YOU:  · Understand these instructions.  · Will watch your condition.  · Will get help right away if you are not doing well or get worse.     This information is not intended to replace advice given to you by your health care provider. Make sure you discuss any questions you have with your health care provider.     Document Released: 09/12/2002 Document Revised: 05/03/2016 Document Reviewed: 05/22/2014  [employer] Interactive Patient Education ©2016 [employer]       Fall Prevention in Hospitals, Adult  As a hospital patient, your condition and the treatments you receive can increase your risk for falls. Some additional risk factors for falls in a hospital include:  · Being in an unfamiliar environment.  · Being on bed rest.  · Your surgery.  · Taking certain medicines.  · Your tubing requirements, such as intravenous (IV) therapy or catheters.  It is important that you learn how to decrease fall risks while at the hospital. Below are important tips that can help prevent falls.  SAFETY TIPS FOR PREVENTING FALLS  Talk about your risk of falling.  · Ask your health care provider why you are at risk for falling. Is it your medicine, illness, tubing placement, or something else?  · Make a plan with your health care provider to keep you safe from  falls.  · Ask your health care provider or pharmacist about side effects of your medicines. Some medicines can make you dizzy or affect your coordination.  Ask for help.  · Ask for help before getting out of bed. You may need to press your call button.  · Ask for assistance in getting safely to the toilet.  · Ask for a walker or cane to be put at your bedside. Ask that most of the side rails on your bed be placed up before your health care provider leaves the room.  · Ask family or friends to sit with you.  · Ask for things that are out of your reach, such as your glasses, hearing aids, telephone, bedside table, or call button.  Follow these tips to avoid falling:  · Stay lying or seated, rather than standing, while waiting for help.  · Wear rubber-soled slippers or shoes whenever you walk in the hospital.  · Avoid quick, sudden movements.  ¨ Change positions slowly.  ¨ Sit on the side of your bed before standing.  ¨ Stand up slowly and wait before you start to walk.  · Let your health care provider know if there is a spill on the floor.  · Pay careful attention to the medical equipment, electrical cords, and tubes around you.  · When you need help, use your call button by your bed or in the bathroom. Wait for one of your health care providers to help you.  · If you feel dizzy or unsure of your footing, return to bed and wait for assistance.  · Avoid being distracted by the TV, telephone, or another person in your room.  · Do not lean or support yourself on rolling objects, such as IV poles or bedside tables.     This information is not intended to replace advice given to you by your health care provider. Make sure you discuss any questions you have with your health care provider.     Document Released: 12/15/2001 Document Revised: 01/08/2016 Document Reviewed: 08/25/2013  [employer] Interactive Patient Education ©2016 [employer]       Surgical Site Infections FAQs  What is a Surgical Site Infection (SSI)?  A  surgical site infection is an infection that occurs after surgery in the part of the body where the surgery took place. Most patients who have surgery do not develop an infection. However, infections develop in about 1 to 3 out of every 100 patients who have surgery.  Some of the common symptoms of a surgical site infection are:  · Redness and pain around the area where you had surgery  · Drainage of cloudy fluid from your surgical wound  · Fever  Can SSIs be treated?  Yes. Most surgical site infections can be treated with antibiotics. The antibiotic given to you depends on the bacteria (germs) causing the infection. Sometimes patients with SSIs also need another surgery to treat the infection.  What are some of the things that hospitals are doing to prevent SSIs?  To prevent SSIs, doctors, nurses, and other healthcare providers:  · Clean their hands and arms up to their elbows with an antiseptic agent just before the surgery.  · Clean their hands with soap and water or an alcohol-based hand rub before and after caring for each patient.  · May remove some of your hair immediately before your surgery using electric clippers if the hair is in the same area where the procedure will occur. They should not shave you with a razor.  · Wear special hair covers, masks, gowns, and gloves during surgery to keep the surgery area clean.  · Give you antibiotics before your surgery starts. In most cases, you should get antibiotics within 60 minutes before the surgery starts and the antibiotics should be stopped within 24 hours after surgery.  · Clean the skin at the site of your surgery with a special soap that kills germs.  What can I do to help prevent SSIs?  Before your surgery:  · Tell your doctor about other medical problems you may have. Health problems such as allergies, diabetes, and obesity could affect your surgery and your treatment.  · Quit smoking. Patients who smoke get more infections. Talk to your doctor about how  you can quit before your surgery.  · Do not shave near where you will have surgery. Shaving with a razor can irritate your skin and make it easier to develop an infection.  At the time of your surgery:  · Speak up if someone tries to shave you with a razor before surgery. Ask why you need to be shaved and talk with your surgeon if you have any concerns.  · Ask if you will get antibiotics before surgery.  After your surgery:  · Make sure that your healthcare providers clean their hands before examining you, either with soap and water or an alcohol-based hand rub.  · If you do not see your providers clean their hands, please ask them to do so.  · Family and friends who visit you should not touch the surgical wound or dressings.  · Family and friends should clean their hands with soap and water or an alcohol-based hand rub before and after visiting you. If you do not see them clean their hands, ask them to clean their hands.  What do I need to do when I go home from the hospital?  · Before you go home, your doctor or nurse should explain everything you need to know about taking care of your wound. Make sure you understand how to care for your wound before you leave the hospital.  · Always clean your hands before and after caring for your wound.  · Before you go home, make sure you know who to contact if you have questions or problems after you get home.  · If you have any symptoms of an infection, such as redness and pain at the surgery site, drainage, or fever, call your doctor immediately.  If you have additional questions, please ask your doctor or nurse.  Developed and co-sponsored by The Society for Healthcare Epidemiology of [place] (SHEA); Infectious Diseases Society of [place] (IDSA); American Hospital Association; Association for Professionals in Infection Control and Epidemiology (APIC); Centers for Disease Control and Prevention (CDC); and The Joint Commission.     This information is not intended to replace  advice given to you by your health care provider. Make sure you discuss any questions you have with your health care provider.     Document Released: 12/23/2014 Document Revised: 01/08/2016 Document Reviewed: 03/02/2016  [employer] Interactive Patient Education ©2016 [employer]       [facility]  CHG 4% Patient Instruction Sheet    Preparing the Skin Before Surgery  Preparing or “prepping” skin before surgery can reduce the risk of infection at the surgical site. To make the process easier,[facility] has chosen 4% Chlorhexidine Gluconate (CHG) antiseptic solution.   The steps below outline the prepping process and should be carefully followed.                                                                                                                                                      Prep the skin at the following time(s):                                                      We recommend you shower the night before surgery, and again the morning of surgery with the 4% CHG antiseptic solution using half of the bottle and a cloth each time.  Dress in clean clothes/sleepwear after showering.  See instructions below for application.          Do not apply any lotions or moisturizers.       Do not shave the area to be prepped for at least 2 days prior to surgery.    Clipping the hair may be done immediately prior to your surgery at the hospital    if needed.    Directions:  Thoroughly rinse your body with water.  Apply 4% CHG to a cloth and wash skin gently, paying special attention to the operative site.  Rinse again thoroughly.  Once you have begun using this product do not apply anything else to your skin. If itching or redness persists, rinse affected areas and discontinue use.    When using this product:  • Keep out of eyes, ears, and mouth.  • If solution should contact these areas, rinse out promptly and thoroughly with water.  • For external use only.  • Do not use in genital area, on your face or  head.      PATIENT/FAMILY/RESPONSIBLE PARTY VERBALIZES UNDERSTANDING OF ABOVE EDUCATION.  COPY OF PAIN SCALE GIVEN AND REVIEWED WITH VERBALIZED UNDERSTANDING.

## 2018-10-26 ENCOUNTER — ANESTHESIA EVENT (OUTPATIENT)
Dept: PERIOP | Facility: HOSPITAL | Age: 28
End: 2018-10-26

## 2018-10-26 RX ORDER — SODIUM CHLORIDE 0.9 % (FLUSH) 0.9 %
3-10 SYRINGE (ML) INJECTION AS NEEDED
Status: DISCONTINUED | OUTPATIENT
Start: 2018-10-26 | End: 2018-10-29 | Stop reason: HOSPADM

## 2018-10-26 RX ORDER — SODIUM CHLORIDE 0.9 % (FLUSH) 0.9 %
3 SYRINGE (ML) INJECTION EVERY 12 HOURS SCHEDULED
Status: DISCONTINUED | OUTPATIENT
Start: 2018-10-26 | End: 2018-10-29 | Stop reason: HOSPADM

## 2018-10-26 RX ORDER — SODIUM CHLORIDE, SODIUM LACTATE, POTASSIUM CHLORIDE, CALCIUM CHLORIDE 600; 310; 30; 20 MG/100ML; MG/100ML; MG/100ML; MG/100ML
30 INJECTION, SOLUTION INTRAVENOUS CONTINUOUS
Status: DISCONTINUED | OUTPATIENT
Start: 2018-10-26 | End: 2018-10-29 | Stop reason: HOSPADM

## 2018-10-29 ENCOUNTER — HOSPITAL ENCOUNTER (OUTPATIENT)
Facility: HOSPITAL | Age: 28
Setting detail: HOSPITAL OUTPATIENT SURGERY
Discharge: HOME | End: 2018-10-29
Attending: OBSTETRICS & GYNECOLOGY | Admitting: OBSTETRICS & GYNECOLOGY

## 2018-10-29 ENCOUNTER — ANESTHESIA (OUTPATIENT)
Dept: PERIOP | Facility: HOSPITAL | Age: 28
End: 2018-10-29

## 2018-10-29 ENCOUNTER — NURSE TRIAGE (OUTPATIENT)
Dept: CALL CENTER | Facility: HOSPITAL | Age: 28
End: 2018-10-29

## 2018-10-29 VITALS
TEMPERATURE: 98.1 F | SYSTOLIC BLOOD PRESSURE: 106 MMHG | DIASTOLIC BLOOD PRESSURE: 74 MMHG | OXYGEN SATURATION: 100 % | HEART RATE: 88 BPM | RESPIRATION RATE: 18 BRPM

## 2018-10-29 DIAGNOSIS — D06.9 CIN III (CERVICAL INTRAEPITHELIAL NEOPLASIA GRADE III) WITH SEVERE DYSPLASIA: ICD-10-CM

## 2018-10-29 LAB
ABO GROUP BLD: NORMAL
B-HCG UR QL: NEGATIVE
BLD GP AB SCN SERPL QL: NEGATIVE
RH BLD: POSITIVE
T&S EXPIRATION DATE: NORMAL

## 2018-10-29 PROCEDURE — 25010000002 FENTANYL CITRATE (PF) 100 MCG/2ML SOLUTION: Performed by: ANESTHESIOLOGY

## 2018-10-29 PROCEDURE — 81025 URINE PREGNANCY TEST: CPT | Performed by: OBSTETRICS & GYNECOLOGY

## 2018-10-29 PROCEDURE — S2900 ROBOTIC SURGICAL SYSTEM: HCPCS | Performed by: OBSTETRICS & GYNECOLOGY

## 2018-10-29 PROCEDURE — 86900 BLOOD TYPING SEROLOGIC ABO: CPT | Performed by: OBSTETRICS & GYNECOLOGY

## 2018-10-29 PROCEDURE — 25010000002 DEXAMETHASONE PER 1 MG: Performed by: ANESTHESIOLOGY

## 2018-10-29 PROCEDURE — 86850 RBC ANTIBODY SCREEN: CPT | Performed by: OBSTETRICS & GYNECOLOGY

## 2018-10-29 PROCEDURE — 25010000002 ONDANSETRON PER 1 MG: Performed by: ANESTHESIOLOGY

## 2018-10-29 PROCEDURE — 25010000002 ONDANSETRON PER 1 MG: Performed by: NURSE ANESTHETIST, CERTIFIED REGISTERED

## 2018-10-29 PROCEDURE — 25010000002 NEOSTIGMINE PER 0.5 MG: Performed by: NURSE ANESTHETIST, CERTIFIED REGISTERED

## 2018-10-29 PROCEDURE — 25010000002 PROPOFOL 10 MG/ML EMULSION: Performed by: NURSE ANESTHETIST, CERTIFIED REGISTERED

## 2018-10-29 PROCEDURE — 86901 BLOOD TYPING SEROLOGIC RH(D): CPT | Performed by: OBSTETRICS & GYNECOLOGY

## 2018-10-29 PROCEDURE — 25010000002 DEXAMETHASONE PER 1 MG: Performed by: NURSE ANESTHETIST, CERTIFIED REGISTERED

## 2018-10-29 PROCEDURE — 88309 TISSUE EXAM BY PATHOLOGIST: CPT | Performed by: OBSTETRICS & GYNECOLOGY

## 2018-10-29 PROCEDURE — 58571 TLH W/T/O 250 G OR LESS: CPT | Performed by: OBSTETRICS & GYNECOLOGY

## 2018-10-29 PROCEDURE — 25010000002 MIDAZOLAM PER 1 MG: Performed by: ANESTHESIOLOGY

## 2018-10-29 PROCEDURE — 25010000002 HYDROMORPHONE PER 4 MG: Performed by: NURSE ANESTHETIST, CERTIFIED REGISTERED

## 2018-10-29 PROCEDURE — 25010000002 GENTAMICIN PER 80 MG: Performed by: OBSTETRICS & GYNECOLOGY

## 2018-10-29 PROCEDURE — 25010000002 GENTAMICIN PER 80 MG: Performed by: NURSE ANESTHETIST, CERTIFIED REGISTERED

## 2018-10-29 RX ORDER — PHENYLEPHRINE HCL IN 0.9% NACL 0.8MG/10ML
SYRINGE (ML) INTRAVENOUS AS NEEDED
Status: DISCONTINUED | OUTPATIENT
Start: 2018-10-29 | End: 2018-10-29 | Stop reason: SURG

## 2018-10-29 RX ORDER — CLINDAMYCIN PHOSPHATE 600 MG/50ML
600 INJECTION INTRAVENOUS
Status: COMPLETED | OUTPATIENT
Start: 2018-10-29 | End: 2018-10-29

## 2018-10-29 RX ORDER — LIDOCAINE HYDROCHLORIDE 40 MG/ML
SOLUTION TOPICAL AS NEEDED
Status: DISCONTINUED | OUTPATIENT
Start: 2018-10-29 | End: 2018-10-29 | Stop reason: SURG

## 2018-10-29 RX ORDER — ACETAMINOPHEN 500 MG
1000 TABLET ORAL ONCE
Status: COMPLETED | OUTPATIENT
Start: 2018-10-29 | End: 2018-10-29

## 2018-10-29 RX ORDER — METOCLOPRAMIDE HYDROCHLORIDE 5 MG/ML
5 INJECTION INTRAMUSCULAR; INTRAVENOUS
Status: DISCONTINUED | OUTPATIENT
Start: 2018-10-29 | End: 2018-10-29 | Stop reason: HOSPADM

## 2018-10-29 RX ORDER — PROPOFOL 10 MG/ML
VIAL (ML) INTRAVENOUS AS NEEDED
Status: DISCONTINUED | OUTPATIENT
Start: 2018-10-29 | End: 2018-10-29 | Stop reason: SURG

## 2018-10-29 RX ORDER — OXYCODONE HYDROCHLORIDE AND ACETAMINOPHEN 5; 325 MG/1; MG/1
1 TABLET ORAL EVERY 4 HOURS PRN
Status: DISCONTINUED | OUTPATIENT
Start: 2018-10-29 | End: 2018-10-29 | Stop reason: HOSPADM

## 2018-10-29 RX ORDER — OXYCODONE AND ACETAMINOPHEN 7.5; 325 MG/1; MG/1
2 TABLET ORAL ONCE AS NEEDED
Status: COMPLETED | OUTPATIENT
Start: 2018-10-29 | End: 2018-10-29

## 2018-10-29 RX ORDER — MAGNESIUM HYDROXIDE 1200 MG/15ML
LIQUID ORAL AS NEEDED
Status: DISCONTINUED | OUTPATIENT
Start: 2018-10-29 | End: 2018-10-29 | Stop reason: HOSPADM

## 2018-10-29 RX ORDER — IPRATROPIUM BROMIDE AND ALBUTEROL SULFATE 2.5; .5 MG/3ML; MG/3ML
3 SOLUTION RESPIRATORY (INHALATION) ONCE AS NEEDED
Status: DISCONTINUED | OUTPATIENT
Start: 2018-10-29 | End: 2018-10-29 | Stop reason: HOSPADM

## 2018-10-29 RX ORDER — ONDANSETRON 2 MG/ML
4 INJECTION INTRAMUSCULAR; INTRAVENOUS ONCE AS NEEDED
Status: COMPLETED | OUTPATIENT
Start: 2018-10-29 | End: 2018-10-29

## 2018-10-29 RX ORDER — MIDAZOLAM HYDROCHLORIDE 1 MG/ML
1 INJECTION INTRAMUSCULAR; INTRAVENOUS
Status: DISCONTINUED | OUTPATIENT
Start: 2018-10-29 | End: 2018-10-29 | Stop reason: HOSPADM

## 2018-10-29 RX ORDER — NALOXONE HCL 0.4 MG/ML
0.4 VIAL (ML) INJECTION AS NEEDED
Status: DISCONTINUED | OUTPATIENT
Start: 2018-10-29 | End: 2018-10-29 | Stop reason: HOSPADM

## 2018-10-29 RX ORDER — SODIUM CHLORIDE, SODIUM LACTATE, POTASSIUM CHLORIDE, CALCIUM CHLORIDE 600; 310; 30; 20 MG/100ML; MG/100ML; MG/100ML; MG/100ML
20 INJECTION, SOLUTION INTRAVENOUS CONTINUOUS
Status: DISCONTINUED | OUTPATIENT
Start: 2018-10-29 | End: 2018-10-29 | Stop reason: HOSPADM

## 2018-10-29 RX ORDER — DEXAMETHASONE SODIUM PHOSPHATE 4 MG/ML
4 INJECTION, SOLUTION INTRA-ARTICULAR; INTRALESIONAL; INTRAMUSCULAR; INTRAVENOUS; SOFT TISSUE ONCE AS NEEDED
Status: COMPLETED | OUTPATIENT
Start: 2018-10-29 | End: 2018-10-29

## 2018-10-29 RX ORDER — IBUPROFEN 600 MG/1
600 TABLET ORAL ONCE AS NEEDED
Status: DISCONTINUED | OUTPATIENT
Start: 2018-10-29 | End: 2018-10-29 | Stop reason: HOSPADM

## 2018-10-29 RX ORDER — SODIUM CHLORIDE, SODIUM LACTATE, POTASSIUM CHLORIDE, CALCIUM CHLORIDE 600; 310; 30; 20 MG/100ML; MG/100ML; MG/100ML; MG/100ML
1000 INJECTION, SOLUTION INTRAVENOUS CONTINUOUS
Status: DISCONTINUED | OUTPATIENT
Start: 2018-10-29 | End: 2018-10-29 | Stop reason: HOSPADM

## 2018-10-29 RX ORDER — SODIUM CHLORIDE, SODIUM LACTATE, POTASSIUM CHLORIDE, CALCIUM CHLORIDE 600; 310; 30; 20 MG/100ML; MG/100ML; MG/100ML; MG/100ML
100 INJECTION, SOLUTION INTRAVENOUS CONTINUOUS
Status: DISCONTINUED | OUTPATIENT
Start: 2018-10-29 | End: 2018-10-29 | Stop reason: HOSPADM

## 2018-10-29 RX ORDER — LIDOCAINE HYDROCHLORIDE 20 MG/ML
INJECTION, SOLUTION INFILTRATION; PERINEURAL AS NEEDED
Status: DISCONTINUED | OUTPATIENT
Start: 2018-10-29 | End: 2018-10-29 | Stop reason: SURG

## 2018-10-29 RX ORDER — MEPERIDINE HYDROCHLORIDE 25 MG/ML
12.5 INJECTION INTRAMUSCULAR; INTRAVENOUS; SUBCUTANEOUS
Status: DISCONTINUED | OUTPATIENT
Start: 2018-10-29 | End: 2018-10-29 | Stop reason: HOSPADM

## 2018-10-29 RX ORDER — SODIUM CHLORIDE 0.9 % (FLUSH) 0.9 %
3 SYRINGE (ML) INJECTION EVERY 12 HOURS SCHEDULED
Status: DISCONTINUED | OUTPATIENT
Start: 2018-10-29 | End: 2018-10-29 | Stop reason: HOSPADM

## 2018-10-29 RX ORDER — GENTAMICIN SULFATE 80 MG/100ML
INJECTION, SOLUTION INTRAVENOUS AS NEEDED
Status: DISCONTINUED | OUTPATIENT
Start: 2018-10-29 | End: 2018-10-29 | Stop reason: SURG

## 2018-10-29 RX ORDER — PHENAZOPYRIDINE HYDROCHLORIDE 200 MG/1
200 TABLET, FILM COATED ORAL ONCE
Status: COMPLETED | OUTPATIENT
Start: 2018-10-29 | End: 2018-10-29

## 2018-10-29 RX ORDER — SUFENTANIL CITRATE 50 UG/ML
INJECTION EPIDURAL; INTRAVENOUS AS NEEDED
Status: DISCONTINUED | OUTPATIENT
Start: 2018-10-29 | End: 2018-10-29 | Stop reason: SURG

## 2018-10-29 RX ORDER — ROCURONIUM BROMIDE 10 MG/ML
INJECTION, SOLUTION INTRAVENOUS AS NEEDED
Status: DISCONTINUED | OUTPATIENT
Start: 2018-10-29 | End: 2018-10-29 | Stop reason: SURG

## 2018-10-29 RX ORDER — GLYCOPYRROLATE 0.2 MG/ML
INJECTION INTRAMUSCULAR; INTRAVENOUS AS NEEDED
Status: DISCONTINUED | OUTPATIENT
Start: 2018-10-29 | End: 2018-10-29 | Stop reason: SURG

## 2018-10-29 RX ORDER — OXYCODONE HYDROCHLORIDE AND ACETAMINOPHEN 5; 325 MG/1; MG/1
1-2 TABLET ORAL EVERY 4 HOURS PRN
Qty: 40 TABLET | Refills: 0 | Status: SHIPPED | OUTPATIENT
Start: 2018-10-29 | End: 2018-11-21

## 2018-10-29 RX ORDER — OXYCODONE AND ACETAMINOPHEN 10; 325 MG/1; MG/1
1 TABLET ORAL ONCE AS NEEDED
Status: DISCONTINUED | OUTPATIENT
Start: 2018-10-29 | End: 2018-10-29 | Stop reason: HOSPADM

## 2018-10-29 RX ORDER — FENTANYL CITRATE 50 UG/ML
25 INJECTION, SOLUTION INTRAMUSCULAR; INTRAVENOUS AS NEEDED
Status: DISCONTINUED | OUTPATIENT
Start: 2018-10-29 | End: 2018-10-29 | Stop reason: HOSPADM

## 2018-10-29 RX ORDER — SODIUM CHLORIDE 0.9 % (FLUSH) 0.9 %
1-10 SYRINGE (ML) INJECTION AS NEEDED
Status: DISCONTINUED | OUTPATIENT
Start: 2018-10-29 | End: 2018-10-29 | Stop reason: HOSPADM

## 2018-10-29 RX ORDER — LABETALOL HYDROCHLORIDE 5 MG/ML
5 INJECTION, SOLUTION INTRAVENOUS
Status: DISCONTINUED | OUTPATIENT
Start: 2018-10-29 | End: 2018-10-29 | Stop reason: HOSPADM

## 2018-10-29 RX ORDER — DEXAMETHASONE SODIUM PHOSPHATE 4 MG/ML
INJECTION, SOLUTION INTRA-ARTICULAR; INTRALESIONAL; INTRAMUSCULAR; INTRAVENOUS; SOFT TISSUE AS NEEDED
Status: DISCONTINUED | OUTPATIENT
Start: 2018-10-29 | End: 2018-10-29 | Stop reason: SURG

## 2018-10-29 RX ORDER — ONDANSETRON 2 MG/ML
INJECTION INTRAMUSCULAR; INTRAVENOUS AS NEEDED
Status: DISCONTINUED | OUTPATIENT
Start: 2018-10-29 | End: 2018-10-29 | Stop reason: SURG

## 2018-10-29 RX ORDER — HYDROMORPHONE HCL 110MG/55ML
PATIENT CONTROLLED ANALGESIA SYRINGE INTRAVENOUS AS NEEDED
Status: DISCONTINUED | OUTPATIENT
Start: 2018-10-29 | End: 2018-10-29 | Stop reason: SURG

## 2018-10-29 RX ORDER — MIDAZOLAM HYDROCHLORIDE 1 MG/ML
2 INJECTION INTRAMUSCULAR; INTRAVENOUS
Status: DISCONTINUED | OUTPATIENT
Start: 2018-10-29 | End: 2018-10-29 | Stop reason: HOSPADM

## 2018-10-29 RX ORDER — SODIUM CHLORIDE 0.9 % (FLUSH) 0.9 %
3 SYRINGE (ML) INJECTION AS NEEDED
Status: DISCONTINUED | OUTPATIENT
Start: 2018-10-29 | End: 2018-10-29 | Stop reason: HOSPADM

## 2018-10-29 RX ADMIN — HYDROMORPHONE HYDROCHLORIDE 2 MG: 2 INJECTION, SOLUTION INTRAMUSCULAR; INTRAVENOUS; SUBCUTANEOUS at 11:34

## 2018-10-29 RX ADMIN — SODIUM CHLORIDE, POTASSIUM CHLORIDE, SODIUM LACTATE AND CALCIUM CHLORIDE 30 ML/HR: 600; 310; 30; 20 INJECTION, SOLUTION INTRAVENOUS at 09:06

## 2018-10-29 RX ADMIN — ACETAMINOPHEN 1000 MG: 500 TABLET, FILM COATED ORAL at 09:39

## 2018-10-29 RX ADMIN — GENTAMICIN SULFATE 230 MG: 40 INJECTION, SOLUTION INTRAMUSCULAR; INTRAVENOUS at 09:43

## 2018-10-29 RX ADMIN — PHENAZOPYRIDINE HYDROCHLORIDE 200 MG: 200 TABLET, FILM COATED ORAL at 09:39

## 2018-10-29 RX ADMIN — Medication 400 MCG: at 10:15

## 2018-10-29 RX ADMIN — DEXAMETHASONE SODIUM PHOSPHATE 4 MG: 4 INJECTION, SOLUTION INTRA-ARTICULAR; INTRALESIONAL; INTRAMUSCULAR; INTRAVENOUS; SOFT TISSUE at 09:40

## 2018-10-29 RX ADMIN — Medication 3 MG: at 11:25

## 2018-10-29 RX ADMIN — CLINDAMYCIN PHOSPHATE 600 MG: 12 INJECTION, SOLUTION INTRAVENOUS at 09:58

## 2018-10-29 RX ADMIN — ROCURONIUM BROMIDE 35 MG: 10 INJECTION INTRAVENOUS at 10:03

## 2018-10-29 RX ADMIN — GLYCOPYRROLATE 0.3 MG: 0.2 INJECTION, SOLUTION INTRAMUSCULAR; INTRAVENOUS at 11:25

## 2018-10-29 RX ADMIN — SUFENTANIL CITRATE 50 MCG: 50 INJECTION, SOLUTION EPIDURAL; INTRAVENOUS at 10:03

## 2018-10-29 RX ADMIN — SODIUM CHLORIDE, POTASSIUM CHLORIDE, SODIUM LACTATE AND CALCIUM CHLORIDE 1000 ML: 600; 310; 30; 20 INJECTION, SOLUTION INTRAVENOUS at 09:07

## 2018-10-29 RX ADMIN — OXYCODONE HYDROCHLORIDE AND ACETAMINOPHEN 2 TABLET: 7.5; 325 TABLET ORAL at 12:06

## 2018-10-29 RX ADMIN — SODIUM CHLORIDE, POTASSIUM CHLORIDE, SODIUM LACTATE AND CALCIUM CHLORIDE: 600; 310; 30; 20 INJECTION, SOLUTION INTRAVENOUS at 11:45

## 2018-10-29 RX ADMIN — ONDANSETRON HYDROCHLORIDE 4 MG: 2 SOLUTION INTRAMUSCULAR; INTRAVENOUS at 11:15

## 2018-10-29 RX ADMIN — FENTANYL CITRATE 100 MCG: 50 INJECTION INTRAMUSCULAR; INTRAVENOUS at 12:27

## 2018-10-29 RX ADMIN — Medication 400 MCG: at 10:18

## 2018-10-29 RX ADMIN — PROPOFOL 60 MG: 10 INJECTION, EMULSION INTRAVENOUS at 10:03

## 2018-10-29 RX ADMIN — MEPERIDINE HYDROCHLORIDE 25 MG: 25 INJECTION INTRAMUSCULAR; INTRAVENOUS; SUBCUTANEOUS at 12:18

## 2018-10-29 RX ADMIN — GENTAMICIN SULFATE 230 MG: 80 INJECTION, SOLUTION INTRAVENOUS at 09:58

## 2018-10-29 RX ADMIN — EPHEDRINE SULFATE 15 MG: 50 INJECTION INTRAMUSCULAR; INTRAVENOUS; SUBCUTANEOUS at 10:17

## 2018-10-29 RX ADMIN — MIDAZOLAM HYDROCHLORIDE 2 MG: 1 INJECTION, SOLUTION INTRAMUSCULAR; INTRAVENOUS at 09:40

## 2018-10-29 RX ADMIN — DEXAMETHASONE SODIUM PHOSPHATE 4 MG: 4 INJECTION, SOLUTION INTRAMUSCULAR; INTRAVENOUS at 11:15

## 2018-10-29 RX ADMIN — LIDOCAINE HYDROCHLORIDE 1 EACH: 40 SOLUTION TOPICAL at 10:03

## 2018-10-29 RX ADMIN — ONDANSETRON 4 MG: 2 SOLUTION INTRAMUSCULAR; INTRAVENOUS at 12:17

## 2018-10-29 RX ADMIN — LIDOCAINE HYDROCHLORIDE 100 MG: 20 INJECTION, SOLUTION INFILTRATION; PERINEURAL at 10:03

## 2018-10-29 NOTE — ANESTHESIA PREPROCEDURE EVALUATION
Anesthesia Evaluation     Patient summary reviewed   no history of anesthetic complications:  NPO Solid Status: > 8 hours  NPO Liquid Status: > 8 hours           Airway   Mallampati: I  TM distance: >3 FB  Neck ROM: full  Dental - normal exam     Pulmonary - normal exam    breath sounds clear to auscultation  (-) asthma, recent URI, sleep apnea, not a smoker  Cardiovascular - normal exam  Exercise tolerance: good (4-7 METS)    ECG reviewed  Rhythm: regular  Rate: normal    (-) pacemaker, hypertension, past MI, angina, cardiac stents, CABG      Neuro/Psych  (-) seizures, TIA, CVA  GI/Hepatic/Renal/Endo    (-) GERD, liver disease, no renal disease, diabetes, hypothyroidism, hyperthyroidism    Musculoskeletal     Abdominal    Substance History      OB/GYN          Other                        Anesthesia Plan    ASA 1     general     intravenous induction   Anesthetic plan, all risks, benefits, and alternatives have been provided, discussed and informed consent has been obtained with: patient.

## 2018-10-29 NOTE — TELEPHONE ENCOUNTER
Surgery today has gotten home and was going to lay down and rest.  She coughed up some grayish stuff.  Is this normal? Advised if she experiences difficulty breathing, swallowing or feels like throat is closes off to return to the ER.     Reason for Disposition  • Other post-op symptom or question    Additional Information  • Negative: Sounds like a life-threatening emergency to the triager  • Negative: Chest pain  • Negative: Difficulty breathing  • Negative: Surgical incision symptoms and questions  • Negative: [1] Discomfort (pain, burning or stinging) when passing urine AND [2] male  • Negative: [1] Discomfort (pain, burning or stinging) when passing urine AND [2] female  • Negative: Constipation  • Negative: New or worsening leg (calf, thigh) pain  • Negative: New or worsening leg swelling  • Negative: Dizziness is severe, or persists > 24 hours after surgery  • Negative: Pain, redness, swelling, or pus at IV Site  • Negative: Symptoms arising from use of a urinary catheter (Thomason or Coude)  • Negative: Cast problems or questions  • Negative: Medication question  • Negative: [1] Widespread rash AND [2] bright red, sunburn-like  • Negative: [1] SEVERE headache AND [2] after spinal (epidural) anesthesia  • Negative: [1] Vomiting AND [2] persists > 4 hours  • Negative: [1] Vomiting AND [2] abdomen looks much more swollen than usual  • Negative: [1] Drinking very little AND [2] dehydration suspected (e.g., no urine > 12 hours, very dry mouth, very lightheaded)  • Negative: Patient sounds very sick or weak to the triager  • Negative: Sounds like a serious complication to the triager  • Negative: Fever > 100.5 F (38.1 C)  • Negative: [1] SEVERE post-op pain (e.g., excruciating, pain scale 8-10) AND [2] not controlled with pain medications  • Negative: [1] Caller has URGENT question AND [2] triager unable to answer question  • Negative: [1] Headache AND [2] after spinal (epidural) anesthesia AND [3] not severe  •  "Negative: Fever present > 3 days (72 hours)  • Negative: [1] MILD-MODERATE post-op pain (e.g., pain scale 1-7) AND [2] not controlled with pain medications  • Negative: [1] Caller has NON-URGENT question AND [2] triager unable to answer question  • Negative: General activity, questions about  • Negative: Resuming driving, questions about  • Negative: Resuming sexual relations, questions about  • Negative: Getting the incision wet, questions about  • Negative: [1] Vomiting AND [2] present < 4 hours    Answer Assessment - Initial Assessment Questions  1. SYMPTOM: \"What's the main symptom you're concerned about?\" (e.g., pain, fever, vomiting)      Coughed up some gray stuff  2. ONSET: \"When did ________  start?\"      Just now  3. SURGERY: \"What surgery was performed?\"      Vaginal hysterectomy  4. DATE of SURGERY: \"When was surgery performed?\"       10/29  5. ANESTHESIA: \" What type of anesthesia did you have?\" (e.g., general, spinal, epidural, local)      general  6. PAIN: \"Is there any pain?\" If so, ask: \"How bad is it?\"  (Scale 1-10; or mild, moderate, severe)      no  7. FEVER: \"Do you have a fever?\" If so, ask: \"What is your temperature, how was it measured, and when did it start?\"      no  8. VOMITING: \"Is there any vomiting?\" If yes, ask: \"How many times?\"      no  9. BLEEDING: \"Is there any bleeding?\" If so, ask: \"How much?\" and \"Where?\"      no  10. OTHER SYMPTOMS: \"Do you have any other symptoms?\" (e.g., drainage from wound, painful urination, constipation)       no    Protocols used: POST-OP SYMPTOMS AND QUESTIONS-ADULT-      "

## 2018-10-29 NOTE — ANESTHESIA POSTPROCEDURE EVALUATION
Patient: Mirela Flores    Procedure Summary     Date:  10/29/18 Room / Location:  [facility] OR  /  [facility] OR    Anesthesia Start:  1001 Anesthesia Stop:  1148    Procedures:       TOTAL LAPAROSCOPIC HYSTERECTOMY BILATERAL SALINGECTOMY WITH DAVINCI SI ROBOT (N/A Abdomen)      CYSTOSCOPY (N/A Urethra) Diagnosis:       BRETT III (cervical intraepithelial neoplasia grade III) with severe dysplasia      (BRETT III (cervical intraepithelial neoplasia grade III) with severe dysplasia [D06.9])    Surgeon:  Karli Guerrero MD Provider:  Cornelio Daniel CRNA    Anesthesia Type:  general ASA Status:  1          Anesthesia Type: general  Last vitals  BP   119/64 (10/29/18 1310)   Temp   98.1 °F (36.7 °C) (10/29/18 1247)   Pulse   79 (10/29/18 1310)   Resp   18 (10/29/18 1310)     SpO2   100 % (10/29/18 1310)     Post Anesthesia Care and Evaluation    Patient location during evaluation: PACU  Patient participation: complete - patient participated  Level of consciousness: awake and alert  Pain management: adequate  Airway patency: patent  Anesthetic complications: No anesthetic complications  PONV Status: none  Cardiovascular status: acceptable and hemodynamically stable  Respiratory status: acceptable  Hydration status: acceptable    Comments: Blood pressure 119/64, pulse 79, temperature 98.1 °F (36.7 °C), temperature source Temporal Artery , resp. rate 18, last menstrual period 10/04/2018, SpO2 100 %, not currently breastfeeding.    Patient discharged from PACU based upon Tony score. Please see RN notes for further details

## 2018-10-29 NOTE — ANESTHESIA PROCEDURE NOTES
Airway  Urgency: elective    Date/Time: 10/29/2018 10:02 AM  Airway not difficult    General Information and Staff    Patient location during procedure: OR  CRNA: LADARIUS JORDAN    Indications and Patient Condition  Indications for airway management: airway protection    Preoxygenated: yes  MILS maintained throughout  Mask difficulty assessment: 1 - vent by mask    Final Airway Details  Final airway type: endotracheal airway      Successful airway: ETT  Cuffed: yes   Successful intubation technique: direct laryngoscopy  Endotracheal tube insertion site: oral  Blade: Delcid  Blade size: 2  ETT size: 7.5 mm  Cormack-Lehane Classification: grade IIa - partial view of glottis  Placement verified by: chest auscultation   Cuff volume (mL): 8  Measured from: lips  ETT to lips (cm): 22  Number of attempts at approach: 1

## 2018-10-31 LAB
CYTO UR: NORMAL
LAB AP CASE REPORT: NORMAL
PATH REPORT.FINAL DX SPEC: NORMAL
PATH REPORT.GROSS SPEC: NORMAL

## 2018-11-01 ENCOUNTER — TELEPHONE (OUTPATIENT)
Dept: OBSTETRICS AND GYNECOLOGY | Facility: CLINIC | Age: 28
End: 2018-11-01

## 2018-11-01 NOTE — TELEPHONE ENCOUNTER
PT called she has broke out in rash on abdominal area and pelvis area. Spoke with Dr Guerrero and pt may be having a reactions to the prep they did before surgery. Dr guerrero said she can take benadryl and do alternating benadryl cream and hydrocortisone cream to areas. Pt also asked about pain medication I told her she can start taking ibuprofen between pain medication for breakthrough pain. Pt was also advised to go to er if anything over the weekend.  Pt understood.

## 2018-11-19 DIAGNOSIS — G43.011 INTRACTABLE MIGRAINE WITHOUT AURA AND WITH STATUS MIGRAINOSUS: ICD-10-CM

## 2018-11-19 RX ORDER — SUMATRIPTAN 100 MG/1
100 TABLET, FILM COATED ORAL TAKE AS DIRECTED
Qty: 9 TABLET | Refills: 0 | Status: SHIPPED | OUTPATIENT
Start: 2018-11-19 | End: 2019-03-22 | Stop reason: SDUPTHER

## 2018-11-19 RX ORDER — PROCHLORPERAZINE MALEATE 10 MG
10 TABLET ORAL EVERY 6 HOURS PRN
Qty: 30 TABLET | Refills: 0 | Status: SHIPPED | OUTPATIENT
Start: 2018-11-19 | End: 2019-03-22 | Stop reason: SDUPTHER

## 2018-11-19 NOTE — TELEPHONE ENCOUNTER
Mirela said she missed her appointments because she was sick.  She has had surgery and said she will be here in March for her appointment.

## 2018-11-21 ENCOUNTER — OFFICE VISIT (OUTPATIENT)
Dept: OBSTETRICS AND GYNECOLOGY | Facility: CLINIC | Age: 28
End: 2018-11-21

## 2018-11-21 VITALS
DIASTOLIC BLOOD PRESSURE: 64 MMHG | SYSTOLIC BLOOD PRESSURE: 98 MMHG | BODY MASS INDEX: 19.31 KG/M2 | HEIGHT: 63 IN | WEIGHT: 109 LBS

## 2018-11-21 DIAGNOSIS — Z78.9 NONSMOKER: ICD-10-CM

## 2018-11-21 DIAGNOSIS — Z09 S/P GYNECOLOGICAL SURGERY, FOLLOW-UP EXAM: Primary | ICD-10-CM

## 2018-11-21 PROCEDURE — 99024 POSTOP FOLLOW-UP VISIT: CPT | Performed by: OBSTETRICS & GYNECOLOGY

## 2018-11-21 RX ORDER — SERTRALINE HYDROCHLORIDE 100 MG/1
150 TABLET, FILM COATED ORAL
COMMUNITY
Start: 2018-01-31 | End: 2019-01-31

## 2018-11-21 RX ORDER — FERROUS SULFATE 325(65) MG
325 TABLET ORAL
COMMUNITY
Start: 2017-11-27 | End: 2019-03-22

## 2018-11-21 NOTE — PROGRESS NOTES
"Subjective   Chief Complaint   Patient presents with   • Post-op     pt here today for 3 week post op davinci TLH bilateral salpingectomy. pt says that she has good days and bad. pt says that she is still having slight bleeding. pt says that she is extremely painful bowel movements. pt voices no other concerns.      Mirela Flores is a 28 y.o. year old  presenting to be seen for her post-operative visit.  She had a bilateral salpingectomy and Da Emmett TLH 3 weeks ago.  Currently she reports intermittent pain at incisions, but reports painful bowel movements to be her biggest concern.    No Additional Complaints Reported    The following portions of the patient's history were reviewed and updated as appropriate:current medications and allergies    Review of Systems      Objective   BP 98/64   Ht 160 cm (63\")   Wt 49.4 kg (109 lb)   LMP 10/04/2018   BMI 19.31 kg/m²     General:  well developed; well nourished  no acute distress   Abdomen: soft, non-tender; no masses  incision is healing   Pelvis: Clinical staff was present for exam.  Cuff well-approximated and non-tender          Assessment   1. Pt is 3 weeks s/p bilateral salpingectomy and Da Emmett TLH  2. Doing well except for constipation     Plan   1. Encouraged Miralax  2. RTO in 3 weeks    No orders of the defined types were placed in this encounter.         This note was electronically signed.    Karli Guerrero MD  2018  "

## 2018-11-30 NOTE — H&P
"Subjective   Chief Complaint   Patient presents with   • Postpartum Care     pt here today for 6 weeks postpartum visit. pt says that she did finally stop bleeding from her LEEP procedure. pt voices no other concerns.      Mirela Flores is a 27 y.o. year old  presenting to be seen for her postpartum visit.  She had a vaginal delivery.   Prenatal course was been benign.    Since delivery she has been sexually active, but it was not painful.  She does not have concerns about post-partum blues/depression.   She is breastfeeing.  Going well.    The following portions of the patient's history were reviewed and updated as appropriate:current medications and allergies    Smoking status: Never Smoker                                                              Smokeless status: Never Used                        Review of Systems   Respiratory: Negative for shortness of breath.    Cardiovascular: Negative for chest pain.   Gastrointestinal: Negative for abdominal pain.   Genitourinary: Negative for difficulty urinating, enuresis, pelvic pain, vaginal bleeding and vaginal discharge.         Objective   /62  Ht 64\" (162.6 cm)  Wt 119 lb (54 kg)  LMP 2017  BMI 20.43 kg/m2    General:  well developed; well nourished  no acute distress   Abdomen: soft, non-tender; no masses   Pelvis: Not performed.          Assessment   1. Normal 6 week postpartum exam  2. No depression or blues  3. BRETT III, s/p LEEP 5 weeks ago     Plan   1. BC options reviewed and compared today.  She did not do well with pills in the past and she previously had a Mirena and did not like it.  Desires a tubal ligation.  Discussed salpingectomy vs Filsche clips        Risks of laparoscopic surgery were reviewed to include, but are not limited to, the possibility of bowel perforation requiring possible bowel resection and/or colostomy, possible bladder injury or possible and possible vascular injury which could require the need for a blood " Discussion/Summary   Lab results are OK        Verified Results  COMP METABOLIC PANEL WITH CBCA, LIPID PANEL AND TSH (CMP,CBCA,LIPFA,TSH) 03Oct2018 04:10PM [facility]     Test Name Result Flag Reference   WHITE BLOOD COUNT 7.7 K/mcL  4.2-11.0   RED CELL COUNT 4.73 mil/mcL  4.00-5.20   HEMOGLOBIN 13.3 g/dl  12.0-15.5   HEMATOCRIT 41.9 %  36.0-46.5   MEAN CORPUSCULAR VOLUME 88.6 fL  78.0-100.0   MEAN CORPUSCULAR HEMOGLOBIN 28.1 pg  26.0-34.0   MEAN CORPUSCULAR HGB CONC 31.7 g/dl L 32.0-36.5   RDW-CV 13.3 %  11.0-15.0   PLATELET COUNT 379 K/mcL  140-450   LILLI% 53 %     LYM% 37 %     MON% 6 %     EOS% 3 %     BASO% 1 %     LILLI ABS 4.1 K/mcL  1.8-7.7   LYM ABS 2.8 K/mcL  1.0-4.0   MON ABS 0.5 K/mcL  0.3-0.9   EOS ABS 0.2 K/mcL  0.1-0.5   BASO ABS 0.1 K/mcL  0.0-0.3   SODIUM 141 mmol/L  135-145   POTASSIUM 4.3 mmol/L  3.4-5.1   CHLORIDE 104 mmol/L     CARBON DIOXIDE 28 mmol/L  21-32   ANION GAP 13 mmol/L  10-20   GLUCOSE 84 mg/dl  65-99   BUN 10 mg/dl  6-20   CREATININE 1.00 mg/dl H 0.51-0.95   GFR EST.AFRICAN AMER 73     eGFR 60 - 89 mL/min/1.73m2 = Mild decrease in kidney function.   GFR EST.NONAFRI AMER 63     eGFR 60 - 89 mL/min/1.73m2 = Mild decrease in kidney function.   BUN/CREATININE RATIO 10  7-25   BILIRUBIN TOTAL 0.5 mg/dl  0.2-1.0   GOT/AST 15 Units/L  <38   ALKALINE PHOSPHATASE 139 Units/L H    ALBUMIN 3.9 g/dl  3.6-5.1   TOTAL PROTEIN 7.6 g/dl  6.4-8.2   GLOBULIN (CALCULATED) 3.7 g/dl  2.0-4.0   A/G RATIO 1.1  1.0-2.4   CALCIUM 9.8 mg/dl  8.4-10.2   GPT/ALT 18 Units/L  <79   FASTING STATUS UNKNOWN hrs     CHOLESTEROL 235 mg/dl H <200   Desirable            <200  Borderline High      200 to 239  High                 >=240   HDL CHOLESTEROL 51 mg/dl  >49   Low            <40  Borderline Low 40 to 49  Near Optimal   50 to 59  Optimal        >=60   TRIGLYCERIDES 102 mg/dl  <150   Normal                   <150  Borderline High          150 to 199  High                     200 to 499  Very High                 >=500   LDL CHOLESTEROL (CALCULATED) 164 mg/dl H <130   OPTIMAL               <100  NEAR OPTIMAL          100-129  BORDERLINE HIGH       130-159  HIGH                  160-189  VERY HIGH             >=190   NON-HDL CHOLESTEROL 184 mg/dl     Therapeutic Target:  CHD and risk equivalents <130  Multiple risk factors    <160  0 to 1 risk factors      <190   CHOLESTEROL/HDL RATIO 4.6 H <4.5   TSH 2.616 mcUnits/mL  0.350-5.000   DIFF TYPE      AUTOMATED DIFFERENTIAL   FASTING STATUS UNKNOWN hrs     NRBC 0 /100 WBC  0   PERCENT IMMATURE GRANULOCYTES 0 %     ABSOLUTE IMMATURE GRANULOCYTES 0.0 K/mcL  0-0.2     HEMOGLOBIN A1C GLYCOSYLATED 03Oct2018 04:10PM [facility]     Test Name Result Flag Reference   HEMOGLOBIN A1C GLYH 6.5 % H 4.5-5.6   ----DIABETIC SCREENING---  NON DIABETIC                 <5.7%  INCREASED RISK                5.7-6.4%  DIAGNOSTIC FOR DIABETES      >6.4%     ----DIABETIC CONTROL---     A1C%           eAG mg/dL  6.0            126  6.5            140  7.0            154  7.5            169  8.0            183  8.5            197  9.0            212  9.5            226  10.0           240     T4 03Oct2018 04:10PM [facility]     Test Name Result Flag Reference   T4 10.6 mcg/dl  4.7-13.3     T3, FREE 03Oct2018 04:10PM [facility]     Test Name Result Flag Reference   FREE T3 3.0 pg/ml  2.2-4.0     URINALYSIS SCREEN with MICROSCOPIC IF INDICATED AND URINE CULTURE REFLEX 03Oct2018 04:10PM [facility]     Test Name Result Flag Reference   URINE COLOR YELLOW  YELLOW   APPEARANCE, URINE HAZY     URINE GLUCOSE NEGATIVE mg/dl  NEGATIVE   URINE BILIRUBIN NEGATIVE  NEGATIVE   KETONES NEGATIVE mg/dl  NEGATIVE   URINE SPECIFIC GRAVITY 1.017  1.005-1.030   RBC-URINE NEGATIVE  NEGATIVE   PH-URINE 5.0 Units  5.0-7.0   URINE PROTEIN NEGATIVE mg/dl  NEGATIVE   UROBILINOGEN-URINE 0.2 mg/dl  0.0-1.0   NITRITE NEGATIVE  NEGATIVE   WBC-URINE MODERATE A NEGATIVE   Culture indicated, results to  transfusion.  Possible need for conversion to a laparotomy was also discussed.  The patient's questions were answered to her satisfaction.  Post-op restrictions and typical post-op course were also reviewed.     2. Repeat PAP in 5 months.    No orders of the defined types were placed in this encounter.         This note was electronically signed.    Karli Guerrero MD  August 11, 2017       follow.   SPECIMEN TYPE      URINE, CLEAN CATCH/MIDSTREAM   SQUAMOUS EPITHELIAL CELLS 1 to 5 /HPF  0-5   ERYTHROCYTES 1 to 3 /HPF  0-3   LEUKOCYTES 6 to 10 /HPF H 0-5   BACTERIA FEW /HPF A NONE SEEN   HYALINE CASTS NONE SEEN /LPF  0-5   MUCUS PRESENT       VITAMIN D,25 HYDROXY 03Oct2018 04:10PM [facility]     Test Name Result Flag Reference   VIT D,25 HYDROXY 17.0 ng/ml L 30.0-100.0   <20  ng/mL=Vitamin D deficiency  20-29  ng/mL=Vitamin D insufficiency   ng/mL=Optimal Vitamin D  >150 ng/mL=Possible toxicity

## 2019-02-22 NOTE — PROGRESS NOTES
I have called multiple times to Ms. Enriquez.  She needed to reschedule her appointment.  I put her on this afternoon, then I was given a message that she would like to schedule another day.  I left her two messages today to call me back at my nurse station (I left her the number) and let me know which day she wants.     YOB: 1990  Location: [facility]  Location Address: [address] [place] [address], [facility], [address] [place] [address]  Location Phone: [phone]    Chief Complaint   Patient presents with   • Thyroid Problem       History of Present Illness  Mirela Flores is a 27 y.o. female.  Mirela Flores is here for follow up of ENT complaints. The patient has had problems with a thyroid nodule  The symptoms are localized to the right side. The patient has had moderate symptoms. The symptoms have been present for the last several months The symptoms are aggravated by  no identifiable factors. The symptoms are improved by no identifiable factors.    Patient reports having an FNA in the past several years ago. Results were benign.    Study Result   US THYROID- 2017 11:24 AM EST      REASON FOR EXAM: thyroid nodule; G43.819-Other migraine, intractable,  without status migrainosus        COMPARISON: NONE.       TECHNIQUE: Multiple longitudinal and transverse real-time sonographic  images of the thyroid are obtained.       FINDINGS:   The right lobe of the thyroid measures 5.2 x 1.6 x 1.7 cm. The left lobe  of the thyroid measures 4.1 x 1.3 x 1.4 cm. The thyroid isthmus measures  0.3 cm in thickness.       The thyroid is homogeneous in echogenicity. A 1.5 x 1.4 x 1.3 cm rounded  solid heterogeneously isoechoic nodule is identified in the midportion  of the right lobe. This is in a similar location to the previously  biopsied cystic solid nodule. Color Doppler demonstrates appropriate  flow.       IMPRESSION:  1. 1.5 cm solid right thyroid nodule. This is in a similar location to  the previously biopsied cystic solid nodule and may reflect the same  lesion, although the cystic component of the nodule is no longer  identified and the solid component has enlarged up to 1.5 cm. This  nodule would be amenable to ultrasound-guided fine-needle aspiration if  clinically warranted.  This report was finalized on 2017 13:30  by Dr Jordi Guerrero, .         TSH   Order: [id number]   Status:  Final result   Visible to patient:  No (Not Released)      Ref Range & Units 13d ago     TSH 0.470 - 4.680 mIU/mL 1.100   Resulting Agency  [facility]      Specimen Collected: 11/23/17  5:15 AM   Last Resulted: 11/23/17  2:00 PM              Study Result          EXAMINATION:   -OU THYROID OR NECK Jan 7, 2014 3:02:00 PM        HISTORY: Thyroid nodule.      Multiple longitudinal and transverse images through the thyroid are  obtained. The right lobe overall measures approximately 14 x 20 x 56 mm.  There is a dominant nodule lower pole of the right lobe measuring 11 x  30 x 15 mm in size. It does contain a solid mural nodule. The left lobe  of the thyroid overall is normal in size measuring approximately 11 x 15  x 43 mm in size. The isthmus is normal at 3 mm.      IMPRESSION-   The patient has a complex predominately cystic nodule involving the  lower pole of the right lobe of the thyroid 15 mm in diameter. It  contains a solid mural nodule. Based upon the patient's age and imaging  features likelihood of a malignancy is low. Followup exam in 6 months  could be considered. Biopsy could also be performed if clinical features  warrant.           kobi NIX       Reading Radiologist-  Stripe       Releasing Radiologist-  Stripe       Released Date Time- 01/08/14 0809   ------------------------------------------------------------------------------          ADDENDUM:  The dominant nodule described above in the lower pole of the right lobe  measures 11 x 13 x 15 mm in size not 11 x 30 x 15 mm.        Addendum Tuyet CHANCE       Addendum Reading Radiologist-  Blippy Social CommerceINES       Addendum Releasing Radiologist-  Blippy Social CommerceINES       Addendum Released Date Time- 01/08/14 0812   ------------------------------------------------------------------------------          ADDENDUM:  The dominant nodule described above in the lower pole of the  right lobe  measures 11 x 13 x 15 mm in size.        Addendum - DAVIN CHANCE       Addendum Reading Radiologist-  MIGUEL       Addendum Releasing Radiologist-  MIGUEL       Addendum Released Date Time- 01/08/14 0883        Past Medical History:   Diagnosis Date   • Anemia affecting pregnancy    • Asthma     as a child   • History of transfusion    • Migraines    • Mono exposure    • S/P thyroid biopsy        Past Surgical History:   Procedure Laterality Date   • CERVICAL CONIZATION Bilateral 7/3/2017    Procedure: CERVICAL CONIZATION, LOOP ELECTROCAUTERY EXCISION PROCEDURE;  Surgeon: Karli Guerrero MD;  Location: [facility] OR;  Service:    • CHOLECYSTECTOMY     • COLONOSCOPY  02/16/2011    normal   • D&C WITH SUCTION     • OVARIAN CYST SURGERY Right    • SALPINGECTOMY Bilateral 8/28/2017    Procedure: laparoscopic salpingectomy;  Surgeon: Karli Guerrero MD;  Location: [facility] OR;  Service:    • TYMPANOSTOMY TUBE PLACEMENT           Current Outpatient Prescriptions:   •  amitriptyline (ELAVIL) 25 MG tablet, Take 2 tablets by mouth Every Night., Disp: 60 tablet, Rfl: 2  •  busPIRone (BUSPAR) 15 MG tablet, Take 7.5 mg by mouth 2 (Two) Times a Day As Needed., Disp: , Rfl:   •  Cholecalciferol 1000 units capsule, Take 1,000 Units by mouth Daily., Disp: 90 capsule, Rfl: 0  •  cyclobenzaprine (FLEXERIL) 10 MG tablet, 1 tablet 3 times a day for up to 2 days.  Then 1 tablet 3 times a day when necessary for neck pain, Disp: 9 tablet, Rfl: 0  •  divalproex (DEPAKOTE ER) 250 MG 24 hr tablet, Take 3 tablets by mouth Daily., Disp: 30 tablet, Rfl: 2  •  ferrous sulfate 325 (65 FE) MG tablet, Take 1 tablet by mouth 2 (Two) Times a Day for 90 days., Disp: 60 tablet, Rfl: 2  •  gabapentin (NEURONTIN) 300 MG capsule, Take 1 capsule by mouth Every 8 (Eight) Hours., Disp: 90 capsule, Rfl: 1  •  indomethacin (INDOCIN) 25 MG capsule, Take 2 capsules by mouth 3 (Three) Times a Day As Needed for Moderate Pain ., Disp: 60  capsule, Rfl: 2  •  lisdexamfetamine (VYVANSE) 40 MG capsule, Take 40 mg by mouth Every Morning., Disp: , Rfl:   •  predniSONE (DELTASONE) 10 MG tablet, 50 mg for 3 days, 40 mg for 3 days, 30 mg for 3 days, 20 mg for 3 days, 10 mg for 3 days, then D/C, Disp: 45 tablet, Rfl: 0  •  prochlorperazine (COMPAZINE) 10 MG tablet, Take 1 tablet by mouth Every 6 (Six) Hours As Needed for Nausea or Vomiting., Disp: 30 tablet, Rfl: 2  •  sertraline (ZOLOFT) 100 MG tablet, TAKE 1 TABLET BY MOUTH DAILY, Disp: , Rfl: 5  •  SUMAtriptan (IMITREX) 100 MG tablet, 1 tab at onset of headache.  May repeat after 2 hours to a maximum of 2 tablets in 24 hours, Disp: 9 tablet, Rfl: 1    Cephalosporins; Penicillins; Topamax [topiramate]; Adhesive tape; and Tape    Family History   Problem Relation Age of Onset   • Other Father    • Heart murmur Mother    • No Known Problems Paternal Grandfather    • No Known Problems Paternal Grandmother    • Thyroid disease Maternal Grandmother    • Hypertension Maternal Grandmother    • No Known Problems Maternal Grandfather    • Breast cancer Other 60   • Ovarian cancer Neg Hx        Social History     Social History   • Marital status:      Spouse name: N/A   • Number of children: N/A   • Years of education: N/A     Occupational History   • Not on file.     Social History Main Topics   • Smoking status: Never Smoker   • Smokeless tobacco: Never Used   • Alcohol use No   • Drug use: No   • Sexual activity: Yes     Partners: Male     Birth control/ protection: None     Other Topics Concern   • Not on file     Social History Narrative       Review of Systems   Constitutional: Negative for activity change, appetite change, chills, diaphoresis, fatigue, fever and unexpected weight change.   HENT: Negative for congestion, dental problem, drooling, ear discharge, ear pain, facial swelling, hearing loss, mouth sores, nosebleeds, postnasal drip, rhinorrhea, sinus pressure, sneezing, sore throat, tinnitus,  trouble swallowing and voice change.         Thyroid nodule   Eyes: Negative.    Respiratory: Negative.    Cardiovascular: Negative.    Gastrointestinal: Negative.    Endocrine: Negative.    Skin: Negative.    Allergic/Immunologic: Negative for environmental allergies, food allergies and immunocompromised state.   Neurological: Negative.    Hematological: Negative.    Psychiatric/Behavioral: Negative.        Vitals:    12/06/17 1539   BP: 118/80   Temp: 97.2 °F (36.2 °C)       Objective     Physical Exam  CONSTITUTIONAL: well nourished, alert, oriented, in no acute distress     COMMUNICATION AND VOICE: able to communicate normally, normal voice quality    HEAD: normocephalic, no lesions, atraumatic, no tenderness, no masses     FACE: appearance normal, no lesions, no tenderness, no deformities, facial motion symmetric    SALIVARY GLANDS: parotid glands with no tenderness, no swelling, no masses, submandibular glands with normal size, nontender    EYES: ocular motility normal, eyelids normal, orbits normal, no proptosis, conjunctiva normal , pupils equal, round     EARS:  Hearing: response to conversational voice normal bilaterally   External Ears: auricles without lesions  Otoscopic: tympanic membrane appearance normal, no lesions, no perforation, normal mobility, no fluid    NOSE:  External Nose: structure normal, no tenderness on palpation, no nasal discharge, no lesions, no evidence of trauma, nostrils patent   Intranasal Exam: nasal mucosa normal, vestibule within normal limits, inferior turbinate normal, nasal septum midline   Nasopharynx:     ORAL:  Lips: upper and lower lips without lesion   Teeth: dentition within normal limits for age   Gums: gingivae healthy   Oral Mucosa: oral mucosa normal, no mucosal lesions   Floor of Mouth: Warthin’s duct patent, mucosa normal  Tongue: lingual mucosa normal without lesions, normal tongue mobility   Palate: soft and hard palates with normal mucosa and  structure  Oropharynx: oropharyngeal mucosa normal    NECK: neck appearance normal, no mass,  noted without erythema or tenderness    THYROID: no overt thyromegaly, no tenderness, right 1.5 cm nodule palpable, position midline     LYMPH NODES: no lymphadenopathy    CHEST/RESPIRATORY: respiratory effort normal, normal breath sounds     CARDIOVASCULAR: rate and rhythm normal, extremities without cyanosis or edema      NEUROLOGIC/PSYCHIATRIC: oriented to time, place and person, mood normal, affect appropriate, CN II-XII intact grossly    Assessment/Plan   Problems Addressed this Visit        Endocrine    Thyroid nodule - Primary    Relevant Orders    US Guided Thyroid Biopsy        * Surgery not found *  Orders Placed This Encounter   Procedures   • US Guided Thyroid Biopsy     Right thyroid nodule      Standing Status:   Future     Standing Expiration Date:   12/6/2018     Order Specific Question:   Are labs needed on on this specimen?     Answer:   Yes     Order Specific Question:   Which labs are required?     Answer:   Cytology for Malignant Cells (LAB13)     Order Specific Question:   Reason for Exam:     Answer:   Thyroid nodule     Return in about 6 weeks (around 1/17/2018) for Recheck thyroid with Dr. Espinoza after FNA.       Patient Instructions   Will get FNA of thyroid nodule with follow-up after.

## 2019-03-22 ENCOUNTER — OFFICE VISIT (OUTPATIENT)
Dept: NEUROLOGY | Facility: CLINIC | Age: 29
End: 2019-03-22

## 2019-03-22 VITALS
HEIGHT: 63 IN | HEART RATE: 82 BPM | SYSTOLIC BLOOD PRESSURE: 108 MMHG | DIASTOLIC BLOOD PRESSURE: 76 MMHG | BODY MASS INDEX: 19.84 KG/M2 | WEIGHT: 112 LBS

## 2019-03-22 DIAGNOSIS — G43.011 INTRACTABLE MIGRAINE WITHOUT AURA AND WITH STATUS MIGRAINOSUS: Primary | ICD-10-CM

## 2019-03-22 PROCEDURE — 99214 OFFICE O/P EST MOD 30 MIN: CPT | Performed by: PHYSICIAN ASSISTANT

## 2019-03-22 RX ORDER — INDOMETHACIN 25 MG/1
25 CAPSULE ORAL 3 TIMES DAILY PRN
Qty: 60 CAPSULE | Refills: 1 | Status: SHIPPED | OUTPATIENT
Start: 2019-03-22 | End: 2022-07-11

## 2019-03-22 RX ORDER — PROCHLORPERAZINE MALEATE 10 MG
10 TABLET ORAL EVERY 6 HOURS PRN
Qty: 30 TABLET | Refills: 0 | Status: SHIPPED | OUTPATIENT
Start: 2019-03-22 | End: 2022-12-05

## 2019-03-22 RX ORDER — GALCANEZUMAB 120 MG/ML
120 INJECTION, SOLUTION SUBCUTANEOUS
Qty: 1 ML | Refills: 3 | Status: SHIPPED | OUTPATIENT
Start: 2019-03-22 | End: 2020-03-31 | Stop reason: SDUPTHER

## 2019-03-22 RX ORDER — SUMATRIPTAN 100 MG/1
100 TABLET, FILM COATED ORAL TAKE AS DIRECTED
Qty: 9 TABLET | Refills: 0 | Status: SHIPPED | OUTPATIENT
Start: 2019-03-22

## 2019-03-23 ENCOUNTER — HOSPITAL ENCOUNTER (EMERGENCY)
Facility: HOSPITAL | Age: 29
Discharge: HOME | End: 2019-03-23
Admitting: EMERGENCY MEDICINE

## 2019-03-23 VITALS
HEART RATE: 90 BPM | OXYGEN SATURATION: 100 % | TEMPERATURE: 97.9 F | SYSTOLIC BLOOD PRESSURE: 125 MMHG | DIASTOLIC BLOOD PRESSURE: 81 MMHG | BODY MASS INDEX: 19.12 KG/M2 | RESPIRATION RATE: 14 BRPM | WEIGHT: 112 LBS | HEIGHT: 64 IN

## 2019-03-23 DIAGNOSIS — I73.00 RAYNAUD'S DISEASE WITHOUT GANGRENE: Primary | ICD-10-CM

## 2019-03-23 PROCEDURE — 99283 EMERGENCY DEPT VISIT LOW MDM: CPT

## 2019-03-23 RX ORDER — AMLODIPINE BESYLATE 2.5 MG/1
2.5 TABLET ORAL DAILY
Qty: 14 TABLET | Refills: 0 | Status: SHIPPED | OUTPATIENT
Start: 2019-03-23 | End: 2019-04-06

## 2019-04-02 DIAGNOSIS — D50.9 IRON DEFICIENCY ANEMIA, UNSPECIFIED IRON DEFICIENCY ANEMIA TYPE: Primary | ICD-10-CM

## 2019-04-03 ENCOUNTER — APPOINTMENT (OUTPATIENT)
Dept: LAB | Facility: HOSPITAL | Age: 29
End: 2019-04-03

## 2019-04-18 DIAGNOSIS — K62.5 RECTAL BLEEDING: Primary | ICD-10-CM

## 2019-05-16 ENCOUNTER — APPOINTMENT (OUTPATIENT)
Dept: GENERAL RADIOLOGY | Facility: HOSPITAL | Age: 29
End: 2019-05-16

## 2019-05-16 ENCOUNTER — HOSPITAL ENCOUNTER (EMERGENCY)
Facility: HOSPITAL | Age: 29
Discharge: HOME | End: 2019-05-16
Admitting: EMERGENCY MEDICINE

## 2019-05-16 VITALS
WEIGHT: 112.2 LBS | HEIGHT: 64 IN | DIASTOLIC BLOOD PRESSURE: 76 MMHG | HEART RATE: 85 BPM | RESPIRATION RATE: 16 BRPM | SYSTOLIC BLOOD PRESSURE: 122 MMHG | TEMPERATURE: 98.7 F | OXYGEN SATURATION: 100 % | BODY MASS INDEX: 19.15 KG/M2

## 2019-05-16 DIAGNOSIS — K59.00 CONSTIPATION, UNSPECIFIED CONSTIPATION TYPE: Primary | ICD-10-CM

## 2019-05-16 LAB
ALBUMIN SERPL-MCNC: 4.1 G/DL (ref 3.5–5)
ALBUMIN/GLOB SERPL: 1.6 G/DL (ref 1.1–2.5)
ALP SERPL-CCNC: 45 U/L (ref 24–120)
ALT SERPL W P-5'-P-CCNC: <15 U/L (ref 0–54)
ANION GAP SERPL CALCULATED.3IONS-SCNC: 8 MMOL/L (ref 4–13)
AST SERPL-CCNC: 21 U/L (ref 7–45)
BACTERIA UR QL AUTO: ABNORMAL /HPF
BASOPHILS # BLD AUTO: 0.06 10*3/MM3 (ref 0–0.2)
BASOPHILS NFR BLD AUTO: 0.5 % (ref 0–2)
BILIRUB SERPL-MCNC: 0.5 MG/DL (ref 0.1–1)
BILIRUB UR QL STRIP: NEGATIVE
BUN BLD-MCNC: 9 MG/DL (ref 5–21)
BUN/CREAT SERPL: 17.3 (ref 7–25)
CALCIUM SPEC-SCNC: 8.7 MG/DL (ref 8.4–10.4)
CHLORIDE SERPL-SCNC: 102 MMOL/L (ref 98–110)
CLARITY UR: CLEAR
CO2 SERPL-SCNC: 29 MMOL/L (ref 24–31)
COLOR UR: YELLOW
CREAT BLD-MCNC: 0.52 MG/DL (ref 0.5–1.4)
DEPRECATED RDW RBC AUTO: 41.1 FL (ref 40–54)
EOSINOPHIL # BLD AUTO: 0.05 10*3/MM3 (ref 0–0.7)
EOSINOPHIL NFR BLD AUTO: 0.4 % (ref 0–4)
ERYTHROCYTE [DISTWIDTH] IN BLOOD BY AUTOMATED COUNT: 11.9 % (ref 12–15)
GFR SERPL CREATININE-BSD FRML MDRD: 140 ML/MIN/1.73
GLOBULIN UR ELPH-MCNC: 2.6 GM/DL
GLUCOSE BLD-MCNC: 80 MG/DL (ref 70–100)
GLUCOSE UR STRIP-MCNC: NEGATIVE MG/DL
HCT VFR BLD AUTO: 33.9 % (ref 37–47)
HGB BLD-MCNC: 11.7 G/DL (ref 12–16)
HGB UR QL STRIP.AUTO: ABNORMAL
IMM GRANULOCYTES # BLD AUTO: 0.06 10*3/MM3 (ref 0–0.05)
IMM GRANULOCYTES NFR BLD AUTO: 0.5 % (ref 0–5)
KETONES UR QL STRIP: NEGATIVE
LEUKOCYTE ESTERASE UR QL STRIP.AUTO: ABNORMAL
LIPASE SERPL-CCNC: 106 U/L (ref 23–203)
LYMPHOCYTES # BLD AUTO: 2.54 10*3/MM3 (ref 0.72–4.86)
LYMPHOCYTES NFR BLD AUTO: 21.9 % (ref 15–45)
MCH RBC QN AUTO: 32.1 PG (ref 28–32)
MCHC RBC AUTO-ENTMCNC: 34.5 G/DL (ref 33–36)
MCV RBC AUTO: 93.1 FL (ref 82–98)
MONOCYTES # BLD AUTO: 0.5 10*3/MM3 (ref 0.19–1.3)
MONOCYTES NFR BLD AUTO: 4.3 % (ref 4–12)
NEUTROPHILS # BLD AUTO: 8.37 10*3/MM3 (ref 1.87–8.4)
NEUTROPHILS NFR BLD AUTO: 72.4 % (ref 39–78)
NITRITE UR QL STRIP: NEGATIVE
NRBC BLD AUTO-RTO: 0 /100 WBC (ref 0–0.2)
PH UR STRIP.AUTO: 7 [PH] (ref 5–8)
PLATELET # BLD AUTO: 269 10*3/MM3 (ref 130–400)
PMV BLD AUTO: 10.7 FL (ref 6–12)
POTASSIUM BLD-SCNC: 3.4 MMOL/L (ref 3.5–5.3)
PROT SERPL-MCNC: 6.7 G/DL (ref 6.3–8.7)
PROT UR QL STRIP: NEGATIVE
RBC # BLD AUTO: 3.64 10*6/MM3 (ref 4.2–5.4)
RBC # UR: ABNORMAL /HPF
REF LAB TEST METHOD: ABNORMAL
SODIUM BLD-SCNC: 139 MMOL/L (ref 135–145)
SP GR UR STRIP: <=1.005 (ref 1–1.03)
SQUAMOUS #/AREA URNS HPF: ABNORMAL /HPF
UROBILINOGEN UR QL STRIP: ABNORMAL
WBC NRBC COR # BLD: 11.58 10*3/MM3 (ref 4.8–10.8)
WBC UR QL AUTO: ABNORMAL /HPF

## 2019-05-16 PROCEDURE — 74018 RADEX ABDOMEN 1 VIEW: CPT

## 2019-05-16 PROCEDURE — 87086 URINE CULTURE/COLONY COUNT: CPT | Performed by: PHYSICIAN ASSISTANT

## 2019-05-16 PROCEDURE — 85025 COMPLETE CBC W/AUTO DIFF WBC: CPT | Performed by: PHYSICIAN ASSISTANT

## 2019-05-16 PROCEDURE — 83690 ASSAY OF LIPASE: CPT | Performed by: PHYSICIAN ASSISTANT

## 2019-05-16 PROCEDURE — 81001 URINALYSIS AUTO W/SCOPE: CPT | Performed by: PHYSICIAN ASSISTANT

## 2019-05-16 PROCEDURE — 87186 SC STD MICRODIL/AGAR DIL: CPT | Performed by: PHYSICIAN ASSISTANT

## 2019-05-16 PROCEDURE — 87077 CULTURE AEROBIC IDENTIFY: CPT | Performed by: PHYSICIAN ASSISTANT

## 2019-05-16 PROCEDURE — 80053 COMPREHEN METABOLIC PANEL: CPT | Performed by: PHYSICIAN ASSISTANT

## 2019-05-16 PROCEDURE — 99283 EMERGENCY DEPT VISIT LOW MDM: CPT

## 2019-05-16 RX ORDER — POLYETHYLENE GLYCOL 3350 17 G/17G
17 POWDER, FOR SOLUTION ORAL DAILY
Qty: 10 EACH | Refills: 0 | Status: SHIPPED | OUTPATIENT
Start: 2019-05-16 | End: 2022-01-31

## 2019-05-18 LAB — BACTERIA SPEC AEROBE CULT: ABNORMAL

## 2020-03-31 DIAGNOSIS — G43.011 INTRACTABLE MIGRAINE WITHOUT AURA AND WITH STATUS MIGRAINOSUS: ICD-10-CM

## 2020-03-31 RX ORDER — GALCANEZUMAB 120 MG/ML
120 INJECTION, SOLUTION SUBCUTANEOUS
Qty: 1 PEN | Refills: 1 | Status: SHIPPED | OUTPATIENT
Start: 2020-03-31 | End: 2022-01-31

## 2020-03-31 NOTE — TELEPHONE ENCOUNTER
----- Message from CITLALLI Graves sent at 3/31/2020  2:05 PM CDT -----  Yes      ----- Message -----  From: Adenike Acuna LPN  Sent: 3/31/2020   1:48 PM CDT  To: CITLALLI Graves said she came in and got samples of Emgality.  She has missed a month and wants to know if you will refill it for her.

## 2021-03-10 NOTE — TELEPHONE ENCOUNTER
----- Message from Karli Guerrero MD sent at 7/3/2017  8:52 AM CDT -----  Please call the patient regarding her abnormal result.  I have ordered her a treadmill test and scheduling will be calling her to arrange.   Nsaids Counseling: NSAID Counseling: I discussed with the patient that NSAIDs should be taken with food. Prolonged use of NSAIDs can result in the development of stomach ulcers.  Patient advised to stop taking NSAIDs if abdominal pain occurs.  The patient verbalized understanding of the proper use and possible adverse effects of NSAIDs.  All of the patient's questions and concerns were addressed.

## 2021-04-22 ENCOUNTER — APPOINTMENT (OUTPATIENT)
Dept: CT IMAGING | Age: 31
End: 2021-04-22

## 2021-04-22 ENCOUNTER — HOSPITAL ENCOUNTER (EMERGENCY)
Age: 31
Discharge: HOME | End: 2021-04-23
Attending: EMERGENCY MEDICINE

## 2021-04-22 DIAGNOSIS — G43.109 MIGRAINE WITH AURA AND WITHOUT STATUS MIGRAINOSUS, NOT INTRACTABLE: Primary | ICD-10-CM

## 2021-04-22 LAB
BASOPHILS ABSOLUTE: 0.1 K/UL (ref 0–0.2)
BASOPHILS RELATIVE PERCENT: 1.1 % (ref 0–1)
EOSINOPHILS ABSOLUTE: 0.1 K/UL (ref 0–0.6)
EOSINOPHILS RELATIVE PERCENT: 1.4 % (ref 0–5)
HCT VFR BLD CALC: 39 % (ref 37–47)
HEMOGLOBIN: 13.1 G/DL (ref 12–16)
IMMATURE GRANULOCYTES #: 0 K/UL
LYMPHOCYTES ABSOLUTE: 3.4 K/UL (ref 1.1–4.5)
LYMPHOCYTES RELATIVE PERCENT: 41.8 % (ref 20–40)
MCH RBC QN AUTO: 33.6 PG (ref 27–31)
MCHC RBC AUTO-ENTMCNC: 33.6 G/DL (ref 33–37)
MCV RBC AUTO: 100 FL (ref 81–99)
MONOCYTES ABSOLUTE: 0.5 K/UL (ref 0–0.9)
MONOCYTES RELATIVE PERCENT: 6.6 % (ref 0–10)
NEUTROPHILS ABSOLUTE: 3.9 K/UL (ref 1.5–7.5)
NEUTROPHILS RELATIVE PERCENT: 48.7 % (ref 50–65)
PDW BLD-RTO: 12.4 % (ref 11.5–14.5)
PLATELET # BLD: 211 K/UL (ref 130–400)
PMV BLD AUTO: 12.1 FL (ref 9.4–12.3)
RBC # BLD: 3.9 M/UL (ref 4.2–5.4)
REASON FOR REJECTION: NORMAL
REJECTED TEST: NORMAL
WBC # BLD: 8.1 K/UL (ref 4.8–10.8)

## 2021-04-22 PROCEDURE — 36415 COLL VENOUS BLD VENIPUNCTURE: CPT

## 2021-04-22 PROCEDURE — 70450 CT HEAD/BRAIN W/O DYE: CPT

## 2021-04-22 PROCEDURE — 2580000003 HC RX 258: Performed by: EMERGENCY MEDICINE

## 2021-04-22 PROCEDURE — 96375 TX/PRO/DX INJ NEW DRUG ADDON: CPT

## 2021-04-22 PROCEDURE — 96374 THER/PROPH/DIAG INJ IV PUSH: CPT

## 2021-04-22 PROCEDURE — 85025 COMPLETE CBC W/AUTO DIFF WBC: CPT

## 2021-04-22 PROCEDURE — 99283 EMERGENCY DEPT VISIT LOW MDM: CPT

## 2021-04-22 PROCEDURE — 6360000002 HC RX W HCPCS: Performed by: EMERGENCY MEDICINE

## 2021-04-22 PROCEDURE — 93005 ELECTROCARDIOGRAM TRACING: CPT | Performed by: EMERGENCY MEDICINE

## 2021-04-22 RX ORDER — KETOROLAC TROMETHAMINE 30 MG/ML
30 INJECTION, SOLUTION INTRAMUSCULAR; INTRAVENOUS ONCE
Status: COMPLETED | OUTPATIENT
Start: 2021-04-22 | End: 2021-04-22

## 2021-04-22 RX ORDER — PROCHLORPERAZINE EDISYLATE 5 MG/ML
10 INJECTION INTRAMUSCULAR; INTRAVENOUS ONCE
Status: COMPLETED | OUTPATIENT
Start: 2021-04-22 | End: 2021-04-22

## 2021-04-22 RX ORDER — DEXAMETHASONE SODIUM PHOSPHATE 10 MG/ML
10 INJECTION, SOLUTION INTRAMUSCULAR; INTRAVENOUS ONCE
Status: COMPLETED | OUTPATIENT
Start: 2021-04-22 | End: 2021-04-22

## 2021-04-22 RX ORDER — DIPHENHYDRAMINE HYDROCHLORIDE 50 MG/ML
50 INJECTION INTRAMUSCULAR; INTRAVENOUS ONCE
Status: COMPLETED | OUTPATIENT
Start: 2021-04-22 | End: 2021-04-22

## 2021-04-22 RX ORDER — SODIUM CHLORIDE, SODIUM LACTATE, POTASSIUM CHLORIDE, AND CALCIUM CHLORIDE .6; .31; .03; .02 G/100ML; G/100ML; G/100ML; G/100ML
1000 INJECTION, SOLUTION INTRAVENOUS ONCE
Status: COMPLETED | OUTPATIENT
Start: 2021-04-22 | End: 2021-04-22

## 2021-04-22 RX ORDER — METOCLOPRAMIDE HYDROCHLORIDE 5 MG/ML
10 INJECTION INTRAMUSCULAR; INTRAVENOUS ONCE
Status: DISCONTINUED | OUTPATIENT
Start: 2021-04-22 | End: 2021-04-22

## 2021-04-22 RX ADMIN — DEXAMETHASONE SODIUM PHOSPHATE 10 MG: 10 INJECTION, SOLUTION INTRAMUSCULAR; INTRAVENOUS at 21:47

## 2021-04-22 RX ADMIN — KETOROLAC TROMETHAMINE 30 MG: 30 INJECTION, SOLUTION INTRAMUSCULAR; INTRAVENOUS at 21:47

## 2021-04-22 RX ADMIN — DIPHENHYDRAMINE HYDROCHLORIDE 50 MG: 50 INJECTION, SOLUTION INTRAMUSCULAR; INTRAVENOUS at 21:47

## 2021-04-22 RX ADMIN — SODIUM CHLORIDE, POTASSIUM CHLORIDE, SODIUM LACTATE AND CALCIUM CHLORIDE 1000 ML: 600; 310; 30; 20 INJECTION, SOLUTION INTRAVENOUS at 21:49

## 2021-04-22 RX ADMIN — PROCHLORPERAZINE EDISYLATE 10 MG: 5 INJECTION INTRAMUSCULAR; INTRAVENOUS at 21:59

## 2021-04-22 ASSESSMENT — ENCOUNTER SYMPTOMS
SHORTNESS OF BREATH: 0
EYE PAIN: 0
RHINORRHEA: 0
ABDOMINAL PAIN: 0
BACK PAIN: 0
CHEST TIGHTNESS: 0
WHEEZING: 0
ABDOMINAL DISTENTION: 0
TROUBLE SWALLOWING: 0
DIARRHEA: 0
COUGH: 0
NAUSEA: 1
SORE THROAT: 0
VOMITING: 0
CONSTIPATION: 0
PHOTOPHOBIA: 1
COLOR CHANGE: 0

## 2021-04-22 ASSESSMENT — PAIN SCALES - GENERAL: PAINLEVEL_OUTOF10: 2

## 2021-04-23 VITALS
WEIGHT: 125 LBS | BODY MASS INDEX: 22.15 KG/M2 | HEIGHT: 63 IN | TEMPERATURE: 98 F | DIASTOLIC BLOOD PRESSURE: 69 MMHG | HEART RATE: 88 BPM | RESPIRATION RATE: 18 BRPM | OXYGEN SATURATION: 98 % | SYSTOLIC BLOOD PRESSURE: 109 MMHG

## 2021-04-23 LAB
EKG P AXIS: 78 DEGREES
EKG P-R INTERVAL: 164 MS
EKG Q-T INTERVAL: 358 MS
EKG QRS DURATION: 90 MS
EKG QTC CALCULATION (BAZETT): 421 MS
EKG T AXIS: 36 DEGREES

## 2021-04-23 PROCEDURE — 93010 ELECTROCARDIOGRAM REPORT: CPT | Performed by: INTERNAL MEDICINE

## 2021-04-23 NOTE — ED PROVIDER NOTES
like about an hour to an hour and a half and then is since resolved. Her headache now is back to its typical dull ache in nature. HPI    NursingNotes were reviewed. REVIEW OF SYSTEMS    (2-9 systems for level 4, 10 or more for level 5)     Review of Systems   Constitutional: Negative for activity change, appetite change, chills, fatigue and fever. HENT: Negative for congestion, ear pain, rhinorrhea, sore throat and trouble swallowing. Eyes: Positive for photophobia and visual disturbance (blurred and decreased). Negative for pain. Respiratory: Negative for cough, chest tightness, shortness of breath and wheezing. Cardiovascular: Negative for chest pain, palpitations and leg swelling. Gastrointestinal: Positive for nausea. Negative for abdominal distention, abdominal pain, constipation, diarrhea and vomiting. Genitourinary: Negative for difficulty urinating, dysuria, flank pain, urgency, vaginal bleeding and vaginal discharge. Musculoskeletal: Negative for back pain, myalgias and neck stiffness. Skin: Negative for color change, pallor and rash. Neurological: Positive for speech difficulty and headaches. Negative for dizziness, facial asymmetry, weakness, light-headedness and numbness. Psychiatric/Behavioral: Negative for agitation, behavioral problems, confusion, hallucinations and suicidal ideas. PAST MEDICALHISTORY     Past Medical History:   Diagnosis Date    Attention deficit hyperactivity disorder (ADHD), predominantly inattentive type 10/3/2017    Generalized anxiety disorder     Iron deficiency anemia     Migraine without aura and without status migrainosus, not intractable 10/3/2017    Thyroid nodule          SURGICAL HISTORY     History reviewed. No pertinent surgical history.       CURRENT MEDICATIONS     Previous Medications    AMITRIPTYLINE (ELAVIL) 25 MG TABLET    Take 25 mg by mouth    [employer] D3 1000 UNITS CAPS    TAKE ONE CAPSULE BY MOUTH EVERY DAY    FERROUS SULFATE 325 (65 FE) MG TABLET    Take 325 mg by mouth    INDOMETHACIN (INDOCIN) 25 MG CAPSULE    Take 50 mg by mouth    LISDEXAMFETAMINE DIMESYLATE (VYVANSE) 40 MG CAPS    Take 40 mg by mouth daily for 30 days. PROCHLORPERAZINE (COMPAZINE) 10 MG TABLET    Take 10 mg by mouth    PROMETHAZINE (PHENERGAN) 25 MG TABLET    TK 1 T PO Q 6 H PRN FOR NUASEA OR VOMITNG    RIZATRIPTAN (MAXALT) 10 MG TABLET    May repeat in 2 hours if needed. Do not exceed 2 tablets in 24 hours. SERTRALINE (ZOLOFT) 100 MG TABLET    Take 1.5 tablets by mouth daily    SUMATRIPTAN (IMITREX) 100 MG TABLET    1 TAB AT ONSET OF HEADACHE. MAY REPEAT AFTER 2 HOURS TO A MAXIMUM OF 2 TABLETS IN 24 HOURS       ALLERGIES     Cephalosporins, Penicillins, Topiramate, and Adhesive tape    FAMILY HISTORY     History reviewed. No pertinent family history.        SOCIAL HISTORY       Social History     Socioeconomic History    Marital status:      Spouse name: None    Number of children: None    Years of education: None    Highest education level: None   Occupational History    None   Social Needs    Financial resource strain: None    Food insecurity     Worry: None     Inability: None    Transportation needs     Medical: None     Non-medical: None   Tobacco Use    Smoking status: Never Smoker    Smokeless tobacco: Never Used   Substance and Sexual Activity    Alcohol use: No    Drug use: No    Sexual activity: None   Lifestyle    Physical activity     Days per week: None     Minutes per session: None    Stress: None   Relationships    Social connections     Talks on phone: None     Gets together: None     Attends [employer] service: None     Active member of club or organization: None     Attends meetings of clubs or organizations: None     Relationship status: None    Intimate partner violence     Fear of current or ex partner: None     Emotionally abused: None     Physically abused: None     Forced sexual activity: None   Other Topics Concern    None   Social History Narrative    None       SCREENINGS    Amy Coma Scale  Eye Opening: Spontaneous  Best Verbal Response: Oriented  Best Motor Response: Obeys commands  [place] Coma Scale Score: 15        PHYSICAL EXAM    (up to 7 for level 4, 8 or more for level 5)     ED Triage Vitals [04/22/21 2052]   BP Temp Temp src Pulse Resp SpO2 Height Weight   119/86 98 °F (36.7 °C) -- 95 20 100 % 5' 3\" (1.6 m) 125 lb (56.7 kg)       Physical Exam  Vitals signs and nursing note reviewed. Constitutional:       Appearance: Normal appearance. She is well-developed. She is not ill-appearing. HENT:      Head: Normocephalic and atraumatic. Mouth/Throat:      Mouth: Mucous membranes are moist.      Pharynx: Oropharynx is clear. Eyes:      Conjunctiva/sclera: Conjunctivae normal.      Pupils: Pupils are equal, round, and reactive to light. Neck:      Musculoskeletal: Normal range of motion and neck supple. Cardiovascular:      Rate and Rhythm: Normal rate and regular rhythm. Heart sounds: Normal heart sounds. No murmur. No friction rub. No gallop. Pulmonary:      Effort: Pulmonary effort is normal.      Breath sounds: Normal breath sounds. No wheezing, rhonchi or rales. Abdominal:      General: Abdomen is flat. Bowel sounds are normal. There is no distension. Palpations: Abdomen is soft. Tenderness: There is no abdominal tenderness. There is no guarding or rebound. Musculoskeletal: Normal range of motion. General: No tenderness. Skin:     General: Skin is warm and dry. Capillary Refill: Capillary refill takes less than 2 seconds. Neurological:      General: No focal deficit present. Mental Status: She is alert and oriented to person, place, and time. Cranial Nerves: Cranial nerves are intact. No cranial nerve deficit. Sensory: Sensation is intact. Motor: Motor function is intact. Coordination: Coordination is intact. Psychiatric:         Mood and Affect: Mood normal.         Behavior: Behavior normal.         Thought Content: Thought content normal.         DIAGNOSTIC RESULTS     EKG: All EKG's areinterpreted by the Emergency Department Physician who either signs or Co-signs this chart in the absence of a cardiologist.    Sinus rhythm with a rate of 96, normal axis, no acute ST elevations or depressions. Normal QTC. RADIOLOGY:  Non-plain film images such as CT, Ultrasound and MRI are read by the radiologist. Plain radiographic images are visualized and preliminarily interpreted bythe emergency physician with the below findings:    CT Head WO Contrast   Final Result   Impression:   No acute intracranial findings. Signed by Dr Mariangel Gage on 4/22/2021 9:36 PM              LABS:  Labs Reviewed   CBC WITH AUTO DIFFERENTIAL - Abnormal; Notable for the following components:       Result Value    RBC 3.90 (*)     .0 (*)     MCH 33.6 (*)     Neutrophils % 48.7 (*)     Lymphocytes % 41.8 (*)     Basophils % 1.1 (*)     All other components within normal limits   SPECIMEN REJECTION   COMPREHENSIVE METABOLIC PANEL       All other labs were within normal range or not returned as of this dictation. EMERGENCY DEPARTMENT COURSE and DIFFERENTIAL DIAGNOSIS/MDM:   Vitals:    Vitals:    04/22/21 2052 04/22/21 2245 04/22/21 2345 04/23/21 0045   BP: 119/86 (!) 107/58 103/71 109/69   Pulse: 95 97 88 88   Resp: 20 18 16 18   Temp: 98 °F (36.7 °C)      SpO2: 100% 97% 99% 98%   Weight: 125 lb (56.7 kg)      Height: 5' 3\" (1.6 m)          MDM  Number of Diagnoses or Management Options  Migraine with aura and without status migrainosus, not intractable: new and requires workup  Diagnosis management comments: Patient presented for a headache. She had complained of some flashing lights in the right eye as well as some abnormal numbness and tingling to her face and hand. This was atypical for her headaches.   The imaging was MEDICATIONS:  New Prescriptions    No medications on file          (Please note that portions of this note were completed with a voice recognition program.  Efforts were made to edit thedictations but occasionally words are mis-transcribed.)    Bart Rushing MD (electronically signed)Emergency Physician          Kelli Granados MD  04/23/21 2216

## 2021-12-13 ENCOUNTER — TELEMEDICINE (OUTPATIENT)
Dept: FAMILY MEDICINE CLINIC | Facility: CLINIC | Age: 31
End: 2021-12-13

## 2021-12-13 ENCOUNTER — LAB (OUTPATIENT)
Dept: LAB | Facility: HOSPITAL | Age: 31
End: 2021-12-13

## 2021-12-13 VITALS — WEIGHT: 115 LBS | HEIGHT: 64 IN | BODY MASS INDEX: 19.63 KG/M2

## 2021-12-13 DIAGNOSIS — R09.81 NASAL CONGESTION: ICD-10-CM

## 2021-12-13 DIAGNOSIS — R05.9 COUGH: ICD-10-CM

## 2021-12-13 DIAGNOSIS — Z20.822 SUSPECTED COVID-19 VIRUS INFECTION: Primary | ICD-10-CM

## 2021-12-13 LAB
FLUAV AG NPH QL: NEGATIVE
FLUBV AG NPH QL IA: NEGATIVE
SARS-COV-2 RNA PNL SPEC NAA+PROBE: NOT DETECTED

## 2021-12-13 PROCEDURE — 87804 INFLUENZA ASSAY W/OPTIC: CPT

## 2021-12-13 PROCEDURE — C9803 HOPD COVID-19 SPEC COLLECT: HCPCS

## 2021-12-13 PROCEDURE — 99203 OFFICE O/P NEW LOW 30 MIN: CPT | Performed by: NURSE PRACTITIONER

## 2021-12-13 PROCEDURE — 87635 SARS-COV-2 COVID-19 AMP PRB: CPT | Performed by: NURSE PRACTITIONER

## 2021-12-13 RX ORDER — CHLORCYCLIZINE HYDROCHLORIDE AND PSEUDOEPHEDRINE HYDROCHLORIDE 25; 60 MG/1; MG/1
1 TABLET ORAL 2 TIMES DAILY PRN
Qty: 60 TABLET | Refills: 2 | Status: SHIPPED | OUTPATIENT
Start: 2021-12-13 | End: 2021-12-14 | Stop reason: SDUPTHER

## 2021-12-13 RX ORDER — CHLORCYCLIZINE HYDROCHLORIDE AND PSEUDOEPHEDRINE HYDROCHLORIDE 25; 60 MG/1; MG/1
1 TABLET ORAL 2 TIMES DAILY PRN
Qty: 60 TABLET | Refills: 2 | Status: SHIPPED | OUTPATIENT
Start: 2021-12-13 | End: 2021-12-13 | Stop reason: SDUPTHER

## 2021-12-13 NOTE — PROGRESS NOTES
"You have chosen to receive care through a telehealth visit.  Do you consent to use a video/audio connection for your medical care today? Yes    This was an audio and video enabled telemedicine encounter. Patient verbally consented to visit. Patient was located at Vehicle and I was located at [facility]  location.       Chief Complaint  Cough and Nasal Congestion    Subjective    History of Present Illness      Patient presents to [facility] for   Pt complains of cough and nasal congestion x 1 month.        Review of Systems    I have reviewed and agree with the HPI and ROS information as above.  Maryan Hernández, APRN     Objective   Vital Signs:   Ht 162.6 cm (64\")   Wt 52.2 kg (115 lb)   BMI 19.74 kg/m²       Physical Exam  Constitutional:       Appearance: Normal appearance. She is well-developed.   HENT:      Head: Normocephalic and atraumatic.      Nose: No congestion.      Mouth/Throat:      Lips: Pink. No lesions.   Eyes:      General: Lids are normal. Vision grossly intact.      Conjunctiva/sclera: Conjunctivae normal.      Right eye: Right conjunctiva is not injected.      Left eye: Left conjunctiva is not injected.   Pulmonary:      Effort: Pulmonary effort is normal.   Musculoskeletal:         General: Normal range of motion.      Cervical back: Full passive range of motion without pain, normal range of motion and neck supple.   Skin:     General: Skin is warm and dry.   Neurological:      Mental Status: She is alert and oriented to person, place, and time.      Motor: Motor function is intact.   Psychiatric:         Mood and Affect: Mood and affect normal.         Judgment: Judgment normal.          Result Review  Data Reviewed:                   Assessment and Plan      Problem List Items Addressed This Visit     None      Visit Diagnoses     Suspected COVID-19 virus infection    -  Primary    Relevant Orders    COVID PRE-OP / PRE-PROCEDURE SCREENING " ORDER (NO ISOLATION) - Swab, Nasal Cavity    Cough        Relevant Orders    Influenza Antigen, Rapid - Swab, Nasopharynx    Nasal congestion        Relevant Medications    Chlorcyclizine-Pseudoephed (Stahist AD) 25-60 MG tablet      Patient is seen today via telehealth. She complains of 1 month of cough and congestion. She denies any known fever. We will proceed with Covid and flu swab. If these are negative she does request an antibiotic. She also request a prescription for Stahist. Will call with results. Patient to call with worsening symptoms.        Follow Up   Return if symptoms worsen or fail to improve.  Patient was given instructions and counseling regarding her condition or for health maintenance advice. Please see specific information pulled into the AVS if appropriate.

## 2021-12-13 NOTE — TELEPHONE ENCOUNTER
Patient states that Stahist was sent to the wrong pharmacy - she wants this sent to [facility].

## 2021-12-14 DIAGNOSIS — R09.81 NASAL CONGESTION: ICD-10-CM

## 2021-12-14 RX ORDER — CHLORCYCLIZINE HYDROCHLORIDE AND PSEUDOEPHEDRINE HYDROCHLORIDE 25; 60 MG/1; MG/1
1 TABLET ORAL 2 TIMES DAILY PRN
Qty: 60 TABLET | Refills: 2 | Status: SHIPPED | OUTPATIENT
Start: 2021-12-14 | End: 2021-12-14 | Stop reason: SDUPTHER

## 2021-12-14 RX ORDER — CHLORCYCLIZINE HYDROCHLORIDE AND PSEUDOEPHEDRINE HYDROCHLORIDE 25; 60 MG/1; MG/1
1 TABLET ORAL 2 TIMES DAILY PRN
Qty: 60 TABLET | Refills: 2 | Status: SHIPPED | OUTPATIENT
Start: 2021-12-14 | End: 2022-05-05 | Stop reason: CLARIF

## 2021-12-14 RX ORDER — CHLORCYCLIZINE HYDROCHLORIDE AND PSEUDOEPHEDRINE HYDROCHLORIDE 25; 60 MG/1; MG/1
1 TABLET ORAL 2 TIMES DAILY PRN
Qty: 60 TABLET | Refills: 2 | OUTPATIENT
Start: 2021-12-14

## 2021-12-14 NOTE — TELEPHONE ENCOUNTER
Caller: Mirela Flores    Relationship: Self    Best call back number: [phone]    Requested Prescriptions:   Requested Prescriptions     Pending Prescriptions Disp Refills   • Chlorcyclizine-Pseudoephed (Stahist AD) 25-60 MG tablet 60 tablet 2     Sig: Take 1 tablet by mouth 2 (Two) Times a Day As Needed (allergies).        Pharmacy where request should be sent: [id number] - [place] [place] - [address] AT [facility] [address] & [address] [facility] [phone] [facility] [phone] FX     Does the patient have less than a 3 day supply:  [x] Yes  [] No    Jose Christensen Rep   12/14/21 10:50 CST

## 2021-12-15 ENCOUNTER — TELEPHONE (OUTPATIENT)
Dept: FAMILY MEDICINE CLINIC | Facility: CLINIC | Age: 31
End: 2021-12-15

## 2021-12-15 DIAGNOSIS — R09.81 NASAL CONGESTION: Primary | ICD-10-CM

## 2021-12-15 RX ORDER — CETIRIZINE HYDROCHLORIDE, PSEUDOEPHEDRINE HYDROCHLORIDE 5; 120 MG/1; MG/1
1 TABLET, FILM COATED, EXTENDED RELEASE ORAL 2 TIMES DAILY
Qty: 60 TABLET | Refills: 0 | Status: SHIPPED | OUTPATIENT
Start: 2021-12-15 | End: 2022-01-16 | Stop reason: SDUPTHER

## 2021-12-15 NOTE — TELEPHONE ENCOUNTER
Patient had requested a script of Stahist, but no pharmacies in [place] are carrying it.  She is requesting something similar to be called in.

## 2022-01-03 ENCOUNTER — LAB (OUTPATIENT)
Dept: LAB | Facility: HOSPITAL | Age: 32
End: 2022-01-03

## 2022-01-03 ENCOUNTER — TELEPHONE (OUTPATIENT)
Dept: FAMILY MEDICINE CLINIC | Facility: CLINIC | Age: 32
End: 2022-01-03

## 2022-01-03 ENCOUNTER — TELEMEDICINE (OUTPATIENT)
Dept: FAMILY MEDICINE CLINIC | Facility: CLINIC | Age: 32
End: 2022-01-03

## 2022-01-03 DIAGNOSIS — Z20.822 EXPOSURE TO COVID-19 VIRUS: ICD-10-CM

## 2022-01-03 DIAGNOSIS — J01.40 SUBACUTE PANSINUSITIS: ICD-10-CM

## 2022-01-03 DIAGNOSIS — J01.40 SUBACUTE PANSINUSITIS: Primary | ICD-10-CM

## 2022-01-03 DIAGNOSIS — Z20.822 EXPOSURE TO COVID-19 VIRUS: Primary | ICD-10-CM

## 2022-01-03 LAB — SARS-COV-2 RNA PNL SPEC NAA+PROBE: NOT DETECTED

## 2022-01-03 PROCEDURE — 99212 OFFICE O/P EST SF 10 MIN: CPT | Performed by: PEDIATRICS

## 2022-01-03 PROCEDURE — 87635 SARS-COV-2 COVID-19 AMP PRB: CPT

## 2022-01-03 RX ORDER — CLARITHROMYCIN 500 MG/1
500 TABLET, COATED ORAL 2 TIMES DAILY
Qty: 20 TABLET | Refills: 0 | Status: SHIPPED | OUTPATIENT
Start: 2022-01-03 | End: 2022-01-13

## 2022-01-03 RX ORDER — METHYLPREDNISOLONE 4 MG/1
TABLET ORAL
Qty: 1 EACH | Refills: 0 | Status: SHIPPED | OUTPATIENT
Start: 2022-01-03 | End: 2022-01-31

## 2022-01-03 NOTE — PROGRESS NOTES
You have chosen to receive care through a telehealth visit.  Do you consent to use a video/audio connection for your medical care today? Yes This was an audio and video enabled telemedicine encounter. Patient verbally consented to visit. Patient was located at Vehicle and I was located at [facility]  location.     Chief Complaint  Sinusitis and Exposure To Known Illness (covid on friday exp)    Subjective    History of Present Illness      Patient presents to [facility] for   Battling a sinus infection however exposure on Friday to a known case of Covid her daughter both need testing her daughter is asymptomatic but she is still congested       Review of Systems    I have reviewed and agree with the HPI information as above.  Dean Tapia MD     Objective   Vital Signs:   There were no vitals taken for this visit.      Physical Exam  Constitutional:       Appearance: Normal appearance. She is well-developed.   HENT:      Head: Normocephalic and atraumatic.      Nose: No congestion.      Mouth/Throat:      Lips: Pink. No lesions.   Eyes:      General: Lids are normal. Vision grossly intact.      Conjunctiva/sclera: Conjunctivae normal.      Right eye: Right conjunctiva is not injected.      Left eye: Left conjunctiva is not injected.   Pulmonary:      Effort: Pulmonary effort is normal.   Musculoskeletal:         General: Normal range of motion.      Cervical back: Full passive range of motion without pain, normal range of motion and neck supple.   Skin:     General: Skin is warm and dry.   Neurological:      Mental Status: She is alert and oriented to person, place, and time.      Motor: Motor function is intact.   Psychiatric:         Mood and Affect: Mood and affect normal.         Judgment: Judgment normal.          Result Review  Data Reviewed:                   Assessment and Plan    Problem List Items Addressed This Visit        ENT    Subacute pansinusitis    Current  Assessment & Plan     Has been fighting this over a week now exposed to covid         Relevant Orders    COVID-19,[facility] IN-HOUSE,Nasal Swab No Transport Media 3-4 HR TAT - Swab, Nasal Cavity (Completed)       Other    Exposure to COVID-19 virus - Primary    Current Assessment & Plan     Exposure on Friday         Relevant Orders    COVID-19,[facility] IN-HOUSE,Nasal Swab No Transport Media 3-4 HR TAT - Swab, Nasal Cavity (Completed)                Follow Up   Return if symptoms worsen or fail to improve.  Patient was given instructions and counseling regarding her condition or for health maintenance advice. Please see specific information pulled into the AVS if appropriate.

## 2022-01-03 NOTE — TELEPHONE ENCOUNTER
Contacted pt, verified name and . Informed covid neg per Dr. Tapia. Advised per Dr. Tapia if pt wished to receive abx and medrol dose, provider would do so. Pt vu of all and did request this to be sent. Verified pref pharm and sent medication per verbal from Dr. Tapia.

## 2022-01-16 DIAGNOSIS — R09.81 NASAL CONGESTION: ICD-10-CM

## 2022-01-17 RX ORDER — CETIRIZINE HYDROCHLORIDE, PSEUDOEPHEDRINE HYDROCHLORIDE 5; 120 MG/1; MG/1
1 TABLET, FILM COATED, EXTENDED RELEASE ORAL 2 TIMES DAILY
Qty: 60 TABLET | Refills: 0 | Status: SHIPPED | OUTPATIENT
Start: 2022-01-17 | End: 2022-03-07 | Stop reason: SDUPTHER

## 2022-01-22 NOTE — TELEPHONE ENCOUNTER
SUBJECTIVE  Patient ID: Sue is a 57 year old female.    Chief Complaint   Patient presents with   • Office Visit   • Ear Pain     Sx started x 2 days on and off sharp pain right ear/Congestion tested positive covid 22        The patient presents with a 2 to 3-day history of right ear pain.  She has intermittent nasal congestion.  She denies fever, chills, sore throat or headache.  She denies coughing, wheezing, shortness of breath, nausea, vomiting, diarrhea, new onset loss of sense of taste or smell.  There is not been any recent contact with any confirmed or suspected COVID cases.  Nothing over-the-counter has been taken for this as of yet.  Nothing in particular makes it worse.       has a past medical history of Acne, Bipolar disorder (CMS/[facility]), Cellulitis of right foot due to methicillin-resistant Staphylococcus aureus (2021),  (normal spontaneous vaginal delivery), Pneumonia (10/16/2019), Rupture of right distal biceps tendon, and Sensory hearing loss, bilateral.   has a past surgical history that includes Acromioplasty; Knee surgery; anes neuroplasty &/or transposit; ulnar; Shoulder surgery (Right); Sinus surgery; Ovary surgery (Left); tooth extraction (); and Biceps tendon repair (Right).  family history includes ADHD/ADD in her brother and mother; Asthma in her mother; Congestive Heart Failure in her father; Diabetes in her father; Kidney disease in her father; Osteoporosis in her mother.   reports that she quit smoking about 6 years ago. She started smoking about 41 years ago. She smoked 0.00 packs per day. She has never used smokeless tobacco. She reports current alcohol use. She reports that she does not use drugs.  has a current medication list which includes the following prescription(s): phenazopyridine, lorazepam, nitrofurantoin (macrocrystal-monohydrate), vitamin d (ergocalciferol), olopatadine, tretinoin, restasis, breo ellipta, valacyclovir, zolpidem, latuda,  Spoke with pt, she does not have any questions re testing  She spoke with Meryl chowdary   amphetamine-dextroamphetamine, bupropion xl, meloxicam, sumatriptan, biotin, bimatoprost, lidocaine, albuterol, diphenhydramine, loratadine, cyanocobalamin-salcaprozate, multiple vitamin, albuterol, ibuprofen, amphetamine-dextroamphetamine, amphetamine-dextroamphetamine, and albuterol.  has No Known Allergies.      Review of Systems   Constitutional: Negative for chills, fever and malaise/fatigue.   HENT: Positive for congestion and ear pain. Negative for sore throat.    Respiratory: Negative for cough, shortness of breath and wheezing.    Musculoskeletal: Negative for myalgias.   Gastrointestinal: Negative for abdominal pain, diarrhea, nausea and vomiting.   Neurological: Positive for headaches.       OBJECTIVE  Visit Vitals  /73 (BP Location: LUE - Left upper extremity, Patient Position: Sitting, Cuff Size: Regular)   Pulse 82   Temp 97.3 °F (36.3 °C) (Temporal)   Resp 16   Ht 5' 8\" (1.727 m)   Wt 68.3 kg (150 lb 9.6 oz)   LMP 07/06/2014   SpO2 100%   BMI 22.90 kg/m²     Physical Exam  Vitals and nursing note reviewed.   Constitutional:       General: She is not in acute distress.     Appearance: Normal appearance. She is well-developed and normal weight.   HENT:      Head: Normocephalic and atraumatic.      Right Ear: Tympanic membrane, ear canal and external ear normal. There is no impacted cerumen.      Left Ear: Tympanic membrane, ear canal and external ear normal. There is no impacted cerumen.      Nose: Nose normal. No congestion or rhinorrhea.      Mouth/Throat:      Mouth: Mucous membranes are moist.      Pharynx: Oropharynx is clear. No oropharyngeal exudate or posterior oropharyngeal erythema.      Comments: Right TMJ click with movement.     Neck: Normal range of motion and neck supple.   Eyes:      Conjunctiva/sclera: Conjunctivae normal.      Pupils: Pupils are equal, round, and reactive to light.   Neck:      Thyroid: No thyromegaly.   Cardiovascular:      Rate and Rhythm: Normal rate and  regular rhythm.      Heart sounds: Normal heart sounds. No murmur heard.    No friction rub. No gallop.   Pulmonary:      Effort: Pulmonary effort is normal. No respiratory distress.      Breath sounds: Normal breath sounds. No wheezing or rales.   Musculoskeletal:         General: Normal range of motion.   Lymphadenopathy:      Cervical: No cervical adenopathy.   Skin:     General: Skin is warm and dry.      Findings: No rash.   Neurological:      General: No focal deficit present.      Mental Status: She is alert and oriented to person, place, and time.      Deep Tendon Reflexes: Reflexes are normal and symmetric.   Psychiatric:         Mood and Affect: Mood normal.         Behavior: Behavior normal.         Thought Content: Thought content normal.         Judgment: Judgment normal.         ASSESSMENT  Sue was seen today for office visit and ear pain.    Diagnoses and all orders for this visit:    TMJ syndrome  -     SERVICE TO DENTISTRY  -     ibuprofen (MOTRIN) 200 MG tablet; Take 2 tablets by mouth every 6 hours as needed for Pain.        PLAN  Patient education completed on disease process, etiology and prognosis.  The patient has been given the opportunity to ask questions and I have attempted to answer these questions to the best of my ability.  The patient verbalizes understanding of the diagnosis and plan and has no further questions today.  Medication usage and side effects discussed.  Follow up as needed.  The patient was advised to call or follow-up if the symptoms worsen, persist or do not resolve with the current treatment.  Return to the clinic as clinically indicated as discussed with patient who verbalized understanding of and agreement with the plan.    Rozina Alonso PA-C  1/22/2022

## 2022-01-31 ENCOUNTER — TELEMEDICINE (OUTPATIENT)
Dept: FAMILY MEDICINE CLINIC | Facility: CLINIC | Age: 32
End: 2022-01-31

## 2022-01-31 VITALS — HEIGHT: 64 IN | WEIGHT: 115 LBS | BODY MASS INDEX: 19.63 KG/M2

## 2022-01-31 DIAGNOSIS — J40 BRONCHITIS: Primary | ICD-10-CM

## 2022-01-31 PROCEDURE — 99213 OFFICE O/P EST LOW 20 MIN: CPT | Performed by: NURSE PRACTITIONER

## 2022-01-31 RX ORDER — METHYLPREDNISOLONE 4 MG/1
TABLET ORAL
Qty: 21 TABLET | Refills: 0 | Status: SHIPPED | OUTPATIENT
Start: 2022-01-31 | End: 2022-05-05

## 2022-01-31 RX ORDER — DOXYCYCLINE HYCLATE 100 MG/1
100 CAPSULE ORAL 2 TIMES DAILY
Qty: 20 CAPSULE | Refills: 0 | Status: SHIPPED | OUTPATIENT
Start: 2022-01-31 | End: 2022-02-10

## 2022-01-31 RX ORDER — DEXTROMETHORPHAN HYDROBROMIDE AND PROMETHAZINE HYDROCHLORIDE 15; 6.25 MG/5ML; MG/5ML
5 SYRUP ORAL NIGHTLY PRN
Qty: 118 ML | Refills: 0 | Status: SHIPPED | OUTPATIENT
Start: 2022-01-31 | End: 2022-07-11

## 2022-01-31 NOTE — PROGRESS NOTES
"This was an audio and video enabled telemedicine encounter. Patient verbally consented to visit. Patient was located at Work and I was located at [facility]  location.     Chief Complaint  Cough (for 2 weeks. ) and Nausea    Subjective    History of Present Illness      Patient presents to [facility] for   Symptoms for nearly 2 weeks, coughing, thick mucous production, nausea, trouble sleeping. Feels she got better after last visit for a few days but then new symptoms started. She can't seem to get over this and feels it is worsening. NO fever. No covid exposure.        Review of Systems   Constitutional: Positive for fatigue. Negative for fever.   HENT: Positive for congestion.    Respiratory: Positive for cough. Negative for shortness of breath.        I have reviewed and agree with the HPI and ROS information as above.  DAVID Lei     Objective   Vital Signs:   Ht 162.6 cm (64\")   Wt 52.2 kg (115 lb)   BMI 19.74 kg/m²       Physical Exam  Constitutional:       Appearance: She is well-developed.   HENT:      Head: Normocephalic and atraumatic.      Nose: No congestion.      Mouth/Throat:      Lips: Pink. No lesions.   Eyes:      General: Lids are normal. Vision grossly intact.      Conjunctiva/sclera: Conjunctivae normal.      Right eye: Right conjunctiva is not injected.      Left eye: Left conjunctiva is not injected.   Pulmonary:      Effort: Pulmonary effort is normal.   Musculoskeletal:         General: Normal range of motion.      Cervical back: Full passive range of motion without pain, normal range of motion and neck supple.   Skin:     General: Skin is dry.   Neurological:      Mental Status: She is alert and oriented to person, place, and time.      Motor: Motor function is intact.   Psychiatric:         Mood and Affect: Mood and affect normal.         Judgment: Judgment normal.          Result Review  Data Reviewed:                   Assessment and " Plan      Problem List Items Addressed This Visit     None      Visit Diagnoses     Bronchitis    -  Primary    Relevant Medications    doxycycline (VIBRAMYCIN) 100 MG capsule    methylPREDNISolone (MEDROL) 4 MG dose pack    promethazine-dextromethorphan (PROMETHAZINE-DM) 6.25-15 MG/5ML syrup      New but persistent symptoms since last visit, for nearly 2 weeks at this time. Not responding to any home remedies. Not sleeping well due to cough, productive, lots of drainage.   Plan:   1. Will send diatherix panel for covid and respiratory panel.   2. Treat symptoms with doxy, medrol, cough syrup.         Follow Up   Return if symptoms worsen or fail to improve.  Patient was given instructions and counseling regarding her condition or for health maintenance advice. Please see specific information pulled into the AVS if appropriate.

## 2022-02-07 ENCOUNTER — TELEPHONE (OUTPATIENT)
Dept: FAMILY MEDICINE CLINIC | Facility: CLINIC | Age: 32
End: 2022-02-07

## 2022-02-07 NOTE — TELEPHONE ENCOUNTER
----- Message from Angela Yoo Jana, APRDAVID sent at 2022  3:11 PM CST -----  Thanks can let her know resp panel was negative as well. Hope she is feeling better.   Gianna Teran, LPN   2022  2:44 PM CST         Scanned into your basket.      Angela Yoo Jana, APRDAVID   2022  7:34 AM CST         Please let pt know results: covid was not detected again but the resp panel is not here?? Can we call and see what happened to this. Thanks.      Contacted pt, verified name and . Informed of above. Pt vu of all and stated is feeling better.

## 2022-02-23 ENCOUNTER — TELEMEDICINE (OUTPATIENT)
Dept: FAMILY MEDICINE CLINIC | Facility: CLINIC | Age: 32
End: 2022-02-23

## 2022-02-23 VITALS — BODY MASS INDEX: 19.63 KG/M2 | RESPIRATION RATE: 20 BRPM | WEIGHT: 115 LBS | HEIGHT: 64 IN

## 2022-02-23 DIAGNOSIS — I73.00 RAYNAUD'S PHENOMENON WITHOUT GANGRENE: Primary | ICD-10-CM

## 2022-02-23 DIAGNOSIS — M25.50 ARTHRALGIA, UNSPECIFIED JOINT: ICD-10-CM

## 2022-02-23 DIAGNOSIS — L30.9: ICD-10-CM

## 2022-02-23 PROCEDURE — 99214 OFFICE O/P EST MOD 30 MIN: CPT | Performed by: NURSE PRACTITIONER

## 2022-02-23 RX ORDER — AMLODIPINE BESYLATE 2.5 MG/1
2.5 TABLET ORAL DAILY
Qty: 30 TABLET | Refills: 2 | Status: SHIPPED | OUTPATIENT
Start: 2022-02-23 | End: 2022-12-05

## 2022-02-23 NOTE — PROGRESS NOTES
"You have chosen to receive care through a telehealth visit.  Do you consent to use a video/audio connection for your medical care today? Yes   This visit was completed by Telehealth using a computer. The location of the provider is [address] [place], [place] [id number]. The location of the patient is currently at work.  Chief Complaint  Raynauds Syndrome and Wants autoimmune work up    Subjective    History of Present Illness {CC  Problem List  Visit Diagnosis   Encounters  Notes  Medications  Labs  Result Review Imaging  Media :23}     Patient presents to [facility] for     Patient states that she has had a few issues over the last several years. He went to ER 2-3 years ago and was diagnosed with Raynauds and was started on Norvasc. She states that she did well with this, but lost insurance and never got it restarted. She states that over the last few weeks her fingers and toes have become painful and are becoming discolored. She states that it is worse when it is cold outside. She states that she has been researching herself and feels as though she may have lupus. She will have periodic erythremic rashes appear. Mainly on her feet and rarely on her face. She wishes to have a autoimmune work up completed.           Objective   Vital Signs:   Resp 20   Ht 162.6 cm (64\")   Wt 52.2 kg (115 lb)   BMI 19.74 kg/m²       Physical Exam  Vitals and nursing note reviewed.   Constitutional:       Appearance: Normal appearance. She is well-developed.   HENT:      Head: Normocephalic and atraumatic.      Mouth/Throat:      Lips: Pink. No lesions.   Eyes:      General: Lids are normal. Vision grossly intact.      Conjunctiva/sclera: Conjunctivae normal.      Right eye: Right conjunctiva is not injected.      Left eye: Left conjunctiva is not injected.   Pulmonary:      Effort: Pulmonary effort is normal.   Musculoskeletal:         General: Normal range of motion.      Cervical back: Full " passive range of motion without pain, normal range of motion and neck supple.   Skin:     General: Skin is dry.   Neurological:      Mental Status: She is alert and oriented to person, place, and time.      Motor: Motor function is intact.   Psychiatric:         Mood and Affect: Mood and affect normal.         Judgment: Judgment normal.          Result Review  Data Reviewed:                   Assessment and Plan    Problem List Items Addressed This Visit     None      Visit Diagnoses     Raynaud's phenomenon without gangrene    -  Primary    Relevant Medications    amLODIPine (Norvasc) 2.5 MG tablet    Other Relevant Orders    C-reactive Protein    Comprehensive Metabolic Panel    CBC & Differential    Uric Acid    Sedimentation Rate    Rheumatoid Factor    Nuclear Antigen Antibody, IFA    TSH    Vitamin D 25 Hydroxy    Arthralgia, unspecified joint        Relevant Orders    C-reactive Protein    Comprehensive Metabolic Panel    CBC & Differential    Uric Acid    Sedimentation Rate    Rheumatoid Factor    Nuclear Antigen Antibody, IFA    TSH    Vitamin D 25 Hydroxy    Erythematous eczema        Relevant Orders    C-reactive Protein    Comprehensive Metabolic Panel    CBC & Differential    Uric Acid    Sedimentation Rate    Rheumatoid Factor    Nuclear Antigen Antibody, IFA    TSH    Vitamin D 25 Hydroxy        Patient states that she has had a few issues over the last several years. He went to ER 2-3 years ago and was diagnosed with Raynauds and was started on Norvasc. She states that she did well with this, but lost insurance and never got it restarted. She states that over the last few weeks her fingers and toes have become painful and are becoming discolored. She states that it is worse when it is cold outside. She states that she has been researching herself and feels as though she may have lupus. She will have periodic erythremic rashes appear. Mainly on her feet and rarely on her face. She wishes to have a  autoimmune work up completed. Will place the orders and the patient is to stop by and have these completed.         Follow Up   Return in about 3 months (around 5/23/2022) for Recheck.  Patient was given instructions and counseling regarding her condition or for health maintenance advice. Please see specific information pulled into the AVS if appropriate.

## 2022-02-23 NOTE — PATIENT INSTRUCTIONS
Raynaud Phenomenon    Raynaud phenomenon is a condition that affects the blood vessels (arteries) that carry blood to your fingers and toes. The arteries that supply blood to your ears, lips, nipples, or the tip of your nose might also be affected. Raynaud phenomenon causes the arteries to become narrow temporarily (spasm). As a result, the flow of blood to the affected areas is temporarily decreased. This usually occurs in response to cold temperatures or stress. During an attack, the skin in the affected areas turns white, then blue, and finally red. You may also feel tingling or numbness in those areas.  Attacks usually last for only a brief period, and then the blood flow to the area returns to normal. In most cases, Raynaud phenomenon does not cause serious health problems.  What are the causes?  In many cases, the cause of this condition is not known. The condition may occur on its own (primary Raynaud phenomenon) or may be associated with other diseases or factors (secondary Raynaud phenomenon).  Possible causes may include:  · Diseases or medical conditions that damage the arteries.  · Injuries and repetitive actions that hurt the hands or feet.  · Being exposed to certain chemicals.  · Taking medicines that narrow the arteries.  · Other medical conditions, such as lupus, scleroderma, rheumatoid arthritis, thyroid problems, blood disorders, Sjogren syndrome, or atherosclerosis.  What increases the risk?  The following factors may make you more likely to develop this condition:  · Being 20-40 years old.  · Being female.  · Having a family history of Raynaud phenomenon.  · Living in a cold climate.  · Smoking.  What are the signs or symptoms?  Symptoms of this condition usually occur when you are exposed to cold temperatures or when you have emotional stress. The symptoms may last for a few minutes or up to several hours. They usually affect your fingers but may also affect your toes, nipples, lips, ears, or  the tip of your nose. Symptoms may include:  · Changes in skin color. The skin in the affected areas will turn pale or white. The skin may then change from white to bluish to red as normal blood flow returns to the area.  · Numbness, tingling, or pain in the affected areas.  In severe cases, symptoms may include:  · Skin sores.  · Tissues decaying and dying (gangrene).  How is this diagnosed?  This condition may be diagnosed based on:  · Your symptoms and medical history.  · A physical exam. During the exam, you may be asked to put your hands in cold water to check for a reaction to cold temperature.  · Tests, such as:  ? Blood tests to check for other diseases or conditions.  ? A test to check the movement of blood through your arteries and veins (vascular ultrasound).  ? A test in which the skin at the base of your fingernail is examined under a microscope (nailfold capillaroscopy).  How is this treated?  Treatment for this condition often involves making lifestyle changes and taking steps to control your exposure to cold temperatures. For more severe cases, medicine (calcium channel blockers) may be used to improve blood flow. Surgery is sometimes done to block the nerves that control the affected arteries, but this is rare.  Follow these instructions at home:  Avoiding cold temperatures  Take these steps to avoid exposure to cold:  · If possible, stay indoors during cold weather.  · When you go outside during cold weather, dress in layers and wear mittens, a hat, a scarf, and warm footwear.  · Wear mittens or gloves when handling ice or frozen food.  · Use holders for glasses or cans containing cold drinks.  · Let warm water run for a while before taking a shower or bath.  · Warm up the car before driving in cold weather.  Lifestyle  · If possible, avoid stressful and emotional situations. Try to find ways to manage your stress, such as:  ? Exercise.  ? Yoga.  ? Meditation.  ? Biofeedback.  · Do not use any  products that contain nicotine or tobacco, such as cigarettes and e-cigarettes. If you need help quitting, ask your health care provider.  · Avoid secondhand smoke.  · Limit your use of caffeine.  ? Switch to decaffeinated coffee, tea, and soda.  ? Avoid chocolate.  · Avoid vibrating tools and machinery.  General instructions  · Protect your hands and feet from injuries, cuts, or bruises.  · Avoid wearing tight rings or wristbands.  · Wear loose fitting socks and comfortable, roomy shoes.  · Take over-the-counter and prescription medicines only as told by your health care provider.  Contact a health care provider if:  · Your discomfort becomes worse despite lifestyle changes.  · You develop sores on your fingers or toes that do not heal.  · Your fingers or toes turn black.  · You have breaks in the skin on your fingers or toes.  · You have a fever.  · You have pain or swelling in your joints.  · You have a rash.  · Your symptoms occur on only one side of your body.  Summary  · Raynaud phenomenon is a condition that affects the arteries that carry blood to your fingers, toes, ears, lips, nipples, or the tip of your nose.  · In many cases, the cause of this condition is not known.  · Symptoms of this condition include changes in skin color, and numbness and tingling of the affected area.  · Treatment for this condition includes lifestyle changes, reducing exposure to cold temperatures, and using medicines for severe cases of the condition.  · Contact your health care provider if your condition worsens despite treatment.  This information is not intended to replace advice given to you by your health care provider. Make sure you discuss any questions you have with your health care provider.  Document Revised: 04/29/2021 Document Reviewed: 04/29/2021  Elsevier Patient Education © 2021 [employer]

## 2022-02-25 ENCOUNTER — LAB (OUTPATIENT)
Dept: LAB | Facility: HOSPITAL | Age: 32
End: 2022-02-25

## 2022-02-25 DIAGNOSIS — M25.50 ARTHRALGIA, UNSPECIFIED JOINT: ICD-10-CM

## 2022-02-25 DIAGNOSIS — L30.9: ICD-10-CM

## 2022-02-25 DIAGNOSIS — I73.00 RAYNAUD'S PHENOMENON WITHOUT GANGRENE: ICD-10-CM

## 2022-02-25 LAB
25(OH)D3 SERPL-MCNC: 35.2 NG/ML (ref 30–100)
ALBUMIN SERPL-MCNC: 4.5 G/DL (ref 3.5–5)
ALBUMIN/GLOB SERPL: 1.8 G/DL (ref 1.1–2.5)
ALP SERPL-CCNC: 48 U/L (ref 24–120)
ALT SERPL W P-5'-P-CCNC: 13 U/L (ref 0–35)
ANION GAP SERPL CALCULATED.3IONS-SCNC: 3 MMOL/L (ref 4–13)
AST SERPL-CCNC: 20 U/L (ref 7–45)
AUTO MIXED CELLS #: 0.3 10*3/MM3 (ref 0.1–2.6)
AUTO MIXED CELLS %: 4.9 % (ref 0.1–24)
BILIRUB SERPL-MCNC: 0.6 MG/DL (ref 0.1–1)
BUN SERPL-MCNC: 9 MG/DL (ref 5–21)
BUN/CREAT SERPL: 17
CALCIUM SPEC-SCNC: 9.1 MG/DL (ref 8.4–10.4)
CHLORIDE SERPL-SCNC: 105 MMOL/L (ref 98–110)
CHROMATIN AB SERPL-ACNC: <10 IU/ML (ref 0–14)
CO2 SERPL-SCNC: 30 MMOL/L (ref 24–31)
CREAT SERPL-MCNC: 0.53 MG/DL (ref 0.5–1.4)
CRP SERPL-MCNC: <0.3 MG/DL (ref 0–0.5)
ERYTHROCYTE [DISTWIDTH] IN BLOOD BY AUTOMATED COUNT: 11.8 % (ref 12.3–15.4)
ERYTHROCYTE [SEDIMENTATION RATE] IN BLOOD: <1 MM/HR (ref 0–20)
GFR SERPL CREATININE-BSD FRML MDRD: 135 ML/MIN/1.73
GLOBULIN UR ELPH-MCNC: 2.5 GM/DL
GLUCOSE SERPL-MCNC: 110 MG/DL (ref 70–100)
HCT VFR BLD AUTO: 34.5 % (ref 34–46.6)
HGB BLD-MCNC: 11.5 G/DL (ref 12–15.9)
LYMPHOCYTES # BLD AUTO: 2.1 10*3/MM3 (ref 0.7–3.1)
LYMPHOCYTES NFR BLD AUTO: 38.9 % (ref 19.6–45.3)
MCH RBC QN AUTO: 32.5 PG (ref 26.6–33)
MCHC RBC AUTO-ENTMCNC: 33.3 G/DL (ref 31.5–35.7)
MCV RBC AUTO: 97.5 FL (ref 79–97)
NEUTROPHILS NFR BLD AUTO: 3 10*3/MM3 (ref 1.7–7)
NEUTROPHILS NFR BLD AUTO: 56.2 % (ref 42.7–76)
PLATELET # BLD AUTO: 162 10*3/MM3 (ref 140–450)
PMV BLD AUTO: 11.5 FL (ref 6–12)
POTASSIUM SERPL-SCNC: 3.7 MMOL/L (ref 3.5–5.3)
PROT SERPL-MCNC: 7 G/DL (ref 6.3–8.7)
RBC # BLD AUTO: 3.54 10*6/MM3 (ref 3.77–5.28)
SODIUM SERPL-SCNC: 138 MMOL/L (ref 135–145)
TSH SERPL DL<=0.05 MIU/L-ACNC: 0.72 UIU/ML (ref 0.27–4.2)
URATE SERPL-MCNC: 3.4 MG/DL (ref 2.7–7.5)
WBC NRBC COR # BLD: 5.4 10*3/MM3 (ref 3.4–10.8)

## 2022-02-25 PROCEDURE — 36415 COLL VENOUS BLD VENIPUNCTURE: CPT

## 2022-02-25 PROCEDURE — 86140 C-REACTIVE PROTEIN: CPT

## 2022-02-25 PROCEDURE — 82306 VITAMIN D 25 HYDROXY: CPT

## 2022-02-25 PROCEDURE — 86431 RHEUMATOID FACTOR QUANT: CPT

## 2022-02-25 PROCEDURE — 80053 COMPREHEN METABOLIC PANEL: CPT

## 2022-02-25 PROCEDURE — 85025 COMPLETE CBC W/AUTO DIFF WBC: CPT

## 2022-02-25 PROCEDURE — 84443 ASSAY THYROID STIM HORMONE: CPT

## 2022-02-25 PROCEDURE — 85652 RBC SED RATE AUTOMATED: CPT

## 2022-02-25 PROCEDURE — 84550 ASSAY OF BLOOD/URIC ACID: CPT

## 2022-02-25 PROCEDURE — 86038 ANTINUCLEAR ANTIBODIES: CPT

## 2022-02-28 LAB — ANA TITR SER IF: NEGATIVE {TITER}

## 2022-03-02 NOTE — PROGRESS NOTES
RA panel is negative, TSH normal, CMP normal  Vitamin D is low normal. Take 5,000 Units OTC  CBC shows that she is anemic. I would like her to come do an anemia panel. She does not have to have an office visit, but can go to lab next door

## 2022-03-03 ENCOUNTER — TELEPHONE (OUTPATIENT)
Dept: FAMILY MEDICINE CLINIC | Facility: CLINIC | Age: 32
End: 2022-03-03

## 2022-03-03 DIAGNOSIS — D64.9 ANEMIA, UNSPECIFIED TYPE: Primary | ICD-10-CM

## 2022-03-03 NOTE — TELEPHONE ENCOUNTER
----- Message from DAVID Resendez sent at 3/2/2022 11:55 AM CST -----  RA panel is negative, TSH normal, CMP normal  Vitamin D is low normal. Take 5,000 Units OTC  CBC shows that she is anemic. I would like her to come do an anemia panel. She does not have to have an office visit, but can go to lab next door

## 2022-03-04 ENCOUNTER — LAB (OUTPATIENT)
Dept: LAB | Facility: HOSPITAL | Age: 32
End: 2022-03-04

## 2022-03-04 DIAGNOSIS — D64.9 ANEMIA, UNSPECIFIED TYPE: ICD-10-CM

## 2022-03-04 LAB
FERRITIN SERPL-MCNC: 144 NG/ML (ref 13–150)
FOLATE SERPL-MCNC: 11.4 NG/ML (ref 4.78–24.2)
IRON 24H UR-MRATE: 100 MCG/DL (ref 37–145)
RETICS # AUTO: 0.05 10*6/MM3 (ref 0.02–0.13)
RETICS/RBC NFR AUTO: 1.2 % (ref 0.7–1.9)
VIT B12 BLD-MCNC: 366 PG/ML (ref 211–946)

## 2022-03-04 PROCEDURE — 83540 ASSAY OF IRON: CPT

## 2022-03-04 PROCEDURE — 85045 AUTOMATED RETICULOCYTE COUNT: CPT

## 2022-03-04 PROCEDURE — 82728 ASSAY OF FERRITIN: CPT

## 2022-03-04 PROCEDURE — 82607 VITAMIN B-12: CPT

## 2022-03-04 PROCEDURE — 36415 COLL VENOUS BLD VENIPUNCTURE: CPT

## 2022-03-04 PROCEDURE — 82746 ASSAY OF FOLIC ACID SERUM: CPT

## 2022-03-05 NOTE — PROGRESS NOTES
Iron panel is normal.   Her HH was low however. I dont think it is any thing concerning, but to be 100% she needs to be referred to hematology to make sure there is nothing abnormal

## 2022-03-07 ENCOUNTER — TELEPHONE (OUTPATIENT)
Dept: FAMILY MEDICINE CLINIC | Facility: CLINIC | Age: 32
End: 2022-03-07

## 2022-03-07 DIAGNOSIS — R09.81 NASAL CONGESTION: ICD-10-CM

## 2022-03-07 DIAGNOSIS — D64.9 ANEMIA, UNSPECIFIED TYPE: Primary | ICD-10-CM

## 2022-03-07 NOTE — TELEPHONE ENCOUNTER
----- Message from DAVID Resendez sent at 3/5/2022  8:28 AM CST -----  Iron panel is normal.   Her HH was low however. I dont think it is any thing concerning, but to be 100% she needs to be referred to hematology to make sure there is nothing abnormal

## 2022-03-07 NOTE — TELEPHONE ENCOUNTER
Called patient with lab results and referral order. She has seen [facility] for her anemia in the past. Wants referred there again.

## 2022-03-08 RX ORDER — CETIRIZINE HYDROCHLORIDE, PSEUDOEPHEDRINE HYDROCHLORIDE 5; 120 MG/1; MG/1
1 TABLET, FILM COATED, EXTENDED RELEASE ORAL 2 TIMES DAILY
Qty: 60 TABLET | Refills: 0 | Status: SHIPPED | OUTPATIENT
Start: 2022-03-08 | End: 2022-03-21 | Stop reason: SDUPTHER

## 2022-03-21 ENCOUNTER — TELEPHONE (OUTPATIENT)
Dept: FAMILY MEDICINE CLINIC | Facility: CLINIC | Age: 32
End: 2022-03-21

## 2022-03-21 DIAGNOSIS — R09.81 NASAL CONGESTION: ICD-10-CM

## 2022-03-21 RX ORDER — CETIRIZINE HYDROCHLORIDE, PSEUDOEPHEDRINE HYDROCHLORIDE 5; 120 MG/1; MG/1
1 TABLET, FILM COATED, EXTENDED RELEASE ORAL 2 TIMES DAILY
Qty: 60 TABLET | Refills: 0 | Status: SHIPPED | OUTPATIENT
Start: 2022-03-21 | End: 2022-04-30 | Stop reason: SDUPTHER

## 2022-03-21 RX ORDER — CETIRIZINE HYDROCHLORIDE, PSEUDOEPHEDRINE HYDROCHLORIDE 5; 120 MG/1; MG/1
1 TABLET, FILM COATED, EXTENDED RELEASE ORAL 2 TIMES DAILY
Qty: 60 TABLET | Refills: 0 | Status: SHIPPED | OUTPATIENT
Start: 2022-03-21 | End: 2022-03-21 | Stop reason: SDUPTHER

## 2022-03-21 NOTE — TELEPHONE ENCOUNTER
Med sent from 3/8/22 was printed. Attempted to e-scribe, prescription cont to print only. Called into pharmacy. Attempted to contact pt, no answer, left vm.

## 2022-03-21 NOTE — TELEPHONE ENCOUNTER
Caller: Mirela Flores    Relationship: Self    Best call back number: [phone]     Requested Prescriptions:   Requested Prescriptions     Pending Prescriptions Disp Refills   • cetirizine-pseudoephedrine (ZyrTEC-D) 5-120 MG per 12 hr tablet 60 tablet 0     Sig: Take 1 tablet by mouth 2 (Two) Times a Day for 30 days.        Pharmacy where request should be sent:       [id number] - [address]  AT [facility] [address] [facility] [phone] [facility] [phone]       Additional details provided by patient: PRESCRIPTION WAS SENT TO [facility] ON 3/8/22----PHARMACY IS SAYING THAT DID NOT RECEIVE THIS PRESCRIPTION--PATIENT IS STILL WITHOUT MEDICATION    Does the patient have less than a 3 day supply:  [x] Yes  [] No    Jose Farris Rep   03/21/22 11:22 CDT

## 2022-04-30 DIAGNOSIS — R09.81 NASAL CONGESTION: ICD-10-CM

## 2022-05-02 RX ORDER — CETIRIZINE HYDROCHLORIDE, PSEUDOEPHEDRINE HYDROCHLORIDE 5; 120 MG/1; MG/1
1 TABLET, FILM COATED, EXTENDED RELEASE ORAL 2 TIMES DAILY
Qty: 60 TABLET | Refills: 0 | Status: SHIPPED | OUTPATIENT
Start: 2022-05-02 | End: 2022-05-10

## 2022-05-05 ENCOUNTER — LAB (OUTPATIENT)
Dept: LAB | Facility: HOSPITAL | Age: 32
End: 2022-05-05

## 2022-05-05 ENCOUNTER — TELEPHONE (OUTPATIENT)
Dept: ONCOLOGY | Facility: CLINIC | Age: 32
End: 2022-05-05

## 2022-05-05 ENCOUNTER — CONSULT (OUTPATIENT)
Dept: ONCOLOGY | Facility: CLINIC | Age: 32
End: 2022-05-05

## 2022-05-05 VITALS
WEIGHT: 116.4 LBS | DIASTOLIC BLOOD PRESSURE: 68 MMHG | BODY MASS INDEX: 19.87 KG/M2 | HEIGHT: 64 IN | OXYGEN SATURATION: 98 % | RESPIRATION RATE: 16 BRPM | SYSTOLIC BLOOD PRESSURE: 108 MMHG | TEMPERATURE: 97.8 F | HEART RATE: 79 BPM

## 2022-05-05 DIAGNOSIS — D64.9 ANEMIA, UNSPECIFIED TYPE: Primary | ICD-10-CM

## 2022-05-05 DIAGNOSIS — G43.909 MIGRAINE WITHOUT STATUS MIGRAINOSUS, NOT INTRACTABLE, UNSPECIFIED MIGRAINE TYPE: ICD-10-CM

## 2022-05-05 DIAGNOSIS — E04.1 THYROID NODULE: ICD-10-CM

## 2022-05-05 DIAGNOSIS — D64.9 ANEMIA, UNSPECIFIED TYPE: ICD-10-CM

## 2022-05-05 DIAGNOSIS — D06.9 CIN III (CERVICAL INTRAEPITHELIAL NEOPLASIA GRADE III) WITH SEVERE DYSPLASIA: ICD-10-CM

## 2022-05-05 DIAGNOSIS — R53.81 MALAISE: ICD-10-CM

## 2022-05-05 DIAGNOSIS — M25.50 ARTHRALGIA, UNSPECIFIED JOINT: ICD-10-CM

## 2022-05-05 LAB
ALBUMIN SERPL-MCNC: 4.9 G/DL (ref 3.5–5.2)
ALBUMIN/GLOB SERPL: 1.8 G/DL
ALP SERPL-CCNC: 63 U/L (ref 39–117)
ALT SERPL W P-5'-P-CCNC: 12 U/L (ref 1–33)
ANION GAP SERPL CALCULATED.3IONS-SCNC: 11 MMOL/L (ref 5–15)
AST SERPL-CCNC: 15 U/L (ref 1–32)
BASOPHILS # BLD AUTO: 0.03 10*3/MM3 (ref 0–0.2)
BASOPHILS NFR BLD AUTO: 0.6 % (ref 0–1.5)
BILIRUB SERPL-MCNC: 0.4 MG/DL (ref 0–1.2)
BUN SERPL-MCNC: 7 MG/DL (ref 6–20)
BUN/CREAT SERPL: 13 (ref 7–25)
CALCIUM SPEC-SCNC: 9.5 MG/DL (ref 8.6–10.5)
CHLORIDE SERPL-SCNC: 103 MMOL/L (ref 98–107)
CO2 SERPL-SCNC: 25 MMOL/L (ref 22–29)
CREAT SERPL-MCNC: 0.54 MG/DL (ref 0.57–1)
CRP SERPL-MCNC: <0.3 MG/DL (ref 0–0.5)
DAT POLY-SP REAG RBC QL: NEGATIVE
DEPRECATED RDW RBC AUTO: 43.9 FL (ref 37–54)
EGFRCR SERPLBLD CKD-EPI 2021: 126.4 ML/MIN/1.73
EOSINOPHIL # BLD AUTO: 0.03 10*3/MM3 (ref 0–0.4)
EOSINOPHIL NFR BLD AUTO: 0.6 % (ref 0.3–6.2)
ERYTHROCYTE [DISTWIDTH] IN BLOOD BY AUTOMATED COUNT: 12 % (ref 12.3–15.4)
ERYTHROCYTE [SEDIMENTATION RATE] IN BLOOD: 2 MM/HR (ref 0–20)
FERRITIN SERPL-MCNC: 157.6 NG/ML (ref 13–150)
GLOBULIN UR ELPH-MCNC: 2.8 GM/DL
GLUCOSE SERPL-MCNC: 85 MG/DL (ref 65–99)
HAPTOGLOB SERPL-MCNC: 138 MG/DL (ref 30–200)
HCT VFR BLD AUTO: 39.5 % (ref 34–46.6)
HGB BLD-MCNC: 13.3 G/DL (ref 12–15.9)
IRON 24H UR-MRATE: 67 MCG/DL (ref 37–145)
IRON SATN MFR SERPL: 19 % (ref 20–50)
LDH SERPL-CCNC: 153 U/L (ref 135–214)
LYMPHOCYTES # BLD AUTO: 2.15 10*3/MM3 (ref 0.7–3.1)
LYMPHOCYTES NFR BLD AUTO: 44.8 % (ref 19.6–45.3)
MCH RBC QN AUTO: 33 PG (ref 26.6–33)
MCHC RBC AUTO-ENTMCNC: 33.7 G/DL (ref 31.5–35.7)
MCV RBC AUTO: 98 FL (ref 79–97)
MONOCYTES # BLD AUTO: 0.27 10*3/MM3 (ref 0.1–0.9)
MONOCYTES NFR BLD AUTO: 5.6 % (ref 5–12)
NEUTROPHILS NFR BLD AUTO: 2.31 10*3/MM3 (ref 1.7–7)
NEUTROPHILS NFR BLD AUTO: 48.2 % (ref 42.7–76)
PLATELET # BLD AUTO: 160 10*3/MM3 (ref 140–450)
PMV BLD AUTO: 12.7 FL (ref 6–12)
POTASSIUM SERPL-SCNC: 3.3 MMOL/L (ref 3.5–5.2)
PROT SERPL-MCNC: 7.7 G/DL (ref 6–8.5)
RBC # BLD AUTO: 4.03 10*6/MM3 (ref 3.77–5.28)
RETICS # AUTO: 0.03 10*6/MM3 (ref 0.02–0.13)
RETICS/RBC NFR AUTO: 0.85 % (ref 0.7–1.9)
SODIUM SERPL-SCNC: 139 MMOL/L (ref 136–145)
T4 FREE SERPL-MCNC: 1.08 NG/DL (ref 0.93–1.7)
TIBC SERPL-MCNC: 350 MCG/DL (ref 298–536)
TRANSFERRIN SERPL-MCNC: 235 MG/DL (ref 200–360)
TSH SERPL DL<=0.05 MIU/L-ACNC: 1.29 UIU/ML (ref 0.27–4.2)
WBC NRBC COR # BLD: 4.8 10*3/MM3 (ref 3.4–10.8)

## 2022-05-05 PROCEDURE — 86880 COOMBS TEST DIRECT: CPT

## 2022-05-05 PROCEDURE — 83540 ASSAY OF IRON: CPT

## 2022-05-05 PROCEDURE — 99204 OFFICE O/P NEW MOD 45 MIN: CPT | Performed by: NURSE PRACTITIONER

## 2022-05-05 PROCEDURE — 86334 IMMUNOFIX E-PHORESIS SERUM: CPT

## 2022-05-05 PROCEDURE — 85652 RBC SED RATE AUTOMATED: CPT

## 2022-05-05 PROCEDURE — 84466 ASSAY OF TRANSFERRIN: CPT

## 2022-05-05 PROCEDURE — 83521 IG LIGHT CHAINS FREE EACH: CPT

## 2022-05-05 PROCEDURE — 85045 AUTOMATED RETICULOCYTE COUNT: CPT

## 2022-05-05 PROCEDURE — 83615 LACTATE (LD) (LDH) ENZYME: CPT

## 2022-05-05 PROCEDURE — 83010 ASSAY OF HAPTOGLOBIN QUANT: CPT

## 2022-05-05 PROCEDURE — 82728 ASSAY OF FERRITIN: CPT

## 2022-05-05 PROCEDURE — 86140 C-REACTIVE PROTEIN: CPT

## 2022-05-05 PROCEDURE — 80053 COMPREHEN METABOLIC PANEL: CPT

## 2022-05-05 PROCEDURE — 84443 ASSAY THYROID STIM HORMONE: CPT

## 2022-05-05 PROCEDURE — 84165 PROTEIN E-PHORESIS SERUM: CPT

## 2022-05-05 PROCEDURE — 82784 ASSAY IGA/IGD/IGG/IGM EACH: CPT

## 2022-05-05 PROCEDURE — 82668 ASSAY OF ERYTHROPOIETIN: CPT

## 2022-05-05 PROCEDURE — 84439 ASSAY OF FREE THYROXINE: CPT

## 2022-05-05 PROCEDURE — 85025 COMPLETE CBC W/AUTO DIFF WBC: CPT

## 2022-05-05 PROCEDURE — 36415 COLL VENOUS BLD VENIPUNCTURE: CPT

## 2022-05-05 NOTE — TELEPHONE ENCOUNTER
PATIENT HAVING CAR TROUBLES, RUNNING ABOUT 15 MIN LATE.    PER STEFANI AT THE OFFICE OKAY, IF MORE THEN 15 MIN CALL BACK

## 2022-05-05 NOTE — PROGRESS NOTES
"[facility]  [address]  [place] [address] [phone]    Patient Name: Mirela Flores  Encounter Date: 05/05/2022  YOB: 1990  Patient Number: [id number]    Initial Note    REASON FOR CONSULTATION: Patient states \" anemia panel is off \".    HISTORY OF PRESENT ILLNESS: Mirela Flores is a 32 y.o. female referred by AMPARO Resendez for diagnostic and management recommendations for anemia. History is obtained from patient. History is considered to be accurate.    She  has health history significant for BRETT III, Migraine, Anemia, Thyroid nodule (FNA negative for malignancy), Bilateral occipital neuralgia, Raynauds     She has previously seen in our office by Dr. Perales.  She was found to have iron deficiency anemia and was given IV Venofer as oral iron was not beneficial.   She was also noted to have leukocytosis and per Dr. Perales's note, Flow cytometry showed no evidence of lymphoproliferative disorder.      She hasn't been seen in our office since February 2018.      On February 25, 2022 she was noted with anemia of hemoglobin 11.5, hematocrit 34.5.  MANE and rheumatoid factor were also checked and were negative.  Iron profile was drawn on March 4 which showed iron 100, ferritin 144.  Saturation and TIBC were not checked.       She received regular pap smears and in 2017 she was found to have high grade squamous intraepithelial lesion.  She states it was \"frozen off\".  Repeat pap in 2018 showed the same type of cells. She was diagnosed with Cervical intraepithelial neoplasia and subsequently had hysterectomy.      Also in 2017, she was found to have a nodule on her thyroid.  She had fine needle aspiration and pathology reported benign follicle.    She now feels as though her thyroid has doubled in size.  She has trouble swallowing.     She also complains today of persistent malaise and fatigue. " " She states when she feels this way, she also have bone pain.   She denies any fever, vomiting, blood in the the stool or urine.       LABS    Lab Results - Last 18 Months   Lab Units 05/05/22  1139 02/25/22  1000 04/22/21  2131   HEMOGLOBIN g/dL 13.3 11.5* 13.1   HEMATOCRIT % 39.5 34.5 39.0   MCV fL 98.0* 97.5* 100.0*   WBC 10*3/mm3 4.80 5.40 8.1   RDW % 12.0* 11.8* 12.4   MPV fL 12.7* 11.5 12.1   PLATELETS 10*3/mm3 160 162 211   NEUTROS ABS 10*3/mm3 2.31 3.00 3.9   LYMPHS ABS 10*3/mm3 2.15 2.10 3.4   MONOS ABS 10*3/mm3 0.27  --  0.50   EOS ABS 10*3/mm3 0.03  --  0.10   BASOS ABS 10*3/mm3 0.03  --  0.10   IMMATURE GRANS (ABS) K/uL  --   --  0.0       Lab Results - Last 18 Months   Lab Units 05/05/22  1139 02/25/22  1000   GLUCOSE mg/dL 85 110*   SODIUM mmol/L 139 138   POTASSIUM mmol/L 3.3* 3.7   CO2 mmol/L 25.0 30.0   CHLORIDE mmol/L 103 105   ANION GAP mmol/L 11.0 3.0*   CREATININE mg/dL 0.54* 0.53   BUN mg/dL 7 9   BUN / CREAT RATIO  13.0 17.0   CALCIUM mg/dL 9.5 9.1   EGFR IF NONAFRICN AM mL/min/1.73  --  135   ALK PHOS U/L 63 48   TOTAL PROTEIN g/dL 7.7 7.0   ALT (SGPT) U/L 12 13   AST (SGOT) U/L 15 20   BILIRUBIN mg/dL 0.4 0.6   ALBUMIN g/dL 4.90 4.50   GLOBULIN gm/dL 2.8 2.5       Lab Results - Last 18 Months   Lab Units 05/05/22  1139 02/25/22  1000   URIC ACID mg/dL  --  3.4   LDH U/L 153  --        Lab Results - Last 18 Months   Lab Units 05/05/22  1139 03/04/22  0840 02/25/22  1000   IRON mcg/dL 67 100  --    TIBC mcg/dL 350  --   --    IRON SATURATION % 19*  --   --    FERRITIN ng/mL 157.60* 144.00  --    TSH uIU/mL 1.290  --  0.723   FOLATE ng/mL  --  11.40  --          PAST MEDICAL HISTORY:  ALLERGIES:  Allergies   Allergen Reactions   • Cephalosporins      Pt states was told can never take them again.    • Penicillins      Reaction as a child.  Patient was told can never take again.    • Topamax [Topiramate] Angioedema   • Reglan [Metoclopramide] Other (See Comments)     \"CHEST PAIN\"   • Adhesive " Tape Rash     TEGADERM AND STERI STRIPS   • Levofloxacin Rash     abrupt onset of urticarial-like lesions on her legs and torso   • Tape Rash     TEGADERM AND STERI STRIPS     CURRENT MEDICATIONS:  Outpatient Encounter Medications as of 5/5/2022   Medication Sig Dispense Refill   • Acetaminophen (TYLENOL PO) Take  by mouth As Needed (pain).     • amLODIPine (Norvasc) 2.5 MG tablet Take 1 tablet by mouth Daily. 30 tablet 2   • cetirizine-pseudoephedrine (ZyrTEC-D) 5-120 MG per 12 hr tablet Take 1 tablet by mouth 2 (Two) Times a Day for 30 days. 60 tablet 0   • indomethacin (INDOCIN) 25 MG capsule Take 1 capsule by mouth 3 (Three) Times a Day As Needed for Mild Pain . 60 capsule 1   • prochlorperazine (COMPAZINE) 10 MG tablet Take 1 tablet by mouth Every 6 (Six) Hours As Needed for Nausea or Vomiting. 30 tablet 0   • promethazine-dextromethorphan (PROMETHAZINE-DM) 6.25-15 MG/5ML syrup Take 5 mL by mouth At Night As Needed for Cough. 118 mL 0   • SUMAtriptan (IMITREX) 100 MG tablet Take 1 tablet by mouth Take As Directed. 9 tablet 0   • [DISCONTINUED] Chlorcyclizine-Pseudoephed (Stahist AD) 25-60 MG tablet Take 1 tablet by mouth 2 (Two) Times a Day As Needed (allergies). 60 tablet 2   • [DISCONTINUED] lamoTRIgine (LaMICtal) 25 MG tablet Take 25 mg by mouth Daily.     • [DISCONTINUED] lisdexamfetamine (VYVANSE) 40 MG capsule Take 40 mg by mouth Every Morning.     • [DISCONTINUED] methylPREDNISolone (MEDROL) 4 MG dose pack Take as directed on package instructions. 21 tablet 0     No facility-administered encounter medications on file as of 5/5/2022.     ADULT ILLNESSES:  Patient Active Problem List   Diagnosis Code   • Migraine without aura with status migrainosus G43.001   • Intractable migraine without aura and with status migrainosus G43.011   • Neck pain M54.2   • Anemia D64.9   • Thyroid cyst E04.1   • Migraine G43.909   • Thyroid nodule E04.1   • Bilateral occipital neuralgia M54.81   • Rectal bleeding K62.5   •  Iron deficiency anemia D50.9   • Colitis K52.9   • Exposure to COVID-19 virus Z20.822   • Subacute pansinusitis J01.40     SURGERIES:  Past Surgical History:   Procedure Laterality Date   • CERVICAL CONIZATION Bilateral 7/3/2017    Procedure: CERVICAL CONIZATION, LOOP ELECTROCAUTERY EXCISION PROCEDURE;  Surgeon: Karli Guerrero MD;  Location: [facility] OR;  Service:    • CHOLECYSTECTOMY     • COLONOSCOPY  02/16/2011    normal   • COLONOSCOPY N/A 2/2/2018    Procedure: COLONOSCOPY WITH ANESTHESIA;  Surgeon: Bridger Charlton DO;  Location:  [facility];  Service:    • CYSTOSCOPY N/A 10/29/2018    Procedure: CYSTOSCOPY;  Surgeon: Karli Guerrero MD;  Location: [facility] OR;  Service: DaVinci   • D & C WITH SUCTION     • ENDOSCOPY N/A 2/2/2018    Procedure: ESOPHAGOGASTRODUODENOSCOPY WITH ANESTHESIA;  Surgeon: Bridger Charlton DO;  Location: [facility] ENDOSCOPY;  Service:    • OVARIAN CYST SURGERY Right    • SALPINGECTOMY Bilateral 8/28/2017    Procedure: laparoscopic salpingectomy;  Surgeon: Karli Guerrero MD;  Location: [facility] OR;  Service:    • TOTAL LAPAROSCOPIC HYSTERECTOMY N/A 10/29/2018    Procedure: TOTAL LAPAROSCOPIC HYSTERECTOMY BILATERAL SALINGECTOMY WITH DAVINCI SI ROBOT;  Surgeon: Karli Guerrero MD;  Location: [facility] OR;  Service: DaVinci   • TYMPANOSTOMY TUBE PLACEMENT       HEALTH MAINTENANCE ITEMS:  Health Maintenance Due   Topic Date Due   • ANNUAL PHYSICAL  Never done   • COVID-19 Vaccine (1) Never done   • HEPATITIS C SCREENING  Never done   • PAP SMEAR  09/14/2021       <no information>  Last Completed Colonoscopy     This patient has no relevant Health Maintenance data.        Immunization History   Administered Date(s) Administered   • Tdap 02/07/2017     Last Completed Mammogram     This patient has no relevant Health Maintenance data.            FAMILY HISTORY:  Family History   Problem Relation Age of Onset   • Other Father    • Heart murmur Mother    • Colon polyps Mother    • No Known Problems  "Paternal Grandfather    • No Known Problems Paternal Grandmother    • Thyroid disease Maternal Grandmother    • Hypertension Maternal Grandmother    • Colon polyps Maternal Grandmother    • No Known Problems Maternal Grandfather    • Breast cancer Other 60   • Ovarian cancer Neg Hx      SOCIAL HISTORY:  Social History     Socioeconomic History   • Marital status:    Tobacco Use   • Smoking status: Never Smoker   • Smokeless tobacco: Never Used   Substance and Sexual Activity   • Alcohol use: No   • Drug use: No   • Sexual activity: Yes     Partners: Male     Birth control/protection: None       REVIEW OF SYSTEMS:  Review of Systems   Constitutional: Positive for diaphoresis (Night Sweats) and fatigue.   HENT: Positive for sinus pressure, trouble swallowing and voice change (Hoarse x 3 months).    Eyes: Positive for itching (allergies ).   Respiratory: Negative for cough, choking, chest tightness and shortness of breath.    Cardiovascular: Negative for chest pain, palpitations and leg swelling.   Gastrointestinal: Positive for constipation. Negative for blood in stool, diarrhea and vomiting.   Endocrine: Positive for polydipsia.        Chills and Flushing     Genitourinary:        S/P hysterctomy r/t high grade squamous cells     Musculoskeletal: Positive for arthralgias and back pain.   Neurological: Positive for headache (Migraines ).   Hematological: Bruises/bleeds easily.   Psychiatric/Behavioral: Positive for sleep disturbance (Intermittent Insomia ). Negative for hallucinations, suicidal ideas, negative for hyperactivity, depressed mood and stress.       /68   Pulse 79   Temp 97.8 °F (36.6 °C) (Temporal)   Resp 16   Ht 162.6 cm (64\")   Wt 52.8 kg (116 lb 6.4 oz)   LMP 10/04/2018   SpO2 98%   Breastfeeding No   BMI 19.98 kg/m²  Body surface area is 1.55 meters squared.    Pain Score    05/05/22 1028   PainSc: 0-No pain       Physical Exam:  Physical Exam  Constitutional:       General: She " "is not in acute distress.     Appearance: Normal appearance. She is normal weight.      Comments: Thin.  BMI 20.    She states \"I can not gain weight\"   HENT:      Head: Normocephalic. Contusion present.      Mouth/Throat:      Mouth: Mucous membranes are moist.   Eyes:      Extraocular Movements: Extraocular movements intact.   Neck:      Thyroid: Thyroid mass (Hx of nodule.  Bx in 2017 benign, however, it has enlarged and is causing difficulty swallowing.) and thyromegaly present.        Comments: Enlarged thyroid, goes to right.  Cardiovascular:      Rate and Rhythm: Normal rate and regular rhythm.   Pulmonary:      Effort: Pulmonary effort is normal. No respiratory distress.      Breath sounds: Normal breath sounds. No wheezing.   Abdominal:      General: Bowel sounds are normal.      Palpations: Abdomen is soft.      Tenderness: There is no abdominal tenderness. There is no guarding.   Musculoskeletal:         General: Normal range of motion.      Cervical back: Normal range of motion.   Lymphadenopathy:      Cervical: No cervical adenopathy.   Skin:     General: Skin is warm and dry.      Coloration: Skin is pale.   Neurological:      General: No focal deficit present.      Mental Status: She is alert and oriented to person, place, and time.   Psychiatric:         Mood and Affect: Mood normal.         Behavior: Behavior normal.         Thought Content: Thought content normal.         Judgment: Judgment normal.         Mirela Flores reports a pain score of 0.  No intervention indicated.       ASSESSMENT / PLAN:    1. Anemia, unspecified type    2. Thyroid nodule    3. Migraine without status migrainosus, not intractable, unspecified migraine type    4. Arthralgia, unspecified joint    5. Malaise    6. BRETT III (cervical intraepithelial neoplasia grade III) with severe dysplasia       1.  Anemia    -On 2/25/22  hemoglobin 11.5, hematocrit 34.5.   -There is no renal sufficiency noted    -Iron normal with  iron " 100, ferritin 144.  Saturation and TIBC were  not checked with that blood draw    -Pt has history of iron deficiency requiring Venofer infusions    - Repeat anemia profile.     -Differential Dx:  Iron Deficiency, Primary Bone Marrow Failure    2.  Thyroid Nodule    -In 2017, pt found to have 1.5 cm nodule to right lob of thyroid    -FNA on 12/19/17 showed benign follicle    -Nodule has enlarged.  Will order ultrasound.    -Will refer patient to ENT for biopsy    3.  Migraine    -Chronic    -Continue medications as prescribed.    4. Arthralgia    -Pt complains of bone pain especially when she is fatigued.   - No noted radiologic findings to indicate acute bony  findings     5.  BRETT III   -Pt had hysterectomy in 2018      PLAN:   1.   regarding the reason for the referral.   2.   regarding anemia and differential diagnoses considerations including iron deficiency anemia, bone marrow failure    3.  Labs for   Orders Placed This Encounter   Procedures   • US Head Neck Soft Tissue   • Comprehensive Metabolic Panel   • Erythropoietin   • Sedimentation Rate   • Ferritin   • C-reactive Protein   • Lactate Dehydrogenase   • Reticulocytes   • Iron Profile   • Haptoglobin   • JOSEP, PE & Free LT Chains, Ser   • TSH   • T4, free   • Ambulatory Referral to ENT (Otolaryngology)   • Direct Antiglobulin Test (Direct Stephan)   • CBC & Differential   4 .  Will order ultrasound of soft tissues of  Neck and refer patient to ENT.  5.  Continue current medications and follow up per PCP and any other providers.    8.  Care discussed with patient.  Understanding expressed.  Patient agreeable with plan.   9.  Return to office in 1 week for lab review.    10.  Further recommendations pending.      Thank you for the referral.    I spent 40 minutes caring for Mirela on this date of service. This time includes time spent by me in the following activities: preparing for the visit, reviewing tests, obtaining and/or reviewing a  separately obtained history, counseling and educating the patient/family/caregiver, ordering medications, tests, or procedures, referring and communicating with other health care professionals, documenting information in the medical record and care coordination.

## 2022-05-06 LAB
ALBUMIN SERPL ELPH-MCNC: 4.5 G/DL (ref 2.9–4.4)
ALBUMIN/GLOB SERPL: 1.7 {RATIO} (ref 0.7–1.7)
ALPHA1 GLOB SERPL ELPH-MCNC: 0.2 G/DL (ref 0–0.4)
ALPHA2 GLOB SERPL ELPH-MCNC: 0.6 G/DL (ref 0.4–1)
B-GLOBULIN SERPL ELPH-MCNC: 0.9 G/DL (ref 0.7–1.3)
EPO SERPL-ACNC: 6.5 MIU/ML (ref 2.6–18.5)
GAMMA GLOB SERPL ELPH-MCNC: 1 G/DL (ref 0.4–1.8)
GLOBULIN SER-MCNC: 2.7 G/DL (ref 2.2–3.9)
IGA SERPL-MCNC: 157 MG/DL (ref 87–352)
IGG SERPL-MCNC: 1001 MG/DL (ref 586–1602)
IGM SERPL-MCNC: 93 MG/DL (ref 26–217)
INTERPRETATION SERPL IEP-IMP: ABNORMAL
KAPPA LC FREE SER-MCNC: 14.1 MG/L (ref 3.3–19.4)
KAPPA LC FREE/LAMBDA FREE SER: 0.97 {RATIO} (ref 0.26–1.65)
LABORATORY COMMENT REPORT: ABNORMAL
LAMBDA LC FREE SERPL-MCNC: 14.6 MG/L (ref 5.7–26.3)
M PROTEIN SERPL ELPH-MCNC: ABNORMAL G/DL
PROT SERPL-MCNC: 7.2 G/DL (ref 6–8.5)

## 2022-05-10 ENCOUNTER — TELEPHONE (OUTPATIENT)
Dept: ONCOLOGY | Facility: CLINIC | Age: 32
End: 2022-05-10

## 2022-05-10 DIAGNOSIS — R09.81 NASAL CONGESTION: ICD-10-CM

## 2022-05-10 RX ORDER — NICOTINE POLACRILEX 2 MG
GUM BUCCAL
Qty: 60 TABLET | Refills: 0 | Status: SHIPPED | OUTPATIENT
Start: 2022-05-10 | End: 2022-05-23 | Stop reason: SDUPTHER

## 2022-05-10 NOTE — TELEPHONE ENCOUNTER
Caller: GREY    Relationship:  SCHEDULING    Best call back number: [phone]      What was the call regarding:  WANTED TO KNOW IF THE ULTRA SOUND THAT IS SCHEDULED CAN BE RESCHEDULED UNTIL THEY CAN GET A HOLD OF PATIENT TO CHECK INSURANCE? MEDICAID IS PENDING AND SCHEDULING NEEDS TO KNOW IF PATIENT WILL BE SELF PAY FOR ULTRA SOUND    Do you require a callback: YES

## 2022-05-10 NOTE — TELEPHONE ENCOUNTER
PT CALLED BACK AND SHE IS GOING TO BE SELF PAY AND MAKE PAYMENTS SO THIS DOES NOT NEED TO BE RESCHEDULED. 05/10/2022 SIERRA

## 2022-05-11 ENCOUNTER — HOSPITAL ENCOUNTER (OUTPATIENT)
Dept: ULTRASOUND IMAGING | Facility: HOSPITAL | Age: 32
Discharge: HOME | End: 2022-05-11
Admitting: NURSE PRACTITIONER

## 2022-05-11 DIAGNOSIS — E04.1 THYROID NODULE: ICD-10-CM

## 2022-05-11 DIAGNOSIS — M25.50 ARTHRALGIA, UNSPECIFIED JOINT: ICD-10-CM

## 2022-05-11 DIAGNOSIS — D64.9 ANEMIA, UNSPECIFIED TYPE: ICD-10-CM

## 2022-05-11 DIAGNOSIS — G43.909 MIGRAINE WITHOUT STATUS MIGRAINOSUS, NOT INTRACTABLE, UNSPECIFIED MIGRAINE TYPE: ICD-10-CM

## 2022-05-11 PROCEDURE — 76536 US EXAM OF HEAD AND NECK: CPT

## 2022-05-12 ENCOUNTER — OFFICE VISIT (OUTPATIENT)
Dept: ONCOLOGY | Facility: CLINIC | Age: 32
End: 2022-05-12

## 2022-05-12 VITALS
BODY MASS INDEX: 20.32 KG/M2 | HEART RATE: 90 BPM | DIASTOLIC BLOOD PRESSURE: 66 MMHG | TEMPERATURE: 98.1 F | WEIGHT: 119 LBS | SYSTOLIC BLOOD PRESSURE: 102 MMHG | RESPIRATION RATE: 16 BRPM | HEIGHT: 64 IN | OXYGEN SATURATION: 93 %

## 2022-05-12 DIAGNOSIS — E04.1 THYROID NODULE: Primary | ICD-10-CM

## 2022-05-12 DIAGNOSIS — D64.9 ANEMIA, UNSPECIFIED TYPE: ICD-10-CM

## 2022-05-12 DIAGNOSIS — G43.809 OTHER MIGRAINE WITHOUT STATUS MIGRAINOSUS, NOT INTRACTABLE: ICD-10-CM

## 2022-05-12 PROCEDURE — 99213 OFFICE O/P EST LOW 20 MIN: CPT | Performed by: NURSE PRACTITIONER

## 2022-05-12 NOTE — PROGRESS NOTES
"[facility]Y  [address]  [facility] [address] [phone]    Patient Name: Mirela Flores  Encounter Date: 05/12/2022  YOB: 1990  Patient Number: [id number]    Hematology / Oncology Progress Note    CHIEF COMPLAINT:       HISTORY OF PRESENT ILLNESS: Mirela Flores is a 32 y.o. female referred by AMPARO Resendez for diagnostic and management recommendations for anemia. History is obtained from patient. History is considered to be accurate.     She  has health history significant for BRETT III, Migraine, Anemia, Thyroid nodule (FNA negative for malignancy), Bilateral occipital neuralgia, Raynauds      She has previously seen in our office by Dr. Perales.  She was found to have iron deficiency anemia and was given IV Venofer as oral iron was not beneficial.   She was also noted to have leukocytosis and per Dr. Perales's note, Flow cytometry showed no evidence of lymphoproliferative disorder.       She hasn't been seen in our office since February 2018.       On February 25, 2022 she was noted with anemia of hemoglobin 11.5, hematocrit 34.5.  MANE and rheumatoid factor were also checked and were negative.  Iron profile was drawn on March 4 which showed iron 100, ferritin 144.  Saturation and TIBC were not checked.        She received regular pap smears and in 2017 she was found to have high grade squamous intraepithelial lesion.  She states it was \"frozen off\".  Repeat pap in 2018 showed the same type of cells. She was diagnosed with Cervical intraepithelial neoplasia and subsequently had hysterectomy.       Also in 2017, she was found to have a nodule on her thyroid.  She had fine needle aspiration and pathology reported benign follicle.    She now feels as though her thyroid has doubled in size.  She has trouble swallowing.      She also complains today of persistent malaise and fatigue.  She states " when she feels this way, she also have bone pain.   She denies any fever, vomiting, blood in the the stool or urine.      Follow Up 05/12/22    Ultrasound of soft tissues of neck on May 11, 2022  1. Large partially cystic right thyroid nodule for which  ultrasound-guided fine needle biopsy is recommended based on size.  2. Other small nodules should undergo ultrasound surveillance.      JOSEP, SPEP, Light Chains   No monoclonality detected. No M Jun.  Normal K/L LC Ratio 0.97    Anemia Profile   -Sat 19% (normal 20%)   Otherwise unremarkable.   -Ferritin 157    Chemistry :  K 3.3  CBC WBC 4.8, Hgb 13.3, Hct 39.5, Plt 160      LABS    Lab Results - Last 18 Months   Lab Units 05/05/22  1139 02/25/22  1000 04/22/21  2131   HEMOGLOBIN g/dL 13.3 11.5* 13.1   HEMATOCRIT % 39.5 34.5 39.0   MCV fL 98.0* 97.5* 100.0*   WBC 10*3/mm3 4.80 5.40 8.1   RDW % 12.0* 11.8* 12.4   MPV fL 12.7* 11.5 12.1   PLATELETS 10*3/mm3 160 162 211   NEUTROS ABS 10*3/mm3 2.31 3.00 3.9   LYMPHS ABS 10*3/mm3 2.15 2.10 3.4   MONOS ABS 10*3/mm3 0.27  --  0.50   EOS ABS 10*3/mm3 0.03  --  0.10   BASOS ABS 10*3/mm3 0.03  --  0.10   IMMATURE GRANS (ABS) K/uL  --   --  0.0       Lab Results - Last 18 Months   Lab Units 05/05/22  1139 02/25/22  1000   GLUCOSE mg/dL 85 110*   SODIUM mmol/L 139 138   POTASSIUM mmol/L 3.3* 3.7   CO2 mmol/L 25.0 30.0   CHLORIDE mmol/L 103 105   ANION GAP mmol/L 11.0 3.0*   CREATININE mg/dL 0.54* 0.53   BUN mg/dL 7 9   BUN / CREAT RATIO  13.0 17.0   CALCIUM mg/dL 9.5 9.1   EGFR IF NONAFRICN AM mL/min/1.73  --  135   ALK PHOS U/L 63 48   TOTAL PROTEIN g/dL 7.7 7.0   ALT (SGPT) U/L 12 13   AST (SGOT) U/L 15 20   BILIRUBIN mg/dL 0.4 0.6   ALBUMIN g/dL 4.90  4.5* 4.50   GLOBULIN gm/dL 2.8 2.5       Lab Results - Last 18 Months   Lab Units 05/05/22  1139 02/25/22  1000   M-SPIKE g/dL Not Observed  --    KAPPA/LAMBDA RATIO, S  0.97  --    FREE LAMBDA LIGHT CHAINS mg/L 14.6  --    URIC ACID mg/dL  --  3.4   IG KAPPA FREE LIGHT CHAIN  "mg/L 14.1  --    LDH U/L 153  --        Lab Results - Last 18 Months   Lab Units 05/05/22  1139 03/04/22  0840 02/25/22  1000   IRON mcg/dL 67 100  --    TIBC mcg/dL 350  --   --    IRON SATURATION % 19*  --   --    FERRITIN ng/mL 157.60* 144.00  --    TSH uIU/mL 1.290  --  0.723   FOLATE ng/mL  --  11.40  --          PAST MEDICAL HISTORY:  ALLERGIES:  Allergies   Allergen Reactions   • Cephalosporins      Pt states was told can never take them again.    • Penicillins      Reaction as a child.  Patient was told can never take again.    • Topamax [Topiramate] Angioedema   • Reglan [Metoclopramide] Other (See Comments)     \"CHEST PAIN\"   • Adhesive Tape Rash     TEGADERM AND STERI STRIPS   • Levofloxacin Rash     abrupt onset of urticarial-like lesions on her legs and torso   • Tape Rash     TEGADERM AND STERI STRIPS     CURRENT MEDICATIONS:  Outpatient Encounter Medications as of 5/12/2022   Medication Sig Dispense Refill   • Acetaminophen (TYLENOL PO) Take  by mouth As Needed (pain).     • amLODIPine (Norvasc) 2.5 MG tablet Take 1 tablet by mouth Daily. 30 tablet 2   • indomethacin (INDOCIN) 25 MG capsule Take 1 capsule by mouth 3 (Three) Times a Day As Needed for Mild Pain . 60 capsule 1   • prochlorperazine (COMPAZINE) 10 MG tablet Take 1 tablet by mouth Every 6 (Six) Hours As Needed for Nausea or Vomiting. 30 tablet 0   • promethazine-dextromethorphan (PROMETHAZINE-DM) 6.25-15 MG/5ML syrup Take 5 mL by mouth At Night As Needed for Cough. 118 mL 0   • SUMAtriptan (IMITREX) 100 MG tablet Take 1 tablet by mouth Take As Directed. 9 tablet 0   • Wal-Zyr D 5-120 MG per 12 hr tablet TAKE 1 TABLET BY MOUTH TWICE DAILY 60 tablet 0     No facility-administered encounter medications on file as of 5/12/2022.     ADULT ILLNESSES:  Patient Active Problem List   Diagnosis Code   • Migraine without aura with status migrainosus G43.001   • Intractable migraine without aura and with status migrainosus G43.011   • Neck pain M54.2 "   • Anemia D64.9   • Thyroid cyst E04.1   • Migraine G43.909   • Thyroid nodule E04.1   • Bilateral occipital neuralgia M54.81   • Rectal bleeding K62.5   • Iron deficiency anemia D50.9   • Colitis K52.9   • Exposure to COVID-19 virus Z20.822   • Subacute pansinusitis J01.40     SURGERIES:  Past Surgical History:   Procedure Laterality Date   • CERVICAL CONIZATION Bilateral 7/3/2017    Procedure: CERVICAL CONIZATION, LOOP ELECTROCAUTERY EXCISION PROCEDURE;  Surgeon: Karli Guerrero MD;  Location: [facility] OR;  Service:    • CHOLECYSTECTOMY     • COLONOSCOPY  02/16/2011    normal   • COLONOSCOPY N/A 2/2/2018    Procedure: COLONOSCOPY WITH ANESTHESIA;  Surgeon: Bridger Charlton DO;  Location: [facility] ENDOSCOPY;  Service:    • CYSTOSCOPY N/A 10/29/2018    Procedure: CYSTOSCOPY;  Surgeon: Karli Guerrero MD;  Location: [facility] OR;  Service: DaVinci   • D & C WITH SUCTION     • ENDOSCOPY N/A 2/2/2018    Procedure: ESOPHAGOGASTRODUODENOSCOPY WITH ANESTHESIA;  Surgeon: Bridger Charlton DO;  Location: [facility] ENDOSCOPY;  Service:    • OVARIAN CYST SURGERY Right    • SALPINGECTOMY Bilateral 8/28/2017    Procedure: laparoscopic salpingectomy;  Surgeon: Karli Guerrero MD;  Location: [facility] OR;  Service:    • TOTAL LAPAROSCOPIC HYSTERECTOMY N/A 10/29/2018    Procedure: TOTAL LAPAROSCOPIC HYSTERECTOMY BILATERAL SALINGECTOMY WITH DAVINCI SI ROBOT;  Surgeon: Karli Guerrero MD;  Location: [facility] OR;  Service: DaVinci   • TYMPANOSTOMY TUBE PLACEMENT       HEALTH MAINTENANCE ITEMS:  Health Maintenance Due   Topic Date Due   • ANNUAL PHYSICAL  Never done   • COVID-19 Vaccine (1) Never done   • HEPATITIS C SCREENING  Never done   • PAP SMEAR  09/14/2021       <no information>  Last Completed Colonoscopy     This patient has no relevant Health Maintenance data.        Immunization History   Administered Date(s) Administered   • Tdap 02/07/2017     Last Completed Mammogram     This patient has no relevant Health Maintenance data.     "        FAMILY HISTORY:  Family History   Problem Relation Age of Onset   • Other Father    • Heart murmur Mother    • Colon polyps Mother    • No Known Problems Paternal Grandfather    • No Known Problems Paternal Grandmother    • Thyroid disease Maternal Grandmother    • Hypertension Maternal Grandmother    • Colon polyps Maternal Grandmother    • No Known Problems Maternal Grandfather    • Breast cancer Other 60   • Ovarian cancer Neg Hx      SOCIAL HISTORY:  Social History     Socioeconomic History   • Marital status:    Tobacco Use   • Smoking status: Never Smoker   • Smokeless tobacco: Never Used   Substance and Sexual Activity   • Alcohol use: No   • Drug use: No   • Sexual activity: Yes     Partners: Male     Birth control/protection: None       REVIEW OF SYSTEMS:    Review of Systems   Constitutional: Positive for diaphoresis (Night Sweats) and fatigue.   HENT: Positive for sinus pressure, trouble swallowing and voice change (Hoarse x 3 months).    Eyes: Positive for itching (allergies ).   Respiratory: Negative for cough, choking, chest tightness and shortness of breath.    Cardiovascular: Negative for chest pain, palpitations and leg swelling.   Gastrointestinal: Positive for constipation. Negative for blood in stool, diarrhea and vomiting.   Endocrine: Positive for polydipsia.        Chills and Flushing        /66   Pulse 90   Temp 98.1 °F (36.7 °C) (Temporal)   Resp 16   Ht 162.6 cm (64\")   Wt 54 kg (119 lb)   LMP 10/04/2018   SpO2 93%   Breastfeeding No   BMI 20.43 kg/m²  Body surface area is 1.57 meters squared.    Pain Score    05/12/22 1027   PainSc: 0-No pain       Physical Exam:  Physical Exam  Constitutional:       Appearance: Normal appearance.   HENT:      Head: Normocephalic and atraumatic.   Eyes:      Extraocular Movements: Extraocular movements intact.   Cardiovascular:      Rate and Rhythm: Normal rate and regular rhythm.   Pulmonary:      Effort: Pulmonary effort is " normal.      Breath sounds: Normal breath sounds.   Neurological:      General: No focal deficit present.      Mental Status: She is alert and oriented to person, place, and time.   Psychiatric:         Mood and Affect: Mood normal.         Behavior: Behavior normal.         Mirela Flores reports a pain score of 0.  No intervention indicated       ASSESSMENT / PLAN    1. Thyroid nodule    2. Anemia, unspecified type    3. Other migraine without status migrainosus, not intractable           ASSESSMENT / PLAN:     1. Anemia, unspecified type    2. Thyroid nodule    3. Migraine without status migrainosus, not intractable, unspecified migraine type    4. Arthralgia, unspecified joint    5. Malaise    6. BRETT III (cervical intraepithelial neoplasia grade III) with severe dysplasia       1.  Anemia               -On 5/5/22  hemoglobin 13.3, hematocrit 39.5              -There is no renal sufficiency noted               -Iron normal with  iron 67, ferritin 157.  Saturation 19% and TIBC 350               -Pt has history of iron deficiency requiring Venofer infusions               - Iron is only slightly decreased at 19%              -Differential Dx:  Iron Deficiency, Primary Bone Marrow Failure     2.  Thyroid Nodule               -In 2017, pt found to have 1.5 cm nodule to right lob of thyroid               -FNA on 12/19/17 showed benign follicle               -Nodule has enlarged.     - Large partially cystic right thyroid nodule for which ultrasound-guided fine needle biopsy is recommended based on size.   -We will schedule FNA     3.  Migraine               -Chronic               -Continue medications as prescribed.     4. Arthralgia               -Pt complains of bone pain especially when she is fatigued.              - No noted radiologic findings to indicate acute bony findings      5.  BRETT III              -Pt had hysterectomy in 2018    PLAN:      Orders Placed This Encounter   Procedures   • COVID PRE-OP /  PRE-PROCEDURE SCREENING ORDER (NO ISOLATION) - Swab, Nasopharynx   • US Guided Thyroid Biopsy      Stable for observation.  Continue current medications, treatment plans and follow up with PCP.  Pt will RTC after FNA   Anemia is stable.  Will continue to monitor.   Care discussed with patient.  Understanding expressed.  Patient agreeable with plan.      I spent 30 minutes caring for Mirela on this date of service. This time includes time spent by me in the following activities: preparing for the visit, obtaining and/or reviewing a separately obtained history, counseling and educating the patient/family/caregiver, ordering medications, tests, or procedures and documenting information in the medical record.

## 2022-05-20 ENCOUNTER — TELEPHONE (OUTPATIENT)
Dept: ONCOLOGY | Facility: CLINIC | Age: 32
End: 2022-05-20

## 2022-05-20 NOTE — TELEPHONE ENCOUNTER
"PATIENT GOT A \"MYCHART\" MESSAGE BUT WAS UNABLE TO OPEN IT SO I CALLED CRISTINA & LIANA AT PRACTICE & NO ONE WAS TRYING TO REACH HER, PATIENT V/U.  "

## 2022-05-23 ENCOUNTER — TELEPHONE (OUTPATIENT)
Dept: FAMILY MEDICINE CLINIC | Facility: CLINIC | Age: 32
End: 2022-05-23

## 2022-05-23 DIAGNOSIS — R09.81 NASAL CONGESTION: ICD-10-CM

## 2022-05-23 RX ORDER — CETIRIZINE HYDROCHLORIDE, PSEUDOEPHEDRINE HYDROCHLORIDE 5; 120 MG/1; MG/1
1 TABLET, FILM COATED, EXTENDED RELEASE ORAL 2 TIMES DAILY
Qty: 60 TABLET | Refills: 2 | Status: SHIPPED | OUTPATIENT
Start: 2022-05-23 | End: 2022-07-07 | Stop reason: SDUPTHER

## 2022-05-23 NOTE — TELEPHONE ENCOUNTER
Caller: Mirela Flores    Relationship: Self    Best call back number:[phone]    What medications are you currently taking:   Current Outpatient Medications on File Prior to Visit   Medication Sig Dispense Refill   • Acetaminophen (TYLENOL PO) Take  by mouth As Needed (pain).     • amLODIPine (Norvasc) 2.5 MG tablet Take 1 tablet by mouth Daily. 30 tablet 2   • indomethacin (INDOCIN) 25 MG capsule Take 1 capsule by mouth 3 (Three) Times a Day As Needed for Mild Pain . 60 capsule 1   • prochlorperazine (COMPAZINE) 10 MG tablet Take 1 tablet by mouth Every 6 (Six) Hours As Needed for Nausea or Vomiting. 30 tablet 0   • promethazine-dextromethorphan (PROMETHAZINE-DM) 6.25-15 MG/5ML syrup Take 5 mL by mouth At Night As Needed for Cough. 118 mL 0   • SUMAtriptan (IMITREX) 100 MG tablet Take 1 tablet by mouth Take As Directed. 9 tablet 0   • Wal-Zyr D 5-120 MG per 12 hr tablet TAKE 1 TABLET BY MOUTH TWICE DAILY 60 tablet 0     No current facility-administered medications on file prior to visit.      When did you start taking these medications: BEEN ON FOR AWHILE     Which medication are you concerned about:    promethazine-dextromethorphan (PROMETHAZINE-DM) 6.25-15 MG/5ML syrup     Who prescribed you this medication: DR. MARIA     What are your concerns: PHARMACY TOLD PATIENT THEY NEEDED A PAPER PRESCRIPTION

## 2022-05-23 NOTE — TELEPHONE ENCOUNTER
Pt called wanting a refill on Zyrtec D with sudafed in it not a refill on Promethazine cough syrup.

## 2022-05-27 ENCOUNTER — LAB (OUTPATIENT)
Dept: LAB | Facility: HOSPITAL | Age: 32
End: 2022-05-27

## 2022-05-27 DIAGNOSIS — E04.1 THYROID NODULE: ICD-10-CM

## 2022-05-27 LAB — SARS-COV-2 ORF1AB RESP QL NAA+PROBE: NOT DETECTED

## 2022-05-27 PROCEDURE — U0004 COV-19 TEST NON-CDC HGH THRU: HCPCS

## 2022-05-27 PROCEDURE — C9803 HOPD COVID-19 SPEC COLLECT: HCPCS

## 2022-05-31 ENCOUNTER — HOSPITAL ENCOUNTER (OUTPATIENT)
Dept: ULTRASOUND IMAGING | Facility: HOSPITAL | Age: 32
Discharge: HOME | End: 2022-05-31
Admitting: RADIOLOGY

## 2022-05-31 DIAGNOSIS — E04.1 THYROID NODULE: ICD-10-CM

## 2022-05-31 PROCEDURE — 88112 CYTOPATH CELL ENHANCE TECH: CPT | Performed by: NURSE PRACTITIONER

## 2022-05-31 PROCEDURE — 76942 ECHO GUIDE FOR BIOPSY: CPT

## 2022-05-31 PROCEDURE — 88172 CYTP DX EVAL FNA 1ST EA SITE: CPT | Performed by: NURSE PRACTITIONER

## 2022-06-01 LAB
BEAKER LAB AP INTRAOPERATIVE CONSULTATION: NORMAL
CYTO UR: NORMAL
LAB AP CASE REPORT: NORMAL
LAB AP CLINICAL INFORMATION: NORMAL
Lab: NORMAL
PATH REPORT.FINAL DX SPEC: NORMAL
PATH REPORT.GROSS SPEC: NORMAL

## 2022-06-02 ENCOUNTER — TELEPHONE (OUTPATIENT)
Dept: FAMILY MEDICINE CLINIC | Facility: CLINIC | Age: 32
End: 2022-06-02

## 2022-06-02 DIAGNOSIS — Z98.890 S/P THYROID BIOPSY: Primary | ICD-10-CM

## 2022-06-02 NOTE — TELEPHONE ENCOUNTER
Caller: Mirela Flores    Relationship: Self    Best call back number: [phone] (H)     What is the medical concern/diagnosis: Thyroid    What specialty or service is being requested: Endocrinology     What is the provider, practice or medical service name: Dr. Kayden Mayer    What is the office location: [address] [place] [address] [address]     What is the office phone number: [phone]     Any additional details:

## 2022-06-16 ENCOUNTER — OFFICE VISIT (OUTPATIENT)
Dept: OTOLARYNGOLOGY | Facility: CLINIC | Age: 32
End: 2022-06-16

## 2022-06-16 ENCOUNTER — TELEPHONE (OUTPATIENT)
Dept: OTOLARYNGOLOGY | Facility: CLINIC | Age: 32
End: 2022-06-16

## 2022-06-16 VITALS
SYSTOLIC BLOOD PRESSURE: 118 MMHG | TEMPERATURE: 98 F | HEART RATE: 109 BPM | BODY MASS INDEX: 19.97 KG/M2 | HEIGHT: 64 IN | DIASTOLIC BLOOD PRESSURE: 74 MMHG | WEIGHT: 117 LBS

## 2022-06-16 DIAGNOSIS — E04.1 THYROID NODULE: ICD-10-CM

## 2022-06-16 DIAGNOSIS — E04.2 NONTOXIC MULTINODULAR GOITER: Primary | ICD-10-CM

## 2022-06-16 PROCEDURE — 99204 OFFICE O/P NEW MOD 45 MIN: CPT | Performed by: OTOLARYNGOLOGY

## 2022-06-16 NOTE — PATIENT INSTRUCTIONS
CONTACT INFORMATION:  The main office phone number is [phone]. For emergencies after hours and on weekends, this number will convert over to our answering service and the on call provider will answer. Please try to keep non emergent phone calls/ questions to office hours 9am-5pm Monday through Friday.     [employer]  As an alternative, you can sign up and use the Epic MyChart system for more direct and quicker access for non emergent questions/ problems.  [facility] [employer] allows you to send messages to your doctor, view your test results, renew your prescriptions, schedule appointments, and more. To sign up, go to [employer] and click on the Sign Up Now link in the New User? box. Enter your [employer] Activation Code exactly as it appears below along with the last four digits of your Social Security Number and your Date of Birth () to complete the sign-up process. If you do not sign up before the expiration date, you must request a new code.    [employer] Activation Code: Activation code not generated  Current [employer] Status: Active    If you have questions, you can email [email or web address] or call [phone] to talk to our [employer] staff. Remember, [employer] is NOT to be used for urgent needs. For medical emergencies, dial 911.
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
## 2022-06-16 NOTE — TELEPHONE ENCOUNTER
Provider:   Caller: GLYNN GARBER    Relationship to Patient: SELF    Phone Number: [phone]    Reason for Call: PT IS CALLING IN TO SEE IF SHE CAN HAVE THE DATE OF 07/06/22 INSTEAD OF 07/13/22 FOR THE TOTAL THYROIDECTOMY PROCEDURE

## 2022-07-07 DIAGNOSIS — R09.81 NASAL CONGESTION: ICD-10-CM

## 2022-07-07 RX ORDER — CETIRIZINE HYDROCHLORIDE, PSEUDOEPHEDRINE HYDROCHLORIDE 5; 120 MG/1; MG/1
1 TABLET, FILM COATED, EXTENDED RELEASE ORAL 2 TIMES DAILY
Qty: 60 TABLET | Refills: 0 | Status: SHIPPED | OUTPATIENT
Start: 2022-07-07 | End: 2022-07-27 | Stop reason: SDUPTHER

## 2022-07-07 NOTE — TELEPHONE ENCOUNTER
Caller: JCARLOS MAKI    Relationship: SELF     Best call back number: [phone] (H)    Requested Prescriptions:      cetirizine-pseudoephedrine (Wal-Zyr D) 5-120 MG per 12 hr tablet  1 tablet, 2 Times Daily         Pharmacy where request should be sent:    [id number] [address] AT [address] [facility] - [phone]  - [phone]   [phone]  Additional details provided by patient: STATES SHE HAS REQUESTED THE REFILL TWO MONTHS AGO A FEW TIMES AND THE MEDICATION IS NOT AT THE PHARMACY   Does the patient have less than a 3 day supply:  [x] Yes  [] No    Jose Houser Rep   07/07/22 09:44 CDT

## 2022-07-08 NOTE — TELEPHONE ENCOUNTER
PATIENT CALLED STATING THE PHARMACY THIS MEDICATION IS BEING SENT TO IS NEEDING THE PRESCRIPTION FAXED TO THEM INSTEAD OF ELECTRONICALLY SENT, WITH THIS TYPE OF MEDICATION THEY ARE NOT ABLE TO RECEIVE REFILL REQUESTING UNLESS IT IS FAXED    GOOD CALL BACK [phone]

## 2022-07-11 ENCOUNTER — PRE-ADMISSION TESTING (OUTPATIENT)
Dept: PREADMISSION TESTING | Facility: HOSPITAL | Age: 32
End: 2022-07-11

## 2022-07-11 ENCOUNTER — LAB (OUTPATIENT)
Dept: LAB | Facility: HOSPITAL | Age: 32
End: 2022-07-11

## 2022-07-11 VITALS
DIASTOLIC BLOOD PRESSURE: 60 MMHG | HEIGHT: 65 IN | HEART RATE: 84 BPM | OXYGEN SATURATION: 100 % | SYSTOLIC BLOOD PRESSURE: 103 MMHG | RESPIRATION RATE: 20 BRPM | WEIGHT: 118.61 LBS | BODY MASS INDEX: 19.76 KG/M2

## 2022-07-11 DIAGNOSIS — E04.1 THYROID NODULE: ICD-10-CM

## 2022-07-11 DIAGNOSIS — E04.2 NONTOXIC MULTINODULAR GOITER: ICD-10-CM

## 2022-07-11 LAB
DEPRECATED RDW RBC AUTO: 43.4 FL (ref 37–54)
ERYTHROCYTE [DISTWIDTH] IN BLOOD BY AUTOMATED COUNT: 11.9 % (ref 12.3–15.4)
HCT VFR BLD AUTO: 38 % (ref 34–46.6)
HGB BLD-MCNC: 12.5 G/DL (ref 12–15.9)
MCH RBC QN AUTO: 32.6 PG (ref 26.6–33)
MCHC RBC AUTO-ENTMCNC: 32.9 G/DL (ref 31.5–35.7)
MCV RBC AUTO: 99.2 FL (ref 79–97)
PLATELET # BLD AUTO: 171 10*3/MM3 (ref 140–450)
PMV BLD AUTO: 12.2 FL (ref 6–12)
RBC # BLD AUTO: 3.83 10*6/MM3 (ref 3.77–5.28)
SARS-COV-2 ORF1AB RESP QL NAA+PROBE: NOT DETECTED
WBC NRBC COR # BLD: 6.41 10*3/MM3 (ref 3.4–10.8)

## 2022-07-11 PROCEDURE — U0004 COV-19 TEST NON-CDC HGH THRU: HCPCS

## 2022-07-11 PROCEDURE — 85027 COMPLETE CBC AUTOMATED: CPT

## 2022-07-11 PROCEDURE — 36415 COLL VENOUS BLD VENIPUNCTURE: CPT

## 2022-07-11 PROCEDURE — C9803 HOPD COVID-19 SPEC COLLECT: HCPCS

## 2022-07-11 NOTE — DISCHARGE INSTRUCTIONS
Before you come to the hospital        Arrival time: AS DIRECTED BY OFFICE     YOU MAY TAKE THE FOLLOWING MEDICATION(S) THE MORNING OF SURGERY WITH A SIP OF WATER: NONE           ALL OTHER HOME MEDICATION CHECK WITH YOUR PHYSICIAN (especially if you are taking diabetes medicines or blood thinners)      If you were given and instructed to use a germ- killing soap, use as directed the night before surgery and the morning of surgery before coming to the hospital.             Eating and drinking restrictions prior to scheduled arrival time    2 Hours before arrival time STOP   Drinking Clear liquids (water, apple juice-no pulp)     6 Hours before arrival time STOP   Milk or drinks that contain milk, full liquids    6 Hours before arrival time STOP   Light meals or foods, such as toast or cereal    8 Hours before arrival time STOP   Heavy foods, such as meat, fried foods, or fatty foods    (It is extremely important that you follow these guidelines to prevent delay or cancelation of your procedure)     Clear Liquids  Water and flavored water                                                                      Clear Fruit juices, such as cranberry juice and apple juice.  Black coffee (NO cream of any kind, including powdered).  Plain tea  Clear bouillon or broth.  Flavored gelatin.  Soda.  Gatorade or Powerade.  Full liquid examples  Juices that have pulp.  Frozen ice pops that contain fruit pieces.  Coffee with creamer  Milk.  Yogurt.              MANAGING PAIN AFTER SURGERY    We know you are probably wondering what your pain will be like after surgery.  Following surgery it is unrealistic to expect you will not have pain.   Pain is how our bodies let us know that something is wrong or cautions us to be careful.  That said, our goal is to make your pain tolerable.    Methods we may use to treat your pain include (oral or IV medications, PCAs, epidurals, nerve blocks, etc.)   While some procedures require IV pain  medications for a short time after surgery, transitioning to pain medications by mouth allows for better management of pain.   Your nurse will encourage you to take oral pain medications whenever possible.  IV medications work almost immediately, but only last a short while.  Taking medications by mouth allows for a more constant level of medication in your blood stream for a longer period of time.      Once your pain is out of control it is harder to get back under control.  It is important you are aware when your next dose of pain medication is due.  If you are admitted, your nurse may write the time of your next dose on the white board in your room to help you remember.      We are interested in your pain and encourage you to inform us about aggravating factors during your visit.   Many times a simple repositioning every few hours can make a big difference.    If your physician says it is okay, do not let your pain prevent you from getting out of bed. Be sure to call your nurse for assistance prior to getting up so you do not fall.      Before surgery, please decide your tolerable pain goal.  These faces help describe the pain ratings we use on a 0-10 scale.   Be prepared to tell us your goal and whether or not you take pain or anxiety medications at home.

## 2022-07-12 ENCOUNTER — TELEPHONE (OUTPATIENT)
Dept: OTOLARYNGOLOGY | Facility: CLINIC | Age: 32
End: 2022-07-12

## 2022-07-12 NOTE — TELEPHONE ENCOUNTER
Spoke with patient as she has concerns about her allergies to tape and steri strips as it tears her skin and causes itching swelling and a rash. Informed patient that it was listed in chart. Informed her of a 1000 arrival time to  2 with nothing to eat or drink after midnight.

## 2022-07-13 ENCOUNTER — ANESTHESIA (OUTPATIENT)
Dept: PERIOP | Facility: HOSPITAL | Age: 32
End: 2022-07-13

## 2022-07-13 ENCOUNTER — ANESTHESIA EVENT (OUTPATIENT)
Dept: PERIOP | Facility: HOSPITAL | Age: 32
End: 2022-07-13

## 2022-07-13 ENCOUNTER — HOSPITAL ENCOUNTER (OUTPATIENT)
Facility: HOSPITAL | Age: 32
Discharge: HOME | End: 2022-07-14
Attending: OTOLARYNGOLOGY | Admitting: OTOLARYNGOLOGY

## 2022-07-13 DIAGNOSIS — E89.0 S/P TOTAL THYROIDECTOMY: ICD-10-CM

## 2022-07-13 DIAGNOSIS — E04.2 NONTOXIC MULTINODULAR GOITER: ICD-10-CM

## 2022-07-13 DIAGNOSIS — E89.0 POSTOPERATIVE HYPOTHYROIDISM: Primary | ICD-10-CM

## 2022-07-13 DIAGNOSIS — E04.1 THYROID NODULE: ICD-10-CM

## 2022-07-13 PROBLEM — Z90.89 S/P TOTAL THYROIDECTOMY: Status: ACTIVE | Noted: 2022-07-13

## 2022-07-13 PROBLEM — Z98.890 S/P TOTAL THYROIDECTOMY: Status: ACTIVE | Noted: 2022-07-13

## 2022-07-13 LAB
CALCIUM SPEC-SCNC: 8 MG/DL (ref 8.6–10.5)
PTH-INTACT SERPL-MCNC: 8.2 PG/ML (ref 15–65)

## 2022-07-13 PROCEDURE — 25010000002 FENTANYL CITRATE (PF) 100 MCG/2ML SOLUTION: Performed by: NURSE ANESTHETIST, CERTIFIED REGISTERED

## 2022-07-13 PROCEDURE — 25010000002 ONDANSETRON PER 1 MG: Performed by: NURSE ANESTHETIST, CERTIFIED REGISTERED

## 2022-07-13 PROCEDURE — G0378 HOSPITAL OBSERVATION PER HR: HCPCS

## 2022-07-13 PROCEDURE — 88307 TISSUE EXAM BY PATHOLOGIST: CPT | Performed by: OTOLARYNGOLOGY

## 2022-07-13 PROCEDURE — 25010000002 DEXAMETHASONE PER 1 MG: Performed by: NURSE ANESTHETIST, CERTIFIED REGISTERED

## 2022-07-13 PROCEDURE — 82310 ASSAY OF CALCIUM: CPT | Performed by: OTOLARYNGOLOGY

## 2022-07-13 PROCEDURE — 60240 REMOVAL OF THYROID: CPT | Performed by: OTOLARYNGOLOGY

## 2022-07-13 PROCEDURE — 25010000002 DROPERIDOL PER 5 MG: Performed by: ANESTHESIOLOGY

## 2022-07-13 PROCEDURE — 25010000002 FENTANYL CITRATE (PF) 250 MCG/5ML SOLUTION: Performed by: NURSE ANESTHETIST, CERTIFIED REGISTERED

## 2022-07-13 PROCEDURE — 25010000002 ONDANSETRON PER 1 MG: Performed by: ANESTHESIOLOGY

## 2022-07-13 PROCEDURE — 25010000002 PROPOFOL 10 MG/ML EMULSION: Performed by: NURSE ANESTHETIST, CERTIFIED REGISTERED

## 2022-07-13 PROCEDURE — 25010000002 MIDAZOLAM PER 1 MG: Performed by: ANESTHESIOLOGY

## 2022-07-13 PROCEDURE — 83970 ASSAY OF PARATHORMONE: CPT | Performed by: OTOLARYNGOLOGY

## 2022-07-13 PROCEDURE — 0 POTASSIUM CHLORIDE PER 2 MEQ: Performed by: OTOLARYNGOLOGY

## 2022-07-13 DEVICE — ABSORBABLE HEMOSTAT (OXIDIZED REGENERATED CELLULOSE, U.S.P.)
Type: IMPLANTABLE DEVICE | Site: NECK | Status: FUNCTIONAL
Brand: SURGICEL FIBRILLAR

## 2022-07-13 RX ORDER — CALCIUM CARBONATE 200(500)MG
4 TABLET,CHEWABLE ORAL 4 TIMES DAILY
Status: DISCONTINUED | OUTPATIENT
Start: 2022-07-13 | End: 2022-07-14 | Stop reason: HOSPADM

## 2022-07-13 RX ORDER — SODIUM CHLORIDE 9 MG/ML
INJECTION, SOLUTION INTRAVENOUS CONTINUOUS PRN
Status: COMPLETED | OUTPATIENT
Start: 2022-07-13 | End: 2022-07-13

## 2022-07-13 RX ORDER — HYDROCODONE BITARTRATE AND ACETAMINOPHEN 5; 325 MG/1; MG/1
1 TABLET ORAL EVERY 4 HOURS PRN
Qty: 15 TABLET | Refills: 0 | Status: SHIPPED | OUTPATIENT
Start: 2022-07-13 | End: 2022-07-16

## 2022-07-13 RX ORDER — SODIUM CHLORIDE AND POTASSIUM CHLORIDE 150; 450 MG/100ML; MG/100ML
75 INJECTION, SOLUTION INTRAVENOUS CONTINUOUS
Status: DISCONTINUED | OUTPATIENT
Start: 2022-07-13 | End: 2022-07-14 | Stop reason: HOSPADM

## 2022-07-13 RX ORDER — PHENYLEPHRINE HCL IN 0.9% NACL 1 MG/10 ML
SYRINGE (ML) INTRAVENOUS AS NEEDED
Status: DISCONTINUED | OUTPATIENT
Start: 2022-07-13 | End: 2022-07-13 | Stop reason: SURG

## 2022-07-13 RX ORDER — IBUPROFEN 200 MG
200 TABLET ORAL EVERY 4 HOURS PRN
Status: DISCONTINUED | OUTPATIENT
Start: 2022-07-13 | End: 2022-07-14 | Stop reason: HOSPADM

## 2022-07-13 RX ORDER — SODIUM CHLORIDE 0.9 % (FLUSH) 0.9 %
3-10 SYRINGE (ML) INJECTION AS NEEDED
Status: DISCONTINUED | OUTPATIENT
Start: 2022-07-13 | End: 2022-07-13 | Stop reason: HOSPADM

## 2022-07-13 RX ORDER — SODIUM CHLORIDE 0.9 % (FLUSH) 0.9 %
3 SYRINGE (ML) INJECTION EVERY 12 HOURS SCHEDULED
Status: DISCONTINUED | OUTPATIENT
Start: 2022-07-13 | End: 2022-07-13 | Stop reason: HOSPADM

## 2022-07-13 RX ORDER — SODIUM CHLORIDE 9 MG/ML
INJECTION, SOLUTION INTRAVENOUS AS NEEDED
Status: DISCONTINUED | OUTPATIENT
Start: 2022-07-13 | End: 2022-07-13 | Stop reason: HOSPADM

## 2022-07-13 RX ORDER — FENTANYL CITRATE 50 UG/ML
INJECTION, SOLUTION INTRAMUSCULAR; INTRAVENOUS AS NEEDED
Status: DISCONTINUED | OUTPATIENT
Start: 2022-07-13 | End: 2022-07-13 | Stop reason: SURG

## 2022-07-13 RX ORDER — NALOXONE HCL 0.4 MG/ML
0.04 VIAL (ML) INJECTION AS NEEDED
Status: DISCONTINUED | OUTPATIENT
Start: 2022-07-13 | End: 2022-07-13 | Stop reason: HOSPADM

## 2022-07-13 RX ORDER — LIDOCAINE HYDROCHLORIDE AND EPINEPHRINE 10; 10 MG/ML; UG/ML
INJECTION, SOLUTION INFILTRATION; PERINEURAL AS NEEDED
Status: DISCONTINUED | OUTPATIENT
Start: 2022-07-13 | End: 2022-07-13 | Stop reason: HOSPADM

## 2022-07-13 RX ORDER — FENTANYL CITRATE 50 UG/ML
25 INJECTION, SOLUTION INTRAMUSCULAR; INTRAVENOUS
Status: DISCONTINUED | OUTPATIENT
Start: 2022-07-13 | End: 2022-07-13 | Stop reason: HOSPADM

## 2022-07-13 RX ORDER — LEVOTHYROXINE SODIUM 100 MCG
100 TABLET ORAL DAILY
Qty: 30 TABLET | Refills: 3 | Status: SHIPPED | OUTPATIENT
Start: 2022-07-13 | End: 2022-11-02

## 2022-07-13 RX ORDER — HYDROCODONE BITARTRATE AND ACETAMINOPHEN 5; 325 MG/1; MG/1
1 TABLET ORAL EVERY 4 HOURS PRN
Status: DISCONTINUED | OUTPATIENT
Start: 2022-07-13 | End: 2022-07-14 | Stop reason: HOSPADM

## 2022-07-13 RX ORDER — SODIUM CHLORIDE, SODIUM LACTATE, POTASSIUM CHLORIDE, CALCIUM CHLORIDE 600; 310; 30; 20 MG/100ML; MG/100ML; MG/100ML; MG/100ML
100 INJECTION, SOLUTION INTRAVENOUS CONTINUOUS
Status: DISCONTINUED | OUTPATIENT
Start: 2022-07-13 | End: 2022-07-13

## 2022-07-13 RX ORDER — ACETAMINOPHEN 500 MG
1000 TABLET ORAL ONCE
Status: COMPLETED | OUTPATIENT
Start: 2022-07-13 | End: 2022-07-13

## 2022-07-13 RX ORDER — AMLODIPINE BESYLATE 5 MG/1
2.5 TABLET ORAL DAILY
Status: DISCONTINUED | OUTPATIENT
Start: 2022-07-13 | End: 2022-07-14 | Stop reason: HOSPADM

## 2022-07-13 RX ORDER — LIDOCAINE HYDROCHLORIDE 10 MG/ML
0.5 INJECTION, SOLUTION EPIDURAL; INFILTRATION; INTRACAUDAL; PERINEURAL ONCE AS NEEDED
Status: DISCONTINUED | OUTPATIENT
Start: 2022-07-13 | End: 2022-07-13 | Stop reason: HOSPADM

## 2022-07-13 RX ORDER — LIDOCAINE HYDROCHLORIDE 20 MG/ML
INJECTION, SOLUTION EPIDURAL; INFILTRATION; INTRACAUDAL; PERINEURAL AS NEEDED
Status: DISCONTINUED | OUTPATIENT
Start: 2022-07-13 | End: 2022-07-13 | Stop reason: SURG

## 2022-07-13 RX ORDER — PROCHLORPERAZINE MALEATE 10 MG
10 TABLET ORAL EVERY 6 HOURS PRN
Status: DISCONTINUED | OUTPATIENT
Start: 2022-07-13 | End: 2022-07-14 | Stop reason: HOSPADM

## 2022-07-13 RX ORDER — ONDANSETRON 2 MG/ML
4 INJECTION INTRAMUSCULAR; INTRAVENOUS
Status: DISCONTINUED | OUTPATIENT
Start: 2022-07-13 | End: 2022-07-13 | Stop reason: HOSPADM

## 2022-07-13 RX ORDER — FLUMAZENIL 0.1 MG/ML
0.2 INJECTION INTRAVENOUS AS NEEDED
Status: DISCONTINUED | OUTPATIENT
Start: 2022-07-13 | End: 2022-07-13 | Stop reason: HOSPADM

## 2022-07-13 RX ORDER — SUMATRIPTAN 50 MG/1
100 TABLET, FILM COATED ORAL DAILY PRN
Status: DISCONTINUED | OUTPATIENT
Start: 2022-07-13 | End: 2022-07-14 | Stop reason: HOSPADM

## 2022-07-13 RX ORDER — DROPERIDOL 2.5 MG/ML
0.62 INJECTION, SOLUTION INTRAMUSCULAR; INTRAVENOUS ONCE AS NEEDED
Status: COMPLETED | OUTPATIENT
Start: 2022-07-13 | End: 2022-07-13

## 2022-07-13 RX ORDER — ACETAMINOPHEN 325 MG/1
650 TABLET ORAL EVERY 4 HOURS PRN
COMMUNITY
Start: 2022-07-13

## 2022-07-13 RX ORDER — SODIUM CHLORIDE 0.9 % (FLUSH) 0.9 %
3 SYRINGE (ML) INJECTION AS NEEDED
Status: DISCONTINUED | OUTPATIENT
Start: 2022-07-13 | End: 2022-07-13 | Stop reason: HOSPADM

## 2022-07-13 RX ORDER — MIDAZOLAM HYDROCHLORIDE 1 MG/ML
1 INJECTION INTRAMUSCULAR; INTRAVENOUS
Status: DISCONTINUED | OUTPATIENT
Start: 2022-07-13 | End: 2022-07-13 | Stop reason: HOSPADM

## 2022-07-13 RX ORDER — ONDANSETRON 2 MG/ML
4 INJECTION INTRAMUSCULAR; INTRAVENOUS ONCE AS NEEDED
Status: COMPLETED | OUTPATIENT
Start: 2022-07-13 | End: 2022-07-14

## 2022-07-13 RX ORDER — ROCURONIUM BROMIDE 10 MG/ML
INJECTION, SOLUTION INTRAVENOUS AS NEEDED
Status: DISCONTINUED | OUTPATIENT
Start: 2022-07-13 | End: 2022-07-13 | Stop reason: SURG

## 2022-07-13 RX ORDER — ONDANSETRON 2 MG/ML
4 INJECTION INTRAMUSCULAR; INTRAVENOUS ONCE AS NEEDED
Status: DISCONTINUED | OUTPATIENT
Start: 2022-07-13 | End: 2022-07-13 | Stop reason: SDUPTHER

## 2022-07-13 RX ORDER — OXYCODONE AND ACETAMINOPHEN 10; 325 MG/1; MG/1
1 TABLET ORAL ONCE AS NEEDED
Status: COMPLETED | OUTPATIENT
Start: 2022-07-13 | End: 2022-07-13

## 2022-07-13 RX ORDER — LABETALOL HYDROCHLORIDE 5 MG/ML
5 INJECTION, SOLUTION INTRAVENOUS
Status: DISCONTINUED | OUTPATIENT
Start: 2022-07-13 | End: 2022-07-13 | Stop reason: HOSPADM

## 2022-07-13 RX ORDER — HYDROMORPHONE HYDROCHLORIDE 1 MG/ML
0.25 INJECTION, SOLUTION INTRAMUSCULAR; INTRAVENOUS; SUBCUTANEOUS
Status: DISCONTINUED | OUTPATIENT
Start: 2022-07-13 | End: 2022-07-13 | Stop reason: HOSPADM

## 2022-07-13 RX ORDER — ONDANSETRON 2 MG/ML
INJECTION INTRAMUSCULAR; INTRAVENOUS AS NEEDED
Status: DISCONTINUED | OUTPATIENT
Start: 2022-07-13 | End: 2022-07-13 | Stop reason: SURG

## 2022-07-13 RX ORDER — PROPOFOL 10 MG/ML
VIAL (ML) INTRAVENOUS AS NEEDED
Status: DISCONTINUED | OUTPATIENT
Start: 2022-07-13 | End: 2022-07-13 | Stop reason: SURG

## 2022-07-13 RX ORDER — DEXAMETHASONE SODIUM PHOSPHATE 4 MG/ML
INJECTION, SOLUTION INTRA-ARTICULAR; INTRALESIONAL; INTRAMUSCULAR; INTRAVENOUS; SOFT TISSUE AS NEEDED
Status: DISCONTINUED | OUTPATIENT
Start: 2022-07-13 | End: 2022-07-13 | Stop reason: SURG

## 2022-07-13 RX ORDER — SODIUM CHLORIDE, SODIUM LACTATE, POTASSIUM CHLORIDE, CALCIUM CHLORIDE 600; 310; 30; 20 MG/100ML; MG/100ML; MG/100ML; MG/100ML
1000 INJECTION, SOLUTION INTRAVENOUS CONTINUOUS
Status: DISCONTINUED | OUTPATIENT
Start: 2022-07-13 | End: 2022-07-13

## 2022-07-13 RX ADMIN — MIDAZOLAM 1 MG: 1 INJECTION INTRAMUSCULAR; INTRAVENOUS at 13:17

## 2022-07-13 RX ADMIN — DROPERIDOL 0.62 MG: 2.5 INJECTION, SOLUTION INTRAMUSCULAR; INTRAVENOUS at 16:51

## 2022-07-13 RX ADMIN — Medication 200 MCG: at 13:52

## 2022-07-13 RX ADMIN — Medication 200 MCG: at 14:01

## 2022-07-13 RX ADMIN — SODIUM CHLORIDE, POTASSIUM CHLORIDE, SODIUM LACTATE AND CALCIUM CHLORIDE: 600; 310; 30; 20 INJECTION, SOLUTION INTRAVENOUS at 14:06

## 2022-07-13 RX ADMIN — FENTANYL CITRATE 100 MCG: 50 INJECTION, SOLUTION INTRAMUSCULAR; INTRAVENOUS at 13:29

## 2022-07-13 RX ADMIN — SODIUM CHLORIDE, POTASSIUM CHLORIDE, SODIUM LACTATE AND CALCIUM CHLORIDE 1000 ML: 600; 310; 30; 20 INJECTION, SOLUTION INTRAVENOUS at 10:36

## 2022-07-13 RX ADMIN — ONDANSETRON 4 MG: 2 INJECTION INTRAMUSCULAR; INTRAVENOUS at 16:13

## 2022-07-13 RX ADMIN — ONDANSETRON 4 MG: 2 INJECTION INTRAMUSCULAR; INTRAVENOUS at 14:47

## 2022-07-13 RX ADMIN — Medication 100 MCG: at 13:57

## 2022-07-13 RX ADMIN — DEXAMETHASONE SODIUM PHOSPHATE 4 MG: 4 INJECTION, SOLUTION INTRA-ARTICULAR; INTRALESIONAL; INTRAMUSCULAR; INTRAVENOUS; SOFT TISSUE at 14:46

## 2022-07-13 RX ADMIN — HYDROCODONE BITARTRATE AND ACETAMINOPHEN 1 TABLET: 5; 325 TABLET ORAL at 20:10

## 2022-07-13 RX ADMIN — FENTANYL CITRATE 100 MCG: 50 INJECTION, SOLUTION INTRAMUSCULAR; INTRAVENOUS at 13:47

## 2022-07-13 RX ADMIN — ROCURONIUM BROMIDE 10 MG: 10 SOLUTION INTRAVENOUS at 13:29

## 2022-07-13 RX ADMIN — ACETAMINOPHEN 1000 MG: 500 TABLET ORAL at 13:17

## 2022-07-13 RX ADMIN — LIDOCAINE HYDROCHLORIDE 50 MG: 20 INJECTION, SOLUTION EPIDURAL; INFILTRATION; INTRACAUDAL; PERINEURAL at 13:29

## 2022-07-13 RX ADMIN — PROPOFOL 150 MG: 10 INJECTION, EMULSION INTRAVENOUS at 13:29

## 2022-07-13 RX ADMIN — FENTANYL CITRATE 150 MCG: 50 INJECTION, SOLUTION INTRAMUSCULAR; INTRAVENOUS at 13:36

## 2022-07-13 RX ADMIN — POTASSIUM CHLORIDE AND SODIUM CHLORIDE 75 ML/HR: 450; 150 INJECTION, SOLUTION INTRAVENOUS at 19:49

## 2022-07-13 RX ADMIN — ANTACID TABLETS 4 TABLET: 500 TABLET, CHEWABLE ORAL at 20:10

## 2022-07-13 RX ADMIN — OXYCODONE AND ACETAMINOPHEN 1 TABLET: 325; 10 TABLET ORAL at 16:07

## 2022-07-13 NOTE — ANESTHESIA PREPROCEDURE EVALUATION
Anesthesia Evaluation     Nursing notes reviewed   no history of anesthetic complications:  NPO Solid Status: > 8 hours  NPO Liquid Status: > 8 hours           Airway   Mallampati: I  TM distance: >3 FB  Neck ROM: full  No difficulty expected  Dental      Pulmonary    (+) asthma (as child),  Cardiovascular   Exercise tolerance: good (4-7 METS)    (-) hypertension, CAD      Neuro/Psych  (+) headaches,    (-) seizures, TIA, CVA  GI/Hepatic/Renal/Endo    (+)   thyroid problem thyroid nodules  (-) liver disease, no renal disease, diabetes    Musculoskeletal     Abdominal    Substance History      OB/GYN          Other                        Anesthesia Plan    ASA 2     general     intravenous induction     Anesthetic plan, risks, benefits, and alternatives have been provided, discussed and informed consent has been obtained with: patient.        CODE STATUS:

## 2022-07-13 NOTE — PLAN OF CARE
Goal Outcome Evaluation:  Plan of Care Reviewed With: patient           Outcome Evaluation: VSS; IVF; resting between care; scd; safety maintained

## 2022-07-13 NOTE — OP NOTE
Dean Espinoza MD   Operative Note    Mirela Flores  7/13/2022    Pre-op Diagnosis:   Nontoxic multinodular goiter [E04.2]  Thyroid nodule [E04.1]    Post-op Diagnosis:     Post-Op Diagnosis Codes:     * Nontoxic multinodular goiter [E04.2]     * Thyroid nodule [E04.1]    Procedure/CPT® Codes:  TX THYROIDECTOMY [99121]    Procedure(s) (LRB):  total thyroidectomy (Bilateral)     Surgeon(s):  Dean Espinoza MD    Anesthesia: General    Staff:   Circulator: Evelyne Aragon RN; Rylie Pryor RN  Scrub Person: Shyann Mayer  Assistant: Elizabeth Quintanilla; Nimo Mariscal    Estimated Blood Loss:   minimal    Specimens:                Specimens     ID Source Type Tests Collected By Collected At Frozen?    A Neck Tissue · TISSUE PATHOLOGY EXAM   Dean Espinoza MD 7/13/22 [id number]     Description: total thyroid    This specimen was not marked as sent.            Drains:   none    Findings:   There was a large well defined soft right mid inferior thyroid nodule that was contained within the capsule.  The left side was relatively normal.  There is no suspicious adenopathy.  There is at least 1 intact parathyroid gland just under the recurrent laryngeal nerve on the right-hand side that error great blood supply.  Both recurrent laryngeal nerves were identified and maintained and stimulated at the end the case.    Complications:   none    Reason for the Operation:  Mirela Flores is a 32 y.o. female who presented to the office for evaluation of thyroid nodule.  She had a previous negative needle biopsy but it kept growing and was visible on the outside of her neck.  A right thyroidectomy was recommended.  After the discussion, she desired a total thyroidectomy.  The risks and benefits were explained including but not limited to bleeding, infection, persistent and/or recurrent disease, risks of the general anesthesia, pain, recurrent laryngeal nerve injury with hoarseness and airway loss,  parathyroid injury and hypocalcemia. Possibilities for further medical and/or surgical therapy pending the pathologic diagnosis were also discussed. Alternatives were discussed. No guarantees were made or implied. Questions were asked and appropriately answered.      Procedure Description:  The patient was taken back to the operating room, placed supine on the operating table and placed under anesthesia by the anesthesia staff. Once this was done a time out was performed to confirm the patient and the proper procedure. The [employer] Nerve Integrity Monitor (NIM) was set up to monitor the recurrent laryngeal by placing a nerve monitoring endotracheal tube and placement of ground and reference leads. The NIM was then tested and found to be in appropriate working order. After sterile prep, a handheld stimulating probe was attached and used for verifying nerve integrity. A total thyroidectomy was performed in the following fashion: A shoulder roll was placed under the patient's shoulders and the neck was extended. A horizontal incision was made one to two fingerbreadths above the clavicles and placed in a skin crease. The strap muscles were identified in the midline and dissection was carried out under the muscle over the lobe of the thyroid. The strap muscles were retracted for exposure of the thyroid. The lateral aspect of the right lobe was dissected from the overlying musculature.  There is a large thyroid nodule which was well-circumscribed within the mid to lower portion of the thyroid.  The middle thyroid vein was identified and ligated with harmonic scalpel. The superior pole was dissected out and the vessels were divided with the Harmonic scalpel close to the capsule of the gland. The remaining attachments of the superior pole were bluntly dissected free of the surrounding tissue. The inferior pole was dissected free and the inferior pole vessels were similarly divided with the Harmonic scalpel. The thyroid was  then mobilized, rotated medially and delivered out of the wound. Dissection was then slowly performed in the area of the tracheoesophageal groove to locate the recurrent laryngeal nerve. The nerve was identified and confirmed with the stimulating probe. The nerve was dissected superiorly into the cricothyroid joint. The remainder of the thyroid gland and isthmus was freed from the trachea and was left intact to the opposite side at the isthmus. Then, a similar excision was carried out on the opposite side. Dissection was carried out under the muscle over the lobe of the thyroid. The strap muscles were retracted for exposure of the thyroid. The lateral aspect of the lobe was dissected from the overlying musculature.  This lobe appeared to be normal size and had some mild nodularity to it.  The middle thyroid vein was identified and ligated with Harmonic scalpel. The superior pole was dissected out and the vessels were divided with the Harmonic scalpel close to the capsule of the gland. The remaining attachments of the superior pole were bluntly dissected free of the surrounding tissue. The inferior pole was dissected free and the inferior pole vessels were similarly divided with the Harmonic scalpel. The thyroid was then mobilized to rotate it medially and delivered out of the wound. Dissection was then slowly performed in the area of the tracheoesophageal groove to locate the recurrent laryngeal nerve. The nerve was identified and confirmed with the stimulating probe. The nerve was dissected superiorly into the cricothyroid joint. The remainder of the thyroid and isthmus was freed up from the trachea to the opposite lobe and the entire gland was removed. The thyroid was sent for permanent section after inspecting for potential parathyroid tissue.  The thyroid did not have any suspicious parathyroid tissue and there is at least a right superior parathyroid that was underneath the recurrent laryngeal nerve that had a  good blood supply.  The wound was then irrigated with warm saline solution. Adequate hemostasis was attained with bipolar cautery. The surgical site was felt to be hemostatic and a drain was not placed.  Fibrillar was placed in the wound.  The muscle layer was closed with 3-0 vicryl interrupted sutures. The skin was closed with a 4-0 monocryl subcuticular sitich. Antibiotic ointment was placed.   The patient was then turned over to the anesthesia team and allowed to wake from anesthesia. The patient was transported to the recovery room in a stable condition.       Dean Espinoza MD     Date: 7/13/2022  Time: 15:08 CDT

## 2022-07-13 NOTE — ANESTHESIA PROCEDURE NOTES
Airway  Date/Time: 7/13/2022 1:32 PM  Airway not difficult    General Information and Staff    Patient location during procedure: OR  CRNA/CAA: aTnner Romero CRNA    Indications and Patient Condition  Indications for airway management: airway protection    Preoxygenated: yes  Mask difficulty assessment: 0 - not attempted    Final Airway Details  Final airway type: endotracheal airway      Successful airway: ETT  Cuffed: yes   Successful intubation technique: direct laryngoscopy  Blade: Delcid  Blade size: 2  ETT size (mm): 7.0  Cormack-Lehane Classification: grade I - full view of glottis  Placement verified by: chest auscultation and capnometry   Measured from: teeth  Number of attempts at approach: 1  Assessment: lips, teeth, and gum same as pre-op and atraumatic intubation

## 2022-07-13 NOTE — H&P
[facility]   PREOPERATIVE HISTORY AND PHYSICAL    Patient Name:Mirela Flores  : 1990  MRN: [id number]  Primary Care Physician: Dean Tapia MD  Date of admission: 2022    Subjective   Subjective     Chief Complaint: preoperative evaluation    History of Present Illness  Mirela Flores is a 32 y.o. female who presents for preoperative evaluation. She is scheduled for total thyroidectomy (Bilateral)    Personal History     Past Medical History:   Diagnosis Date   • Abnormal cervical cytology    • Anemia affecting pregnancy    • Asthma     as a child   • Constipation    • History of transfusion    • Iron deficiency anemia    • Migraines    • Mono exposure    • Nausea and vomiting    • Raynaud's disease    • S/P thyroid biopsy    • Spinal headache     spinal tap   • Thyroid nodule        Past Surgical History:   Procedure Laterality Date   • CERVICAL CONIZATION Bilateral 7/3/2017    Procedure: CERVICAL CONIZATION, LOOP ELECTROCAUTERY EXCISION PROCEDURE;  Surgeon: Karli Guerrero MD;  Location: [facility] OR;  Service:    • CHOLECYSTECTOMY     • COLONOSCOPY  2011    normal   • COLONOSCOPY N/A 2018    Procedure: COLONOSCOPY WITH ANESTHESIA;  Surgeon: Bridger Charlton DO;  Location: [facility] ENDOSCOPY;  Service:    • CYSTOSCOPY N/A 10/29/2018    Procedure: CYSTOSCOPY;  Surgeon: Karli Guerrero MD;  Location: [facility] OR;  Service: DaVinci   • D & C WITH SUCTION     • ENDOSCOPY N/A 2018    Procedure: ESOPHAGOGASTRODUODENOSCOPY WITH ANESTHESIA;  Surgeon: Bridger Charlton DO;  Location: [facility] ENDOSCOPY;  Service:    • OVARIAN CYST SURGERY Right    • SALPINGECTOMY Bilateral 2017    Procedure: laparoscopic salpingectomy;  Surgeon: Karli Guerrero MD;  Location: [facility] OR;  Service:    • TOTAL LAPAROSCOPIC HYSTERECTOMY N/A 10/29/2018    Procedure: TOTAL LAPAROSCOPIC HYSTERECTOMY BILATERAL SALINGECTOMY WITH DAVINCI SI ROBOT;  Surgeon: Karli Guerrero MD;  Location: [facility] OR;  Service: DaVinci   •  TYMPANOSTOMY TUBE PLACEMENT         Family History: Her family history includes Breast cancer (age of onset: 60) in an other family member; Cancer in her maternal grandmother; Colon polyps in her maternal grandmother and mother; Heart murmur in her mother; Hypertension in her maternal grandmother; No Known Problems in her maternal grandfather, paternal grandfather, and paternal grandmother; Other in her father; Thyroid disease in her maternal grandmother.     Social History: She  reports that she has never smoked. She has never used smokeless tobacco. She reports that she does not drink alcohol and does not use drugs.    Home Medications:  Acetaminophen, SUMAtriptan, amLODIPine, cetirizine-pseudoephedrine, and prochlorperazine    Allergies:  She is allergic to cephalosporins, topamax [topiramate], penicillins, reglan [metoclopramide], adhesive tape, levofloxacin, and tape.    Objective    Objective     Vitals:    Temp:  [98 °F (36.7 °C)] 98 °F (36.7 °C)  Heart Rate:  [75-78] 78  Resp:  [16-18] 16  BP: (109)/(80) 109/80    ENT Physical Exam  Constitutional  Appearance: patient appears well-developed and well-nourished,  Communication/Voice: communication appropriate for developmental age; vocal quality normal;  Head and Face  Appearance: head appears normal, face appears normal and face appears atraumatic;  Palpation: facial palpation normal;  Salivary: glands normal;  Ear  Hearing: intact;  Auricles: right auricle normal; left auricle normal;  External Mastoids: right external mastoid normal; left external mastoid normal;  Nose  External Nose: nares patent bilaterally; external nose normal;  Oral Cavity/Oropharynx  Lips: normal;  Neck  Neck: neck normal;  Thyroid: thyroid mass (Right-sided) present;  Respiratory  Inspection: breathing unlabored; normal breathing rate;  Cardiovascular  Inspection: extremities are warm and well perfused; no peripheral edema present;  Neurovestibular  Mental Status: alert and  oriented;  Psychiatric: mood normal; affect is appropriate;        Assessment & Plan   Assessment / Plan     Brief Patient Summary:  Mirela Flores is a 32 y.o. female who presents for preoperative evaluation.    Pre-Op Diagnosis Codes:     * Nontoxic multinodular goiter [E04.2]     * Thyroid nodule [E04.1]    Active Hospital Problems:  Active Hospital Problems    Diagnosis    • Nontoxic multinodular goiter    • Thyroid nodule-bilaterally      Fine Needle Aspiration (05/31/2022 11:49)  Right fine-needle aspiration with benign findings     Most Recent Value  6/18/2019 - 6/17/2022   TSH Baseline 0.270 - 4.200 uIU/mL 1.290  5/5/2022   Free T4 0.93 - 1.70 ng/dL 1.08  5/5/2022   Calcium 8.6 - 10.5 mg/dL 9.5  5/5/2022     US thyroid (11/30/2017 11:25)  US Head Neck Soft Tissue (05/11/2022 12:03)  Most recent thyroid ultrasound on 5/11/2022 shows a enlarged right thyroid lobe measuring 5.8 x 2.1 x 1.9 cm.  There is a complex cystic nodule measuring 2.8 cm read as a TI-RADS 3.  There is also an 8 mm TI-RADS 4 nodule on the right side.  Left lobe has normal measurements.  There is a 6 mm TI-RADS 4 nodule.         Plan:   Procedure(s):  total thyroidectomy    THYROIDECTOMY: A thyroidectomy was recommended. The risks and benefits were explained including but not limited to bleeding, infection, persistent and/or recurrent disease, risks of the general anesthesia, pain, recurrent laryngeal nerve injury with hoarseness and airway loss, parathyroid injury and hypocalcemia. Operative possibilities including hemithyroidectomy, total thyroidectomy and harry dissections were discussed. Possibilities for delayed need for total thyroidectomy pending pathologic diagnosis were also discussed. Alternatives were discussed. No guarantees were made or implied. Questions were asked appropriately answered.      Dean Espinoza MD

## 2022-07-14 ENCOUNTER — TELEPHONE (OUTPATIENT)
Dept: OTOLARYNGOLOGY | Facility: CLINIC | Age: 32
End: 2022-07-14

## 2022-07-14 VITALS
OXYGEN SATURATION: 98 % | TEMPERATURE: 98 F | DIASTOLIC BLOOD PRESSURE: 53 MMHG | BODY MASS INDEX: 19.5 KG/M2 | SYSTOLIC BLOOD PRESSURE: 93 MMHG | RESPIRATION RATE: 16 BRPM | HEART RATE: 66 BPM | WEIGHT: 117.06 LBS | HEIGHT: 65 IN

## 2022-07-14 LAB
CALCIUM SPEC-SCNC: 8.5 MG/DL (ref 8.6–10.5)
CALCIUM SPEC-SCNC: 9.3 MG/DL (ref 8.6–10.5)
CYTO UR: NORMAL
LAB AP CASE REPORT: NORMAL
Lab: NORMAL
PATH REPORT.FINAL DX SPEC: NORMAL
PATH REPORT.GROSS SPEC: NORMAL

## 2022-07-14 PROCEDURE — 25010000002 ONDANSETRON PER 1 MG: Performed by: OTOLARYNGOLOGY

## 2022-07-14 PROCEDURE — 0 POTASSIUM CHLORIDE PER 2 MEQ: Performed by: OTOLARYNGOLOGY

## 2022-07-14 PROCEDURE — 99024 POSTOP FOLLOW-UP VISIT: CPT | Performed by: EMERGENCY MEDICINE

## 2022-07-14 PROCEDURE — 82310 ASSAY OF CALCIUM: CPT | Performed by: OTOLARYNGOLOGY

## 2022-07-14 PROCEDURE — 63710000001 PROCHLORPERAZINE MALEATE PER 10 MG: Performed by: OTOLARYNGOLOGY

## 2022-07-14 RX ADMIN — ANTACID TABLETS 4 TABLET: 500 TABLET, CHEWABLE ORAL at 08:43

## 2022-07-14 RX ADMIN — PROCHLORPERAZINE MALEATE 10 MG: 10 TABLET ORAL at 05:43

## 2022-07-14 RX ADMIN — HYDROCODONE BITARTRATE AND ACETAMINOPHEN 1 TABLET: 5; 325 TABLET ORAL at 05:18

## 2022-07-14 RX ADMIN — ONDANSETRON 4 MG: 2 INJECTION INTRAMUSCULAR; INTRAVENOUS at 00:13

## 2022-07-14 RX ADMIN — POTASSIUM CHLORIDE AND SODIUM CHLORIDE 75 ML/HR: 450; 150 INJECTION, SOLUTION INTRAVENOUS at 08:42

## 2022-07-14 RX ADMIN — HYDROCODONE BITARTRATE AND ACETAMINOPHEN 1 TABLET: 5; 325 TABLET ORAL at 13:29

## 2022-07-14 RX ADMIN — HYDROCODONE BITARTRATE AND ACETAMINOPHEN 1 TABLET: 5; 325 TABLET ORAL at 09:17

## 2022-07-14 RX ADMIN — HYDROCODONE BITARTRATE AND ACETAMINOPHEN 1 TABLET: 5; 325 TABLET ORAL at 00:13

## 2022-07-14 NOTE — TELEPHONE ENCOUNTER
Pt's  called requesting clarification on Tums instructions.  This nurse gave the instructions as told by Roxane and pt's  voiced understanding.

## 2022-07-14 NOTE — PLAN OF CARE
Goal Outcome Evaluation:  Plan of Care Reviewed With: patient        Progress: no change  Outcome Evaluation: Pain med given x 2 and nausea med given x 1;  IVF continues;  safety maintained;  voided per BR;

## 2022-07-14 NOTE — DISCHARGE SUMMARY
[facility]         DISCHARGE SUMMARY    Patient Name: Mirela Flores  : 1990  MRN: [id number]    Date of Admission: 2022  Date of Discharge:  2022  Primary Care Physician: Dean Tapia MD    Consults     No orders found for last 30 day(s).          Surgical Procedures Since Admission:  Procedure(s):  total thyroidectomy  Surgeon:  Dean Espinoza MD  Status:  1 Day Post-Op  -------------------      Presenting Problem:   Nontoxic multinodular goiter [E04.2]  Thyroid nodule [E04.1]  Postoperative hypothyroidism [E89.0]    Active and Resolved Hospital Problems:  Active Hospital Problems    Diagnosis POA   • S/P total thyroidectomy [E89.0] Not Applicable   • Postoperative hypothyroidism [E89.0] No   • Thyroid nodule-bilaterally [E04.1] Yes      Resolved Hospital Problems   No resolved problems to display.         Hospital Course     Hospital Course:  Mirela Flores is a 32 y.o. female who is post op day 1 from a total thyroidectomy per Dr. Espinoza.  She was noted to have a decreased calcium initially after surgery as well as nausea, the decision was made to observe overnight.  She has been on TUMS and calcium has returned to normal. She is eating and drinking without difficulty.  She has some mild hoarseness today.    Day of Discharge     Vital Signs:  Temp:  [97.3 °F (36.3 °C)-98 °F (36.7 °C)] 98 °F (36.7 °C)  Heart Rate:  [60-91] 66  Resp:  [13-19] 16  BP: ()/(45-78) 93/53  Flow (L/min):  [8] 8  Output by Drain (mL) 22 0701 - 22 1900 22 1901 - 22 0700 22 0701 - 22 1154 Range Total   Patient has no LDAs of requested type attached.     Physical Exam:  Physical Exam  Constitutional:       Appearance: She is normal weight.   HENT:      Head: Normocephalic.      Right Ear: External ear normal.      Left Ear: External ear normal.      Nose: Nose normal.      Mouth/Throat:      Mouth: Mucous membranes are moist.   Eyes:      Pupils: Pupils  are equal, round, and reactive to light.   Neck:      Trachea: Trachea normal.     Cardiovascular:      Rate and Rhythm: Normal rate.      Pulses: Normal pulses.   Pulmonary:      Effort: Pulmonary effort is normal.   Musculoskeletal:      Cervical back: Normal range of motion and neck supple.   Skin:     General: Skin is warm and dry.      Capillary Refill: Capillary refill takes less than 2 seconds.   Neurological:      General: No focal deficit present.      Mental Status: She is alert.   Psychiatric:         Mood and Affect: Mood normal.           Pertinent  and/or Most Recent Results     LAB RESULTS:      Lab 07/11/22  1122   WBC 6.41   HEMOGLOBIN 12.5   HEMATOCRIT 38.0   PLATELETS 171   MCV 99.2*         Lab 07/14/22  0717 07/13/22  2359 07/13/22  1536   CALCIUM 9.3 8.5* 8.0*               Brief Urine Lab Results     None        Microbiology Results (last 10 days)     Procedure Component Value - Date/Time    COVID PRE-OP / PRE-PROCEDURE SCREENING ORDER (NO ISOLATION) - Swab, Nasopharynx [[id number]]  (Normal) Collected: 07/11/22 1144    Lab Status: Final result Specimen: Swab from Nasopharynx Updated: 07/11/22 1722    Narrative:      The following orders were created for panel order COVID PRE-OP / PRE-PROCEDURE SCREENING ORDER (NO ISOLATION) - Swab, Nasopharynx.  Procedure                               Abnormality         Status                     ---------                               -----------         ------                     COVID-19,APTIMA PANTHER,...[[id number]]  Normal              Final result                 Please view results for these tests on the individual orders.    COVID-19,APTIMA PANTHER,[facility] IN-HOUSE,NP/OP/NASAL SWAB IN UTM/VTM/SALINE/LIQUID AMIES TRANSPORT MEDIA/NP WASH OR ASPIRATE, 24 HR TAT - Swab, Nasopharynx [[id number]]  (Normal) Collected: 07/11/22 1144    Lab Status: Final result Specimen: Swab from Nasopharynx Updated: 07/11/22 1722     COVID19 Not Detected    Narrative:      Fact  "sheet for providers: https://www.fda.gov/media/873086/download     Fact sheet for patients: https://www.fda.gov/media/153892/download    Test performed by RT PCR.           Labs Pending at Discharge:  Pending Labs     Order Current Status    Tissue Pathology Exam In process          Discharge Details        Discharge Medications      New Medications      Instructions Start Date   HYDROcodone-acetaminophen 5-325 MG per tablet  Commonly known as: NORCO   1 tablet, Oral, Every 4 Hours PRN      Synthroid 100 MCG tablet  Generic drug: levothyroxine   100 mcg, Oral, Daily         Changes to Medications      Instructions Start Date   cetirizine-pseudoephedrine 5-120 MG per 12 hr tablet  Commonly known as: Wal-Zyr D  What changed:   · when to take this  · reasons to take this   1 tablet, Oral, 2 Times Daily      SUMAtriptan 100 MG tablet  Commonly known as: IMITREX  What changed:   · when to take this  · reasons to take this   100 mg, Oral, Take As Directed      TYLENOL PO  What changed: Another medication with the same name was added. Make sure you understand how and when to take each.   1 tablet, Oral, As Needed      acetaminophen 325 MG tablet  Commonly known as: TYLENOL  What changed: You were already taking a medication with the same name, and this prescription was added. Make sure you understand how and when to take each.   650 mg, Oral, Every 4 Hours PRN         Continue These Medications      Instructions Start Date   amLODIPine 2.5 MG tablet  Commonly known as: Norvasc   2.5 mg, Oral, Daily      prochlorperazine 10 MG tablet  Commonly known as: COMPAZINE   10 mg, Oral, Every 6 Hours PRN             Allergies   Allergen Reactions   • Cephalosporins Other (See Comments)     Pt states was told can never take them again.    • Topamax [Topiramate] Angioedema   • Penicillins Unknown - Low Severity     Reaction as a child.  Patient was told can never take again.    • Reglan [Metoclopramide] Other (See Comments)     \"CHEST " "PAIN\"   • Adhesive Tape Rash     TEGADERM AND STERI STRIPS   • Levofloxacin Rash     abrupt onset of urticarial-like lesions on her legs and torso   • Tape Rash     TEGADERM AND STERI STRIPS         Discharge Disposition:  Home or Self Care    Diet:  Hospital:  Diet Order   Procedures   • Diet Regular       Discharge Activity:   Activity Instructions     Discharge Activity      No heavy lifting bending or straining for 2 weeks.  As long as not having to do heavy lifting or straining at work, may return in 1 week. Do not drive while using pain medication.          No future appointments.    Additional Instructions for the Follow-ups that You Need to Schedule     Discharge Follow-up with Specified Provider: midlevel practitioner in Dr Espinoza's office for wound/ postoperative check; 3 Weeks   As directed      To: midlevel practitioner in Dr Espinoza's office for wound/ postoperative check    Follow Up: 3 Weeks         Notify Physician or Go To The ED For the Following Conditions   As directed      Neck swelling, difficulty swallowing, increasing pain, noisy breathing or tinging aound mouth/ hands    Order Comments: Neck swelling, difficulty swallowing, increasing pain, noisy breathing or tinging aound mouth/ hands          TSH    Aug 03, 2022 (Approximate)      Release to patient: Immediate         Calcium    Aug 08, 2022 (Approximate)      Release to patient: Immediate         PTH, Intact    Aug 09, 2022 (Approximate)      Release to patient: Immediate               Time spent on Discharge including face to face service:  15 minutes    DAVID Apodaca  Electronically signed by DAVID Apodaca, 07/14/22, 11:54 AM CDT.    "

## 2022-07-14 NOTE — ANESTHESIA POSTPROCEDURE EVALUATION
"Patient: Mirela Flores    Procedure Summary     Date: 07/13/22 Room / Location:  [facility] OR  /  [facility] OR    Anesthesia Start: 1323 Anesthesia Stop: 1509    Procedure: total thyroidectomy (Bilateral Neck) Diagnosis:       Nontoxic multinodular goiter      Thyroid nodule      (Nontoxic multinodular goiter [E04.2])      (Thyroid nodule [E04.1])    Surgeons: Dean Espinoza MD Provider: Tanner Romero CRNA    Anesthesia Type: general ASA Status: 2          Anesthesia Type: general    Vitals  Vitals Value Taken Time   /78 07/13/22 1645   Temp 97.9 °F (36.6 °C) 07/13/22 1505   Pulse 61 07/13/22 1645   Resp 16 07/13/22 1645   SpO2 99 % 07/13/22 1645           Post Anesthesia Care and Evaluation    Patient location during evaluation: PACU  Patient participation: complete - patient participated  Level of consciousness: awake and alert  Pain management: adequate    Airway patency: patent  Anesthetic complications: No anesthetic complications    Cardiovascular status: acceptable  Respiratory status: acceptable  Hydration status: acceptable    Comments: Blood pressure 102/66, pulse 61, temperature 97.6 °F (36.4 °C), temperature source Oral, resp. rate 16, height 164 cm (64.57\"), weight 53.1 kg (117 lb 1 oz), last menstrual period 10/04/2018, SpO2 100 %, not currently breastfeeding.    Pt discharged from PACU based on clarita score >8      "

## 2022-07-21 ENCOUNTER — TELEPHONE (OUTPATIENT)
Dept: FAMILY MEDICINE CLINIC | Facility: CLINIC | Age: 32
End: 2022-07-21

## 2022-07-21 ENCOUNTER — OFFICE VISIT (OUTPATIENT)
Dept: FAMILY MEDICINE CLINIC | Facility: CLINIC | Age: 32
End: 2022-07-21

## 2022-07-21 VITALS
OXYGEN SATURATION: 98 % | HEART RATE: 92 BPM | WEIGHT: 115 LBS | TEMPERATURE: 98.5 F | HEIGHT: 64 IN | BODY MASS INDEX: 19.63 KG/M2 | DIASTOLIC BLOOD PRESSURE: 73 MMHG | SYSTOLIC BLOOD PRESSURE: 105 MMHG

## 2022-07-21 DIAGNOSIS — E89.0 H/O PARTIAL THYROIDECTOMY: Primary | ICD-10-CM

## 2022-07-21 DIAGNOSIS — E04.1 THYROID NODULE: Primary | ICD-10-CM

## 2022-07-21 DIAGNOSIS — R49.0 HOARSE: ICD-10-CM

## 2022-07-21 PROCEDURE — 99213 OFFICE O/P EST LOW 20 MIN: CPT | Performed by: NURSE PRACTITIONER

## 2022-07-21 RX ORDER — METHYLPREDNISOLONE 4 MG/1
1 TABLET ORAL DAILY
Qty: 21 TABLET | Refills: 0 | Status: SHIPPED | OUTPATIENT
Start: 2022-07-21 | End: 2022-12-05

## 2022-07-21 NOTE — TELEPHONE ENCOUNTER
Tell the patient that I talked to Dr Espinoza. He evaluated the nerve post op and it was intact. He states that it is most likely still swollen. If she wishes, I can send in a steroid pack that could help. Other wise he will just follow up in office.

## 2022-07-21 NOTE — PROGRESS NOTES
"Chief Complaint  Post OP  (Patient is here for a one week follow up on her thyroid surgery done at  with Dr. Espinoza. Patient has concerns about her voice not approving. She states doctor advised her 48 hours. )    Subjective    History of Present Illness      Patient presents to [facility] for   Patient is here for a one week follow up on her thyroid surgery done at  with Dr. Espinoza. Patient has concerns about her voice not approving. She states doctor advised her 48 hours.        Review of Systems    I have reviewed and agree with the HPI information as above.  Denise Hoffman, APRN     Objective   Vital Signs:   /73 (BP Location: Left arm)   Pulse 92   Temp 98.5 °F (36.9 °C)   Ht 162.6 cm (64\")   Wt 52.2 kg (115 lb)   SpO2 98%   BMI 19.74 kg/m²     BMI is within normal parameters. No other follow-up for BMI required.      Physical Exam  Vitals and nursing note reviewed.   Constitutional:       Appearance: Normal appearance. She is well-developed.   HENT:      Head: Normocephalic and atraumatic.      Right Ear: Tympanic membrane, ear canal and external ear normal.      Left Ear: Tympanic membrane, ear canal and external ear normal.      Nose: Nose normal. No septal deviation, nasal tenderness or congestion.      Mouth/Throat:      Lips: Pink. No lesions.      Mouth: Mucous membranes are moist. No oral lesions.      Dentition: Normal dentition.      Pharynx: Oropharynx is clear. No pharyngeal swelling, oropharyngeal exudate or posterior oropharyngeal erythema.   Eyes:      General: Lids are normal. Vision grossly intact. No scleral icterus.        Right eye: No discharge.         Left eye: No discharge.      Extraocular Movements: Extraocular movements intact.      Conjunctiva/sclera: Conjunctivae normal.      Right eye: Right conjunctiva is not injected.      Left eye: Left conjunctiva is not injected.      Pupils: Pupils are equal, round, and reactive to light. "   Neck:      Thyroid: No thyroid mass.      Trachea: Trachea normal.        Comments: Incision   Cardiovascular:      Rate and Rhythm: Normal rate and regular rhythm.      Heart sounds: Normal heart sounds. No murmur heard.    No gallop.   Pulmonary:      Effort: Pulmonary effort is normal.      Breath sounds: Normal breath sounds and air entry. No wheezing, rhonchi or rales.   Musculoskeletal:         General: No tenderness or deformity. Normal range of motion.      Cervical back: Normal range of motion and neck supple. Pain with movement present.      Thoracic back: Normal.      Right lower leg: No edema.      Left lower leg: No edema.   Skin:     General: Skin is warm and dry.      Coloration: Skin is not jaundiced.      Findings: No rash.   Neurological:      Mental Status: She is alert and oriented to person, place, and time.      Cranial Nerves: Cranial nerves are intact.      Sensory: Sensation is intact.      Motor: Motor function is intact.      Coordination: Coordination is intact.      Gait: Gait is intact.      Deep Tendon Reflexes: Reflexes are normal and symmetric.   Psychiatric:         Mood and Affect: Mood and affect normal.         Behavior: Behavior normal.         Judgment: Judgment normal.        Result Review  Data Reviewed:   The following data was reviewed by: DAVID Resendez on 07/21/2022:  Common labs    Common Labsle 7/11/22 7/13/22 7/13/22 7/14/22     1536 2359    Calcium  8.0 (A) 8.5 (A) 9.3   WBC 6.41      Hemoglobin 12.5      Hematocrit 38.0      Platelets 171      (A) Abnormal value            Data reviewed: Consultant notes Dr. Espinoza surgery notes            Assessment and Plan      Problem List Items Addressed This Visit    None     Visit Diagnoses     H/O partial thyroidectomy    -  Primary    Hoarse            Patient is here today for a follow-up of a thyroidectomy.  She is 8 days postop, surgery per Dr. Espinoza.  She states that she is concerned because she still has  an extremely hoarse voice and has difficulty talking at times.  She also states that she feels like something is catching in the back of her throat and does have mild difficulty swallowing at times.  She states that her neck is still really sore.  She does have an appointment coming up with Dr. Callejas in 2 weeks.  I have put a call into Dr. Espinoza to see if he has any other suggestions at this time.  My thoughts are that she is still inflamed from the surgery and\or intubation.  But I will gladly let her know what Dr. Espinoza status.  I spent 20 minutes caring for Mirela on this date of service. This time includes time spent by me in the following activities:reviewing tests, performing a medically appropriate examination and/or evaluation , counseling and educating the patient/family/caregiver and referring and communicating with other health care professionals     Follow Up   Return if symptoms worsen or fail to improve.  Patient was given instructions and counseling regarding her condition or for health maintenance advice. Please see specific information pulled into the AVS if appropriate.

## 2022-07-27 DIAGNOSIS — R09.81 NASAL CONGESTION: ICD-10-CM

## 2022-07-27 RX ORDER — CETIRIZINE HYDROCHLORIDE, PSEUDOEPHEDRINE HYDROCHLORIDE 5; 120 MG/1; MG/1
1 TABLET, FILM COATED, EXTENDED RELEASE ORAL 2 TIMES DAILY PRN
Qty: 30 TABLET | Refills: 0 | Status: SHIPPED | OUTPATIENT
Start: 2022-07-27 | End: 2022-08-18 | Stop reason: SDUPTHER

## 2022-07-27 NOTE — TELEPHONE ENCOUNTER
Padmini Wong, [facility] Hospitaledical AssistantSigned  [id number]                     Left message for pt that [facility] [employer]                              HUB TO READ.

## 2022-07-27 NOTE — TELEPHONE ENCOUNTER
Caller: MarkMirela    Relationship: Self    Best call back number: [phone]    Requested Prescriptions:   Requested Prescriptions     Pending Prescriptions Disp Refills   • cetirizine-pseudoephedrine (Wal-Zyr D) 5-120 MG per 12 hr tablet 60 tablet 0     Sig: Take 1 tablet by mouth 2 (Two) Times a Day.        Pharmacy where request should be sent:FAX TO [id number] - [facility] [address] AT [address] & [facility] [phone] [facility] [phone] FX     Please advise when and if medication can be called in. If unable to be filled, please advise with callback at the phone number listed above.    Thank you,  Dorian Wan, PCT

## 2022-07-29 ENCOUNTER — LAB (OUTPATIENT)
Dept: LAB | Facility: HOSPITAL | Age: 32
End: 2022-07-29

## 2022-07-29 DIAGNOSIS — E89.0 S/P TOTAL THYROIDECTOMY: ICD-10-CM

## 2022-07-29 DIAGNOSIS — E89.0 POSTOPERATIVE HYPOTHYROIDISM: ICD-10-CM

## 2022-07-29 LAB — CALCIUM SPEC-SCNC: 8.7 MG/DL (ref 8.4–10.4)

## 2022-07-29 PROCEDURE — 84443 ASSAY THYROID STIM HORMONE: CPT

## 2022-07-29 PROCEDURE — 83970 ASSAY OF PARATHORMONE: CPT

## 2022-07-29 PROCEDURE — 82310 ASSAY OF CALCIUM: CPT

## 2022-07-29 PROCEDURE — 36415 COLL VENOUS BLD VENIPUNCTURE: CPT

## 2022-07-30 LAB
PTH-INTACT SERPL-MCNC: 19.7 PG/ML (ref 15–65)
TSH SERPL DL<=0.05 MIU/L-ACNC: 1.77 UIU/ML (ref 0.27–4.2)

## 2022-08-03 ENCOUNTER — OFFICE VISIT (OUTPATIENT)
Dept: OTOLARYNGOLOGY | Facility: CLINIC | Age: 32
End: 2022-08-03

## 2022-08-03 DIAGNOSIS — E89.0 S/P TOTAL THYROIDECTOMY: Primary | ICD-10-CM

## 2022-08-03 PROCEDURE — 99024 POSTOP FOLLOW-UP VISIT: CPT | Performed by: EMERGENCY MEDICINE

## 2022-08-03 NOTE — PROGRESS NOTES
DAVID Apodaca ENT [facility] TH[facility]  [address], [address]  [place] [address]  Fax [phone]  Phone [phone]      Visit Type: POST-OP   Chief Complaint   Patient presents with   • Post-op        HPI  Mirela Flores is a 32 y.o.  female who presents for follow up s/p total thyroidectomy - Bilateral on 7/13/2022. The patient has had a relatively normal postoperative course. The patient has had complaints of hoarseness. The symptoms are not localized to a particular location. The symptoms severity was described as: mild to moderate The symptoms have been: improving for the last several weeks There have been no identified factors that aggravate the symptoms. There have been no factors that have improved the symptoms.     Past Medical History:   Diagnosis Date   • Abnormal cervical cytology    • Anemia affecting pregnancy    • Asthma     as a child   • Constipation    • History of transfusion    • Iron deficiency anemia    • Migraines    • Mono exposure    • Nausea and vomiting    • Raynaud's disease    • S/P thyroid biopsy    • Spinal headache     spinal tap   • Thyroid nodule        Past Surgical History:   Procedure Laterality Date   • CERVICAL CONIZATION Bilateral 7/3/2017    Procedure: CERVICAL CONIZATION, LOOP ELECTROCAUTERY EXCISION PROCEDURE;  Surgeon: Karli Guerrero MD;  Location: [facility] OR;  Service:    • CHOLECYSTECTOMY     • COLONOSCOPY  02/16/2011    normal   • COLONOSCOPY N/A 2/2/2018    Procedure: COLONOSCOPY WITH ANESTHESIA;  Surgeon: Bridger Charlton DO;  Location: [facility] ENDOSCOPY;  Service:    • CYSTOSCOPY N/A 10/29/2018    Procedure: CYSTOSCOPY;  Surgeon: Karli Guerrero MD;  Location: [facility] OR;  Service: DaVinci   • D & C WITH SUCTION     • ENDOSCOPY N/A 2/2/2018    Procedure: ESOPHAGOGASTRODUODENOSCOPY WITH ANESTHESIA;  Surgeon: Bridger Charlton DO;  Location: [facility] ENDOSCOPY;  Service:    • OVARIAN CYST  SURGERY Right    • SALPINGECTOMY Bilateral 8/28/2017    Procedure: laparoscopic salpingectomy;  Surgeon: Karli Guerrero MD;  Location:  [facility] OR;  Service:    • THYROIDECTOMY Bilateral 7/13/2022    Procedure: total thyroidectomy;  Surgeon: Dean Espinoza MD;  Location:  [facility] OR;  Service: ENT;  Laterality: Bilateral;   • TOTAL LAPAROSCOPIC HYSTERECTOMY N/A 10/29/2018    Procedure: TOTAL LAPAROSCOPIC HYSTERECTOMY BILATERAL SALINGECTOMY WITH DAVINCI SI ROBOT;  Surgeon: Karli Guerrero MD;  Location:  [facility] OR;  Service: DaVinci   • TYMPANOSTOMY TUBE PLACEMENT         Family History: Her family history includes Breast cancer (age of onset: 60) in an other family member; Cancer in her maternal grandmother; Colon polyps in her maternal grandmother and mother; Heart murmur in her mother; Hypertension in her maternal grandmother; No Known Problems in her maternal grandfather, paternal grandfather, and paternal grandmother; Other in her father; Thyroid disease in her maternal grandmother.     Social History: She  reports that she has never smoked. She has never used smokeless tobacco. She reports that she does not drink alcohol and does not use drugs.    Home Medications:  Acetaminophen, SUMAtriptan, acetaminophen, amLODIPine, cetirizine-pseudoephedrine, levothyroxine, methylPREDNISolone, and prochlorperazine    Allergies:  She is allergic to cephalosporins, topamax [topiramate], penicillins, reglan [metoclopramide], adhesive tape, levofloxacin, and tape.       Vital Signs:      ENT Physical Exam  Drawings            Result Review    RESULTS REVIEW    I have reviewed the patients old records in the chart.   The following results/records were reviewed:   TSH (07/29/2022 14:27) wnl      Assessment & Plan    Diagnoses and all orders for this visit:    1. S/P total thyroidectomy (Primary)  -     TSH; Future                Return in about 3 months (around 11/3/2022) for Follow up with DAVID Moran with labs.       Roxane Hogan, APRN  08/03/22  15:09 CDT

## 2022-08-18 DIAGNOSIS — R09.81 NASAL CONGESTION: ICD-10-CM

## 2022-08-18 RX ORDER — CETIRIZINE HYDROCHLORIDE, PSEUDOEPHEDRINE HYDROCHLORIDE 5; 120 MG/1; MG/1
1 TABLET, FILM COATED, EXTENDED RELEASE ORAL 2 TIMES DAILY PRN
Qty: 30 TABLET | Refills: 0 | Status: SHIPPED | OUTPATIENT
Start: 2022-08-18 | End: 2022-08-19 | Stop reason: SDUPTHER

## 2022-08-18 NOTE — TELEPHONE ENCOUNTER
Caller: Mirela Flores    Relationship: Self    Best call back number: [phone]      Requested Prescriptions:   Requested Prescriptions     Pending Prescriptions Disp Refills   • cetirizine-pseudoephedrine (Wal-Zyr D) 5-120 MG per 12 hr tablet 30 tablet 0     Sig: Take 1 tablet by mouth 2 (Two) Times a Day As Needed for Allergies.        Pharmacy where request should be sent: [facility] - [place] [address] - [phone] [facility] [phone] FX     Additional details provided by patient: PT SAID THAT SHE NEVER PICKED THIS UP FROM [facility] - SHE IS ASKING FOR THIS TO BE SENT TO [facility] SO THAT SHE CAN GET IT PICKED UP ASAP     Does the patient have less than a 3 day supply:  [x] Yes  [] No    Jose Taylor Rep   08/18/22 09:15 CDT

## 2022-08-19 DIAGNOSIS — R09.81 NASAL CONGESTION: ICD-10-CM

## 2022-08-19 RX ORDER — CETIRIZINE HYDROCHLORIDE, PSEUDOEPHEDRINE HYDROCHLORIDE 5; 120 MG/1; MG/1
1 TABLET, FILM COATED, EXTENDED RELEASE ORAL 2 TIMES DAILY PRN
Qty: 30 TABLET | Refills: 0 | Status: SHIPPED | OUTPATIENT
Start: 2022-08-19 | End: 2022-12-05

## 2022-11-01 ENCOUNTER — OFFICE VISIT (OUTPATIENT)
Dept: OTOLARYNGOLOGY | Facility: CLINIC | Age: 32
End: 2022-11-01

## 2022-11-01 ENCOUNTER — TELEPHONE (OUTPATIENT)
Dept: OTOLARYNGOLOGY | Facility: CLINIC | Age: 32
End: 2022-11-01

## 2022-11-01 ENCOUNTER — LAB (OUTPATIENT)
Dept: LAB | Facility: HOSPITAL | Age: 32
End: 2022-11-01

## 2022-11-01 DIAGNOSIS — E04.1 THYROID NODULE: ICD-10-CM

## 2022-11-01 DIAGNOSIS — E89.0 S/P TOTAL THYROIDECTOMY: Primary | ICD-10-CM

## 2022-11-01 DIAGNOSIS — E89.0 S/P TOTAL THYROIDECTOMY: ICD-10-CM

## 2022-11-01 LAB — TSH SERPL DL<=0.05 MIU/L-ACNC: 0.02 UIU/ML (ref 0.27–4.2)

## 2022-11-01 PROCEDURE — 99213 OFFICE O/P EST LOW 20 MIN: CPT | Performed by: EMERGENCY MEDICINE

## 2022-11-01 PROCEDURE — 36415 COLL VENOUS BLD VENIPUNCTURE: CPT

## 2022-11-01 PROCEDURE — 84443 ASSAY THYROID STIM HORMONE: CPT

## 2022-11-01 NOTE — PROGRESS NOTES
DAVID Apodaca ENT [facility]AR [facility] THRO[facility]  [address], [address]  [place] [address]  Fax [phone]  Phone [phone]      Visit Type: FOLLOW UP   Chief Complaint   Patient presents with   • Follow-up     Thyroidectomy        HPI  Mirela Flores is a 32 y.o.  female who presents for follow up s/p total thyroidectomy - Bilateral on 7/13/2022. The patient has had a relatively normal postoperative course and currently has no related complaints.    Past Medical History:   Diagnosis Date   • Abnormal cervical cytology    • Anemia affecting pregnancy    • Asthma     as a child   • Constipation    • History of transfusion    • Iron deficiency anemia    • Migraines    • Mono exposure    • Nausea and vomiting    • Raynaud's disease    • S/P thyroid biopsy    • Spinal headache     spinal tap   • Thyroid nodule        Past Surgical History:   Procedure Laterality Date   • CERVICAL CONIZATION Bilateral 7/3/2017    Procedure: CERVICAL CONIZATION, LOOP ELECTROCAUTERY EXCISION PROCEDURE;  Surgeon: Karli Guerrero MD;  Location: [facility] OR;  Service:    • CHOLECYSTECTOMY     • COLONOSCOPY  02/16/2011    normal   • COLONOSCOPY N/A 2/2/2018    Procedure: COLONOSCOPY WITH ANESTHESIA;  Surgeon: Bridger Charlton DO;  Location: [facility] ENDOSCOPY;  Service:    • CYSTOSCOPY N/A 10/29/2018    Procedure: CYSTOSCOPY;  Surgeon: Karli Guerrero MD;  Location: [facility] OR;  Service: DaVinci   • D & C WITH SUCTION     • ENDOSCOPY N/A 2/2/2018    Procedure: ESOPHAGOGASTRODUODENOSCOPY WITH ANESTHESIA;  Surgeon: Bridger Charlton DO;  Location: [facility] ENDOSCOPY;  Service:    • OVARIAN CYST SURGERY Right    • SALPINGECTOMY Bilateral 8/28/2017    Procedure: laparoscopic salpingectomy;  Surgeon: Karli Guerrero MD;  Location: [facility] OR;  Service:    • THYROIDECTOMY Bilateral 7/13/2022    Procedure: total thyroidectomy;  Surgeon: Dean Espinoza MD;  Location: [facility] OR;   Service: ENT;  Laterality: Bilateral;   • TOTAL LAPAROSCOPIC HYSTERECTOMY N/A 10/29/2018    Procedure: TOTAL LAPAROSCOPIC HYSTERECTOMY BILATERAL SALINGECTOMY WITH DAVINCI SI ROBOT;  Surgeon: Karli Guerreor MD;  Location: [facility];  Service: DaVinci   • TYMPANOSTOMY TUBE PLACEMENT         Family History: Her family history includes Breast cancer (age of onset: 60) in an other family member; Cancer in her maternal grandmother; Colon polyps in her maternal grandmother and mother; Heart murmur in her mother; Hypertension in her maternal grandmother; No Known Problems in her maternal grandfather, paternal grandfather, and paternal grandmother; Other in her father; Thyroid disease in her maternal grandmother.     Social History: She  reports that she has never smoked. She has never used smokeless tobacco. She reports that she does not drink alcohol and does not use drugs.    Home Medications:  Acetaminophen, SUMAtriptan, acetaminophen, amLODIPine, cetirizine-pseudoephedrine, levothyroxine, methylPREDNISolone, and prochlorperazine    Allergies:  She is allergic to cephalosporins, topamax [topiramate], penicillins, reglan [metoclopramide], adhesive tape, levofloxacin, and tape.       Vital Signs:      ENT Physical Exam  Neck  Neck: neck normal; neck palpation normal;  Thyroid: bilateral nodes absent;         Result Review    RESULTS REVIEW    I have reviewed the patients old records in the chart.     Assessment & Plan    Diagnoses and all orders for this visit:    1. S/P total thyroidectomy (Primary)    2. Thyroid nodule-bilaterally  Overview:  Fine Needle Aspiration (05/31/2022 11:49)  Right fine-needle aspiration with benign findings     Most Recent Value  6/18/2019 - 6/17/2022   TSH Baseline 0.270 - 4.200 uIU/mL 1.290  5/5/2022   Free T4 0.93 - 1.70 ng/dL 1.08  5/5/2022   Calcium 8.6 - 10.5 mg/dL 9.5  5/5/2022     US thyroid (11/30/2017 11:25)  US Head Neck Soft Tissue (05/11/2022 12:03)  Most recent thyroid ultrasound  on 5/11/2022 shows a enlarged right thyroid lobe measuring 5.8 x 2.1 x 1.9 cm.  There is a complex cystic nodule measuring 2.8 cm read as a TI-RADS 3.  There is also an 8 mm TI-RADS 4 nodule on the right side.  Left lobe has normal measurements.  There is a 6 mm TI-RADS 4 nodule.         Follow up with primary care physician for routine thyroid monitoring and refills. Call or return if problems or questions.    Return if symptoms worsen or fail to improve.      Roxane Hogan, APRN  11/01/22  10:06 CDT

## 2022-11-01 NOTE — TELEPHONE ENCOUNTER
Left message for patient to remind her that she needs to have her TSH checked before her appt today if she has not gotten it elsewhere. If she does not have a recent TSH we will have to reschedule her appt.  Requested call back

## 2022-11-02 ENCOUNTER — TELEPHONE (OUTPATIENT)
Dept: OTOLARYNGOLOGY | Facility: CLINIC | Age: 32
End: 2022-11-02

## 2022-11-02 DIAGNOSIS — E89.0 S/P TOTAL THYROIDECTOMY: Primary | ICD-10-CM

## 2022-11-02 RX ORDER — LEVOTHYROXINE SODIUM 88 MCG
88 TABLET ORAL DAILY
Qty: 30 TABLET | Refills: 3 | Status: SHIPPED | OUTPATIENT
Start: 2022-11-02 | End: 2022-12-16

## 2022-11-02 NOTE — TELEPHONE ENCOUNTER
Pt aware of TSH results and slight decrease in synthroid per APRN. Pt aslo aware to repeat labs in 3weeks. Pt stated understanding

## 2022-11-07 ENCOUNTER — TELEPHONE (OUTPATIENT)
Dept: OTOLARYNGOLOGY | Facility: CLINIC | Age: 32
End: 2022-11-07

## 2022-11-07 NOTE — TELEPHONE ENCOUNTER
The [facility] received a fax that requires your attention. The document has been indexed to the patient’s chart for your review.      Reason for sending: DRUG CHANGE REQUEST    Documents Description: EXT MED [id number]_[id number] RIAZ_11/2/22    Name of Sender: JAVIER    Date Indexed: 11/02/22    Notes (if needed):

## 2022-11-14 ENCOUNTER — TELEPHONE (OUTPATIENT)
Dept: OTOLARYNGOLOGY | Facility: CLINIC | Age: 32
End: 2022-11-14

## 2022-11-14 NOTE — TELEPHONE ENCOUNTER
Caller: Mirela Flores    Relationship to patient: Self    Best call back number: [phone]    Patient is needing: PATIENT STATES SHE WAS TOLD THAT DEPENDING ON HER BLOODWORK THAT SHE WOULD NEED A 3 WEEK FOLLOW UP APPOINTMENT.     HUB ATTEMPTED TO WARM TRANSFER DUE TO NO FOLLOW UP TIMEFRAME LISTED ON LAST VISIT NOTE & NO AVAILABILITY WITHIN 3 WEEKS BUT WAS UNABLE TO REACH PRACTICE.     PATIENT REQUESTING A CALL BACK.

## 2022-12-05 ENCOUNTER — OFFICE VISIT (OUTPATIENT)
Dept: FAMILY MEDICINE CLINIC | Facility: CLINIC | Age: 32
End: 2022-12-05

## 2022-12-05 VITALS
HEIGHT: 65 IN | SYSTOLIC BLOOD PRESSURE: 123 MMHG | TEMPERATURE: 97.7 F | HEART RATE: 116 BPM | WEIGHT: 129 LBS | DIASTOLIC BLOOD PRESSURE: 80 MMHG | BODY MASS INDEX: 21.49 KG/M2

## 2022-12-05 DIAGNOSIS — R20.2 NUMBNESS AND TINGLING IN BOTH HANDS: ICD-10-CM

## 2022-12-05 DIAGNOSIS — Z23 IMMUNIZATION DUE: ICD-10-CM

## 2022-12-05 DIAGNOSIS — I73.00 RAYNAUD'S PHENOMENON WITHOUT GANGRENE: Primary | ICD-10-CM

## 2022-12-05 DIAGNOSIS — R20.0 NUMBNESS AND TINGLING IN BOTH HANDS: ICD-10-CM

## 2022-12-05 PROCEDURE — 90471 IMMUNIZATION ADMIN: CPT

## 2022-12-05 PROCEDURE — 99213 OFFICE O/P EST LOW 20 MIN: CPT

## 2022-12-05 PROCEDURE — 90686 IIV4 VACC NO PRSV 0.5 ML IM: CPT

## 2022-12-05 RX ORDER — AMLODIPINE BESYLATE 2.5 MG/1
2.5 TABLET ORAL DAILY
Qty: 30 TABLET | Refills: 5 | Status: SHIPPED | OUTPATIENT
Start: 2022-12-05

## 2022-12-05 NOTE — PROGRESS NOTES
Injection  Injection performed in L deltoid by Olinda Gonzalez RN. Patient tolerated the procedure well without complications.  12/05/22   Olinda Gonzalez RN

## 2022-12-05 NOTE — PROGRESS NOTES
"Chief Complaint  Numbness (Fingers and toes), Tingling (Fingers and toes), and joint pain    Subjective    History of Present Illness      Patient presents to [facility] for   History of Present Illness  Pt c/o numbness and tingling in fingers and toes that has been going on for 3 years. Pt states that when she gets cold that her joints start hurting. Pt says she has a h/o Raynaud's syndrome.       Review of Systems   All other systems reviewed and are negative.      I have reviewed and agree with the HPI and ROS information as above.  Mirela Shook, DAVID     Objective   Vital Signs:   /80   Pulse 116   Temp 97.7 °F (36.5 °C)   Ht 163.8 cm (64.5\")   Wt 58.5 kg (129 lb)   BMI 21.80 kg/m²     BMI is within normal parameters. No other follow-up for BMI required.      Physical Exam  Constitutional:       Appearance: Normal appearance. She is well-developed.   HENT:      Head: Normocephalic and atraumatic.      Right Ear: Tympanic membrane, ear canal and external ear normal.      Left Ear: Tympanic membrane, ear canal and external ear normal.      Nose: Nose normal. No septal deviation, nasal tenderness or congestion.      Mouth/Throat:      Lips: Pink. No lesions.      Mouth: Mucous membranes are moist. No oral lesions.      Dentition: Normal dentition.      Pharynx: Oropharynx is clear. No pharyngeal swelling, oropharyngeal exudate or posterior oropharyngeal erythema.   Eyes:      General: Lids are normal. Vision grossly intact. No scleral icterus.        Right eye: No discharge.         Left eye: No discharge.      Extraocular Movements: Extraocular movements intact.      Conjunctiva/sclera: Conjunctivae normal.      Right eye: Right conjunctiva is not injected.      Left eye: Left conjunctiva is not injected.      Pupils: Pupils are equal, round, and reactive to light.   Neck:      Thyroid: No thyroid mass.      Trachea: Trachea normal.   Cardiovascular:      Rate and " Rhythm: Normal rate and regular rhythm.      Heart sounds: Normal heart sounds. No murmur heard.    No gallop.   Pulmonary:      Effort: Pulmonary effort is normal.      Breath sounds: Normal breath sounds and air entry. No wheezing, rhonchi or rales.   Abdominal:      General: There is no distension.      Palpations: Abdomen is soft. There is no mass.      Tenderness: There is no abdominal tenderness. There is no right CVA tenderness, left CVA tenderness, guarding or rebound.   Musculoskeletal:         General: No tenderness or deformity. Normal range of motion.      Cervical back: Full passive range of motion without pain, normal range of motion and neck supple.      Thoracic back: Normal.      Right lower leg: No edema.      Left lower leg: No edema.   Skin:     General: Skin is warm and dry.      Coloration: Skin is not jaundiced.      Findings: No rash.   Neurological:      Mental Status: She is alert and oriented to person, place, and time.      Cranial Nerves: Cranial nerves are intact.      Sensory: Sensation is intact.      Motor: Motor function is intact.      Coordination: Coordination is intact.      Gait: Gait is intact.      Deep Tendon Reflexes: Reflexes are normal and symmetric.   Psychiatric:         Mood and Affect: Mood and affect normal.         Judgment: Judgment normal.          JOSE DANIEL-7:      PHQ-2 Depression Screening  Little interest or pleasure in doing things?     Feeling down, depressed, or hopeless?     PHQ-2 Total Score       PHQ-9 Depression Screening  Little interest or pleasure in doing things?     Feeling down, depressed, or hopeless?     Trouble falling or staying asleep, or sleeping too much?     Feeling tired or having little energy?     Poor appetite or overeating?     Feeling bad about yourself - or that you are a failure or have let yourself or your family down?     Trouble concentrating on things, such as reading the newspaper or watching television?     Moving or speaking so  slowly that other people could have noticed? Or the opposite - being so fidgety or restless that you have been moving around a lot more than usual?     Thoughts that you would be better off dead, or of hurting yourself in some way?     PHQ-9 Total Score     If you checked off any problems, how difficult have these problems made it for you to do your work, take care of things at home, or get along with other people?        Result Review  Data Reviewed:                   Assessment and Plan      Diagnoses and all orders for this visit:    1. Raynaud's phenomenon without gangrene (Primary)  -     amLODIPine (Norvasc) 2.5 MG tablet; Take 1 tablet by mouth Daily.  Dispense: 30 tablet; Refill: 5    2. Immunization due  -     FluLaval/Fluzone >6 mos ([id number])    3. Numbness and tingling in both hands  -     amLODIPine (Norvasc) 2.5 MG tablet; Take 1 tablet by mouth Daily.  Dispense: 30 tablet; Refill: 5    Patient is seen today with c/o numbness and tingling in both hands and feet due to her raynauds. She states that she did follow with neurology when she was diagnosed with this and life happened and she has been unable to be seen. She states that she has been out of her amlodipine and is needing to have this refilled. She states that this helps her tremendously. She states that she realized that she needed her meds back when she had severe numbness and pain in the fingers this weekend and dropped her fork while eating. Educated to follow up with neurology if no improvement once restarting medication. Patient voices understanding.     Plan  1. Refill amlodipine 2.5mg         Follow Up   No follow-ups on file.  Patient was given instructions and counseling regarding her condition or for health maintenance advice. Please see specific information pulled into the AVS if appropriate.

## 2022-12-09 ENCOUNTER — TELEPHONE (OUTPATIENT)
Dept: FAMILY MEDICINE CLINIC | Facility: CLINIC | Age: 32
End: 2022-12-09

## 2022-12-09 RX ORDER — LEVOTHYROXINE SODIUM 88 MCG
88 TABLET ORAL DAILY
Qty: 30 TABLET | Refills: 3 | Status: CANCELLED | OUTPATIENT
Start: 2022-12-09

## 2022-12-09 NOTE — TELEPHONE ENCOUNTER
Contacted pt back, verified name and . Advised prescribed by different office. Pt vu and thanked staff.

## 2022-12-09 NOTE — TELEPHONE ENCOUNTER
Caller: Mirela Flores    Relationship: Self    Best call back number: [phone]    Requested Prescriptions:   Requested Prescriptions     Pending Prescriptions Disp Refills   • Synthroid 88 MCG tablet 30 tablet 3     Sig: Take 1 tablet by mouth Daily.        Pharmacy where request should be sent: [facility] [place] [address] - [phone] [facility] [phone] FX     Additional details provided by patient: COMPLETELY OUT    Does the patient have less than a 3 day supply:  [x] Yes  [] No    Would you like a call back once the refill request has been completed: [x] Yes [] No    If the office needs to give you a call back, can they leave a voicemail: [x] Yes [] No    Jose Castillo Rep   12/09/22 09:22 CST

## 2022-12-12 DIAGNOSIS — R09.81 NASAL CONGESTION: ICD-10-CM

## 2022-12-12 RX ORDER — CETIRIZINE HYDROCHLORIDE, PSEUDOEPHEDRINE HYDROCHLORIDE 5; 120 MG/1; MG/1
TABLET, FILM COATED, EXTENDED RELEASE ORAL
Qty: 30 TABLET | Refills: 0 | Status: SHIPPED | OUTPATIENT
Start: 2022-12-12 | End: 2023-03-16 | Stop reason: SDUPTHER

## 2022-12-13 ENCOUNTER — TELEPHONE (OUTPATIENT)
Dept: OTOLARYNGOLOGY | Facility: CLINIC | Age: 32
End: 2022-12-13

## 2022-12-13 NOTE — TELEPHONE ENCOUNTER
Patient called and she has filled her script, told patient I will speak with Provider and call her back Friday and she has to have TSH redrawn.

## 2022-12-13 NOTE — TELEPHONE ENCOUNTER
Caller: Mirela Flores    Relationship: Self    Best call back number: [phone]    Requested Prescriptions: SYNTHROID 88 MCG  Requested Prescriptions      No prescriptions requested or ordered in this encounter        Pharmacy where request should be sent: [facility].  PHONE [phone]    Does the patient have less than a 3 day supply:  [x] Yes  [] No    Would you like a call back once the refill request has been completed: [x] Yes [] No    If the office needs to give you a call back, can they leave a voicemail: [x] Yes [] No    Jose Green Rep   12/13/22 10:46 CST

## 2022-12-15 ENCOUNTER — LAB (OUTPATIENT)
Dept: LAB | Facility: HOSPITAL | Age: 32
End: 2022-12-15

## 2022-12-15 DIAGNOSIS — E89.0 S/P TOTAL THYROIDECTOMY: ICD-10-CM

## 2022-12-15 LAB — TSH SERPL DL<=0.05 MIU/L-ACNC: 0.04 UIU/ML (ref 0.27–4.2)

## 2022-12-15 PROCEDURE — 84443 ASSAY THYROID STIM HORMONE: CPT

## 2022-12-15 PROCEDURE — 36415 COLL VENOUS BLD VENIPUNCTURE: CPT

## 2022-12-16 ENCOUNTER — TELEPHONE (OUTPATIENT)
Dept: OTOLARYNGOLOGY | Facility: CLINIC | Age: 32
End: 2022-12-16

## 2022-12-16 DIAGNOSIS — E89.0 S/P TOTAL THYROIDECTOMY: Primary | ICD-10-CM

## 2022-12-16 RX ORDER — LEVOTHYROXINE SODIUM 50 MCG
50 TABLET ORAL DAILY
Qty: 30 TABLET | Refills: 0 | Status: SHIPPED | OUTPATIENT
Start: 2022-12-16 | End: 2023-01-18 | Stop reason: SDUPTHER

## 2022-12-19 ENCOUNTER — TELEPHONE (OUTPATIENT)
Dept: OTOLARYNGOLOGY | Facility: CLINIC | Age: 32
End: 2022-12-19

## 2022-12-19 NOTE — TELEPHONE ENCOUNTER
The [facility] received a fax that requires your attention. The document has been indexed to the patient’s chart for your review.          Reason for sending: PRIOR AUTH NEEDED FOR TWO SEPARATE RX        Documents Description: [id number]  [id number]      Name of Sender: JAVIER        Date Indexed: 12.16.22

## 2023-01-04 ENCOUNTER — TELEPHONE (OUTPATIENT)
Dept: OTOLARYNGOLOGY | Facility: CLINIC | Age: 33
End: 2023-01-04
Payer: COMMERCIAL

## 2023-01-04 NOTE — TELEPHONE ENCOUNTER
Provider: APRN, MASON    Caller: GLYNN GARBER    Relationship to Patient: SELF    Phone Number: [phone]    Reason for Call: PT HAD SOME QUESTIONS ABOUT MEDICATION ADJUSTMENT AND POSSIBLE NEEDING ADDITIONAL LABS DONE

## 2023-01-04 NOTE — TELEPHONE ENCOUNTER
Call placed to patient and explained she should use the last dose ordered of synthroid which is 50 mcg,patient verbalized understanding.

## 2023-01-05 DIAGNOSIS — R09.81 NASAL CONGESTION: ICD-10-CM

## 2023-01-05 RX ORDER — CETIRIZINE HYDROCHLORIDE AND PSEUDOEPHEDRINE HYDROCHLORIDE 5; 120 MG/1; MG/1
TABLET, FILM COATED, EXTENDED RELEASE ORAL
Refills: 0 | OUTPATIENT
Start: 2023-01-05

## 2023-01-17 ENCOUNTER — LAB (OUTPATIENT)
Dept: LAB | Facility: HOSPITAL | Age: 33
End: 2023-01-17
Payer: COMMERCIAL

## 2023-01-17 DIAGNOSIS — E89.0 S/P TOTAL THYROIDECTOMY: ICD-10-CM

## 2023-01-17 LAB — TSH SERPL DL<=0.05 MIU/L-ACNC: 0.28 UIU/ML (ref 0.27–4.2)

## 2023-01-17 PROCEDURE — 36415 COLL VENOUS BLD VENIPUNCTURE: CPT

## 2023-01-17 PROCEDURE — 84443 ASSAY THYROID STIM HORMONE: CPT

## 2023-01-18 ENCOUNTER — TELEPHONE (OUTPATIENT)
Dept: OTOLARYNGOLOGY | Facility: CLINIC | Age: 33
End: 2023-01-18
Payer: COMMERCIAL

## 2023-01-18 DIAGNOSIS — E89.0 S/P TOTAL THYROIDECTOMY: Primary | ICD-10-CM

## 2023-01-18 RX ORDER — LEVOTHYROXINE SODIUM 50 MCG
50 TABLET ORAL DAILY
Qty: 30 TABLET | Refills: 3 | Status: SHIPPED | OUTPATIENT
Start: 2023-01-18 | End: 2023-02-08

## 2023-01-18 NOTE — TELEPHONE ENCOUNTER
Pt aware 3 refills of 50 mcg of synthroid sent to pharmacy and to get tsh drawn in 1 week to 10 days per NP. Pt verbalized understanding.

## 2023-02-08 RX ORDER — LEVOTHYROXINE SODIUM 50 MCG
TABLET ORAL
Qty: 30 TABLET | Refills: 0 | Status: SHIPPED | OUTPATIENT
Start: 2023-02-08 | End: 2023-03-10

## 2023-03-07 ENCOUNTER — TELEPHONE (OUTPATIENT)
Dept: OTOLARYNGOLOGY | Facility: CLINIC | Age: 33
End: 2023-03-07
Payer: COMMERCIAL

## 2023-03-07 NOTE — TELEPHONE ENCOUNTER
Caller: Mirela Flores    Relationship: Self    Best call back number: [phone]    What orders are you requesting (i.e. lab or imaging): RETAKE LABSTO MAKE SURE LEVELS ARE OK AND TO MAKE SURE MEDICATION IS AT CORRECT DOSAGE    In what timeframe would the patient need to come in: ASAP    Where will you receive your lab/imaging services: [facility]    Additional notes: WOULD LIKE CALL BACK, OK TO LEAVE VM

## 2023-03-08 NOTE — TELEPHONE ENCOUNTER
"Informed pt she has an active order for TSH labs, and she can go to the lab at anytime. Pt states she will go tomorrow morning because she is feeling \"off.\"  "

## 2023-03-09 ENCOUNTER — LAB (OUTPATIENT)
Dept: LAB | Facility: HOSPITAL | Age: 33
End: 2023-03-09
Payer: COMMERCIAL

## 2023-03-09 DIAGNOSIS — E89.0 S/P TOTAL THYROIDECTOMY: ICD-10-CM

## 2023-03-09 PROCEDURE — 84443 ASSAY THYROID STIM HORMONE: CPT

## 2023-03-09 PROCEDURE — 36415 COLL VENOUS BLD VENIPUNCTURE: CPT

## 2023-03-10 ENCOUNTER — TELEPHONE (OUTPATIENT)
Dept: OTOLARYNGOLOGY | Facility: CLINIC | Age: 33
End: 2023-03-10
Payer: COMMERCIAL

## 2023-03-10 DIAGNOSIS — E04.1 THYROID NODULE: ICD-10-CM

## 2023-03-10 DIAGNOSIS — E89.0 S/P TOTAL THYROIDECTOMY: Primary | ICD-10-CM

## 2023-03-10 LAB — TSH SERPL DL<=0.05 MIU/L-ACNC: 11.7 UIU/ML (ref 0.27–4.2)

## 2023-03-10 RX ORDER — LEVOTHYROXINE SODIUM 75 MCG
75 TABLET ORAL DAILY
Qty: 30 TABLET | Refills: 0 | Status: SHIPPED | OUTPATIENT
Start: 2023-03-10 | End: 2023-04-05 | Stop reason: SDUPTHER

## 2023-03-10 NOTE — TELEPHONE ENCOUNTER
Informed pt of TSH results and that her synthroid was increased to 75mcg. Notified to recheck labs in 3 weeks, and that labs will be redrawn every 3 weeks until we get her TSH stable.

## 2023-03-16 ENCOUNTER — TELEMEDICINE (OUTPATIENT)
Dept: FAMILY MEDICINE CLINIC | Facility: CLINIC | Age: 33
End: 2023-03-16
Payer: COMMERCIAL

## 2023-03-16 DIAGNOSIS — J06.9 UPPER RESPIRATORY TRACT INFECTION, UNSPECIFIED TYPE: Primary | ICD-10-CM

## 2023-03-16 DIAGNOSIS — J30.1 SEASONAL ALLERGIC RHINITIS DUE TO POLLEN: ICD-10-CM

## 2023-03-16 DIAGNOSIS — R09.81 NASAL CONGESTION: ICD-10-CM

## 2023-03-16 PROCEDURE — 99213 OFFICE O/P EST LOW 20 MIN: CPT | Performed by: NURSE PRACTITIONER

## 2023-03-16 RX ORDER — METHYLPREDNISOLONE 4 MG/1
TABLET ORAL
Qty: 21 TABLET | Refills: 0 | Status: SHIPPED | OUTPATIENT
Start: 2023-03-16

## 2023-03-16 RX ORDER — FLUTICASONE PROPIONATE 50 MCG
2 SPRAY, SUSPENSION (ML) NASAL DAILY
Qty: 16 G | Refills: 1 | Status: SHIPPED | OUTPATIENT
Start: 2023-03-16

## 2023-03-16 RX ORDER — CETIRIZINE HYDROCHLORIDE, PSEUDOEPHEDRINE HYDROCHLORIDE 5; 120 MG/1; MG/1
1 TABLET, FILM COATED, EXTENDED RELEASE ORAL 2 TIMES DAILY
Qty: 20 TABLET | Refills: 0 | Status: SHIPPED | OUTPATIENT
Start: 2023-03-16 | End: 2023-03-26

## 2023-03-16 NOTE — PROGRESS NOTES
This was an audio and video enabled telemedicine encounter.  You have chosen to receive care through a telephone visit. Do you consent to use a telephone visit for your medical care today? Yes  Chief Complaint  No chief complaint on file.    Subjective    History of Present Illness      Patient presents to [facility] for   History of Present Illness  Pt complains of nasal congestion/drainage and sore throat/raspy voice.         Review of Systems   Constitutional: Negative for chills and fever.   HENT: Positive for congestion, postnasal drip and voice change.    Respiratory: Positive for cough. Negative for shortness of breath and wheezing.        I have reviewed and agree with the HPI and ROS information as above.  Katty Pryor, DAVID     Objective   Vital Signs:   There were no vitals taken for this visit.    BMI is within normal parameters. No other follow-up for BMI required.      Physical Exam  Constitutional:       Appearance: Normal appearance. She is well-developed.   HENT:      Head: Normocephalic and atraumatic.      Nose: Congestion present.      Mouth/Throat:      Lips: Pink. No lesions.   Eyes:      General: Lids are normal. Vision grossly intact.      Conjunctiva/sclera: Conjunctivae normal.      Right eye: Right conjunctiva is not injected.      Left eye: Left conjunctiva is not injected.   Pulmonary:      Effort: Pulmonary effort is normal.   Musculoskeletal:         General: Normal range of motion.      Cervical back: Full passive range of motion without pain, normal range of motion and neck supple.   Skin:     General: Skin is warm and dry.   Neurological:      Mental Status: She is alert and oriented to person, place, and time.      Motor: Motor function is intact.   Psychiatric:         Mood and Affect: Mood and affect normal.         Judgment: Judgment normal.            Result Review  Data Reviewed:                   Assessment and Plan      Diagnoses and all  orders for this visit:    1. Upper respiratory tract infection, unspecified type (Primary)  -     methylPREDNISolone (MEDROL) 4 MG dose pack; Take as directed on package instructions.  Dispense: 21 tablet; Refill: 0    2. Seasonal allergic rhinitis due to pollen  -     fluticasone (FLONASE) 50 MCG/ACT nasal spray; 2 sprays into the nostril(s) as directed by provider Daily.  Dispense: 16 g; Refill: 1    3. Nasal congestion  -     cetirizine-pseudoephedrine (ZyrTEC-D) 5-120 MG per 12 hr tablet; Take 1 tablet by mouth 2 (Two) Times a Day for 10 days.  Dispense: 20 tablet; Refill: 0            Follow Up   Return if symptoms worsen or fail to improve.  Patient was given instructions and counseling regarding her condition or for health maintenance advice. Please see specific information pulled into the AVS if appropriate.

## 2023-04-04 ENCOUNTER — LAB (OUTPATIENT)
Dept: LAB | Facility: HOSPITAL | Age: 33
End: 2023-04-04
Payer: COMMERCIAL

## 2023-04-04 DIAGNOSIS — E89.0 S/P TOTAL THYROIDECTOMY: ICD-10-CM

## 2023-04-04 LAB — TSH SERPL DL<=0.05 MIU/L-ACNC: 1.17 UIU/ML (ref 0.27–4.2)

## 2023-04-04 PROCEDURE — 36415 COLL VENOUS BLD VENIPUNCTURE: CPT

## 2023-04-04 PROCEDURE — 84443 ASSAY THYROID STIM HORMONE: CPT

## 2023-04-05 ENCOUNTER — TELEPHONE (OUTPATIENT)
Dept: OTOLARYNGOLOGY | Facility: CLINIC | Age: 33
End: 2023-04-05
Payer: COMMERCIAL

## 2023-04-05 DIAGNOSIS — E89.0 S/P TOTAL THYROIDECTOMY: Primary | ICD-10-CM

## 2023-04-05 RX ORDER — LEVOTHYROXINE SODIUM 75 MCG
75 TABLET ORAL DAILY
Qty: 90 TABLET | Refills: 0 | Status: SHIPPED | OUTPATIENT
Start: 2023-04-05 | End: 2023-04-10

## 2023-04-05 NOTE — TELEPHONE ENCOUNTER
Informed pt that the NP sent in a 3 month supply of synthroid, and she will need to have TSH drawn prior to running out. Notified the lab order is already in. Pt stated understanding.

## 2023-04-10 RX ORDER — LEVOTHYROXINE SODIUM 75 MCG
TABLET ORAL
Qty: 30 TABLET | Refills: 0 | Status: SHIPPED | OUTPATIENT
Start: 2023-04-10

## 2023-05-09 DIAGNOSIS — E89.0 S/P TOTAL THYROIDECTOMY: ICD-10-CM

## 2023-05-09 DIAGNOSIS — E04.1 THYROID NODULE: Primary | ICD-10-CM

## 2023-05-09 RX ORDER — LEVOTHYROXINE SODIUM 75 MCG
TABLET ORAL
Qty: 30 TABLET | Refills: 1 | Status: SHIPPED | OUTPATIENT
Start: 2023-05-09

## 2023-06-02 ENCOUNTER — LAB (OUTPATIENT)
Dept: LAB | Facility: HOSPITAL | Age: 33
End: 2023-06-02
Payer: COMMERCIAL

## 2023-06-02 DIAGNOSIS — E89.0 S/P TOTAL THYROIDECTOMY: ICD-10-CM

## 2023-06-02 PROCEDURE — 36415 COLL VENOUS BLD VENIPUNCTURE: CPT

## 2023-06-02 PROCEDURE — 84443 ASSAY THYROID STIM HORMONE: CPT

## 2023-06-03 LAB — TSH SERPL DL<=0.05 MIU/L-ACNC: 1.39 UIU/ML (ref 0.27–4.2)

## 2023-06-07 DIAGNOSIS — R09.81 NASAL CONGESTION: ICD-10-CM

## 2023-06-07 NOTE — TELEPHONE ENCOUNTER
Caller: Mirela Flores    Relationship: Self    Best call back number: [phone]     Requested Prescriptions:   Requested Prescriptions     Pending Prescriptions Disp Refills    cetirizine-pseudoephedrine (ZyrTEC-D) 5-120 MG per 12 hr tablet 20 tablet 0     Sig: Take 1 tablet by mouth 2 (Two) Times a Day for 10 days.        Pharmacy where request should be sent: [place] [address] - [phone] [facility] [phone] FX     Last office visit with prescribing clinician: 12/5/2022   Last telemedicine visit with prescribing clinician: 3/16/2023   Next office visit with prescribing clinician: Visit date not found     Additional details provided by patient:   PATIENT IS OUT OF MEDICATION     Does the patient have less than a 3 day supply:  [x] Yes  [] No    Would you like a call back once the refill request has been completed: [x] Yes [] No    If the office needs to give you a call back, can they leave a voicemail: [x] Yes [] No    Charu Leigh, PCT   06/07/23 11:53 CDT

## 2023-06-08 RX ORDER — CETIRIZINE HYDROCHLORIDE, PSEUDOEPHEDRINE HYDROCHLORIDE 5; 120 MG/1; MG/1
1 TABLET, FILM COATED, EXTENDED RELEASE ORAL 2 TIMES DAILY
Qty: 20 TABLET | Refills: 0 | Status: SHIPPED | OUTPATIENT
Start: 2023-06-08 | End: 2023-06-18

## 2023-06-16 DIAGNOSIS — R20.2 NUMBNESS AND TINGLING IN BOTH HANDS: ICD-10-CM

## 2023-06-16 DIAGNOSIS — R20.0 NUMBNESS AND TINGLING IN BOTH HANDS: ICD-10-CM

## 2023-06-16 DIAGNOSIS — I73.00 RAYNAUD'S PHENOMENON WITHOUT GANGRENE: ICD-10-CM

## 2023-06-16 RX ORDER — AMLODIPINE BESYLATE 2.5 MG/1
TABLET ORAL
Qty: 30 TABLET | Refills: 5 | OUTPATIENT
Start: 2023-06-16

## 2023-08-11 DIAGNOSIS — E89.0 S/P TOTAL THYROIDECTOMY: ICD-10-CM

## 2023-08-11 RX ORDER — LEVOTHYROXINE SODIUM 75 MCG
TABLET ORAL
Qty: 30 TABLET | Refills: 0 | Status: SHIPPED | OUTPATIENT
Start: 2023-08-11

## 2023-08-22 ENCOUNTER — TELEMEDICINE (OUTPATIENT)
Dept: FAMILY MEDICINE CLINIC | Facility: CLINIC | Age: 33
End: 2023-08-22
Payer: COMMERCIAL

## 2023-08-22 VITALS — BODY MASS INDEX: 22.86 KG/M2 | HEIGHT: 63 IN | WEIGHT: 129 LBS

## 2023-08-22 DIAGNOSIS — J30.1 SEASONAL ALLERGIC RHINITIS DUE TO POLLEN: ICD-10-CM

## 2023-08-22 DIAGNOSIS — J01.00 ACUTE NON-RECURRENT MAXILLARY SINUSITIS: Primary | ICD-10-CM

## 2023-08-22 PROCEDURE — 99213 OFFICE O/P EST LOW 20 MIN: CPT | Performed by: NURSE PRACTITIONER

## 2023-08-22 RX ORDER — FEXOFENADINE HCL 180 MG/1
180 TABLET ORAL DAILY
Qty: 30 TABLET | Refills: 2 | Status: SHIPPED | OUTPATIENT
Start: 2023-08-22

## 2023-08-22 RX ORDER — FLUTICASONE PROPIONATE 50 MCG
2 SPRAY, SUSPENSION (ML) NASAL DAILY
Qty: 16 G | Refills: 1 | Status: SHIPPED | OUTPATIENT
Start: 2023-08-22

## 2023-08-22 RX ORDER — AZITHROMYCIN 250 MG/1
TABLET, FILM COATED ORAL
Qty: 6 TABLET | Refills: 0 | Status: SHIPPED | OUTPATIENT
Start: 2023-08-22

## 2023-08-22 RX ORDER — METHYLPREDNISOLONE 4 MG/1
TABLET ORAL
Qty: 21 TABLET | Refills: 0 | Status: SHIPPED | OUTPATIENT
Start: 2023-08-22

## 2023-08-22 NOTE — PROGRESS NOTES
"This was an audio and video enabled telemedicine encounter. Patient verbally consented to visit. Patient was located at Work and I was located at [facility]  location.     Chief Complaint  Sinusitis and Cough    Subjective    History of Present Illness      Patient presents to [facility] for   History of Present Illness  Patient being seen for sinus pressure, drainage, and pressure.  She also complains of cough and headache.     Review of Systems   Constitutional: Negative.    HENT:  Positive for congestion, rhinorrhea and sinus pressure.    Eyes: Negative.    Respiratory:  Positive for cough.    Cardiovascular: Negative.    Gastrointestinal: Negative.    Endocrine: Negative.    Genitourinary: Negative.    Musculoskeletal: Negative.    Skin: Negative.    Allergic/Immunologic: Negative.    Neurological:  Positive for headache.   Hematological: Negative.    Psychiatric/Behavioral: Negative.       I have reviewed and agree with the HPI and [facility] information as above.  Denise Martinez, APRN     Objective   Vital Signs:   Ht 158.8 cm (62.5\")   Wt 58.5 kg (129 lb)   BMI 23.22 kg/mý     BMI is within normal parameters. No other follow-up for BMI required.      Physical Exam  Vitals and nursing note reviewed.   Constitutional:       Appearance: Normal appearance. She is well-developed.   HENT:      Head: Normocephalic and atraumatic.      Mouth/Throat:      Lips: Pink. No lesions.   Eyes:      General: Lids are normal. Vision grossly intact.      Conjunctiva/sclera: Conjunctivae normal.      Right eye: Right conjunctiva is not injected.      Left eye: Left conjunctiva is not injected.   Pulmonary:      Effort: Pulmonary effort is normal.   Musculoskeletal:         General: Normal range of motion.      Cervical back: Full passive range of motion without pain, normal range of motion and neck supple.   Skin:     General: Skin is dry.   Neurological:      Mental Status: She is alert and " oriented to person, place, and time.      Motor: Motor function is intact.   Psychiatric:         Mood and Affect: Mood and affect normal.         Judgment: Judgment normal.        JOSE DANIEL-7:      PHQ-2 Depression Screening  Little interest or pleasure in doing things?     Feeling down, depressed, or hopeless?     PHQ-2 Total Score       PHQ-9 Depression Screening  Little interest or pleasure in doing things?     Feeling down, depressed, or hopeless?     Trouble falling or staying asleep, or sleeping too much?     Feeling tired or having little energy?     Poor appetite or overeating?     Feeling bad about yourself - or that you are a failure or have let yourself or your family down?     Trouble concentrating on things, such as reading the newspaper or watching television?     Moving or speaking so slowly that other people could have noticed? Or the opposite - being so fidgety or restless that you have been moving around a lot more than usual?     Thoughts that you would be better off dead, or of hurting yourself in some way?     PHQ-9 Total Score     If you checked off any problems, how difficult have these problems made it for you to do your work, take care of things at home, or get along with other people?        Result Review  Data Reviewed:            Assessment and Plan      Diagnoses and all orders for this visit:    1. Acute non-recurrent maxillary sinusitis (Primary)  -     methylPREDNISolone (MEDROL) 4 MG dose pack; Take as directed on package instructions.  Dispense: 21 tablet; Refill: 0  -     azithromycin (Zithromax Z-Steve) 250 MG tablet; Take 2 tablets by mouth on day 1, then 1 tablet daily on days 2-5  Dispense: 6 tablet; Refill: 0    2. Seasonal allergic rhinitis due to pollen  -     fluticasone (FLONASE) 50 MCG/ACT nasal spray; 2 sprays into the nostril(s) as directed by provider Daily.  Dispense: 16 g; Refill: 1  -     fexofenadine (Allegra Allergy) 180 MG tablet; Take 1 tablet by mouth Daily.   Dispense: 30 tablet; Refill: 2      Patient being seen through telehealth today with complaints of sinus congestion, pressure, and drainage.  She states symptoms began 5 to 6 days ago.  She also complains of a dry cough.  She has experienced a headache over the past few days.  She states she has had hot and cold flashes, but does not feel she is had a fever.  She did take a home COVID test which was negative.  She is taking DayQuil and NyQuil with minimal improvement.    Plan:    1.  Offered respiratory panel swab, patient declines and would like treatment only.  Start Z-Steve and Medrol Dosepak.  Explained to patient that if this is viral in nature and the antibiotic will not treat this.  Also recommended Flonase nasal spray and a daily allergy medication.  Patient states she did not see improvements with zyrtec and claritin.  We will send Allegra at this time. F/u as needed.      Follow Up   Return if symptoms worsen or fail to improve.  Patient was given instructions and counseling regarding her condition or for health maintenance advice. Please see specific information pulled into the AVS if appropriate.

## 2023-09-13 DIAGNOSIS — E89.0 S/P TOTAL THYROIDECTOMY: ICD-10-CM

## 2023-09-13 RX ORDER — LEVOTHYROXINE SODIUM 75 MCG
TABLET ORAL
Qty: 30 TABLET | Refills: 0 | Status: SHIPPED | OUTPATIENT
Start: 2023-09-13

## 2023-10-13 DIAGNOSIS — E89.0 S/P TOTAL THYROIDECTOMY: ICD-10-CM

## 2023-10-13 RX ORDER — LEVOTHYROXINE SODIUM 75 MCG
TABLET ORAL
Qty: 30 TABLET | Refills: 0 | Status: SHIPPED | OUTPATIENT
Start: 2023-10-13

## 2023-10-30 DIAGNOSIS — E89.0 S/P TOTAL THYROIDECTOMY: ICD-10-CM

## 2023-10-30 RX ORDER — LEVOTHYROXINE SODIUM 75 MCG
TABLET ORAL
Qty: 30 TABLET | Refills: 0 | Status: SHIPPED | OUTPATIENT
Start: 2023-10-30

## 2023-12-13 ENCOUNTER — TELEPHONE (OUTPATIENT)
Dept: OTOLARYNGOLOGY | Facility: CLINIC | Age: 33
End: 2023-12-13
Payer: COMMERCIAL

## 2023-12-13 DIAGNOSIS — E89.0 S/P TOTAL THYROIDECTOMY: ICD-10-CM

## 2023-12-13 RX ORDER — LEVOTHYROXINE SODIUM 75 MCG
TABLET ORAL
Qty: 30 TABLET | Refills: 0 | OUTPATIENT
Start: 2023-12-13

## 2023-12-13 RX ORDER — LEVOTHYROXINE SODIUM 75 MCG
75 TABLET ORAL DAILY
Qty: 30 TABLET | Refills: 3 | Status: SHIPPED | OUTPATIENT
Start: 2023-12-13

## 2023-12-13 NOTE — TELEPHONE ENCOUNTER
Called pt and informed her that her synthroid was filled with 3 refills. Also notified that when she is on her last bottle she would need to call her PCP for an appt for then to take over her synthroid refills, since we have gotten her TSH under control with her current med dosage. Pt verbalized understanding.

## 2023-12-13 NOTE — TELEPHONE ENCOUNTER
Caller: Mirela Flores     Relationship: SELF    Best call back number: [phone]     Requested Prescriptions: Synthroid 75 MCG tablet   Requested Prescriptions      No prescriptions requested or ordered in this encounter        Pharmacy where request should be sent:    [facility] [address] - [phone] [facility] [phone] FX       Last office visit with prescribing clinician: 11/1/2022   Last telemedicine visit with prescribing clinician: Visit date not found   Next office visit with prescribing clinician: 12/13/2023     Additional details provided by patient: PT IS OUT OF MEDICATION. SHE HASN'T TAKEN ANY MEDS SINCE YESTERDAY. SHE STATES HER PHARMACY HAS SENT SEVERAL REQUESTS BUT NOTHING HAS BEEN SENT TO THEM.     PT IS ALSO ASKING WHEN SHE IS DUE FOR LABS AGAIN. LAST PROG NOTE STATE PT SHOULD FOLLOW UP W/PCP FOR ANY THYROID LABS OR REFILLS BUT PT STATES SHE WAS UNAWARE AND WASN'T SURE IF ANYONE CONTACTED HER PCP IN REGARDS TO THAT.       Does the patient have less than a 3 day supply:  [x] Yes  [] No    Would you like a call back once the refill request has been completed: [] Yes [x] No    If the office needs to give you a call back, can they leave a voicemail: [] Yes [x] No    Jose Stern Rep   12/13/23 12:42 CST

## 2023-12-18 ENCOUNTER — TELEPHONE (OUTPATIENT)
Dept: FAMILY MEDICINE CLINIC | Facility: CLINIC | Age: 33
End: 2023-12-18

## 2023-12-18 ENCOUNTER — LAB (OUTPATIENT)
Dept: LAB | Facility: HOSPITAL | Age: 33
End: 2023-12-18
Payer: COMMERCIAL

## 2023-12-18 ENCOUNTER — OFFICE VISIT (OUTPATIENT)
Dept: FAMILY MEDICINE CLINIC | Facility: CLINIC | Age: 33
End: 2023-12-18
Payer: COMMERCIAL

## 2023-12-18 VITALS
HEART RATE: 60 BPM | WEIGHT: 113.2 LBS | SYSTOLIC BLOOD PRESSURE: 112 MMHG | HEIGHT: 63 IN | BODY MASS INDEX: 20.06 KG/M2 | TEMPERATURE: 98.9 F | DIASTOLIC BLOOD PRESSURE: 80 MMHG | OXYGEN SATURATION: 99 %

## 2023-12-18 DIAGNOSIS — R50.9 FEVER, UNSPECIFIED FEVER CAUSE: ICD-10-CM

## 2023-12-18 DIAGNOSIS — R05.1 ACUTE COUGH: Primary | ICD-10-CM

## 2023-12-18 DIAGNOSIS — J02.9 SORE THROAT: ICD-10-CM

## 2023-12-18 DIAGNOSIS — R52 GENERALIZED BODY ACHES: ICD-10-CM

## 2023-12-18 DIAGNOSIS — R09.81 NASAL CONGESTION: ICD-10-CM

## 2023-12-18 DIAGNOSIS — R05.1 ACUTE COUGH: ICD-10-CM

## 2023-12-18 LAB
B PARAPERT DNA SPEC QL NAA+PROBE: NOT DETECTED
B PERT DNA SPEC QL NAA+PROBE: NOT DETECTED
C PNEUM DNA NPH QL NAA+NON-PROBE: NOT DETECTED
FLUAV H1 2009 PAND RNA NPH QL NAA+PROBE: DETECTED
FLUBV RNA ISLT QL NAA+PROBE: NOT DETECTED
HADV DNA SPEC NAA+PROBE: NOT DETECTED
HCOV 229E RNA SPEC QL NAA+PROBE: NOT DETECTED
HCOV HKU1 RNA SPEC QL NAA+PROBE: NOT DETECTED
HCOV NL63 RNA SPEC QL NAA+PROBE: NOT DETECTED
HCOV OC43 RNA SPEC QL NAA+PROBE: NOT DETECTED
HMPV RNA NPH QL NAA+NON-PROBE: NOT DETECTED
HPIV1 RNA ISLT QL NAA+PROBE: NOT DETECTED
HPIV2 RNA SPEC QL NAA+PROBE: NOT DETECTED
HPIV3 RNA NPH QL NAA+PROBE: NOT DETECTED
HPIV4 P GENE NPH QL NAA+PROBE: NOT DETECTED
M PNEUMO IGG SER IA-ACNC: NOT DETECTED
RHINOVIRUS RNA SPEC NAA+PROBE: NOT DETECTED
RSV RNA NPH QL NAA+NON-PROBE: NOT DETECTED
SARS-COV-2 RNA NPH QL NAA+NON-PROBE: NOT DETECTED

## 2023-12-18 PROCEDURE — 0202U NFCT DS 22 TRGT SARS-COV-2: CPT

## 2023-12-18 PROCEDURE — 99213 OFFICE O/P EST LOW 20 MIN: CPT

## 2023-12-18 NOTE — TELEPHONE ENCOUNTER
Caller: Mirela Flores    Relationship to patient: Self    Best call back number: [phone]     Chief complaint: SICK    Type of visit: SAME DAY    Requested date: 12/18/23     Additional notes: PATIENT REQUESTING CALLBACK TO  HERSELF AND 2 OTHER PATIENTS FOR SAME DAY APPTS.

## 2023-12-18 NOTE — PROGRESS NOTES
"Chief Complaint  Cough, Generalized Body Aches, Sore Throat, Nausea, Headache, and URI    Subjective    History of Present Illness      Patient presents to [facility] for   History of Present Illness  Pt presents today with Cough, Generalized Body Aches, Sore Throat, Nausea, Headache, and congestion. Ongoing since yesterday       Review of Systems   Constitutional:  Positive for fever.   HENT:  Positive for congestion, rhinorrhea and sinus pressure.    Respiratory:  Positive for cough.    All other systems reviewed and are negative.      I have reviewed and agree with the HPI and ROS information as above.  Mirela Shook, DAVID     Objective   Vital Signs:   /80   Pulse 60   Temp 98.9 °F (37.2 °C) (Infrared)   Ht 158.8 cm (62.52\")   Wt 51.3 kg (113 lb 3.2 oz)   SpO2 99%   BMI 20.36 kg/m²     BMI is within normal parameters. No other follow-up for BMI required.      Physical Exam  Constitutional:       Appearance: Normal appearance. She is well-developed.   HENT:      Head: Normocephalic and atraumatic.      Right Ear: Tympanic membrane, ear canal and external ear normal. A middle ear effusion is present.      Left Ear: Tympanic membrane, ear canal and external ear normal. A middle ear effusion is present.      Nose: Nose normal. Congestion present. No septal deviation or nasal tenderness.      Mouth/Throat:      Lips: Pink. No lesions.      Mouth: Mucous membranes are moist. No oral lesions.      Dentition: Normal dentition.      Pharynx: Oropharynx is clear. Posterior oropharyngeal erythema present. No pharyngeal swelling or oropharyngeal exudate.   Eyes:      General: Lids are normal. Vision grossly intact. No scleral icterus.        Right eye: No discharge.         Left eye: No discharge.      Extraocular Movements: Extraocular movements intact.      Conjunctiva/sclera: Conjunctivae normal.      Right eye: Right conjunctiva is not injected.      Left eye: Left conjunctiva " is not injected.      Pupils: Pupils are equal, round, and reactive to light.   Neck:      Thyroid: No thyroid mass.      Trachea: Trachea normal.   Cardiovascular:      Rate and Rhythm: Normal rate and regular rhythm.      Heart sounds: Normal heart sounds. No murmur heard.     No gallop.   Pulmonary:      Effort: Pulmonary effort is normal.      Breath sounds: Normal breath sounds and air entry. No wheezing, rhonchi or rales.   Abdominal:      General: There is no distension.      Palpations: Abdomen is soft. There is no mass.      Tenderness: There is no abdominal tenderness. There is no right CVA tenderness, left CVA tenderness, guarding or rebound.   Musculoskeletal:         General: No tenderness or deformity. Normal range of motion.      Cervical back: Full passive range of motion without pain, normal range of motion and neck supple.      Thoracic back: Normal.      Right lower leg: No edema.      Left lower leg: No edema.   Skin:     General: Skin is warm and dry.      Coloration: Skin is not jaundiced.      Findings: No rash.   Neurological:      Mental Status: She is alert and oriented to person, place, and time.      Sensory: Sensation is intact.      Motor: Motor function is intact.      Coordination: Coordination is intact.      Gait: Gait is intact.      Deep Tendon Reflexes: Reflexes are normal and symmetric.   Psychiatric:         Mood and Affect: Mood and affect normal.         Judgment: Judgment normal.          JOSE DANIEL-7:      PHQ-2 Depression Screening  Little interest or pleasure in doing things? 0-->not at all   Feeling down, depressed, or hopeless? 0-->not at all   PHQ-2 Total Score 0     PHQ-9 Depression Screening  Little interest or pleasure in doing things? 0-->not at all   Feeling down, depressed, or hopeless? 0-->not at all   Trouble falling or staying asleep, or sleeping too much?     Feeling tired or having little energy?     Poor appetite or overeating?     Feeling bad about yourself - or  that you are a failure or have let yourself or your family down?     Trouble concentrating on things, such as reading the newspaper or watching television?     Moving or speaking so slowly that other people could have noticed? Or the opposite - being so fidgety or restless that you have been moving around a lot more than usual?     Thoughts that you would be better off dead, or of hurting yourself in some way?     PHQ-9 Total Score 0   If you checked off any problems, how difficult have these problems made it for you to do your work, take care of things at home, or get along with other people?        Result Review  Data Reviewed:                   Assessment and Plan      Diagnoses and all orders for this visit:    1. Acute cough (Primary)  -     Respiratory Panel PCR w/COVID-19(SARS-CoV-2) MONICA/BAILEY/OSKAR/PAD/COR/JONATHAN In-House, NP Swab in UTM/VTM, 2 HR TAT - Swab, Nasopharynx; Future    2. Nasal congestion  -     Respiratory Panel PCR w/COVID-19(SARS-CoV-2) MONICA/BAILEY/OSKAR/PAD/COR/JONATHAN In-House, NP Swab in UTM/VTM, 2 HR TAT - Swab, Nasopharynx; Future    3. Sore throat  -     Respiratory Panel PCR w/COVID-19(SARS-CoV-2) MONICA/BAILEY/OSKAR/PAD/COR/JONATHAN In-House, NP Swab in UTM/VTM, 2 HR TAT - Swab, Nasopharynx; Future    4. Fever, unspecified fever cause  -     Respiratory Panel PCR w/COVID-19(SARS-CoV-2) MONICA/BAILEY/OSKAR/PAD/COR/JONATHAN In-House, NP Swab in UTM/VTM, 2 HR TAT - Swab, Nasopharynx; Future    5. Generalized body aches  -     Respiratory Panel PCR w/COVID-19(SARS-CoV-2) MONICA/BAILEY/OSKAR/PAD/COR/JONATHAN In-House, NP Swab in UTM/VTM, 2 HR TAT - Swab, Nasopharynx; Future    Patient is seen today with her children with complaints of a cough, nasal congestion, sore throat, fever, body aches.  Symptoms have been ongoing since yesterday.  Her daughter started out sick first.  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  Patient overall just does not feel well.  Patient has been taking  over-the-counter cold and flu.  We will do respiratory panel today.  If negative will send in Z-Steve and Medrol pack.    Plan:  1.  Respiratory panel  2.  Will send in Z-Steve, Medrol pack if negative.  3.  Tylenol, Motrin for fever and pain relief.        Follow Up   No follow-ups on file.  Patient was given instructions and counseling regarding her condition or for health maintenance advice. Please see specific information pulled into the AVS if appropriate.

## 2023-12-19 ENCOUNTER — TELEPHONE (OUTPATIENT)
Dept: FAMILY MEDICINE CLINIC | Facility: CLINIC | Age: 33
End: 2023-12-19
Payer: COMMERCIAL

## 2023-12-19 NOTE — TELEPHONE ENCOUNTER
----- Message from DAVID Evans sent at 12/19/2023  8:00 AM CST -----  Regarding: FW:  Please let mother know she is positive for influenza A. May treat with zofluza if she would like  ----- Message -----  From: Lab, Background User  Sent: 12/18/2023   9:25 PM CST  To: DAVID Evans

## 2023-12-20 DIAGNOSIS — J10.1 INFLUENZA A: Primary | ICD-10-CM

## 2023-12-20 RX ORDER — BALOXAVIR MARBOXIL 40 MG/1
40 TABLET, FILM COATED ORAL ONCE
Qty: 1 EACH | Refills: 0 | Status: SHIPPED | OUTPATIENT
Start: 2023-12-20 | End: 2023-12-20

## 2024-03-14 DIAGNOSIS — E89.0 S/P TOTAL THYROIDECTOMY: ICD-10-CM

## 2024-03-14 RX ORDER — LEVOTHYROXINE SODIUM 75 MCG
75 TABLET ORAL DAILY
Qty: 30 TABLET | Refills: 3 | Status: SHIPPED | OUTPATIENT
Start: 2024-03-14

## 2024-04-10 ENCOUNTER — TELEMEDICINE (OUTPATIENT)
Dept: FAMILY MEDICINE CLINIC | Facility: CLINIC | Age: 34
End: 2024-04-10
Payer: COMMERCIAL

## 2024-04-10 VITALS — WEIGHT: 113 LBS | BODY MASS INDEX: 20.8 KG/M2 | HEIGHT: 62 IN

## 2024-04-10 DIAGNOSIS — I73.00 RAYNAUD'S PHENOMENON WITHOUT GANGRENE: ICD-10-CM

## 2024-04-10 DIAGNOSIS — R20.2 NUMBNESS AND TINGLING IN BOTH HANDS: ICD-10-CM

## 2024-04-10 DIAGNOSIS — R20.0 NUMBNESS AND TINGLING IN BOTH HANDS: ICD-10-CM

## 2024-04-10 DIAGNOSIS — E89.0 S/P TOTAL THYROIDECTOMY: Primary | ICD-10-CM

## 2024-04-10 PROCEDURE — 99214 OFFICE O/P EST MOD 30 MIN: CPT

## 2024-04-10 RX ORDER — LEVOTHYROXINE SODIUM 75 MCG
75 TABLET ORAL DAILY
Qty: 30 TABLET | Refills: 2 | Status: SHIPPED | OUTPATIENT
Start: 2024-04-10

## 2024-04-10 RX ORDER — AMLODIPINE BESYLATE 2.5 MG/1
2.5 TABLET ORAL DAILY
Qty: 30 TABLET | Refills: 2 | Status: SHIPPED | OUTPATIENT
Start: 2024-04-10

## 2024-04-10 NOTE — PROGRESS NOTES
"You have chosen to receive care through a telehealth visit.  Do you consent to use a video/audio connection for your medical care today? Yes   This was an audio and video enabled telemedicine encounter. Patient verbally consented to visit. Patient was located at Home and I was located at [facility]  location.       Chief Complaint  Hypothyroidism    Subjective    History of Present Illness      Patient presents to [facility] for   History of Present Illness  Hypothyroidism doing well.   Hypothyroidism         Review of Systems   All other systems reviewed and are negative.      I have reviewed and agree with the HPI and ROS information as above.  DAVID Evans     Objective   Vital Signs:   Ht 157.5 cm (62\")   Wt 51.3 kg (113 lb)   BMI 20.67 kg/m²       Physical Exam  Constitutional:       Appearance: Normal appearance. She is well-developed.   HENT:      Head: Normocephalic and atraumatic.      Nose: No congestion.      Mouth/Throat:      Lips: Pink. No lesions.   Eyes:      General: Lids are normal. Vision grossly intact.      Conjunctiva/sclera: Conjunctivae normal.      Right eye: Right conjunctiva is not injected.      Left eye: Left conjunctiva is not injected.   Pulmonary:      Effort: Pulmonary effort is normal.   Musculoskeletal:         General: Normal range of motion.      Cervical back: Full passive range of motion without pain, normal range of motion and neck supple.   Neurological:      Mental Status: She is alert and oriented to person, place, and time.      Motor: Motor function is intact.   Psychiatric:         Mood and Affect: Mood and affect normal.         Judgment: Judgment normal.          PHQ-2 Depression Screening  Little interest or pleasure in doing things?     Feeling down, depressed, or hopeless?     PHQ-2 Total Score        PHQ-9 Depression Screening  Little interest or pleasure in doing things?     Feeling down, depressed, or hopeless?   "   Trouble falling or staying asleep, or sleeping too much?     Feeling tired or having little energy?     Poor appetite or overeating?     Feeling bad about yourself - or that you are a failure or have let yourself or your family down?     Trouble concentrating on things, such as reading the newspaper or watching television?     Moving or speaking so slowly that other people could have noticed? Or the opposite - being so fidgety or restless that you have been moving around a lot more than usual?     Thoughts that you would be better off dead, or of hurting yourself in some way?     PHQ-9 Total Score     If you checked off any problems, how difficult have these problems made it for you to do your work, take care of things at home, or get along with other people?          Result Review  Data Reviewed:                   Assessment and Plan    Problem List Items Addressed This Visit       S/P total thyroidectomy - Primary    Relevant Medications    Synthroid 75 MCG tablet     Other Visit Diagnoses       Raynaud's phenomenon without gangrene        Relevant Medications    amLODIPine (Norvasc) 2.5 MG tablet    Numbness and tingling in both hands        Relevant Medications    amLODIPine (Norvasc) 2.5 MG tablet        Patient is seen today on telehealth for synthroid and restart amlodipine for raynauds. Patient is doing well on current medication regimens and wishes to continue same.     Patient is due for routine physical and labs and will schedule this for next visit.     Plan  Cont Synthroid 75mcg  Restart Amlodipine 2.5mg          Follow Up   Return in about 3 months (around 7/10/2024) for Annual physical.  Patient was given instructions and counseling regarding her condition or for health maintenance advice. Please see specific information pulled into the AVS if appropriate.

## 2024-05-21 ENCOUNTER — OFFICE VISIT (OUTPATIENT)
Dept: OBGYN CLINIC | Age: 34
End: 2024-05-21
Payer: COMMERCIAL

## 2024-05-21 VITALS
HEART RATE: 86 BPM | SYSTOLIC BLOOD PRESSURE: 126 MMHG | HEIGHT: 66 IN | BODY MASS INDEX: 18.64 KG/M2 | WEIGHT: 116 LBS | DIASTOLIC BLOOD PRESSURE: 80 MMHG

## 2024-05-21 DIAGNOSIS — Z13.31 DEPRESSION SCREEN: ICD-10-CM

## 2024-05-21 DIAGNOSIS — Z01.419 WELL WOMAN EXAM WITH ROUTINE GYNECOLOGICAL EXAM: ICD-10-CM

## 2024-05-21 DIAGNOSIS — Z11.51 SCREENING FOR HPV (HUMAN PAPILLOMAVIRUS): ICD-10-CM

## 2024-05-21 DIAGNOSIS — Z12.72 SCREENING FOR VAGINAL CANCER: Primary | ICD-10-CM

## 2024-05-21 LAB — HPV16+18+H RISK 12 DNA SPEC-IMP: NORMAL

## 2024-05-21 PROCEDURE — 99385 PREV VISIT NEW AGE 18-39: CPT | Performed by: OBSTETRICS & GYNECOLOGY

## 2024-05-21 PROCEDURE — G0444 DEPRESSION SCREEN ANNUAL: HCPCS | Performed by: OBSTETRICS & GYNECOLOGY

## 2024-05-21 RX ORDER — LEVOTHYROXINE SODIUM 0.07 MG/1
75 TABLET ORAL DAILY
COMMUNITY
Start: 2024-04-10

## 2024-05-21 RX ORDER — AMLODIPINE BESYLATE 2.5 MG/1
2.5 TABLET ORAL DAILY
COMMUNITY
Start: 2024-04-10

## 2024-05-21 ASSESSMENT — ENCOUNTER SYMPTOMS
RESPIRATORY NEGATIVE: 1
GASTROINTESTINAL NEGATIVE: 1

## 2024-05-21 ASSESSMENT — PATIENT HEALTH QUESTIONNAIRE - PHQ9
2. FEELING DOWN, DEPRESSED OR HOPELESS: NOT AT ALL
SUM OF ALL RESPONSES TO PHQ QUESTIONS 1-9: 0
SUM OF ALL RESPONSES TO PHQ QUESTIONS 1-9: 0
SUM OF ALL RESPONSES TO PHQ9 QUESTIONS 1 & 2: 0
1. LITTLE INTEREST OR PLEASURE IN DOING THINGS: NOT AT ALL
SUM OF ALL RESPONSES TO PHQ QUESTIONS 1-9: 0
SUM OF ALL RESPONSES TO PHQ QUESTIONS 1-9: 0

## 2024-05-21 NOTE — PROGRESS NOTES
SUBJECTIVE:  Shaunna Jimenez is a 34 y.o.  who is here for annual exam and c/o rash and itching on breasts that is improving.      Review of Systems   Constitutional: Negative.    Respiratory: Negative.     Cardiovascular: Negative.    Gastrointestinal: Negative.    Genitourinary:         Mastalgia and pruritus on breasts   Musculoskeletal:  Positive for myalgias.   Skin:  Positive for rash.   Neurological: Negative.    Psychiatric/Behavioral: Negative.     All other systems reviewed and are negative.      GYN HX:   No LMP recorded. Patient has had a hysterectomy.  Abnormal Bleeding/Menses: none  Abnormal pap smear: Pt has h/o abnormal pap and is S/P colpo and LEEP.    Social History     Substance and Sexual Activity   Sexual Activity Not on file     OB History    No obstetric history on file.         Because violence is so common, we ask all our patients: are you in a relationship or do you live with a person who threatens, hurts, orcontrols you: No    Past Medical History:   Diagnosis Date    Abnormal Pap smear of cervix     Attention deficit hyperactivity disorder (ADHD), predominantly inattentive type 10/03/2017    Generalized anxiety disorder     Iron deficiency anemia     Migraine without aura and without status migrainosus, not intractable 10/03/2017    Thyroid nodule      Past Surgical History:   Procedure Laterality Date    COLPOSCOPY      HYSTERECTOMY (CERVIX STATUS UNKNOWN)      LEEP      XR MIDLINE EQUAL OR GREATER THAN 5 YEARS  2014    XR MIDLINE EQUAL OR GREATER THAN 5 YEARS    XR MIDLINE EQUAL OR GREATER THAN 5 YEARS  2014    XR MIDLINE EQUAL OR GREATER THAN 5 YEARS     Family History   Problem Relation Age of Onset    Breast Cancer Other     Breast Cancer Other     Breast Cancer Maternal Aunt      Social History     Tobacco Use    Smoking status: Never    Smokeless tobacco: Never   Substance Use Topics    Alcohol use: No    Drug use: No     Current Outpatient Medications on File

## 2024-05-28 LAB
HPV HR 12 DNA SPEC QL NAA+PROBE: NOT DETECTED
HPV16 DNA SPEC QL NAA+PROBE: NOT DETECTED
HPV16+18+H RISK 12 DNA SPEC-IMP: NORMAL
HPV18 DNA SPEC QL NAA+PROBE: NOT DETECTED

## 2024-07-11 DIAGNOSIS — R20.2 NUMBNESS AND TINGLING IN BOTH HANDS: ICD-10-CM

## 2024-07-11 DIAGNOSIS — R20.0 NUMBNESS AND TINGLING IN BOTH HANDS: ICD-10-CM

## 2024-07-11 DIAGNOSIS — I73.00 RAYNAUD'S PHENOMENON WITHOUT GANGRENE: ICD-10-CM

## 2024-07-11 RX ORDER — AMLODIPINE BESYLATE 2.5 MG/1
2.5 TABLET ORAL DAILY
Qty: 30 TABLET | Refills: 2 | OUTPATIENT
Start: 2024-07-11

## 2024-07-12 ENCOUNTER — TELEPHONE (OUTPATIENT)
Dept: FAMILY MEDICINE CLINIC | Facility: CLINIC | Age: 34
End: 2024-07-12
Payer: COMMERCIAL

## 2024-07-12 DIAGNOSIS — R20.0 NUMBNESS AND TINGLING IN BOTH HANDS: ICD-10-CM

## 2024-07-12 DIAGNOSIS — R20.2 NUMBNESS AND TINGLING IN BOTH HANDS: ICD-10-CM

## 2024-07-12 DIAGNOSIS — I73.00 RAYNAUD'S PHENOMENON WITHOUT GANGRENE: ICD-10-CM

## 2024-07-12 DIAGNOSIS — E89.0 S/P TOTAL THYROIDECTOMY: ICD-10-CM

## 2024-07-12 RX ORDER — LEVOTHYROXINE SODIUM 75 MCG
75 TABLET ORAL DAILY
Qty: 30 TABLET | Refills: 2 | Status: SHIPPED | OUTPATIENT
Start: 2024-07-12

## 2024-07-12 RX ORDER — AMLODIPINE BESYLATE 2.5 MG/1
2.5 TABLET ORAL DAILY
Qty: 30 TABLET | Refills: 2 | Status: SHIPPED | OUTPATIENT
Start: 2024-07-12

## 2024-07-12 NOTE — TELEPHONE ENCOUNTER
Caller: Mirela Flores    Relationship: Self    Best call back number: [phone]     What is the best time to reach you: ANY    Who are you requesting to speak with (clinical staff, provider,  specific staff member): CLINICAL    Do you know the name of the person who called: SELF    What was the call regarding: WOULD LIKE TO KNOW IF IT'S TIME TO SCHEDULE LABS YET.     Is it okay if the provider responds through [employer]: YES OR CALL BACK

## 2024-07-12 NOTE — TELEPHONE ENCOUNTER
Caller: FloresMirela    Relationship: Self    Best call back number: [phone]     Requested Prescriptions:   Requested Prescriptions     Pending Prescriptions Disp Refills    Synthroid 75 MCG tablet 30 tablet 2     Sig: Take 1 tablet by mouth Daily.    amLODIPine (Norvasc) 2.5 MG tablet 30 tablet 2     Sig: Take 1 tablet by mouth Daily.        Pharmacy where request should be sent: [place] [address] - [phone] [facility] [phone] FX     Last office visit with prescribing clinician: 12/18/2023   Last telemedicine visit with prescribing clinician: 4/10/2024   Next office visit with prescribing clinician: Visit date not found     Additional details provided by patient:     Does the patient have less than a 3 day supply:  [x] Yes  [] No    Would you like a call back once the refill request has been completed: [] Yes [x] No    If the office needs to give you a call back, can they leave a voicemail: [] Yes [x] No    Ezequiel Friedman III, Jose Rep   07/12/24 11:59 CDT

## 2024-08-08 DIAGNOSIS — E89.0 S/P TOTAL THYROIDECTOMY: ICD-10-CM

## 2024-08-08 RX ORDER — LEVOTHYROXINE SODIUM 75 MCG
75 TABLET ORAL DAILY
Qty: 30 TABLET | Refills: 2 | OUTPATIENT
Start: 2024-08-08

## 2024-12-30 DIAGNOSIS — E89.0 S/P TOTAL THYROIDECTOMY: ICD-10-CM

## 2024-12-30 NOTE — TELEPHONE ENCOUNTER
Rx Refill Note  Requested Prescriptions     Pending Prescriptions Disp Refills    Synthroid 75 MCG tablet [Pharmacy Med Name: SYNTHROID 75MCG TABLET] 30 tablet 2     Sig: TAKE ONE (1) TABLET BY MOUTH DAILY.      Last office visit with prescribing clinician: 12/18/2023   Last telemedicine visit with prescribing clinician: Visit date not found   Next office visit with prescribing clinician: Visit date not found

## 2025-01-03 RX ORDER — LEVOTHYROXINE SODIUM 75 MCG
75 TABLET ORAL DAILY
Qty: 30 TABLET | Refills: 0 | Status: SHIPPED | OUTPATIENT
Start: 2025-01-03

## 2025-03-08 DIAGNOSIS — E89.0 S/P TOTAL THYROIDECTOMY: ICD-10-CM

## 2025-03-10 RX ORDER — LEVOTHYROXINE SODIUM 75 MCG
75 TABLET ORAL DAILY
Qty: 30 TABLET | Refills: 0 | Status: SHIPPED | OUTPATIENT
Start: 2025-03-10

## 2025-03-10 NOTE — TELEPHONE ENCOUNTER
Rx Refill Note  Requested Prescriptions     Pending Prescriptions Disp Refills    Synthroid 75 MCG tablet [Pharmacy Med Name: SYNTHROID 75MCG TABLET] 30 tablet 0     Sig: TAKE ONE (1) TABLET BY MOUTH DAILY      Last office visit with prescribing clinician: 12/18/2023   Last telemedicine visit with prescribing clinician: 4-     Next office visit with prescribing clinician: Visit date not found    [facility] requesting refill above.  Last refill 1-3-25, #30 with 0 refills.      Sadaf Ann MA  03/10/25, 12:55 CDT

## 2025-04-28 NOTE — TELEPHONE ENCOUNTER
Botox Approved, patient scheduled   Patient states she has arthritis and was experiencing extreme left foot pain yesterday. Patient took her 's Celebrex prescription medication and states she felt relief. Patient is experiencing minor pain today and would like to know if she can obtain script for arthritis pain or if she needs and appointment to see Dr. Ashley. Informed patient message will be sent to provider. Patient acknowledged an oral understanding.

## 2025-05-06 DIAGNOSIS — Z98.890 S/P TOTAL THYROIDECTOMY: ICD-10-CM

## 2025-05-06 DIAGNOSIS — Z90.89 S/P TOTAL THYROIDECTOMY: ICD-10-CM

## 2025-05-07 RX ORDER — LEVOTHYROXINE SODIUM 75 MCG
75 TABLET ORAL DAILY
Qty: 30 TABLET | Refills: 0 | Status: SHIPPED | OUTPATIENT
Start: 2025-05-07

## 2025-05-07 NOTE — TELEPHONE ENCOUNTER
Rx Refill Note  Requested Prescriptions     Refused Prescriptions Disp Refills    Synthroid 75 MCG tablet [Pharmacy Med Name: SYNTHROID 75MCG TABLET] 30 tablet 0     Sig: TAKE ONE (1) TABLET BY MOUTH DAILY      Last office visit with prescribing clinician: 12/18/2023   Last telemedicine visit with prescribing clinician: Visit date not found   Next office visit with prescribing clinician: Visit date not found

## 2025-05-13 ENCOUNTER — TRANSCRIBE ORDERS (OUTPATIENT)
Dept: ADMINISTRATIVE | Facility: HOSPITAL | Age: 35
End: 2025-05-13
Payer: COMMERCIAL

## 2025-05-13 ENCOUNTER — LAB (OUTPATIENT)
Dept: LAB | Facility: HOSPITAL | Age: 35
End: 2025-05-13
Payer: COMMERCIAL

## 2025-05-13 DIAGNOSIS — E55.9 VITAMIN D DEFICIENCY, UNSPECIFIED: ICD-10-CM

## 2025-05-13 DIAGNOSIS — E03.9 HYPOTHYROIDISM, UNSPECIFIED TYPE: Primary | ICD-10-CM

## 2025-05-13 DIAGNOSIS — E03.9 HYPOTHYROIDISM, UNSPECIFIED TYPE: ICD-10-CM

## 2025-05-13 LAB
25(OH)D3 SERPL-MCNC: 29.7 NG/ML (ref 30–100)
T3 SERPL-MCNC: 84.2 NG/DL (ref 80–200)
T4 FREE SERPL-MCNC: 1.34 NG/DL (ref 0.92–1.68)
TSH SERPL DL<=0.05 MIU/L-ACNC: 2.56 UIU/ML (ref 0.27–4.2)

## 2025-05-13 PROCEDURE — 82306 VITAMIN D 25 HYDROXY: CPT

## 2025-05-13 PROCEDURE — 36415 COLL VENOUS BLD VENIPUNCTURE: CPT

## 2025-05-13 PROCEDURE — 84480 ASSAY TRIIODOTHYRONINE (T3): CPT

## 2025-05-13 PROCEDURE — 84443 ASSAY THYROID STIM HORMONE: CPT

## 2025-05-13 PROCEDURE — 84439 ASSAY OF FREE THYROXINE: CPT

## 2025-06-03 DIAGNOSIS — Z90.89 S/P TOTAL THYROIDECTOMY: ICD-10-CM

## 2025-06-03 DIAGNOSIS — Z98.890 S/P TOTAL THYROIDECTOMY: ICD-10-CM

## 2025-06-03 RX ORDER — LEVOTHYROXINE SODIUM 75 MCG
75 TABLET ORAL DAILY
Qty: 30 TABLET | Refills: 0 | OUTPATIENT
Start: 2025-06-03

## (undated) DEVICE — GLV SURG SENSICARE W/ALOE PF LF 7 STRL

## (undated) DEVICE — TRAP FLD MINIVAC MEGADYNE 100ML

## (undated) DEVICE — PK ENT HD AND NK 30

## (undated) DEVICE — TIP COVER ACCESSORY

## (undated) DEVICE — TBG PENCL TELESCP MEGADYNE SMOKE EVAC 10FT

## (undated) DEVICE — LAPAROSCOPY WOUND CLOSURE SYSTEM KIT CONSISTS OF:05391529640002; NEOCLOSE ANCHORGUIDE 5/12 & 8/15 US; MODEL NUMBER NCA515-U; QTY 10 AND05391529640019; NEOCLOSE AUTOANCHOR X2 PACK US; MODEL NUMBER NCA2-U;  QTY 10: Brand: NEOCLOSE ANCHORGUIDE REGULAR PORT CLOSURE KIT US

## (undated) DEVICE — SUT VIC 0 CT1 CR8 27IN UD VCPP41D

## (undated) DEVICE — YANKAUER,BULB TIP WITH VENT: Brand: ARGYLE

## (undated) DEVICE — RETR STAY HK ELAS SHRP 5MM 50PK

## (undated) DEVICE — 3M™ STERI-STRIP™ REINFORCED ADHESIVE SKIN CLOSURES, R1546, 1/4 IN X 4 IN (6 MM X 100 MM), 10 STRIPS/ENVELOPE: Brand: 3M™ STERI-STRIP™

## (undated) DEVICE — MSK O2 MD CONCENTR A/ LF 7FT 1P/U

## (undated) DEVICE — TBG DRN URINARY 9/32IN

## (undated) DEVICE — CLTH CLENS READYCLEANSE PERI CARE PK/5

## (undated) DEVICE — ST TBG DSE 6FT

## (undated) DEVICE — ST TBG AIRSEAL FLTR TRI LUM

## (undated) DEVICE — PAD,PREPPING,CUFFED,24X48,7",NONSTERILE: Brand: MEDLINE

## (undated) DEVICE — SUT MNCRYL 4/0 P3 18IN UD MCP494G

## (undated) DEVICE — HEWSON SUTURE RETRIEVER: Brand: HEWSON SUTURE RETRIEVER

## (undated) DEVICE — DISPOSABLE IRRIGATION BIPOLAR CORD, M1000 TYPE: Brand: KIRWAN

## (undated) DEVICE — GLV SURG BIOGEL M LTX PF 7 1/2

## (undated) DEVICE — PK TURNOVER RM ADV

## (undated) DEVICE — TOTAL TRAY, 16FR 10ML SIL FOLEY, URN: Brand: MEDLINE

## (undated) DEVICE — SUT MNCRYL 4/0 PS2 27IN UD MCP426H

## (undated) DEVICE — ENDOPOUCH RETRIEVER SPECIMEN RETRIEVAL BAGS: Brand: ENDOPOUCH RETRIEVER

## (undated) DEVICE — 2, DISPOSABLE SUCTION/IRRIGATOR WITHOUT DISPOSABLE TIP: Brand: STRYKEFLOW

## (undated) DEVICE — ANTIBACTERIAL UNDYED BRAIDED (POLYGLACTIN 910), SYNTHETIC ABSORBABLE SUTURE: Brand: COATED VICRYL

## (undated) DEVICE — DAVINCI: Brand: MEDLINE INDUSTRIES, INC.

## (undated) DEVICE — BAPTIST TURNOVER KIT: Brand: MEDLINE INDUSTRIES, INC.

## (undated) DEVICE — HARMONIC FOCUS SHEARS 9CM LENGTH + ADAPTIVE TISSUE TECHNOLOGY FOR USE WITH BLUE HAND PIECE ONLY: Brand: HARMONIC FOCUS

## (undated) DEVICE — SUT SILK 3/0 SUTUPAK TIES 24IN SA74H

## (undated) DEVICE — UTILITY MARKER W/MED LABELS: Brand: MEDLINE

## (undated) DEVICE — SKIN AFFIX SURG ADHESIVE 72/CS 0.55ML: Brand: MEDLINE

## (undated) DEVICE — PROBE 8225101 5PK STD PRASS FL TIP ROHS

## (undated) DEVICE — LAPAROSCOPIC MONOPOLAR CORD: Brand: VALLEYLAB

## (undated) DEVICE — FRCP BX RADJAW4 NDL 2.8 240 STD OG

## (undated) DEVICE — CONMED SCOPE SAVER BITE BLOCK, 20X27 MM: Brand: SCOPE SAVER

## (undated) DEVICE — ENDOPATH XCEL BLADELESS TROCARS WITH STABILITY SLEEVES: Brand: ENDOPATH XCEL

## (undated) DEVICE — HDRST POSITIONING FM RND 2X9IN

## (undated) DEVICE — GLV SURG NEOLON 2G PF LF 6 STRL

## (undated) DEVICE — TRY PREP SCRB VAG PVP

## (undated) DEVICE — THE CHANNEL CLEANING BRUSH IS A NYLON FLEXI BRUSH ATTACHED TO A FLEXIBLE PLASTIC SHEATH DESIGNED TO SAFELY REMOVE DEBRIS FROM FLEXIBLE ENDOSCOPES.

## (undated) DEVICE — VCARE MEDIUM, UTERINE MANIPULATOR, VAGINAL-CERVICAL-AHLUWALIA'S-RETRACTOR-ELEVATOR: Brand: VCARE

## (undated) DEVICE — VAGINAL PREP TRAY: Brand: MEDLINE INDUSTRIES, INC.

## (undated) DEVICE — STERILE RAYON TIPPED OB/GYN APPLICATORS: Brand: PURITAN

## (undated) DEVICE — TBG SMPL FLTR LINE NASL 02/C02 A/ BX/100

## (undated) DEVICE — ENDOGATOR AUXILIARY WATER JET CONNECTOR: Brand: ENDOGATOR

## (undated) DEVICE — PACK,UNIVERSAL,NO GOWNS: Brand: MEDLINE

## (undated) DEVICE — WIPE THERAWASH SLV SPEC CARE 2PK

## (undated) DEVICE — 4-PORT MANIFOLD: Brand: NEPTUNE 2

## (undated) DEVICE — ENDOPATH XCEL WITH OPTIVIEW TECHNOLOGY BLADELESS TROCARS WITH STABILITY SLEEVES: Brand: ENDOPATH XCEL OPTIVIEW

## (undated) DEVICE — SPNG DISSCT CHRRY 3/8IN STRL PK/5

## (undated) DEVICE — CYSTO/BLADDER IRRIGATION SET, REGULATING CLAMP

## (undated) DEVICE — ELECTRD BLD EZ CLN MOD XLNG 2.75IN

## (undated) DEVICE — APPL HEMO SURG ARISTA/AH/FLEXITIP XL 38CM

## (undated) DEVICE — 1000 SES SMOKE EVACUATION SYSTEM, HAND HELD TUBING SET: Brand: 1000 SES

## (undated) DEVICE — PAD GRND REM POLYHESIVE A/ DISP

## (undated) DEVICE — TROC ANCHORPORT BLADELES LP 8X120MM

## (undated) DEVICE — DRSNG TELFA PAD NONADH STR 1S 3X8IN

## (undated) DEVICE — DECANTER: Brand: UNBRANDED

## (undated) DEVICE — PK LAP GYN 30

## (undated) DEVICE — Device

## (undated) DEVICE — SYR CONTRL LUERLOK 10CC

## (undated) DEVICE — SENSR O2 OXIMAX FNGR A/ 18IN NONSTR

## (undated) DEVICE — CUFF,BP,DISP,1 TUBE,ADULT,HP: Brand: MEDLINE

## (undated) DEVICE — ADHS LIQ MASTISOL 2/3ML

## (undated) DEVICE — EMG TUBE 8229707 NIM TRIVANTAGE 7.0MM ID: Brand: NIM TRIVANTAGE™

## (undated) DEVICE — TOWEL,OR,DSP,ST,BLUE,DLX,10/PK,8PK/CS: Brand: MEDLINE

## (undated) DEVICE — OBT BLADLES ENDOWRIST DAVINCI/S 8MM

## (undated) DEVICE — PAD D&C: Brand: MEDLINE INDUSTRIES, INC.

## (undated) DEVICE — ENDOPATH XCEL WITH OPTIVIEW TECHNOLOGY UNIVERSAL TROCAR STABILITY SLEEVES: Brand: ENDOPATH XCEL OPTIVIEW

## (undated) DEVICE — Device: Brand: DEFENDO AIR/WATER/SUCTION AND BIOPSY VALVE

## (undated) DEVICE — GLV SURG SENSICARE W/ALOE PF LF SZ6 STRL

## (undated) DEVICE — ELECTRD BALL LLETZ 5MM 13CM STRL